# Patient Record
Sex: FEMALE | Race: WHITE | Employment: OTHER | URBAN - METROPOLITAN AREA
[De-identification: names, ages, dates, MRNs, and addresses within clinical notes are randomized per-mention and may not be internally consistent; named-entity substitution may affect disease eponyms.]

---

## 2020-01-01 ENCOUNTER — CARE COORDINATION (OUTPATIENT)
Dept: SLEEP MEDICINE | Facility: CLINIC | Age: 85
End: 2020-01-01

## 2020-01-01 ENCOUNTER — TELEPHONE (OUTPATIENT)
Dept: INTERNAL MEDICINE | Facility: CLINIC | Age: 85
End: 2020-01-01

## 2020-01-01 ENCOUNTER — MEDICAL CORRESPONDENCE (OUTPATIENT)
Dept: HEALTH INFORMATION MANAGEMENT | Facility: CLINIC | Age: 85
End: 2020-01-01

## 2020-01-01 ENCOUNTER — NURSE TRIAGE (OUTPATIENT)
Dept: NURSING | Facility: CLINIC | Age: 85
End: 2020-01-01

## 2020-01-01 ENCOUNTER — APPOINTMENT (OUTPATIENT)
Dept: OCCUPATIONAL THERAPY | Facility: CLINIC | Age: 85
DRG: 291 | End: 2020-01-01
Payer: MEDICARE

## 2020-01-01 ENCOUNTER — ANCILLARY PROCEDURE (OUTPATIENT)
Dept: GENERAL RADIOLOGY | Facility: CLINIC | Age: 85
End: 2020-01-01
Attending: NURSE PRACTITIONER
Payer: MEDICARE

## 2020-01-01 ENCOUNTER — APPOINTMENT (OUTPATIENT)
Dept: CARDIOLOGY | Facility: CLINIC | Age: 85
DRG: 291 | End: 2020-01-01
Attending: INTERNAL MEDICINE
Payer: MEDICARE

## 2020-01-01 ENCOUNTER — PATIENT OUTREACH (OUTPATIENT)
Dept: CARE COORDINATION | Facility: CLINIC | Age: 85
End: 2020-01-01

## 2020-01-01 ENCOUNTER — VIRTUAL VISIT (OUTPATIENT)
Dept: FAMILY MEDICINE | Facility: CLINIC | Age: 85
End: 2020-01-01
Payer: MEDICARE

## 2020-01-01 ENCOUNTER — TELEPHONE (OUTPATIENT)
Dept: SLEEP MEDICINE | Facility: CLINIC | Age: 85
End: 2020-01-01

## 2020-01-01 ENCOUNTER — DOCUMENTATION ONLY (OUTPATIENT)
Dept: OTHER | Facility: CLINIC | Age: 85
End: 2020-01-01

## 2020-01-01 ENCOUNTER — HOSPITAL ENCOUNTER (OUTPATIENT)
Dept: RESPIRATORY THERAPY | Facility: CLINIC | Age: 85
Discharge: HOME | End: 2020-06-10
Attending: INTERNAL MEDICINE | Admitting: INTERNAL MEDICINE
Payer: MEDICARE

## 2020-01-01 ENCOUNTER — OFFICE VISIT (OUTPATIENT)
Dept: FAMILY MEDICINE | Facility: CLINIC | Age: 85
End: 2020-01-01
Payer: MEDICARE

## 2020-01-01 ENCOUNTER — HOSPITAL ENCOUNTER (EMERGENCY)
Facility: CLINIC | Age: 85
Discharge: HOME | End: 2020-07-20
Attending: EMERGENCY MEDICINE | Admitting: EMERGENCY MEDICINE
Payer: MEDICARE

## 2020-01-01 ENCOUNTER — HOSPITAL ENCOUNTER (INPATIENT)
Facility: CLINIC | Age: 85
LOS: 1 days | Discharge: HOME HEALTH | DRG: 291 | End: 2020-05-23
Attending: NURSE PRACTITIONER | Admitting: INTERNAL MEDICINE
Payer: MEDICARE

## 2020-01-01 ENCOUNTER — APPOINTMENT (OUTPATIENT)
Dept: PHYSICAL THERAPY | Facility: CLINIC | Age: 85
DRG: 291 | End: 2020-01-01
Payer: MEDICARE

## 2020-01-01 ENCOUNTER — DOCUMENTATION ONLY (OUTPATIENT)
Dept: MEDSURG UNIT | Facility: CLINIC | Age: 85
End: 2020-01-01

## 2020-01-01 ENCOUNTER — HOSPITAL ENCOUNTER (OUTPATIENT)
Dept: CT IMAGING | Facility: CLINIC | Age: 85
Discharge: HOME | End: 2020-07-09
Attending: INTERNAL MEDICINE | Admitting: INTERNAL MEDICINE
Payer: MEDICARE

## 2020-01-01 ENCOUNTER — APPOINTMENT (OUTPATIENT)
Dept: CT IMAGING | Facility: CLINIC | Age: 85
End: 2020-01-01
Attending: EMERGENCY MEDICINE
Payer: MEDICARE

## 2020-01-01 ENCOUNTER — VIRTUAL VISIT (OUTPATIENT)
Dept: CARDIOLOGY | Facility: CLINIC | Age: 85
End: 2020-01-01
Attending: INTERNAL MEDICINE
Payer: MEDICARE

## 2020-01-01 ENCOUNTER — APPOINTMENT (OUTPATIENT)
Dept: CT IMAGING | Facility: CLINIC | Age: 85
DRG: 291 | End: 2020-01-01
Attending: NURSE PRACTITIONER
Payer: MEDICARE

## 2020-01-01 ENCOUNTER — APPOINTMENT (OUTPATIENT)
Dept: ULTRASOUND IMAGING | Facility: CLINIC | Age: 85
DRG: 291 | End: 2020-01-01
Attending: NURSE PRACTITIONER
Payer: MEDICARE

## 2020-01-01 ENCOUNTER — HOSPITAL ENCOUNTER (OUTPATIENT)
Dept: RESPIRATORY THERAPY | Facility: CLINIC | Age: 85
End: 2020-07-09
Attending: INTERNAL MEDICINE
Payer: MEDICARE

## 2020-01-01 VITALS
SYSTOLIC BLOOD PRESSURE: 130 MMHG | WEIGHT: 189.38 LBS | BODY MASS INDEX: 31.55 KG/M2 | HEIGHT: 65 IN | HEART RATE: 51 BPM | OXYGEN SATURATION: 95 % | TEMPERATURE: 98.3 F | DIASTOLIC BLOOD PRESSURE: 60 MMHG | RESPIRATION RATE: 18 BRPM

## 2020-01-01 VITALS
OXYGEN SATURATION: 91 % | TEMPERATURE: 97.3 F | SYSTOLIC BLOOD PRESSURE: 126 MMHG | RESPIRATION RATE: 16 BRPM | DIASTOLIC BLOOD PRESSURE: 70 MMHG | WEIGHT: 203 LBS | HEART RATE: 65 BPM

## 2020-01-01 VITALS
DIASTOLIC BLOOD PRESSURE: 72 MMHG | HEART RATE: 86 BPM | SYSTOLIC BLOOD PRESSURE: 124 MMHG | RESPIRATION RATE: 20 BRPM | OXYGEN SATURATION: 97 % | BODY MASS INDEX: 33.12 KG/M2 | WEIGHT: 199 LBS | TEMPERATURE: 98.5 F

## 2020-01-01 VITALS
SYSTOLIC BLOOD PRESSURE: 132 MMHG | BODY MASS INDEX: 33.28 KG/M2 | TEMPERATURE: 97.6 F | DIASTOLIC BLOOD PRESSURE: 80 MMHG | RESPIRATION RATE: 18 BRPM | WEIGHT: 200 LBS | HEART RATE: 66 BPM | OXYGEN SATURATION: 94 %

## 2020-01-01 VITALS — WEIGHT: 198 LBS | BODY MASS INDEX: 32.95 KG/M2

## 2020-01-01 VITALS
BODY MASS INDEX: 35.03 KG/M2 | OXYGEN SATURATION: 98 % | WEIGHT: 210.5 LBS | TEMPERATURE: 97.8 F | DIASTOLIC BLOOD PRESSURE: 62 MMHG | SYSTOLIC BLOOD PRESSURE: 118 MMHG | HEART RATE: 54 BPM

## 2020-01-01 VITALS
TEMPERATURE: 97.4 F | SYSTOLIC BLOOD PRESSURE: 176 MMHG | RESPIRATION RATE: 18 BRPM | HEART RATE: 55 BPM | OXYGEN SATURATION: 100 % | DIASTOLIC BLOOD PRESSURE: 84 MMHG

## 2020-01-01 DIAGNOSIS — J96.12 CHRONIC RESPIRATORY FAILURE WITH HYPERCAPNIA (H): Chronic | ICD-10-CM

## 2020-01-01 DIAGNOSIS — K21.9 GASTROESOPHAGEAL REFLUX DISEASE WITHOUT ESOPHAGITIS: Primary | ICD-10-CM

## 2020-01-01 DIAGNOSIS — S80.12XD HEMATOMA OF LEFT LOWER EXTREMITY, SUBSEQUENT ENCOUNTER: ICD-10-CM

## 2020-01-01 DIAGNOSIS — S80.12XA HEMATOMA OF LEG, LEFT, INITIAL ENCOUNTER: ICD-10-CM

## 2020-01-01 DIAGNOSIS — Z23 NEED FOR PROPHYLACTIC VACCINATION AND INOCULATION AGAINST INFLUENZA: ICD-10-CM

## 2020-01-01 DIAGNOSIS — I10 ESSENTIAL HYPERTENSION: Chronic | ICD-10-CM

## 2020-01-01 DIAGNOSIS — R00.0 WIDE-COMPLEX TACHYCARDIA: Chronic | ICD-10-CM

## 2020-01-01 DIAGNOSIS — Z79.899 ON AMIODARONE THERAPY: ICD-10-CM

## 2020-01-01 DIAGNOSIS — Z86.718 HISTORY OF DVT OF LOWER EXTREMITY: ICD-10-CM

## 2020-01-01 DIAGNOSIS — R06.02 SOB (SHORTNESS OF BREATH): ICD-10-CM

## 2020-01-01 DIAGNOSIS — Z53.9 DIAGNOSIS NOT YET DEFINED: Primary | ICD-10-CM

## 2020-01-01 DIAGNOSIS — I50.31 ACUTE DIASTOLIC HEART FAILURE (H): Primary | ICD-10-CM

## 2020-01-01 DIAGNOSIS — Z87.19 HISTORY OF UPPER GASTROINTESTINAL BLEEDING: ICD-10-CM

## 2020-01-01 DIAGNOSIS — S80.12XD HEMATOMA OF LEFT LOWER EXTREMITY, SUBSEQUENT ENCOUNTER: Primary | ICD-10-CM

## 2020-01-01 DIAGNOSIS — R06.02 SOB (SHORTNESS OF BREATH): Primary | ICD-10-CM

## 2020-01-01 DIAGNOSIS — M79.89 LEG SWELLING: ICD-10-CM

## 2020-01-01 DIAGNOSIS — M79.89 SWELLING OF LOWER EXTREMITY: ICD-10-CM

## 2020-01-01 DIAGNOSIS — M54.10 BACK PAIN WITH RIGHT-SIDED RADICULOPATHY: Primary | ICD-10-CM

## 2020-01-01 DIAGNOSIS — I25.10 CORONARY ARTERY DISEASE INVOLVING NATIVE CORONARY ARTERY OF NATIVE HEART WITHOUT ANGINA PECTORIS: ICD-10-CM

## 2020-01-01 DIAGNOSIS — I65.01 STENOSIS OF RIGHT VERTEBRAL ARTERY: ICD-10-CM

## 2020-01-01 DIAGNOSIS — I10 ESSENTIAL HYPERTENSION: Primary | Chronic | ICD-10-CM

## 2020-01-01 DIAGNOSIS — R94.2 ABNORMAL PFT: ICD-10-CM

## 2020-01-01 DIAGNOSIS — S80.02XA CONTUSION OF LEFT KNEE, INITIAL ENCOUNTER: ICD-10-CM

## 2020-01-01 DIAGNOSIS — R00.0 WIDE-COMPLEX TACHYCARDIA: Primary | ICD-10-CM

## 2020-01-01 DIAGNOSIS — Z79.01 CHRONIC ANTICOAGULATION: ICD-10-CM

## 2020-01-01 DIAGNOSIS — I25.10 CORONARY ARTERY DISEASE INVOLVING NATIVE CORONARY ARTERY OF NATIVE HEART WITHOUT ANGINA PECTORIS: Primary | Chronic | ICD-10-CM

## 2020-01-01 DIAGNOSIS — Z87.39 HISTORY OF DISCITIS: ICD-10-CM

## 2020-01-01 DIAGNOSIS — S83.92XA SPRAIN OF LEFT KNEE, INITIAL ENCOUNTER: ICD-10-CM

## 2020-01-01 DIAGNOSIS — R00.0 WIDE-COMPLEX TACHYCARDIA: ICD-10-CM

## 2020-01-01 DIAGNOSIS — M15.0 PRIMARY OSTEOARTHRITIS INVOLVING MULTIPLE JOINTS: Primary | ICD-10-CM

## 2020-01-01 DIAGNOSIS — Z20.822 ENCOUNTER FOR LABORATORY TESTING FOR COVID-19 VIRUS: ICD-10-CM

## 2020-01-01 DIAGNOSIS — I82.432 ACUTE DEEP VEIN THROMBOSIS (DVT) OF POPLITEAL VEIN OF LEFT LOWER EXTREMITY (H): ICD-10-CM

## 2020-01-01 DIAGNOSIS — Z95.828 PRESENCE OF IVC FILTER: ICD-10-CM

## 2020-01-01 DIAGNOSIS — I50.30 HEART FAILURE WITH PRESERVED EJECTION FRACTION, NYHA CLASS II (H): Primary | ICD-10-CM

## 2020-01-01 DIAGNOSIS — I50.31 ACUTE DIASTOLIC HEART FAILURE (H): ICD-10-CM

## 2020-01-01 DIAGNOSIS — R63.5 WEIGHT GAIN: ICD-10-CM

## 2020-01-01 DIAGNOSIS — I50.30 HEART FAILURE WITH PRESERVED EJECTION FRACTION, NYHA CLASS II (H): ICD-10-CM

## 2020-01-01 DIAGNOSIS — R09.02 HYPOXIA: ICD-10-CM

## 2020-01-01 DIAGNOSIS — J98.6 ELEVATED HEMIDIAPHRAGM: ICD-10-CM

## 2020-01-01 DIAGNOSIS — L30.4 INTERTRIGO: Primary | ICD-10-CM

## 2020-01-01 DIAGNOSIS — I25.10 CORONARY ARTERY DISEASE INVOLVING NATIVE CORONARY ARTERY OF NATIVE HEART WITHOUT ANGINA PECTORIS: Chronic | ICD-10-CM

## 2020-01-01 DIAGNOSIS — R09.02 HYPOXEMIA: Primary | ICD-10-CM

## 2020-01-01 DIAGNOSIS — E78.5 HYPERLIPIDEMIA LDL GOAL <70: ICD-10-CM

## 2020-01-01 DIAGNOSIS — Z79.899 ON AMIODARONE THERAPY: Primary | ICD-10-CM

## 2020-01-01 DIAGNOSIS — G62.9 NEUROPATHY: ICD-10-CM

## 2020-01-01 DIAGNOSIS — G56.02 CARPAL TUNNEL SYNDROME OF LEFT WRIST: ICD-10-CM

## 2020-01-01 LAB
ALBUMIN SERPL-MCNC: 3.2 G/DL (ref 3.4–5)
ALBUMIN SERPL-MCNC: 3.6 G/DL (ref 3.4–5)
ALBUMIN SERPL-MCNC: 3.8 G/DL (ref 3.4–5)
ALBUMIN UR-MCNC: NEGATIVE MG/DL
ALP SERPL-CCNC: 71 U/L (ref 40–150)
ALP SERPL-CCNC: 74 U/L (ref 40–150)
ALP SERPL-CCNC: 83 U/L (ref 40–150)
ALT SERPL W P-5'-P-CCNC: 17 U/L (ref 0–50)
ALT SERPL W P-5'-P-CCNC: 17 U/L (ref 0–50)
ALT SERPL W P-5'-P-CCNC: 19 U/L (ref 0–50)
ALT SERPL W P-5'-P-CCNC: 20 U/L (ref 0–50)
ANION GAP SERPL CALCULATED.3IONS-SCNC: 1 MMOL/L (ref 3–14)
ANION GAP SERPL CALCULATED.3IONS-SCNC: 1 MMOL/L (ref 3–14)
ANION GAP SERPL CALCULATED.3IONS-SCNC: 3 MMOL/L (ref 3–14)
ANION GAP SERPL CALCULATED.3IONS-SCNC: 3 MMOL/L (ref 3–14)
ANION GAP SERPL CALCULATED.3IONS-SCNC: 4 MMOL/L (ref 3–14)
ANION GAP SERPL CALCULATED.3IONS-SCNC: 4 MMOL/L (ref 3–14)
ANION GAP SERPL CALCULATED.3IONS-SCNC: 5 MMOL/L (ref 3–14)
APPEARANCE UR: CLEAR
APTT PPP: 32 SEC (ref 22–37)
AST SERPL W P-5'-P-CCNC: 18 U/L (ref 0–45)
AST SERPL W P-5'-P-CCNC: 19 U/L (ref 0–45)
AST SERPL W P-5'-P-CCNC: 23 U/L (ref 0–45)
BACTERIA SPEC CULT: NO GROWTH
BACTERIA SPEC CULT: NORMAL
BASE EXCESS BLDV CALC-SCNC: 11.2 MMOL/L
BASE EXCESS BLDV CALC-SCNC: 13 MMOL/L
BASE EXCESS BLDV CALC-SCNC: 6 MMOL/L
BASOPHILS # BLD AUTO: 0 10E9/L (ref 0–0.2)
BASOPHILS # BLD AUTO: 0 10E9/L (ref 0–0.2)
BASOPHILS # BLD AUTO: 0.1 10E9/L (ref 0–0.2)
BASOPHILS NFR BLD AUTO: 0.5 %
BASOPHILS NFR BLD AUTO: 0.6 %
BASOPHILS NFR BLD AUTO: 0.9 %
BILIRUB SERPL-MCNC: 0.2 MG/DL (ref 0.2–1.3)
BILIRUB SERPL-MCNC: 0.4 MG/DL (ref 0.2–1.3)
BILIRUB SERPL-MCNC: 0.6 MG/DL (ref 0.2–1.3)
BILIRUB UR QL STRIP: NEGATIVE
BUN SERPL-MCNC: 15 MG/DL (ref 7–30)
BUN SERPL-MCNC: 21 MG/DL (ref 7–30)
BUN SERPL-MCNC: 22 MG/DL (ref 7–30)
BUN SERPL-MCNC: 23 MG/DL (ref 7–30)
BUN SERPL-MCNC: 25 MG/DL (ref 7–30)
BUN SERPL-MCNC: 26 MG/DL (ref 7–30)
BUN SERPL-MCNC: 27 MG/DL (ref 7–30)
CALCIUM SERPL-MCNC: 8.3 MG/DL (ref 8.5–10.1)
CALCIUM SERPL-MCNC: 8.4 MG/DL (ref 8.5–10.1)
CALCIUM SERPL-MCNC: 8.5 MG/DL (ref 8.5–10.1)
CALCIUM SERPL-MCNC: 8.6 MG/DL (ref 8.5–10.1)
CALCIUM SERPL-MCNC: 8.7 MG/DL (ref 8.5–10.1)
CALCIUM SERPL-MCNC: 9 MG/DL (ref 8.5–10.1)
CALCIUM SERPL-MCNC: 9 MG/DL (ref 8.5–10.1)
CHLORIDE SERPL-SCNC: 100 MMOL/L (ref 94–109)
CHLORIDE SERPL-SCNC: 101 MMOL/L (ref 94–109)
CHLORIDE SERPL-SCNC: 101 MMOL/L (ref 94–109)
CHLORIDE SERPL-SCNC: 102 MMOL/L (ref 94–109)
CHLORIDE SERPL-SCNC: 102 MMOL/L (ref 94–109)
CHLORIDE SERPL-SCNC: 103 MMOL/L (ref 94–109)
CHLORIDE SERPL-SCNC: 108 MMOL/L (ref 94–109)
CO2 SERPL-SCNC: 32 MMOL/L (ref 20–32)
CO2 SERPL-SCNC: 34 MMOL/L (ref 20–32)
CO2 SERPL-SCNC: 35 MMOL/L (ref 20–32)
CO2 SERPL-SCNC: 37 MMOL/L (ref 20–32)
CO2 SERPL-SCNC: 38 MMOL/L (ref 20–32)
CO2 SERPL-SCNC: 39 MMOL/L (ref 20–32)
CO2 SERPL-SCNC: 39 MMOL/L (ref 20–32)
COLOR UR AUTO: YELLOW
CREAT BLD-MCNC: 1.1 MG/DL (ref 0.52–1.04)
CREAT SERPL-MCNC: 0.82 MG/DL (ref 0.55–1.02)
CREAT SERPL-MCNC: 0.88 MG/DL (ref 0.52–1.04)
CREAT SERPL-MCNC: 0.91 MG/DL (ref 0.52–1.04)
CREAT SERPL-MCNC: 0.94 MG/DL (ref 0.52–1.04)
CREAT SERPL-MCNC: 1.04 MG/DL (ref 0.52–1.04)
CREAT SERPL-MCNC: 1.16 MG/DL (ref 0.52–1.04)
CREAT SERPL-MCNC: 1.29 MG/DL (ref 0.52–1.04)
CREAT SERPL-MCNC: 1.32 MG/DL (ref 0.52–1.04)
DIFFERENTIAL METHOD BLD: ABNORMAL
DLCOCOR-%PRED-PRE: 73 %
DLCOCOR-PRE: 14.18 ML/MIN/MMHG
DLCOUNC-%PRED-PRE: 70 %
DLCOUNC-PRE: 13.58 ML/MIN/MMHG
DLCOUNC-PRED: 19.25 ML/MIN/MMHG
EJECTION FRACTION: 60 %
EJECTION FRACTION: 65 %
EOSINOPHIL # BLD AUTO: 0.1 10E9/L (ref 0–0.7)
EOSINOPHIL # BLD AUTO: 0.1 10E9/L (ref 0–0.7)
EOSINOPHIL # BLD AUTO: 0.2 10E9/L (ref 0–0.7)
EOSINOPHIL NFR BLD AUTO: 0.6 %
EOSINOPHIL NFR BLD AUTO: 1 %
EOSINOPHIL NFR BLD AUTO: 2.6 %
ERV-%PRED-PRE: 103 %
ERV-PRE: 0.26 L
ERV-PRED: 0.26 L
ERYTHROCYTE [DISTWIDTH] IN BLOOD BY AUTOMATED COUNT: 13.5 % (ref 10–15)
ERYTHROCYTE [DISTWIDTH] IN BLOOD BY AUTOMATED COUNT: 13.9 % (ref 10–15)
ERYTHROCYTE [DISTWIDTH] IN BLOOD BY AUTOMATED COUNT: 13.9 % (ref 10–15)
ERYTHROCYTE [DISTWIDTH] IN BLOOD BY AUTOMATED COUNT: 14 % (ref 10–15)
ERYTHROCYTE [DISTWIDTH] IN BLOOD BY AUTOMATED COUNT: 14.2 % (ref 10–15)
EXPTIME-PRE: 7.73 SEC
FEF2575-%PRED-POST: 74 %
FEF2575-%PRED-PRE: 75 %
FEF2575-POST: 1.12 L/SEC
FEF2575-PRE: 1.13 L/SEC
FEF2575-PRED: 1.51 L/SEC
FEFMAX-%PRED-PRE: 64 %
FEFMAX-PRE: 3 L/SEC
FEFMAX-PRED: 4.65 L/SEC
FEV1-%PRED-PRE: 58 %
FEV1-PRE: 1.12 L
FEV1FEV6-PRE: 80 %
FEV1FEV6-PRED: 77 %
FEV1FVC-PRE: 81 %
FEV1FVC-PRED: 77 %
FEV1SVC-PRE: 101 %
FEV1SVC-PRED: 66 %
FIFMAX-PRE: 1.98 L/SEC
FRCPLETH-%PRED-PRE: 75 %
FRCPLETH-PRE: 2.11 L
FRCPLETH-PRED: 2.78 L
FVC-%PRED-PRE: 54 %
FVC-PRE: 1.38 L
FVC-PRED: 2.55 L
GAW-%PRED-PRE: 34 %
GAW-PRE: 0.36 L/S/CMH2O
GAW-PRED: 1.03 L/S/CMH2O
GFR SERPL CREATININE-BSD FRML MDRD: 37 ML/MIN/{1.73_M2}
GFR SERPL CREATININE-BSD FRML MDRD: 38 ML/MIN/{1.73_M2}
GFR SERPL CREATININE-BSD FRML MDRD: 43 ML/MIN/{1.73_M2}
GFR SERPL CREATININE-BSD FRML MDRD: 47 ML/MIN/{1.73_M2}
GFR SERPL CREATININE-BSD FRML MDRD: 49 ML/MIN/{1.73_M2}
GFR SERPL CREATININE-BSD FRML MDRD: 56 ML/MIN/{1.73_M2}
GFR SERPL CREATININE-BSD FRML MDRD: 58 ML/MIN/{1.73_M2}
GFR SERPL CREATININE-BSD FRML MDRD: 60 ML/MIN/{1.73_M2}
GFR SERPL CREATININE-BSD FRML MDRD: >60 ML/MIN/1.73M2
GLUCOSE SERPL-MCNC: 104 MG/DL (ref 70–180)
GLUCOSE SERPL-MCNC: 77 MG/DL (ref 70–99)
GLUCOSE SERPL-MCNC: 78 MG/DL (ref 70–99)
GLUCOSE SERPL-MCNC: 79 MG/DL (ref 70–99)
GLUCOSE SERPL-MCNC: 80 MG/DL (ref 70–100)
GLUCOSE SERPL-MCNC: 80 MG/DL (ref 70–99)
GLUCOSE SERPL-MCNC: 83 MG/DL (ref 70–99)
GLUCOSE SERPL-MCNC: 88 MG/DL (ref 70–99)
GLUCOSE SERPL-MCNC: 95 MG/DL (ref 70–99)
GLUCOSE UR STRIP-MCNC: NEGATIVE MG/DL
HCO3 BLDV-SCNC: 34 MMOL/L (ref 21–28)
HCO3 BLDV-SCNC: 41 MMOL/L (ref 21–28)
HCO3 BLDV-SCNC: 42 MMOL/L (ref 21–28)
HCT VFR BLD AUTO: 32.9 % (ref 35–47)
HCT VFR BLD AUTO: 33.4 % (ref 35–47)
HCT VFR BLD AUTO: 37.5 % (ref 35–47)
HCT VFR BLD AUTO: 39.1 % (ref 35–47)
HCT VFR BLD AUTO: 40.6 % (ref 35–47)
HGB BLD-MCNC: 10.8 G/DL (ref 11.7–15.7)
HGB BLD-MCNC: 11.6 G/DL (ref 11.7–15.7)
HGB BLD-MCNC: 12.1 G/DL (ref 11.7–15.7)
HGB BLD-MCNC: 9.7 G/DL (ref 11.7–15.7)
HGB BLD-MCNC: 9.8 G/DL (ref 11.7–15.7)
HGB UR QL STRIP: NEGATIVE
IC-%PRED-PRE: 30 %
IC-PRE: 0.8 L
IC-PRED: 2.66 L
IMM GRANULOCYTES # BLD: 0 10E9/L (ref 0–0.4)
IMM GRANULOCYTES # BLD: 0 10E9/L (ref 0–0.4)
IMM GRANULOCYTES NFR BLD: 0.3 %
IMM GRANULOCYTES NFR BLD: 0.3 %
INR PPP: 1.37 (ref 0.86–1.14)
INR PPP: 2 (ref 0.9–1.1)
KETONES UR STRIP-MCNC: NEGATIVE MG/DL
LACTATE BLD-SCNC: 0.7 MMOL/L (ref 0.7–2)
LEUKOCYTE ESTERASE UR QL STRIP: NEGATIVE
LYMPHOCYTES # BLD AUTO: 0.9 10E9/L (ref 0.8–5.3)
LYMPHOCYTES # BLD AUTO: 1.1 10E9/L (ref 0.8–5.3)
LYMPHOCYTES # BLD AUTO: 1.7 10E9/L (ref 0.8–5.3)
LYMPHOCYTES NFR BLD AUTO: 12.2 %
LYMPHOCYTES NFR BLD AUTO: 17.1 %
LYMPHOCYTES NFR BLD AUTO: 25.1 %
Lab: NORMAL
MCH RBC QN AUTO: 28.8 PG (ref 26.5–33)
MCH RBC QN AUTO: 30.8 PG (ref 26.5–33)
MCH RBC QN AUTO: 30.8 PG (ref 26.5–33)
MCH RBC QN AUTO: 30.9 PG (ref 26.5–33)
MCH RBC QN AUTO: 30.9 PG (ref 26.5–33)
MCHC RBC AUTO-ENTMCNC: 28.8 G/DL (ref 31.5–36.5)
MCHC RBC AUTO-ENTMCNC: 29 G/DL (ref 31.5–36.5)
MCHC RBC AUTO-ENTMCNC: 29.7 G/DL (ref 31.5–36.5)
MCHC RBC AUTO-ENTMCNC: 29.8 G/DL (ref 31.5–36.5)
MCHC RBC AUTO-ENTMCNC: 29.8 G/DL (ref 31.5–36.5)
MCV RBC AUTO: 100 FL (ref 78–100)
MCV RBC AUTO: 104 FL (ref 78–100)
MCV RBC AUTO: 106 FL (ref 78–100)
MEP-PRE: 47 CMH2O
MIP-PRE: -32 CMH2O
MONOCYTES # BLD AUTO: 0.7 10E9/L (ref 0–1.3)
MONOCYTES # BLD AUTO: 0.7 10E9/L (ref 0–1.3)
MONOCYTES # BLD AUTO: 0.8 10E9/L (ref 0–1.3)
MONOCYTES NFR BLD AUTO: 11.4 %
MONOCYTES NFR BLD AUTO: 11.7 %
MONOCYTES NFR BLD AUTO: 9.1 %
NEUTROPHILS # BLD AUTO: 4.1 10E9/L (ref 1.6–8.3)
NEUTROPHILS # BLD AUTO: 4.4 10E9/L (ref 1.6–8.3)
NEUTROPHILS # BLD AUTO: 6 10E9/L (ref 1.6–8.3)
NEUTROPHILS NFR BLD AUTO: 59.4 %
NEUTROPHILS NFR BLD AUTO: 69.9 %
NEUTROPHILS NFR BLD AUTO: 77.3 %
NITRATE UR QL: NEGATIVE
NRBC # BLD AUTO: 0 10*3/UL
NRBC # BLD AUTO: 0 10*3/UL
NRBC BLD AUTO-RTO: 0 /100
NRBC BLD AUTO-RTO: 0 /100
NT-PROBNP SERPL-MCNC: 883 PG/ML (ref 0–1800)
NT-PROBNP SERPL-MCNC: 966 PG/ML (ref 0–450)
O2/TOTAL GAS SETTING VFR VENT: 24 %
O2/TOTAL GAS SETTING VFR VENT: ABNORMAL %
O2/TOTAL GAS SETTING VFR VENT: ABNORMAL %
PCO2 BLDV: 69 MM HG (ref 40–50)
PCO2 BLDV: 81 MM HG (ref 40–50)
PCO2 BLDV: 87 MM HG (ref 40–50)
PH BLDV: 7.28 PH (ref 7.32–7.43)
PH BLDV: 7.31 PH (ref 7.32–7.43)
PH BLDV: 7.33 PH (ref 7.32–7.43)
PH UR STRIP: 5 PH (ref 5–7)
PLATELET # BLD AUTO: 183 10E9/L (ref 150–450)
PLATELET # BLD AUTO: 206 10E9/L (ref 150–450)
PLATELET # BLD AUTO: 211 10E9/L (ref 150–450)
PLATELET # BLD AUTO: 227 10E9/L (ref 150–450)
PLATELET # BLD AUTO: 255 10E9/L (ref 150–450)
PO2 BLDV: 29 MM HG (ref 25–47)
PO2 BLDV: 35 MM HG (ref 25–47)
PO2 BLDV: 40 MM HG (ref 25–47)
POTASSIUM SERPL-SCNC: 3.4 MMOL/L (ref 3.4–5.3)
POTASSIUM SERPL-SCNC: 3.7 MMOL/L (ref 3.4–5.3)
POTASSIUM SERPL-SCNC: 3.8 MMOL/L (ref 3.5–5.1)
POTASSIUM SERPL-SCNC: 3.9 MMOL/L (ref 3.4–5.3)
POTASSIUM SERPL-SCNC: 4 MMOL/L (ref 3.4–5.3)
POTASSIUM SERPL-SCNC: 4.1 MMOL/L (ref 3.4–5.3)
POTASSIUM SERPL-SCNC: 4.4 MMOL/L (ref 3.4–5.3)
POTASSIUM SERPL-SCNC: 4.6 MMOL/L (ref 3.4–5.3)
PROT SERPL-MCNC: 6.8 G/DL (ref 6.8–8.8)
PROT SERPL-MCNC: 7.3 G/DL (ref 6.8–8.8)
PROT SERPL-MCNC: 7.4 G/DL (ref 6.8–8.8)
RBC # BLD AUTO: 3.14 10E12/L (ref 3.8–5.2)
RBC # BLD AUTO: 3.18 10E12/L (ref 3.8–5.2)
RBC # BLD AUTO: 3.75 10E12/L (ref 3.8–5.2)
RBC # BLD AUTO: 3.77 10E12/L (ref 3.8–5.2)
RBC # BLD AUTO: 3.92 10E12/L (ref 3.8–5.2)
RVPLETH-%PRED-PRE: 77 %
RVPLETH-PRE: 1.8 L
RVPLETH-PRED: 2.32 L
SARS-COV-2 PCR COMMENT: NORMAL
SARS-COV-2 RNA SPEC QL NAA+PROBE: NEGATIVE
SARS-COV-2 RNA SPEC QL NAA+PROBE: NORMAL
SGAW-%PRED-PRE: 165 %
SGAW-PRE: 0.17 1/CMH2O*S
SGAW-PRED: 0.1 1/CMH2O*S
SODIUM SERPL-SCNC: 139 MMOL/L (ref 133–144)
SODIUM SERPL-SCNC: 140 MMOL/L (ref 133–144)
SODIUM SERPL-SCNC: 141 MMOL/L (ref 133–144)
SODIUM SERPL-SCNC: 142 MMOL/L (ref 133–144)
SODIUM SERPL-SCNC: 142 MMOL/L (ref 133–144)
SODIUM SERPL-SCNC: 144 MMOL/L (ref 133–144)
SODIUM SERPL-SCNC: 144 MMOL/L (ref 133–144)
SOURCE: NORMAL
SP GR UR STRIP: 1.02 (ref 1–1.03)
SPECIMEN SOURCE: NORMAL
SRAW-%PRED-PRE: 125 %
SRAW-PRE: 5.97 CMH2O*S
SRAW-PRED: 4.76 CMH2O*S
TLCPLETH-%PRED-PRE: 56 %
TLCPLETH-PRE: 2.91 L
TLCPLETH-PRED: 5.11 L
TROPONIN I SERPL-MCNC: <0.015 UG/L (ref 0–0.04)
TSH SERPL DL<=0.005 MIU/L-ACNC: 2.33 MU/L (ref 0.4–4)
UROBILINOGEN UR STRIP-MCNC: 0 MG/DL (ref 0–2)
VA-%PRED-PRE: 56 %
VA-PRE: 2.71 L
VC-%PRED-PRE: 38 %
VC-PRE: 1.11 L
VC-PRED: 2.91 L
WBC # BLD AUTO: 5.7 10E9/L (ref 4–11)
WBC # BLD AUTO: 6.3 10E9/L (ref 4–11)
WBC # BLD AUTO: 6.9 10E9/L (ref 4–11)
WBC # BLD AUTO: 7.7 10E9/L (ref 4–11)
WBC # BLD AUTO: 7.8 10E9/L (ref 4–11)

## 2020-01-01 PROCEDURE — 93325 DOPPLER ECHO COLOR FLOW MAPG: CPT | Mod: 26 | Performed by: INTERNAL MEDICINE

## 2020-01-01 PROCEDURE — 25000128 H RX IP 250 OP 636: Performed by: INTERNAL MEDICINE

## 2020-01-01 PROCEDURE — 82565 ASSAY OF CREATININE: CPT

## 2020-01-01 PROCEDURE — 36415 COLL VENOUS BLD VENIPUNCTURE: CPT | Performed by: NURSE PRACTITIONER

## 2020-01-01 PROCEDURE — 99495 TRANSJ CARE MGMT MOD F2F 14D: CPT | Performed by: INTERNAL MEDICINE

## 2020-01-01 PROCEDURE — 99285 EMERGENCY DEPT VISIT HI MDM: CPT | Mod: 25 | Performed by: EMERGENCY MEDICINE

## 2020-01-01 PROCEDURE — 71046 X-RAY EXAM CHEST 2 VIEWS: CPT | Performed by: RADIOLOGY

## 2020-01-01 PROCEDURE — 71260 CT THORAX DX C+: CPT

## 2020-01-01 PROCEDURE — 25000128 H RX IP 250 OP 636: Performed by: NURSE PRACTITIONER

## 2020-01-01 PROCEDURE — 85027 COMPLETE CBC AUTOMATED: CPT | Performed by: INTERNAL MEDICINE

## 2020-01-01 PROCEDURE — 97161 PT EVAL LOW COMPLEX 20 MIN: CPT | Mod: GP | Performed by: PHYSICAL THERAPIST

## 2020-01-01 PROCEDURE — 99204 OFFICE O/P NEW MOD 45 MIN: CPT | Mod: 95 | Performed by: INTERNAL MEDICINE

## 2020-01-01 PROCEDURE — 93321 DOPPLER ECHO F-UP/LMTD STD: CPT

## 2020-01-01 PROCEDURE — 99284 EMERGENCY DEPT VISIT MOD MDM: CPT | Mod: Z6 | Performed by: EMERGENCY MEDICINE

## 2020-01-01 PROCEDURE — 40000809 ZZH STATISTIC NO DOCUMENTATION TO SUPPORT CHARGE

## 2020-01-01 PROCEDURE — 87040 BLOOD CULTURE FOR BACTERIA: CPT | Performed by: NURSE PRACTITIONER

## 2020-01-01 PROCEDURE — 94060 EVALUATION OF WHEEZING: CPT

## 2020-01-01 PROCEDURE — 99204 OFFICE O/P NEW MOD 45 MIN: CPT | Performed by: INTERNAL MEDICINE

## 2020-01-01 PROCEDURE — 85025 COMPLETE CBC W/AUTO DIFF WBC: CPT | Performed by: NURSE PRACTITIONER

## 2020-01-01 PROCEDURE — 71275 CT ANGIOGRAPHY CHEST: CPT

## 2020-01-01 PROCEDURE — 99285 EMERGENCY DEPT VISIT HI MDM: CPT | Mod: 25 | Performed by: NURSE PRACTITIONER

## 2020-01-01 PROCEDURE — 83880 ASSAY OF NATRIURETIC PEPTIDE: CPT | Performed by: NURSE PRACTITIONER

## 2020-01-01 PROCEDURE — 80053 COMPREHEN METABOLIC PANEL: CPT | Performed by: NURSE PRACTITIONER

## 2020-01-01 PROCEDURE — 99225 ZZC SUBSEQUENT OBSERVATION CARE,LEVEL II: CPT | Performed by: INTERNAL MEDICINE

## 2020-01-01 PROCEDURE — 94762 N-INVAS EAR/PLS OXIMTRY CONT: CPT

## 2020-01-01 PROCEDURE — 80048 BASIC METABOLIC PNL TOTAL CA: CPT | Performed by: INTERNAL MEDICINE

## 2020-01-01 PROCEDURE — 85025 COMPLETE CBC W/AUTO DIFF WBC: CPT | Performed by: EMERGENCY MEDICINE

## 2020-01-01 PROCEDURE — G0180 MD CERTIFICATION HHA PATIENT: HCPCS | Performed by: FAMILY MEDICINE

## 2020-01-01 PROCEDURE — 25000125 ZZHC RX 250: Performed by: INTERNAL MEDICINE

## 2020-01-01 PROCEDURE — 73700 CT LOWER EXTREMITY W/O DYE: CPT | Mod: LT

## 2020-01-01 PROCEDURE — 93970 EXTREMITY STUDY: CPT

## 2020-01-01 PROCEDURE — 94726 PLETHYSMOGRAPHY LUNG VOLUMES: CPT | Mod: 26 | Performed by: INTERNAL MEDICINE

## 2020-01-01 PROCEDURE — 81003 URINALYSIS AUTO W/O SCOPE: CPT | Performed by: NURSE PRACTITIONER

## 2020-01-01 PROCEDURE — 93321 DOPPLER ECHO F-UP/LMTD STD: CPT | Mod: 26 | Performed by: INTERNAL MEDICINE

## 2020-01-01 PROCEDURE — 36415 COLL VENOUS BLD VENIPUNCTURE: CPT | Performed by: INTERNAL MEDICINE

## 2020-01-01 PROCEDURE — 93010 ELECTROCARDIOGRAM REPORT: CPT | Mod: Z6 | Performed by: NURSE PRACTITIONER

## 2020-01-01 PROCEDURE — 94729 DIFFUSING CAPACITY: CPT

## 2020-01-01 PROCEDURE — 94726 PLETHYSMOGRAPHY LUNG VOLUMES: CPT

## 2020-01-01 PROCEDURE — 80053 COMPREHEN METABOLIC PANEL: CPT | Performed by: EMERGENCY MEDICINE

## 2020-01-01 PROCEDURE — 84443 ASSAY THYROID STIM HORMONE: CPT | Performed by: INTERNAL MEDICINE

## 2020-01-01 PROCEDURE — 85610 PROTHROMBIN TIME: CPT | Performed by: EMERGENCY MEDICINE

## 2020-01-01 PROCEDURE — 82803 BLOOD GASES ANY COMBINATION: CPT | Performed by: NURSE PRACTITIONER

## 2020-01-01 PROCEDURE — 40000274 ZZH STATISTIC RCP CONSULT EA 30 MIN

## 2020-01-01 PROCEDURE — 25000132 ZZH RX MED GY IP 250 OP 250 PS 637: Mod: GY | Performed by: INTERNAL MEDICINE

## 2020-01-01 PROCEDURE — 99239 HOSP IP/OBS DSCHRG MGMT >30: CPT | Performed by: INTERNAL MEDICINE

## 2020-01-01 PROCEDURE — 82803 BLOOD GASES ANY COMBINATION: CPT | Performed by: INTERNAL MEDICINE

## 2020-01-01 PROCEDURE — 94799 UNLISTED PULMONARY SVC/PX: CPT

## 2020-01-01 PROCEDURE — 25000125 ZZHC RX 250: Performed by: NURSE PRACTITIONER

## 2020-01-01 PROCEDURE — 99214 OFFICE O/P EST MOD 30 MIN: CPT | Performed by: INTERNAL MEDICINE

## 2020-01-01 PROCEDURE — 99214 OFFICE O/P EST MOD 30 MIN: CPT | Mod: 25 | Performed by: NURSE PRACTITIONER

## 2020-01-01 PROCEDURE — 83605 ASSAY OF LACTIC ACID: CPT | Performed by: NURSE PRACTITIONER

## 2020-01-01 PROCEDURE — 99233 SBSQ HOSP IP/OBS HIGH 50: CPT | Performed by: INTERNAL MEDICINE

## 2020-01-01 PROCEDURE — 96376 TX/PRO/DX INJ SAME DRUG ADON: CPT

## 2020-01-01 PROCEDURE — 93308 TTE F-UP OR LMTD: CPT | Mod: 26 | Performed by: INTERNAL MEDICINE

## 2020-01-01 PROCEDURE — G0378 HOSPITAL OBSERVATION PER HR: HCPCS

## 2020-01-01 PROCEDURE — 40000894 ZZH STATISTIC OT IP EVAL DEFER

## 2020-01-01 PROCEDURE — 94060 EVALUATION OF WHEEZING: CPT | Mod: 26 | Performed by: INTERNAL MEDICINE

## 2020-01-01 PROCEDURE — 90662 IIV NO PRSV INCREASED AG IM: CPT | Performed by: NURSE PRACTITIONER

## 2020-01-01 PROCEDURE — 99213 OFFICE O/P EST LOW 20 MIN: CPT | Mod: 95 | Performed by: INTERNAL MEDICINE

## 2020-01-01 PROCEDURE — 99219 ZZC INITIAL OBSERVATION CARE,LEVL II: CPT | Performed by: INTERNAL MEDICINE

## 2020-01-01 PROCEDURE — 12000000 ZZH R&B MED SURG/OB

## 2020-01-01 PROCEDURE — 99207 ZZC CDG-CODE CATEGORY CHANGED: CPT | Performed by: INTERNAL MEDICINE

## 2020-01-01 PROCEDURE — 40000275 ZZH STATISTIC RCP TIME EA 10 MIN

## 2020-01-01 PROCEDURE — G0008 ADMIN INFLUENZA VIRUS VAC: HCPCS | Performed by: NURSE PRACTITIONER

## 2020-01-01 PROCEDURE — 84484 ASSAY OF TROPONIN QUANT: CPT | Performed by: NURSE PRACTITIONER

## 2020-01-01 PROCEDURE — 84460 ALANINE AMINO (ALT) (SGPT): CPT | Performed by: INTERNAL MEDICINE

## 2020-01-01 PROCEDURE — 93005 ELECTROCARDIOGRAM TRACING: CPT | Performed by: NURSE PRACTITIONER

## 2020-01-01 PROCEDURE — 70450 CT HEAD/BRAIN W/O DYE: CPT

## 2020-01-01 PROCEDURE — 97165 OT EVAL LOW COMPLEX 30 MIN: CPT | Mod: GO

## 2020-01-01 PROCEDURE — 94729 DIFFUSING CAPACITY: CPT | Mod: 26 | Performed by: INTERNAL MEDICINE

## 2020-01-01 PROCEDURE — 99214 OFFICE O/P EST MOD 30 MIN: CPT | Mod: 95 | Performed by: INTERNAL MEDICINE

## 2020-01-01 PROCEDURE — 96374 THER/PROPH/DIAG INJ IV PUSH: CPT | Mod: 59 | Performed by: NURSE PRACTITIONER

## 2020-01-01 PROCEDURE — G0179 MD RECERTIFICATION HHA PT: HCPCS | Performed by: INTERNAL MEDICINE

## 2020-01-01 PROCEDURE — 87086 URINE CULTURE/COLONY COUNT: CPT | Performed by: NURSE PRACTITIONER

## 2020-01-01 PROCEDURE — 85730 THROMBOPLASTIN TIME PARTIAL: CPT | Performed by: EMERGENCY MEDICINE

## 2020-01-01 RX ORDER — LOPERAMIDE HYDROCHLORIDE 2 MG/1
2 TABLET ORAL EVERY 4 HOURS PRN
COMMUNITY
Start: 2020-01-01 | End: 2021-01-01

## 2020-01-01 RX ORDER — IOPAMIDOL 755 MG/ML
77 INJECTION, SOLUTION INTRAVASCULAR ONCE
Status: COMPLETED | OUTPATIENT
Start: 2020-01-01 | End: 2020-01-01

## 2020-01-01 RX ORDER — METOPROLOL SUCCINATE 50 MG/1
50 TABLET, EXTENDED RELEASE ORAL DAILY
Status: DISCONTINUED | OUTPATIENT
Start: 2020-01-01 | End: 2020-01-01 | Stop reason: HOSPADM

## 2020-01-01 RX ORDER — OXYCODONE AND ACETAMINOPHEN 7.5; 325 MG/1; MG/1
1 TABLET ORAL AT BEDTIME
Refills: 0 | COMMUNITY
Start: 2020-01-01 | End: 2020-01-01

## 2020-01-01 RX ORDER — ATORVASTATIN CALCIUM 20 MG/1
40 TABLET, FILM COATED ORAL DAILY
Status: DISCONTINUED | OUTPATIENT
Start: 2020-01-01 | End: 2020-01-01 | Stop reason: HOSPADM

## 2020-01-01 RX ORDER — OXYCODONE AND ACETAMINOPHEN 5; 325 MG/1; MG/1
1 TABLET ORAL DAILY PRN
Qty: 7 TABLET | Refills: 0 | Status: SHIPPED | OUTPATIENT
Start: 2020-01-01 | End: 2020-01-01

## 2020-01-01 RX ORDER — FUROSEMIDE 40 MG
40 TABLET ORAL DAILY
Qty: 30 TABLET | Refills: 1 | Status: SHIPPED | OUTPATIENT
Start: 2020-01-01 | End: 2020-01-01

## 2020-01-01 RX ORDER — LOSARTAN POTASSIUM 25 MG/1
25 TABLET ORAL DAILY
COMMUNITY
Start: 2020-01-01 | End: 2021-01-01

## 2020-01-01 RX ORDER — LEVOTHYROXINE SODIUM 75 UG/1
75 TABLET ORAL DAILY
COMMUNITY
Start: 2020-01-01

## 2020-01-01 RX ORDER — FUROSEMIDE 40 MG
40 TABLET ORAL DAILY PRN
Qty: 30 TABLET | Refills: 1 | Status: SHIPPED | OUTPATIENT
Start: 2020-01-01 | End: 2020-01-01

## 2020-01-01 RX ORDER — METOPROLOL SUCCINATE 25 MG/1
25 TABLET, EXTENDED RELEASE ORAL DAILY
Qty: 90 TABLET | Refills: 3
Start: 2020-01-01 | End: 2020-01-01

## 2020-01-01 RX ORDER — AMIODARONE HYDROCHLORIDE 200 MG/1
200 TABLET ORAL DAILY
Qty: 90 TABLET | Refills: 0 | Status: SHIPPED | OUTPATIENT
Start: 2020-01-01 | End: 2021-01-01

## 2020-01-01 RX ORDER — NAPHAZOLINE/ZINC SULF/GLYCERIN 0.012-0.25
1-2 DROPS OPHTHALMIC (EYE) 4 TIMES DAILY PRN
COMMUNITY
Start: 2020-01-01 | End: 2021-01-01

## 2020-01-01 RX ORDER — METOPROLOL SUCCINATE 50 MG/1
50 TABLET, EXTENDED RELEASE ORAL DAILY
COMMUNITY
Start: 2020-01-01 | End: 2020-01-01

## 2020-01-01 RX ORDER — AMIODARONE HYDROCHLORIDE 200 MG/1
200 TABLET ORAL DAILY
Qty: 90 TABLET | Refills: 0 | Status: SHIPPED | OUTPATIENT
Start: 2020-01-01 | End: 2020-01-01

## 2020-01-01 RX ORDER — GABAPENTIN 600 MG/1
600 TABLET ORAL AT BEDTIME
COMMUNITY
Start: 2020-01-01 | End: 2020-01-01

## 2020-01-01 RX ORDER — OMEPRAZOLE 40 MG/1
40 CAPSULE, DELAYED RELEASE ORAL DAILY
Qty: 30 CAPSULE | Refills: 0 | Status: SHIPPED | OUTPATIENT
Start: 2020-01-01 | End: 2020-01-01

## 2020-01-01 RX ORDER — NYSTATIN 100000 U/G
CREAM TOPICAL 2 TIMES DAILY
Status: DISCONTINUED | OUTPATIENT
Start: 2020-01-01 | End: 2020-01-01 | Stop reason: HOSPADM

## 2020-01-01 RX ORDER — OXYCODONE AND ACETAMINOPHEN 5; 325 MG/1; MG/1
1 TABLET ORAL DAILY PRN
Qty: 20 TABLET | Refills: 0 | Status: SHIPPED | OUTPATIENT
Start: 2020-01-01 | End: 2020-01-01

## 2020-01-01 RX ORDER — GABAPENTIN 600 MG/1
600 TABLET ORAL 2 TIMES DAILY
Qty: 180 TABLET | Refills: 3 | Status: SHIPPED | OUTPATIENT
Start: 2020-01-01

## 2020-01-01 RX ORDER — CEPHALEXIN 500 MG/1
500 CAPSULE ORAL 4 TIMES DAILY
Qty: 40 CAPSULE | Refills: 0 | Status: SHIPPED | OUTPATIENT
Start: 2020-01-01 | End: 2020-01-01

## 2020-01-01 RX ORDER — POLYETHYLENE GLYCOL 3350 17 G/17G
1 POWDER, FOR SOLUTION ORAL DAILY PRN
COMMUNITY
Start: 2020-01-01 | End: 2021-01-01

## 2020-01-01 RX ORDER — BENZOCAINE/MENTHOL 6 MG-10 MG
LOZENGE MUCOUS MEMBRANE 4 TIMES DAILY PRN
Status: ON HOLD | COMMUNITY
Start: 2020-01-01 | End: 2020-01-01

## 2020-01-01 RX ORDER — FOLIC ACID 1 MG/1
1 TABLET ORAL DAILY
COMMUNITY
Start: 2020-01-01

## 2020-01-01 RX ORDER — LEVOTHYROXINE SODIUM 75 UG/1
75 TABLET ORAL DAILY
Status: DISCONTINUED | OUTPATIENT
Start: 2020-01-01 | End: 2020-01-01 | Stop reason: HOSPADM

## 2020-01-01 RX ORDER — NYSTATIN 100000 U/G
CREAM TOPICAL 2 TIMES DAILY
Qty: 30 G | Refills: 11 | Status: SHIPPED | OUTPATIENT
Start: 2020-01-01

## 2020-01-01 RX ORDER — FUROSEMIDE 10 MG/ML
40 INJECTION INTRAMUSCULAR; INTRAVENOUS ONCE
Status: COMPLETED | OUTPATIENT
Start: 2020-01-01 | End: 2020-01-01

## 2020-01-01 RX ORDER — GABAPENTIN 600 MG/1
600 TABLET ORAL AT BEDTIME
Status: DISCONTINUED | OUTPATIENT
Start: 2020-01-01 | End: 2020-01-01 | Stop reason: HOSPADM

## 2020-01-01 RX ORDER — FUROSEMIDE 20 MG
TABLET ORAL
Qty: 30 TABLET | Refills: 11 | Status: SHIPPED | OUTPATIENT
Start: 2020-01-01 | End: 2020-01-01

## 2020-01-01 RX ORDER — ALBUTEROL SULFATE 0.83 MG/ML
2.5 SOLUTION RESPIRATORY (INHALATION) ONCE
Status: COMPLETED | OUTPATIENT
Start: 2020-01-01 | End: 2020-01-01

## 2020-01-01 RX ORDER — OXYCODONE AND ACETAMINOPHEN 5; 325 MG/1; MG/1
1 TABLET ORAL 2 TIMES DAILY PRN
Qty: 14 TABLET | Refills: 0 | Status: SHIPPED | OUTPATIENT
Start: 2020-01-01 | End: 2021-01-01

## 2020-01-01 RX ORDER — SIMETHICONE 125 MG
125-250 TABLET,CHEWABLE ORAL PRN
COMMUNITY
Start: 2020-01-01 | End: 2021-01-01

## 2020-01-01 RX ORDER — LOSARTAN POTASSIUM 25 MG/1
25 TABLET ORAL DAILY
Status: DISCONTINUED | OUTPATIENT
Start: 2020-01-01 | End: 2020-01-01 | Stop reason: HOSPADM

## 2020-01-01 RX ORDER — ZINC OXIDE
OINTMENT (GRAM) TOPICAL PRN
COMMUNITY
Start: 2020-01-01 | End: 2021-01-01

## 2020-01-01 RX ORDER — BENZOCAINE/MENTHOL 6 MG-10 MG
LOZENGE MUCOUS MEMBRANE 3 TIMES DAILY PRN
COMMUNITY
Start: 2020-01-01 | End: 2021-01-01

## 2020-01-01 RX ORDER — ACETAMINOPHEN 500 MG
1000 TABLET ORAL
COMMUNITY
Start: 2020-01-01 | End: 2020-01-01

## 2020-01-01 RX ORDER — FUROSEMIDE 20 MG
TABLET ORAL
Qty: 360 TABLET | Refills: 11 | Status: ON HOLD | OUTPATIENT
Start: 2020-01-01 | End: 2021-01-01

## 2020-01-01 RX ORDER — AMIODARONE HYDROCHLORIDE 200 MG/1
200 TABLET ORAL DAILY
Qty: 30 TABLET | Refills: 0 | Status: SHIPPED | OUTPATIENT
Start: 2020-01-01 | End: 2020-01-01

## 2020-01-01 RX ORDER — AMIODARONE HYDROCHLORIDE 400 MG/1
400 TABLET ORAL DAILY
Qty: 21 TABLET | Refills: 0 | Status: ON HOLD | COMMUNITY
Start: 2020-01-01 | End: 2020-01-01

## 2020-01-01 RX ORDER — METOPROLOL SUCCINATE 25 MG/1
25 TABLET, EXTENDED RELEASE ORAL DAILY
Qty: 90 TABLET | Refills: 3 | Status: SHIPPED | OUTPATIENT
Start: 2020-01-01 | End: 2020-01-01

## 2020-01-01 RX ORDER — NYSTATIN 100000 U/G
CREAM TOPICAL 2 TIMES DAILY
COMMUNITY
Start: 2020-01-01 | End: 2020-01-01

## 2020-01-01 RX ORDER — ACETAMINOPHEN 500 MG
1000 TABLET ORAL 3 TIMES DAILY
Status: SHIPPED | DISCHARGE
Start: 2020-01-01

## 2020-01-01 RX ORDER — OMEPRAZOLE 40 MG/1
40 CAPSULE, DELAYED RELEASE ORAL DAILY
COMMUNITY
End: 2020-01-01

## 2020-01-01 RX ORDER — FUROSEMIDE 10 MG/ML
20 INJECTION INTRAMUSCULAR; INTRAVENOUS ONCE
Status: COMPLETED | OUTPATIENT
Start: 2020-01-01 | End: 2020-01-01

## 2020-01-01 RX ORDER — ACETAMINOPHEN 500 MG
1000 TABLET ORAL
Status: DISCONTINUED | OUTPATIENT
Start: 2020-01-01 | End: 2020-01-01 | Stop reason: HOSPADM

## 2020-01-01 RX ORDER — TRIAMCINOLONE ACETONIDE 1 MG/G
CREAM TOPICAL 2 TIMES DAILY PRN
COMMUNITY
Start: 2020-01-01 | End: 2021-01-01

## 2020-01-01 RX ORDER — AMIODARONE HYDROCHLORIDE 200 MG/1
200 TABLET ORAL DAILY
Status: DISCONTINUED | OUTPATIENT
Start: 2020-01-01 | End: 2020-01-01 | Stop reason: HOSPADM

## 2020-01-01 RX ORDER — OMEPRAZOLE 40 MG/1
CAPSULE, DELAYED RELEASE ORAL
Qty: 30 CAPSULE | Refills: 0 | Status: ON HOLD | OUTPATIENT
Start: 2020-01-01 | End: 2021-01-01

## 2020-01-01 RX ORDER — ATORVASTATIN CALCIUM 40 MG/1
40 TABLET, FILM COATED ORAL DAILY
Qty: 90 TABLET | Refills: 3 | Status: SHIPPED | OUTPATIENT
Start: 2020-01-01 | End: 2021-01-01

## 2020-01-01 RX ORDER — FUROSEMIDE 20 MG
20 TABLET ORAL DAILY
Qty: 3 TABLET | Refills: 0 | Status: ON HOLD | OUTPATIENT
Start: 2020-01-01 | End: 2020-01-01

## 2020-01-01 RX ORDER — SULFAMETHOXAZOLE/TRIMETHOPRIM 800-160 MG
1 TABLET ORAL 2 TIMES DAILY
Qty: 10 TABLET | Refills: 0 | COMMUNITY
Start: 2020-01-01 | End: 2021-01-01

## 2020-01-01 RX ORDER — FUROSEMIDE 20 MG
20 TABLET ORAL DAILY PRN
Qty: 30 TABLET | Refills: 0 | Status: SHIPPED | OUTPATIENT
Start: 2020-01-01 | End: 2020-01-01

## 2020-01-01 RX ORDER — IOPAMIDOL 755 MG/ML
80 INJECTION, SOLUTION INTRAVASCULAR ONCE
Status: COMPLETED | OUTPATIENT
Start: 2020-01-01 | End: 2020-01-01

## 2020-01-01 RX ORDER — ZINC ACETATE ANHYDROUS AND ZINC GLUCONATE 2; 1 [HP_X]/1; [HP_X]/1
1 TABLET ORAL
COMMUNITY
Start: 2020-01-01 | End: 2021-01-01

## 2020-01-01 RX ADMIN — MICONAZOLE NITRATE: 20 POWDER TOPICAL at 08:37

## 2020-01-01 RX ADMIN — LOSARTAN POTASSIUM 25 MG: 25 TABLET, FILM COATED ORAL at 08:45

## 2020-01-01 RX ADMIN — AMIODARONE HYDROCHLORIDE 200 MG: 200 TABLET ORAL at 08:37

## 2020-01-01 RX ADMIN — LOSARTAN POTASSIUM 25 MG: 25 TABLET, FILM COATED ORAL at 20:26

## 2020-01-01 RX ADMIN — FUROSEMIDE 40 MG: 10 INJECTION, SOLUTION INTRAVENOUS at 14:30

## 2020-01-01 RX ADMIN — FUROSEMIDE 20 MG: 10 INJECTION, SOLUTION INTRAMUSCULAR; INTRAVENOUS at 20:26

## 2020-01-01 RX ADMIN — AMIODARONE HYDROCHLORIDE 200 MG: 200 TABLET ORAL at 20:27

## 2020-01-01 RX ADMIN — SODIUM CHLORIDE 100 ML: 9 INJECTION, SOLUTION INTRAVENOUS at 10:13

## 2020-01-01 RX ADMIN — ALBUTEROL SULFATE 2.5 MG: 2.5 SOLUTION RESPIRATORY (INHALATION) at 12:15

## 2020-01-01 RX ADMIN — AMIODARONE HYDROCHLORIDE 200 MG: 200 TABLET ORAL at 09:18

## 2020-01-01 RX ADMIN — ATORVASTATIN CALCIUM 40 MG: 20 TABLET, FILM COATED ORAL at 20:27

## 2020-01-01 RX ADMIN — METOPROLOL SUCCINATE 50 MG: 50 TABLET, EXTENDED RELEASE ORAL at 20:27

## 2020-01-01 RX ADMIN — SODIUM CHLORIDE 80 ML: 9 INJECTION, SOLUTION INTRAVENOUS at 13:06

## 2020-01-01 RX ADMIN — ATORVASTATIN CALCIUM 40 MG: 20 TABLET, FILM COATED ORAL at 08:45

## 2020-01-01 RX ADMIN — MICONAZOLE NITRATE: 20 POWDER TOPICAL at 09:18

## 2020-01-01 RX ADMIN — MICONAZOLE NITRATE: 20 POWDER TOPICAL at 10:18

## 2020-01-01 RX ADMIN — IOPAMIDOL 80 ML: 755 INJECTION, SOLUTION INTRAVENOUS at 13:06

## 2020-01-01 RX ADMIN — ACETAMINOPHEN 1000 MG: 500 TABLET, FILM COATED ORAL at 08:44

## 2020-01-01 RX ADMIN — IOPAMIDOL 77 ML: 755 INJECTION, SOLUTION INTRAVENOUS at 10:12

## 2020-01-01 RX ADMIN — GABAPENTIN 600 MG: 600 TABLET, FILM COATED ORAL at 21:22

## 2020-01-01 RX ADMIN — METOPROLOL SUCCINATE 50 MG: 50 TABLET, EXTENDED RELEASE ORAL at 08:37

## 2020-01-01 RX ADMIN — ACETAMINOPHEN 1000 MG: 500 TABLET, FILM COATED ORAL at 20:27

## 2020-01-01 RX ADMIN — LEVOTHYROXINE SODIUM 75 MCG: 75 TABLET ORAL at 08:37

## 2020-01-01 RX ADMIN — ACETAMINOPHEN 1000 MG: 500 TABLET, FILM COATED ORAL at 12:22

## 2020-01-01 RX ADMIN — METOPROLOL SUCCINATE 50 MG: 50 TABLET, EXTENDED RELEASE ORAL at 08:44

## 2020-01-01 RX ADMIN — MICONAZOLE NITRATE: 20 POWDER TOPICAL at 21:23

## 2020-01-01 RX ADMIN — METOPROLOL SUCCINATE 50 MG: 50 TABLET, EXTENDED RELEASE ORAL at 09:18

## 2020-01-01 RX ADMIN — LOSARTAN POTASSIUM 25 MG: 25 TABLET, FILM COATED ORAL at 08:37

## 2020-01-01 RX ADMIN — RIVAROXABAN 20 MG: 10 TABLET, FILM COATED ORAL at 16:55

## 2020-01-01 RX ADMIN — ACETAMINOPHEN 1000 MG: 500 TABLET, FILM COATED ORAL at 09:18

## 2020-01-01 RX ADMIN — ACETAMINOPHEN 1000 MG: 500 TABLET, FILM COATED ORAL at 08:37

## 2020-01-01 RX ADMIN — MICONAZOLE NITRATE: 20 POWDER TOPICAL at 21:46

## 2020-01-01 RX ADMIN — LOSARTAN POTASSIUM 25 MG: 25 TABLET, FILM COATED ORAL at 09:18

## 2020-01-01 RX ADMIN — LEVOTHYROXINE SODIUM 75 MCG: 75 TABLET ORAL at 09:18

## 2020-01-01 RX ADMIN — ACETAMINOPHEN 1000 MG: 500 TABLET, FILM COATED ORAL at 11:59

## 2020-01-01 RX ADMIN — RIVAROXABAN 20 MG: 10 TABLET, FILM COATED ORAL at 18:23

## 2020-01-01 RX ADMIN — ATORVASTATIN CALCIUM 40 MG: 20 TABLET, FILM COATED ORAL at 09:18

## 2020-01-01 RX ADMIN — ACETAMINOPHEN 1000 MG: 500 TABLET, FILM COATED ORAL at 16:55

## 2020-01-01 RX ADMIN — LEVOTHYROXINE SODIUM 75 MCG: 75 TABLET ORAL at 08:44

## 2020-01-01 RX ADMIN — ACETAMINOPHEN 1000 MG: 500 TABLET, FILM COATED ORAL at 18:23

## 2020-01-01 RX ADMIN — RIVAROXABAN 20 MG: 10 TABLET, FILM COATED ORAL at 20:26

## 2020-01-01 RX ADMIN — ACETAMINOPHEN 1000 MG: 500 TABLET, FILM COATED ORAL at 12:02

## 2020-01-01 RX ADMIN — FUROSEMIDE 40 MG: 10 INJECTION, SOLUTION INTRAMUSCULAR; INTRAVENOUS at 12:11

## 2020-01-01 RX ADMIN — GABAPENTIN 600 MG: 600 TABLET, FILM COATED ORAL at 21:46

## 2020-01-01 RX ADMIN — GABAPENTIN 600 MG: 600 TABLET, FILM COATED ORAL at 21:39

## 2020-01-01 RX ADMIN — AMIODARONE HYDROCHLORIDE 200 MG: 200 TABLET ORAL at 08:44

## 2020-01-01 RX ADMIN — ATORVASTATIN CALCIUM 40 MG: 20 TABLET, FILM COATED ORAL at 08:37

## 2020-01-01 SDOH — HEALTH STABILITY: MENTAL HEALTH: HOW OFTEN DO YOU HAVE A DRINK CONTAINING ALCOHOL?: NEVER

## 2020-01-01 ASSESSMENT — ACTIVITIES OF DAILY LIVING (ADL)
ADLS_ACUITY_SCORE: 20

## 2020-01-01 ASSESSMENT — MIFFLIN-ST. JEOR
SCORE: 1342.88
SCORE: 1310.88
SCORE: 1314.88

## 2020-04-22 ENCOUNTER — TRANSFERRED RECORDS (OUTPATIENT)
Dept: HEALTH INFORMATION MANAGEMENT | Facility: CLINIC | Age: 85
End: 2020-04-22

## 2020-05-01 PROBLEM — R00.0 WIDE-COMPLEX TACHYCARDIA: Status: ACTIVE | Noted: 2020-01-01

## 2020-05-01 PROBLEM — I65.01 STENOSIS OF RIGHT VERTEBRAL ARTERY: Status: ACTIVE | Noted: 2020-01-01

## 2020-05-01 PROBLEM — I25.10 CORONARY ARTERY DISEASE INVOLVING NATIVE CORONARY ARTERY OF NATIVE HEART WITHOUT ANGINA PECTORIS: Status: ACTIVE | Noted: 2020-01-01

## 2020-05-01 PROBLEM — Z79.891 LONG TERM (CURRENT) USE OF OPIATE ANALGESIC: Status: RESOLVED | Noted: 2019-09-30 | Resolved: 2020-01-01

## 2020-05-01 PROBLEM — E66.01 MORBID OBESITY WITH BMI OF 40.0-44.9, ADULT (H): Status: ACTIVE | Noted: 2018-09-14

## 2020-05-01 PROBLEM — Z95.828 PRESENCE OF IVC FILTER: Status: ACTIVE | Noted: 2020-01-01

## 2020-05-01 PROBLEM — Z87.39 HISTORY OF DISCITIS: Status: ACTIVE | Noted: 2020-01-01

## 2020-05-01 PROBLEM — M54.17 LUMBOSACRAL RADICULOPATHY AT L5: Status: ACTIVE | Noted: 2017-03-15

## 2020-05-01 PROBLEM — C44.310 BASAL CELL CARCINOMA OF FACE: Status: ACTIVE | Noted: 2017-07-19

## 2020-05-01 PROBLEM — G45.9 TIA (TRANSIENT ISCHEMIC ATTACK): Status: ACTIVE | Noted: 2019-12-26

## 2020-05-01 PROBLEM — Z87.19 HISTORY OF UPPER GASTROINTESTINAL BLEEDING: Status: ACTIVE | Noted: 2020-01-01

## 2020-05-01 NOTE — PROGRESS NOTES
Subjective     Caitlin Sales is a 84 year old female who presents to clinic today for the following health issues:    HPI     Establish care  Pt has signed a PHI today authorizing daugthers : Chante Soliman, Gale Reardon, and Jade Mata to discuss all PHI.  Pt's dtr, Chante is on the care outside the clinic on the phone [phone] in case the provider wants to talk to her as well during the office visit today.    Pt is living in the [facility] - at [address] [place] [id number]  Pt brought a lot forms        Hospital Follow-up Visit:    Hospital/Nursing Home/IP Rehab Facility: [facility]   Date of Admission: 4/22/20  Date of Discharge: 4/28/20  Reason(s) for Admission: Wide-complex tachycardia (HRC);   Cardiomyopathy, unspecified type (HRC)      Was your hospitalization related to COVID-19? No   Problems taking medications regularly:  None  Medication changes since discharge: None  Problems adhering to non-medication therapy:  None    Summary of hospitalization:  CareEverywhere information obtained and reviewed  Diagnostic Tests/Treatments reviewed.  Follow up needed: cards  Other Healthcare Providers Involved in Patient s Care:         None  Update since discharge: improved. Post Discharge Medication Reconciliation: discharge medications reconciled and changed, per note/orders (see AVS).  Plan of care communicated with patient and family          Was living in [facility] and moved to [facility] in [place]. This was 3-4 weeks ago.  Previous PCP with [facility].  Visit was done with daughter Chante on the phone       Final Diagnoses  #Paroxysmal Wide Complex Tachycardia vs atrial fibrillation  #Hx of TIA with possible acute TIA vs syncope  #possible syncope  #Sepsis possibly due to SSTI  #Acute LLE DVT  #Raynaud phenomenon?   #Chronic back pain s/p fusion c/b RLE radiculopathy and diskitis  #Alzheimers Dementia  #CKD  #HTN  #Hypothyroidism      Wide-complex tachycardia:  Needed multiple shocks.  Started on amiodarone.  Converted to sinus.  --On amiodarone 400 mg daily x25 days, then decrease to 200 mg daily  --Needs cardiology follow-up  --Needs LFTs and TSH in 3 months    History of Stroke/TIA: Seen on CT angio.  Has moderate stenosis of the right vertebral artery    DVT: Occlusive left mid to distal femoral vein involving the popliteal vein and nonocclusive clot in the left common femoral and proximal femoral  --On Xarelto 15 mg twice daily x21 days, then 20 mg daily indefinitely  --In the setting of sepsis due to cellulitis  --History of left leg DVT following back surgery in 2013.  --Has IVC filter in place; patient does not report that there were plans to remove     CAD?: Perfusion defect in the inferolateral wall and septal area seen on stress test.  Patient reports no history of acute MI.  --not on statin?  --not on aspirin - risk likely outweighs the benefit due to being on xarelto.  --she does not recall being on a statin    Sepsis possibly due to SSTI. She was noted to have what looks like a cellulitis. She was transitioned to keflex to complete the course  --Has since completed antibiotic    Raynaud phenomenon?  Intermittent hands are cyanotic appearing and cool, then resolves. Per patient this has happened before. Diff dx includes raynaud phenomenon (more likely since wax and wane) vs less likely shower emboli, though if vasculopathic could have PVD. No signs of ongoing critical circulation. If worsens consider UE vascular imaging, vascular surgery consultation.    Chronic back pain s/p fusion c/b RLE radiculopathy and diskitis  --On gabapentin.  Previously on percocet 7.5 mg HS but this was stopped because making her too drowsy.     Alzheimer's Dementia: Recently moved to [facility].  Family and patient deny any formal diagnosis of dementia.  No formal neuropsych testing per patient and daughter Chante  --after visit, PCP notes were reviewed.  In the  "months leading up to [facility] placement, safety was a major concern.  --She had been unintentionally overdosing on her gabapentin and her oxycodone and Tylenol.  She was hiding these medications and would get angry when her family would try to supervise the administration of these medications.  Family was needing to stay with patient 24 hours a day in the several months leading up to her move to the assisted living facility.  Her primary care doctor did indeed give her the diagnosis of Alzheimer's.    Leg swelling: intermittent, but worse since hospital stay.    Code: Full per chart review and discussion with patient.  She reports \"it is selfish, but if there is even a small chance that I would recover, I would want to do everything medically indicated to save my life\"  , has 6 children, all local.  Has many grandchildren and great grandchildren.  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  Hobbies, dance and music.  She plays the piano. Holiness is important, Judaism.    Lifelong non-smoker.  Previous 1 cocktail/night, none in many months.  Has living will.  Jade is financial, New is primary health care agent.  However, per office visit note 2/20/20 with PCP it appears Jade is the healthcare power of         --patient denies chest pain, palpitations, shortness of breath.  Feels the cellulitis of LLE is improved.  Is noting swelling of left hand and bilateral lower extremities    Current Outpatient Medications   Medication Sig Dispense Refill     acetaminophen (TYLENOL) 500 MG tablet Take 2 tablets (1,000 mg) by mouth 3 times daily (with meals)       amiodarone (PACERONE) 400 MG tablet Take 1 tablet (400 mg) by mouth daily 21 tablet 0     folic acid (FOLVITE) 1 MG tablet Take 1 tablet (1 mg) by mouth daily       gabapentin (NEURONTIN) 600 MG tablet Take 1 tablet (600 mg) by mouth At Bedtime       Homeopathic Products (ZICAM COLD REMEDY) TBDP Take 1 lozenge by mouth every 3 hours as needed At onset of cold symptoms   "     hydrocortisone (CORTAID) 1 % external cream Apply topically 3 times daily as needed       hydrocortisone (CORTAID) 1 % external cream Apply topically 4 times daily as needed Apply to itchy skin       Hyprom-Naphaz-Polysorb-Zn Sulf (CLEAR EYES COMPLETE) SOLN Apply 1-2 drops to eye 4 times daily as needed       levothyroxine (SYNTHROID/LEVOTHROID) 75 MCG tablet Take 1 tablet (75 mcg) by mouth daily       loperamide (IMODIUM A-D) 2 MG tablet Take 1 tablet (2 mg) by mouth every 4 hours as needed for diarrhea       losartan (COZAAR) 25 MG tablet Take 1 tablet (25 mg) by mouth daily       metoprolol succinate ER (TOPROL-XL) 50 MG 24 hr tablet Take 1 tablet (50 mg) by mouth daily       nystatin (MYCOSTATIN) 447131 UNIT/GM external cream Apply topically 2 times daily       polyethylene glycol (MIRALAX) 17 GM/SCOOP powder Take 17 g (1 capful) by mouth daily as needed for constipation       rivaroxaban ANTICOAGULANT (XARELTO ANTICOAGULANT) 15 MG TABS tablet Take 1 tablet (15 mg) by mouth 2 times daily (with meals) 32 tablet 0     simethicone (MYLICON) 125 MG chewable tablet Take 1-2 tablets (125-250 mg) by mouth as needed for intestinal gas       sulfamethoxazole-trimethoprim (BACTRIM DS) 800-160 MG tablet Take 1 tablet by mouth 2 times daily Take as needed for UTI sx 10 tablet 0     triamcinolone (KENALOG) 0.1 % external cream Apply topically 2 times daily Apply to hand rahs       zinc oxide (DESITIN) 40 % external ointment Apply topically as needed for dry skin or irritation Apply daily as needed to red chafed skin rash       Angel Carb-Mag Hydrox-Simeth (ROLAIDS ADVANCED PO) Take 2-3 tablets by mouth daily as needed       Methylcobalamin 5000 MCG SUBL Place 1 lozenge under the tongue daily       Multiple Vitamins-Minerals (ZINC) LOZG Take 1 each by mouth every 3 hours as needed           Reviewed and updated as needed this visit by Provider  Tobacco  Meds  Problems  Med Hx  Surg Hx  Fam Hx  Soc Hx      "    COLONOSCOPY 2000   Chronic sigmoid diverticular disease with mild signs of diverticulitis     APPENDECTOMY 2   As a teenager     REMOVAL GALLBLADDER 1967        REMOVAL OF OVARY/TUBE(S) 1967   Left ovary/tube removed S/P tubal pregnancy     DXA AXIAL SKELETON W VERTEBRAL FX ASSESS 2004   Normal, per doctor note     DOPPLER ECHO HEART,COMPLETE 2002        BIOPSY OF UTERUS LINING 2001   Endometrial Biopsy     COLONOSCOPY,REMV LESN,SNARE 2002   With biopsy. Precancerous polyp noted. F/U recommended in 2 years.      COLONOSCOPY,BIOPSY 2007   (needs f/u 2014)Indications: Hx of polyps. Findings: Polyp in the mid transverse colon, severe diverticulosis in the sigmoid, hemorrhoids, otherwise normal colonoscopy.      INJ,NEUROLYTIC,EPID/LUMB/CAUDAL 2010   DI L4-5 and L5-S1 injections. Mcdonough.      BACK SURGERY 2010   X4 at [facility]      VENA CAVA FILTER PLACEMENT    permenant     TOTAL HIP REPLACEMENT 2013 Right            Family History    Medical History Relation Name Comments   GI Disease Other   Family history of colon polyps     Relation Name Status Comments   Brother   Alive     Daughter   Alive     Daughter   Alive     Daughter   Alive     Daughter   Alive     Father    (Age 79)  of AAA, also had HTN, Prostate cancer, age 70s, Colon Polyps   Maternal Grandfather        Maternal Grandmother        Mother    (Age 81) AODM, multiple myeloma, bone, liver and ? lip from \"chew\" age 70s   Other         Paternal Grandfather        Paternal Grandmother        Son   Alive     Son   Alive          Review of Systems   ROS COMP: Constitutional, HEENT, cardiovascular, pulmonary, GI, , musculoskeletal, neuro, skin, endocrine and psych systems are negative, except as otherwise noted.      Objective    /70   Pulse 65   Temp 97.3  F (36.3  C) (Tympanic)   Resp 16   Wt 92.1 kg (203 lb)   SpO2 91% " "  Breastfeeding No   There is no height or weight on file to calculate BMI.  Physical Exam   GENERAL APPEARANCE: alert, no distress and Overweight, wheeled walker   EYES: Eyes grossly normal to inspection, PERRL and conjunctivae and sclerae normal  HENT: MMM  NECK: no adenopathy, no asymmetry, masses, or scars and thyroid normal to palpation  RESP: lungs clear to auscultation - no rales, rhonchi or wheezes  CV: regular rates and rhythm, normal S1 S2, no S3 or S4 and no murmur, click or rub  LYMPHATICS: no cervical adenopathy  3+ pitting edema to bilateral lower legs, 1+ pitting edema to left hand  ABDOMEN: soft, nontender, without hepatosplenomegaly or masses, bowel sounds normal and obese  SKIN: Very thin fragile skin.  Diffuse ecchymosis bilateral arms and legs.  Venous stasis changes bilateral lower legs.  Area of demarcation of the left lower leg is noted with minimal erythema within demarcated area  PSYCH: mentation appears normal and affect normal/bright  MENTAL STATUS EXAM:  Appearance/Behavior: No apparent distress and Neatly groomed  Speech: Normal  Mood/Affect: normal affect  Insight: Poor  Memory: impaired              Assessment & Plan     1. Wide-complex tachycardia (H) - Continue on amiodarone.  Once completes the 400 mg daily will need 200 mg daily.  Needs referral to EP cardiology and she prefers to keep local here at [place]  - CARDIOLOGY EVAL ADULT REFERRAL; Future  - amiodarone (PACERONE) 200 MG tablet; Take 1 tablet (200 mg) by mouth daily  Dispense: 90 tablet; Refill: 0    2. Presence of IVC filter history of DVT in 2010.  The IVC filter has been in place since then.  There is a note from her previous primary care physician that this is a \"permanent\" filter, although further details are unclear    3. Hyperlipidemia LDL goal <70 -Given the abnormal stress test and the evidence of stroke on imaging, it is prudent to start a statin and patient and daughter are agreeable  - atorvastatin (LIPITOR) " 40 MG tablet; Take 1 tablet (40 mg) by mouth daily  Dispense: 90 tablet; Refill: 3    4. Leg swelling - Short course of diuretics.  Longer course increases the risk of bladder infections of which she has had recurrent.  Order for compression stockings  - Miscellaneous Order for DME - ONLY FOR DME  - furosemide (LASIX) 20 MG tablet; Take 1 tablet (20 mg) by mouth daily for 3 days  Dispense: 3 tablet; Refill: 0    5. Acute deep vein thrombosis (DVT) of popliteal vein of left lower extremity (H) second occurrence of DVT.  Needs to remain on anticoagulation indefinitely.-Change to once daily Once the twice daily dosing of Xarelto is complete,  - rivaroxaban ANTICOAGULANT (XARELTO ANTICOAGULANT) 20 MG TABS tablet; Take 1 tablet (20 mg) by mouth daily (with dinner)  Dispense: 90 tablet; Refill: 3    6. Coronary artery disease involving native coronary artery of native heart without angina pectoris -start statin as above    7. History of DVT of lower extremity  .  History of GI bleed on aspirin.  Will need to monitor for bleeding while on the Xarelto.  Did not start aspirin today    8. Stenosis of right vertebral artery -anticoagulation and statin as above    9. History of discitis -complication of lumbar spine surgery in 2010.  Required prolonged antibiotics    10. History of upper gastrointestinal bleeding as above         Patient Instructions   1. Referral to cardiology at [place].  Please cancel [facility] follow-up.   2. follow-up with Dr. Zacarias in 1 month.  We will review your medication list and see about stopping any.  3. Bring copy of living will  4. For the swelling, start Lasix 20 mg once daily x 3 days  5. Order for compression stockings  6. Start atorvastatin 40 mg once daily at bedtime  7. Once the amiodarone 400 mg daily is complete, start 200 mg once daily  8. Once rivaroxaban twice daily is complete, start 20 mg once daily at bedtime.      Return in about 1 month (around 6/1/2020) for Routine  Follow-Up/Med Check.    Abbie Zacarias, [facility]

## 2020-05-01 NOTE — PATIENT INSTRUCTIONS
1. Referral to cardiology at [place].  Please cancel [facility] follow-up.   2. follow-up with Dr. Zacarias in 1 month.  We will review your medication list and see about stopping any.  3. Bring copy of living will  4. For the swelling, start Lasix 20 mg once daily x 3 days  5. Order for compression stockings  6. Start atorvastatin 40 mg once daily at bedtime  7. Once the amiodarone 400 mg daily is complete, start 200 mg once daily  8. Once rivaroxaban twice daily is complete, start 20 mg once daily at bedtime.

## 2020-05-08 NOTE — TELEPHONE ENCOUNTER
Reason for Call:  Other prescription    Detailed comments: Caitlin has intermittently complains of acid reflux.  The last 2 nights she has stated that she has had bile come up.  Ayana from [facility] in [place] is asking if patient can get something for this?  She used to take something but it has been recalled.      Also questions about rivaroxaban dosing.  They need either 1 more 15mg tab or 1 more 20mg tab.  Based on the current order, they are short 1 day before they can refill to new dose of 20mg.        Phone Number Patient can be reached at: Ayana - [phone]    Best Time: Any    Can we leave a detailed message on this number? YES    Call taken on 5/8/2020 at 12:10 PM by Evette Guzman

## 2020-05-12 NOTE — TELEPHONE ENCOUNTER
Physician Order, OT Eval, OT Care Plan, Physician Orders.  Signed, faxed to [phone] and sent to [facility].    Vicki Tavarez on 5/12/2020 at 9:32 AM

## 2020-05-13 NOTE — TELEPHONE ENCOUNTER
Dr. Rell SMITH    When I called to tell Ayana BLACKMON about the DME for stocking she states they are actually 2 pills short on the xarelto.  I refilled another tablet for them.  If this is not ok please let us know. Lauren MARCUS RN

## 2020-05-13 NOTE — TELEPHONE ENCOUNTER
Dr. Zacarias,    I called Ayana at [facility].  She needs one more thing.  A DME for compression stocking to [facility].  She never picked up the ones at [facility].  I have pended for your approval. Lauren MARCUS RN

## 2020-05-13 NOTE — TELEPHONE ENCOUNTER
Reason for Call:  Other     Detailed comments: The pt is missing an dose of the medication of xarelto, and she is wondering if she should have this refilled from today or it was missed another day please advise her.      Phone Number Patient can be reached at: Other phone number:  [phone]    Best Time: any    Can we leave a detailed message on this number? YES    Call taken on 5/13/2020 at 2:06 PM by Lauren Fowler

## 2020-05-13 NOTE — TELEPHONE ENCOUNTER
DETAILED ROUTING HISTORY OF                                             Telephone Encounter for Caitlin Sales                       Created by: Evette Guzman                      on Fri May 8, 2020 12:10 PM        Routed by: Vicki Tavarez                       on Wed May 13, 2020 11:29 AM       Routed to:                       Cassandra: [facility]     on Wed May 13, 2020 11:29 AM                    [facility] Provider Careteam Pool      Opened by: Lauren Wilder, RN *Pool member*   on Wed May 13, 2020 11:32 AM       Pended by: Lauren Wilder, RN *Pool member*   on Wed May 13, 2020 11:32 AM                                       End of Report

## 2020-05-13 NOTE — TELEPHONE ENCOUNTER
Dr. Zacarias,    Looks like this call never got routed to us.  [facility].  Sounds like she was on zantac and needs something else now.  Also they are 1 pill short of the rivaroxaban. Don't understand does she take it with the 20 mg tabs? Lauren MARCUS RN

## 2020-05-13 NOTE — TELEPHONE ENCOUNTER
Ayana RN at [facility] would like to talk to RN about this, she has not heard from us or Dr. Zacarias. Vicki Tavarez on 5/13/2020 at 11:29 AM

## 2020-05-13 NOTE — TELEPHONE ENCOUNTER
Pharmacist called back to confirm order of Xarelto, ONE, pill only.    Yes, this is correct.    Fabienne Agudelo RN

## 2020-05-20 PROBLEM — R09.02 HYPOXIA: Status: ACTIVE | Noted: 2020-01-01

## 2020-05-20 NOTE — ED NOTES
Spoke with Ayana BLACKMON at [facility] [phone] with admission plan. She stated she would inform her daughter

## 2020-05-20 NOTE — LETTER
Transition Communication Hand-off for Care Transitions to Next Level of Care Provider    Name: Caitlin Sales  : 1935  MRN #: [id number]  Primary Care Provider: Abbie Zacarias  Primary Care MD Name: Dr. Zacarias  Primary Clinic: [address] [facility] [address]  Primary Care Clinic Name: [facility]  Reason for Hospitalization:  Hypoxia [R09.02]  Swelling of lower extremity [M79.89]  Admit Date/Time: 2020  8:40 AM  Discharge Date:   Payor Source: Payor: MEDICARE / Plan: MEDICARE / Product Type: Medicare /     Readmission Assessment Measure (ASHLEY) Risk Score/category: average           Reason for Communication Hand-off Referral: Fragility    Discharge Plan: back to [facility]/ home care       Concern for non-adherence with plan of care:   Y/N no  Discharge Needs Assessment:  Needs      Most Recent Value   Equipment Currently Used at Home  walker, rolling   Assisted Living  -- [[facility], [place]]          Follow-up specialty is recommended: Yes    Follow-up plan:    Future Appointments   Date Time Provider Department Center   2020 12:40 PM Abbie Zacarias DO [facility]       Any outstanding tests or procedures:        Referrals     Future Labs/Procedures    Home Care Referral     Comments:    Your provider has referred you to: [facility] [address] [place] Phone: [phone] Fax: [phone]    Extended Emergency Contact Information  Primary Emergency Contact: Sonia Reardon  Mobile Phone: [phone]  Relation: Daughter  Secondary Emergency Contact: Jade Mata  Mobile Phone: [phone]  Relation: Daughter    Patient Anticipated Discharge Date:    RN, PT, HHA to begin 24 - 48 hours after discharge.  PLEASE EVALUATE AND TREAT (Evaluation timeline is 24 - 48 hrs. Please call if there is need for a variance to this timeline).    REASON FOR REFERRAL: resumption   ADDITIONAL SERVICES NEEDED:     OTHER PERTINENT INFORMATION: Patient was last seen by provider  on 5/23 for hypoxia.    No current outpatient medications on file.    Patient Active Problem List:     History of DVT of lower extremity     Neuropathy     S/P hip replacement     Wide-complex tachycardia (H)     Presence of IVC filter     Counseling on health care directive     Back pain with right-sided radiculopathy     CKD (chronic kidney disease) stage 2, GFR 60-89 ml/min     Diverticulosis of colon     Esophageal reflux     Late onset Alzheimer's disease without behavioral disturbance (H)     Lumbosacral radiculopathy at L5     Osteoarthrosis, hip     Osteoporosis     Other iron deficiency anemia     Hypothyroidism     Personal history of colonic polyps     Primary osteoarthritis involving multiple joints     Hyperlipidemia     S/P lumbar spinal fusion     Shoulder pain     Supraventricular tachycardia (H)     History of TIA (transient ischemic attack) and stroke     Vitreous degeneration     Essential hypertension     Coronary artery disease involving native coronary artery of native heart without angina pectoris     Stenosis of right vertebral artery     History of discitis     History of upper gastrointestinal bleeding     Hypoxemia     Class 1 obesity due to excess calories with serious comorbidity in adult     Chronic respiratory failure with hypercapnia (H)     Elevated hemidiaphragm      Documentation of Face to Face and Certification for Home Health Services    I certify that patient, Caitlin Sales is under my care and that I, or a Nurse Practitioner or Physician's Assistant working with me, had a face-to-face encounter that meets the physician face-to-face encounter requirements with this patient on: 5/23/2020.    This encounter with the patient was in whole, or in part, for the following medical condition, which is the primary reason for Home Health Care: hypoxia.    I certify that, based on my findings, the following services are medically necessary Home Health Services: Nursing, Occupational  Therapy and Physical Therapy    My clinical findings support the need for the above services because: Nurse is needed: resumption., Occupational Therapy Services are needed to assess and treat cognitive ability and address ADL safety due to impairment in resumption. and Physical Therapy Services are needed to assess and treat the following functional impairments: resumption.    Further, I certify that my clinical findings support that this patient is homebound (i.e. absences from home require considerable and taxing effort and are for medical reasons or [employer] services or infrequently or of short duration when for other reasons) because: Requires assistance of another person or specialized equipment to access medical services because patient: Requires supervision of another for safe transfer..    Based on the above findings, I certify that this patient is confined to the home and needs intermittent skilled nursing care, physical therapy and/or speech therapy.  The patient is under my care, and I have initiated the establishment of the plan of care.  This patient will be followed by a physician who will periodically review the plan of care.    Physician/Provider to provide follow up care: Abbie Zacarias    [facility] certified Physician at time of discharge: Dr. Iyer    Please be aware that coverage of these services is subject to the terms and limitations of your health insurance plan.  Call member services at your health plan with any benefit or coverage questions.          Supplies     Future Labs/Procedures    Home Oxygen Order for DME - ONLY FOR DME     Process Instructions:    By signing this order, the Authorizing Provider is attesting that they have completed a face-to-face evaluation on the patient to determine their need for this equipment in the last 60 days. A new face-to-face evaluation is required each time  A new prescription for on eo f the specified items is ordered.   If an  additional provider completed the evaluation, please indicate their name below.     **As of 2018, an order requisition and face sheet will print for all DME orders. Please give printed order and face sheet to patient if not obtaining product from [facility] DME closet.           Key Recommendations:  Discharging back to her correction w/ resumed home care, now qualified for home oxygen    Tania Kohler RN    AVS/Discharge Summary is the source of truth; this is a helpful guide for improved communication of patient story

## 2020-05-20 NOTE — ED PROVIDER NOTES
History     Chief Complaint   Patient presents with     Shortness of Breath     saturation of 80% this AM at [facility] center, per staff SOA, patient states was not     Cough     only after eating     HPI  Caitlin Sales is a 84 year old female with past medical history of wide-complex tachycardia approximately 4 weeks ago, DVT in left lower extremity, possible history of Alzheimer's, hypertension who presents to the emergency department from assisted living with concerns of hypoxia.  Reports that the nursing staff came into her room this a.m. and noted her oxygen saturation was 80%.  Patient was reporting that she is feeling fine and denies chest pain, shortness of air.  Patient reports that she has noticed over the past 2 to 3 days that she has developed a cough/chest discomfort after eating and then decided that she was eating too much and decreased her volume of food and those symptoms resolved.  Patient reports pain with pressure and palpation in the lower extremities but otherwise denies any other pain.  In discussion with daughter Gale, they have noticed increased swelling in the bilateral lower extremities.  Patient denies history of heart failure  Patient reports chronic urinary urgency and no change in this and denies hematuria, dysuria.    04/27/2020  Component  Value  Ref Range & Units  Status    Ejection Fraction  60   %  Final         Allergies:  Allergies   Allergen Reactions     Ace Inhibitors Cough     Aspirin      Other reaction(s): GI Bleeding     Citalopram Unknown     Penicillins      Other reaction(s): *Unknown - Childhood Rxn     Tramadol Other (See Comments)     Lethargy     Furosemide Rash     Lisinopril-Hydrochlorothiazide Cough     Torsemide Rash       Problem List:    Patient Active Problem List    Diagnosis Date Noted     Hypoxia 05/20/2020     Priority: Medium     Presence of IVC filter 05/01/2020     Priority: Medium     Discussed considering removal in 6-12 months, post-COVID.    "    Coronary artery disease involving native coronary artery of native heart without angina pectoris 05/01/2020     Priority: Medium     Presumed CAD.  Stress test 4/2020 Perfusion defect in the inferolateral wall and septal area seen on stress test.  Patient reports no history of acute MI.       Stenosis of right vertebral artery 05/01/2020     Priority: Medium     With TIA 4/2020.  Started statin       History of discitis 05/01/2020     Priority: Medium     diskitis and had, I believe, roughly 12 weeks of IV antibiotics and 3 subsequent back fusion surgeries after initial surgery. Eventually, she was infection free but unfortunately she had a fair bit of scar tissue from that that entrapped nerve roots on the right side causing a significant marked right low back pain and right leg radiculopathy         History of upper gastrointestinal bleeding 05/01/2020     Priority: Medium     On aspirin       Wide-complex tachycardia (H) 04/28/2020     Priority: Medium     Late onset Alzheimer's disease without behavioral disturbance (H) 02/20/2020     Priority: Medium     Worsening as of 2/2020       TIA (transient ischemic attack) 12/26/2019     Priority: Medium     Supraventricular tachycardia (H) 01/25/2019     Priority: Medium     HOLTER suggested long runs Vtach.  Cardiac EP eval [facility] dul feels this is SVT with aberancy, beta blocker dose increased.  Stress testing 4/2019 \"most likely\" normal, EF 80%       Morbid obesity with BMI of 40.0-44.9, adult (H) 09/14/2018     Priority: Medium     Basal cell carcinoma of face 07/19/2017     Priority: Medium     Lumbosacral radiculopathy at L5 03/15/2017     Priority: Medium     S/P lumbar spinal fusion 03/15/2017     Priority: Medium     L3-S1       Other specified hypothyroidism 02/15/2016     Priority: Medium     CKD (chronic kidney disease) stage 2, GFR 60-89 ml/min 12/18/2015     Priority: Medium     Other iron deficiency anemia 12/18/2015     Priority: Medium     400 " venofer 12/2019 (noted to be b12, folate AND b12 def at same time)       Pure hypercholesterolemia 12/18/2015     Priority: Medium     Back pain with right-sided radiculopathy 11/20/2015     Priority: Medium     INJECTION Dr Huddleston 3/15/17 modestly helpful.  Fusion Angeline Banks 6/2010-- multiple complications, profound scar tissue entrapping right sided nerves-- chronic pain       Shoulder pain 02/16/2015     Priority: Medium     Severe OA per xr 2015.  Not surg candidate.  Left shoulder inj 2/16/15.  Right shoulder inj 2/16/15, 9/6/2016, 12/7/18, 1/3/2020       History of DVT of lower extremity 09/25/2013     Priority: Medium     ultrasound 4/2020 - Occlusive left mid to distal femoral vein involving the popliteal vein and nonocclusive clot in the left common femoral and proximal femoral  Needs anticoagulation indefinintely.  History of DVT 2013       Neuropathy 09/25/2013     Priority: Medium     S/P hip replacement 09/25/2013     Priority: Medium     Osteoarthrosis, hip 05/02/2012     Priority: Medium     Right hip. Significant OA changes on xrays.  Failed conservative rx, 9/24/13 with Right CHILO/misterling       Routine general medical examination at a health care facility 08/07/2006     Priority: Medium     Pap/mammo via [facility]  IMO Update 10/11       Personal history of colonic polyps 02/06/2006     Priority: Medium     8/05=1 tubulovillous polyp, recheck 9/07=1polyp=benign/5 yr fu  IMO Update 10/11       Diverticulosis of colon 08/05/2005     Priority: Medium     Per colonoscopy  IMO Update 10/11       Esophageal reflux 08/05/2004     Priority: Medium     Osteoporosis 08/05/2004     Priority: Medium     Dexa 10/06 normal, recheck 2 yrs.   Dexa 2/08 good. 1/2011 mild worsening with frax score 10%major/0.8%other. REPEAT 2012^^^^^^^^^^^^^^^^^^^^^       Primary osteoarthritis involving multiple joints 08/05/2004     Priority: Medium     Complex/multiple back surgeries Angeline Banks summer 2010--ultimately  with fusion.  Right knee-hinkle, rooster comb injections-x5 fall 2008.  Right shoulder cortisone 1/08, 10/08, 1/3/20.  Left hip injection 5/2011. Right hip injected mdavis 1/20/11, 3/15/11, also 9/2011 by [facility] (all trochanter bursa), -1/18/12 (r hip again).  Left knee 6/25/14, 9/17/14, 4/24/15.       Essential hypertension 08/05/2004     Priority: Medium     Echo 02/2016-- EF 55-60%, valves ok, right heart ok       Vitreous degeneration 03/14/2002     Priority: Medium     HX of bilateral vitreous detachment       Counseling on health care directive 06/15/2001     Priority: Medium        Past Medical History:    Past Medical History:   Diagnosis Date     CKD (chronic kidney disease) stage 2, GFR 60-89 ml/min 12/18/2015     Coronary artery disease involving native coronary artery of native heart without angina pectoris 5/1/2020     Esophageal reflux 8/5/2004     Essential hypertension 8/5/2004     History of DVT of lower extremity 9/25/2013     Late onset Alzheimer's disease without behavioral disturbance (H) 2/20/2020     Morbid obesity with BMI of 40.0-44.9, adult (H) 9/14/2018     Neuropathy 9/25/2013     Presence of IVC filter 5/1/2020     TIA (transient ischemic attack) 12/26/2019     Wide-complex tachycardia (H) 4/28/2020       Past Surgical History:    Past Surgical History:   Procedure Laterality Date     APPENDECTOMY      teenager     AS LUMBAR SPINE FUSN,POST INTRBDY  2010     AS REVISE TOTAL HIP REPLACEMENT Right 2013     CHOLECYSTECTOMY  1967     ENDOMETRIAL SAMPLING (BIOPSY)  2001     SALPINGO OOPHORECTOMY,R/L/DI Left 1967     VENA CAVA FILTER  2010    Permanent        Family History:    Family History   Problem Relation Age of Onset     Cancer Mother         multiple myeloma     Abdominal Aortic Aneurysm Father      Hypertension Father      Prostate Cancer Father        Social History:  Marital Status:   [5]  Social History     Tobacco Use     Smoking status: Never Smoker      "Smokeless tobacco: Never Used   Substance Use Topics     Alcohol use: Not Currently     Frequency: Never     Drug use: Never        Medications:    No current outpatient medications on file.    Review of Systems  As mentioned above in the history present illness. All other systems were reviewed and are negative.    Physical Exam   BP: (!) 156/84  Pulse: 88  Heart Rate: 84  Temp: 98.2  F (36.8  C)  Resp: 16  Height: 165.1 cm (5' 5\")  Weight: 89.8 kg (198 lb)  SpO2: 91 %      Physical Exam  Vitals signs and nursing note reviewed.   Constitutional:       General: She is not in acute distress.     Appearance: She is well-developed. She is obese. She is not ill-appearing, toxic-appearing or diaphoretic.   HENT:      Head: Normocephalic and atraumatic.      Right Ear: External ear normal.      Left Ear: External ear normal.      Mouth/Throat:      Mouth: Mucous membranes are moist.   Eyes:      General:         Right eye: No discharge.         Left eye: No discharge.      Extraocular Movements: Extraocular movements intact.      Conjunctiva/sclera: Conjunctivae normal.      Pupils: Pupils are equal, round, and reactive to light.   Neck:      Musculoskeletal: Normal range of motion.   Cardiovascular:      Rate and Rhythm: Normal rate and regular rhythm.      Heart sounds: Normal heart sounds. No murmur. No friction rub.   Pulmonary:      Effort: Pulmonary effort is normal. No tachypnea, accessory muscle usage or respiratory distress.      Breath sounds: Normal breath sounds. No stridor. No decreased breath sounds, wheezing or rales.   Chest:      Chest wall: No tenderness or crepitus.   Abdominal:      General: Bowel sounds are normal.      Palpations: Abdomen is soft. There is no mass.      Tenderness: There is no abdominal tenderness. There is no guarding or rebound.   Musculoskeletal:      Right lower leg: Edema (2 pedal to mid shin) present.      Left lower leg: She exhibits tenderness (left lower leg with erythema, " dry scaly skin). Edema (+1 pedal) present.   Skin:     General: Skin is warm and dry.      Capillary Refill: Capillary refill takes less than 2 seconds.      Coloration: Skin is not pale.      Findings: No erythema or rash.   Neurological:      General: No focal deficit present.      Mental Status: She is alert.   Psychiatric:         Mood and Affect: Mood normal.         ED Course        Procedures         EKG Interpretation:      EKG Number: 1  Interpreted by Abbie Chambers, WALT ZAMORA, Ritchie June  Symptoms at time of EKG: None   Rhythm: sinus  Rate: 84  Conduction: concern for  Left bundle branch block   ST Segments/ T Waves: No acute ischemic changes  Comparison to prior: No old EKG available    Clinical Impression: no acute changes and non-specific EKG        Results for orders placed or performed during the hospital encounter of 05/20/20 (from the past 24 hour(s))   CBC with platelets differential   Result Value Ref Range    WBC 6.9 4.0 - 11.0 10e9/L    RBC Count 3.77 (L) 3.8 - 5.2 10e12/L    Hemoglobin 11.6 (L) 11.7 - 15.7 g/dL    Hematocrit 39.1 35.0 - 47.0 %     (H) 78 - 100 fl    MCH 30.8 26.5 - 33.0 pg    MCHC 29.7 (L) 31.5 - 36.5 g/dL    RDW 13.9 10.0 - 15.0 %    Platelet Count 255 150 - 450 10e9/L    Diff Method Automated Method     % Neutrophils 59.4 %    % Lymphocytes 25.1 %    % Monocytes 11.7 %    % Eosinophils 2.6 %    % Basophils 0.9 %    % Immature Granulocytes 0.3 %    Nucleated RBCs 0 0 /100    Absolute Neutrophil 4.1 1.6 - 8.3 10e9/L    Absolute Lymphocytes 1.7 0.8 - 5.3 10e9/L    Absolute Monocytes 0.8 0.0 - 1.3 10e9/L    Absolute Eosinophils 0.2 0.0 - 0.7 10e9/L    Absolute Basophils 0.1 0.0 - 0.2 10e9/L    Abs Immature Granulocytes 0.0 0 - 0.4 10e9/L    Absolute Nucleated RBC 0.0    Comprehensive metabolic panel   Result Value Ref Range    Sodium 144 133 - 144 mmol/L    Potassium 4.0 3.4 - 5.3 mmol/L    Chloride 108 94 - 109 mmol/L    Carbon Dioxide 32 20 - 32 mmol/L    Anion Gap 4  3 - 14 mmol/L    Glucose 83 70 - 99 mg/dL    Urea Nitrogen 23 7 - 30 mg/dL    Creatinine 0.91 0.52 - 1.04 mg/dL    GFR Estimate 58 (L) >60 mL/min/[1.73_m2]    GFR Estimate If Black 67 >60 mL/min/[1.73_m2]    Calcium 8.4 (L) 8.5 - 10.1 mg/dL    Bilirubin Total 0.4 0.2 - 1.3 mg/dL    Albumin 3.2 (L) 3.4 - 5.0 g/dL    Protein Total 6.8 6.8 - 8.8 g/dL    Alkaline Phosphatase 71 40 - 150 U/L    ALT 17 0 - 50 U/L    AST 19 0 - 45 U/L   Troponin I   Result Value Ref Range    Troponin I ES <0.015 0.000 - 0.045 ug/L   Blood gas venous   Result Value Ref Range    Ph Venous 7.31 (L) 7.32 - 7.43 pH    PCO2 Venous 69 (H) 40 - 50 mm Hg    PO2 Venous 29 25 - 47 mm Hg    Bicarbonate Venous 34 (H) 21 - 28 mmol/L    Base Excess Venous 6.0 mmol/L    FIO2 21%    Symptomatic COVID-19 Virus (Coronavirus) by PCR    Specimen: Nasopharyngeal   Result Value Ref Range    COVID-19 Virus PCR to [facility] - Source Nasopharyngeal     COVID-19 Virus PCR to [facility] - Result       Test received-See reflex to IDDL test SARS CoV2 (COVID-19) Virus RT-PCR   Lactic acid whole blood   Result Value Ref Range    Lactic Acid 0.7 0.7 - 2.0 mmol/L   Nt probnp inpatient (BNP)   Result Value Ref Range    N-Terminal Pro BNP Inpatient 883 0 - 1,800 pg/mL   SARS-CoV-2 COVID-19 Virus (Coronavirus) RT-PCR Nasopharyngeal    Specimen: Nasopharyngeal   Result Value Ref Range    SARS-CoV-2 Virus Specimen Source Nasopharyngeal     SARS-CoV-2 PCR Result NEGATIVE     SARS-CoV-2 PCR Comment       The Simplexa COVID-19 direct PCR assay by [employer] on the [employer] instrument has been   given Emergency Use Authorization (EUA) for the in vitro qualitative detection of RNA from   the SARS-CoV2 virus in nasopharyngeal swabs in viral transport medium from patients with   signs and symptoms of infection who are suspected of COVID-19. Performance is unknown in   asymptomatic patients.     Blood culture (one site)    Specimen: Blood    Left Arm   Result Value Ref Range    Specimen  Description Blood Left Arm     Culture Micro PENDING    CT Chest Pulmonary Embolism w Contrast    Narrative    CT CHEST PULMONARY EMBOLISM WITH CONTRAST  5/20/2020 10:24 AM     HISTORY: Hypoxia, known DVT.    COMPARISON: None.    TECHNIQUE: Volumetric helical acquisition of CT images of the chest  from the lung apices to the kidneys were acquired after the  administration of 77 mL Isovue-370 IV contrast. Radiation dose for  this scan was reduced using automated exposure control, adjustment of  the mA and/or kV according to patient size, or iterative  reconstruction technique.    FINDINGS: There is no pulmonary embolism. Somewhat centralized  groundglass changes noted which are nonspecific, pattern suggestive of  edema. Moderate hiatal hernia. Markedly elevated right hemidiaphragm  with presumed associated compressive atelectasis versus less likely  infiltrate. The heart appears enlarged, enlarged main pulmonary artery  which is nonspecific but may indicate pulmonary hypertension. Thoracic  aorta is atherosclerotic without evidence of dissection or aneurysm.  There is no pleural or pericardial effusion. There is no pneumothorax.  Adrenal glands are normal. Remainder of the visualized upper abdomen  is unremarkable.      Impression    IMPRESSION:   1. No pulmonary embolism demonstrated.  2. No thoracic aortic dissection or aneurysm.   3. Markedly elevated right hemidiaphragm and associated compressive  atelectasis versus less likely infiltrate.   4. Some central groundglass change noted, this may indicate edema or  perhaps more peripheral air trapping. This is felt less likely  infectious but cannot be excluded.  5. Moderate hiatal hernia.    VALERIO MEIER MD   UA reflex to Microscopic   Result Value Ref Range    Color Urine Yellow     Appearance Urine Clear     Glucose Urine Negative NEG^Negative mg/dL    Bilirubin Urine Negative NEG^Negative    Ketones Urine Negative NEG^Negative mg/dL    Specific Gravity Urine  1.017 1.003 - 1.035    Blood Urine Negative NEG^Negative    pH Urine 5.0 5.0 - 7.0 pH    Protein Albumin Urine Negative NEG^Negative mg/dL    Urobilinogen mg/dL 0.0 0.0 - 2.0 mg/dL    Nitrite Urine Negative NEG^Negative    Leukocyte Esterase Urine Negative NEG^Negative    Source Midstream Urine    Urine Culture    Specimen: Midstream Urine   Result Value Ref Range    Specimen Description Midstream Urine     Special Requests Specimen received in preservative     Culture Micro PENDING    US Lower Extremity Venous Duplex Bilateral    Narrative    US LOWER EXTREMITY VENOUS DUPLEX BILATERAL 5/20/2020 11:24 AM    HISTORY: Bilateral leg swelling.    TECHNIQUE: Color flow and doppler spectral waveform analysis of deep  venous structures is performed.  Imaged deep venous structures of each  lower extremity include the common femoral vein, femoral vein,  popliteal vein, and visualized posterior deep calf veins.    COMPARISON: None.    FINDINGS: No DVT is seen in the right lower extremity. The distal left  posterior tibial vein, just above the left ankle is difficult to  visualize due to its small size. It is also difficult to compress.  This is probably due to its small size and the patient's body habitus.  A small amount of mild/early nonocclusive thrombus would be considered  less likely.      Impression    IMPRESSION: No DVT is seen in either lower extremity at or above the  level of the knees. There is a one equivocal area in the left lower  extremity, just above the level of the ankle, as described above..     MAY JENSEN MD       Medications   acetaminophen (TYLENOL) tablet 1,000 mg (has no administration in time range)   atorvastatin (LIPITOR) tablet 40 mg (has no administration in time range)   gabapentin (NEURONTIN) tablet 600 mg (has no administration in time range)   levothyroxine (SYNTHROID/LEVOTHROID) tablet 75 mcg (has no administration in time range)   losartan (COZAAR) tablet 25 mg (has no administration in  time range)   metoprolol succinate ER (TOPROL-XL) 24 hr tablet 50 mg (has no administration in time range)   iopamidol (ISOVUE-370) solution 77 mL (77 mLs Intravenous Given 5/20/20 1012)   sodium chloride 0.9 % bag 500mL for CT scan flush use (100 mLs Intravenous Given 5/20/20 1013)   furosemide (LASIX) injection 40 mg (40 mg Intravenous Given 5/20/20 1211)       Assessments & Plan (with Medical Decision Making)  Caitlin Sales is a 84 year old female with past medical history of wide-complex tachycardia approximately 4 weeks ago, DVT in left lower extremity, possible history of Alzheimer's, hypertension who presents to the emergency department from assisted living with concerns of hypoxia.  On initial examination patient is noted to have a temp of 97, heart rate 78, respiratory rate of 20, and initial oxygen saturation of 90% on room air and then within 1 hour did decline down into 89% and thus 2 L of oxygen per nasal cannula was applied with subsequent oxygenation 95% and greater.  EKG reveals possible left bundle branch block but sinus rhythm and troponin is normal and no signs of acute ischemia.  CT for PE protocol completed due to known DVT 4 weeks ago and reveals no PE and no peripheral groundglass appearance but central groundglass appearance possible concerning for edema or fluid in the chest.  Bilateral lower extremity ultrasounds completed and DVT that was previously noted in the left lower leg is absent presently and no DVT in the right leg.  Renal and liver function appears stable.  Lactic acid obtained and is normal.  Unknown etiology for the hypoxia although there is bilateral lower extremity swelling possibly new onset heart failure.  Phone call placed to hospitalist Dr. Kishan Eldridge who agrees to observation admission.  COVID 19 testing completed and pending.  Consulted with Dr. New Shaw and evaluation and care of this patient.     I have reviewed the nursing notes.    I have reviewed the  findings, diagnosis, plan and need for follow up with the patient.    Current Discharge Medication List          Final diagnoses:   Hypoxia   Swelling of lower extremity       5/20/2020   [facility] EMERGENCY DEPARTMENT     Abbie Chambers APRN CNP  05/20/20 6002

## 2020-05-20 NOTE — ED NOTES
Bed: ED18  Expected date:   Expected time:   Means of arrival: Ambulance  Comments:  Pt coming from [facility]

## 2020-05-20 NOTE — PLAN OF CARE
[place] [facility] [place]    Patient admitted to room 2405 at approximately 1400 via cart from emergency room. Patient was accompanied by transport tech.     Verbal SBAR report received from Iris TAVAREZ RN prior to patient arrival.     Patient transferred to bed via air josie. Patient alert and oriented X 3; disoriented to date. Pain is controlled without any medications.  . Admission vital signs: Blood pressure (!) 186/87, pulse 78, temperature 97  F (36.1  C), temperature source Oral, resp. rate 20, height 1.651 m (5' 5\"), weight 88.7 kg (195 lb 8.8 oz), SpO2 97 %, not currently breastfeeding. Patient was oriented to plan of care, call light, bed controls, tv, telephone, bathroom, and visiting hours.     Risk Assessment    The following safety risks were identified during admission: fall. Yellow risk band applied: YES.     Skin Initial Assessment    This writer admitted this patient and completed a full skin assessment and Kashif score in the Adult PCS flowsheet. Appropriate interventions initiated as needed.     Secondary skin check completed by Not completed by 2nd RN due to Patient Under Investigation for Covid.    Kashif Risk Assessment  Sensory Perception: 3-->slightly limited  Moisture: 3-->occasionally moist  Activity: 3-->walks occasionally  Mobility: 3-->slightly limited  Nutrition: 4-->excellent  Friction and Shear: 2-->potential problem  Kashif Score: 18  Bed Support Surface: Atmos Air mattress  Reassessed using Bed Algorithm: No  Kashif Intervention(s) Implemented: antiembolic/splint/device removed for skin assessment, HOB elevated 30 degrees or less, incontinence care, patient /family education on pressure injury prevention, patient education on pressure relief reinforced, repositioned/turned q2hr  Informed Refusal Kashif Interventions: No    Education    Patient has a [place] to Observation order: Yes  Observation education completed and documented: Yes In Emergency Dept.      Alma Bell RN      "

## 2020-05-20 NOTE — ED TRIAGE NOTES
"Here today due to low saturation on vitals check this AM, was in hospital recently for Afib and blood clot to left lower extremity, states has a filter for clots, states has episodes of SOA recently but not currently, has a cough only after eats \"due to reflux\"  "

## 2020-05-20 NOTE — PROGRESS NOTES
DATE:  5/20/2020   TIME OF RECEIPT FROM LAB:  1750  LAB TEST:  COVID 19  LAB VALUE:  NEGATIVE  RESULTS GIVEN WITH READ-BACK TO (PROVIDER):  Dr Nagel    TIME LAB VALUE REPORTED TO PROVIDER:  7584

## 2020-05-20 NOTE — H&P
[facility]    History and Physical - Hospitalist Service       Date of Admission:  5/20/2020    Assessment & Plan   Caitlin Sales is a 84 year old female presents on 5/20/2020 due to home health care found patient's room air SpO2 80% and patient was referred to the ED for evaluation.     Hypoxemia without respiratory failure  Suspect acute diastolic heart failure     Patient found to have SpO2 80% on RA by [facility] and referred to the ED. No dyspnea, chest pain. Possible mild pulmonary edema on CT chest. H/o systolic heart failure but LVEF recovered and was 65% on echo 4/28/2020. Bilateral lower extremity edema right > left on exam with pitting above the knees. lower extremity US negative. CT angio PE study negative. Furosemide 40 mg IV in the ED with excellent urine output. Currently off supplemental O2 while awake, sitting up in bed. Patient's body habitus may also play a role in some hypoventilation at night and atelectasis may also play a role.     Given significant bilateral lower extremity edema that is worse than baseline, will give additional furosemide this evening.    - furosemide 20 mg IV x one more dose   - telemetry monitoring to rule out recurrent tachyarrhythmia   - recheck BMP in the morning   - follow off O2 and resume if needed    COVID PUI - ruled out    Due to hypoxemia and some central groundglass change on CT chest and COVID-19 pandemic, COVID-19 considered and NP swab for SARS-CoV-2 PCR sent. No acute symptoms or labs to suggest COVID-19 and patient likely has acute on chronic heart failure as explanation for hypoxemia. Low suspicion PUI. SARS-CoV-2 PCR is negative. No indication for repeat testing. Precautions removed.     H/o wide-complex tachycardia    Recent admission to [facility], found to be in wide complex tachycardia which ultimately required multiple shocks and converted on amiodarone. Just finished the amiodarone loading dose of 400 mg daily yesterday and  is to start 200 mg daily today   - amiodarone 200 mg daily   - telemetry monitoring overnight    Coronary Artery Disease    Stress test 4/2020 showed severe irreversible perfusion defect of the inferolateral wall and distal septal distribution, no discrete areas of ischemia. Asymptomatic.    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 Acute left lower extremity DVT seen one month ago and now on indefinite anticoagulation with rivaroxaban.   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    - continue home rivaroxaban, statin    Lower extremity edema    Prescribed 3 days of furosemide for lower extremity edema 5/1. Echo 4/2020 showed EF 65%, normal LV size and systolic function, mild RV dilation, normal to mildly reduced RV systolic function, bi-atrial enlargement, mild MR and TR. Weight is down from clinic visit but still significant bilateral lower extremity edema right > left.    - furosemide IV once more as above     Chronic Back Pain with radiculopathy    Chronic.   - continue home gabapentin    Alzheimer's dementia    No formal neuropysch testing. Family and patient denied formal diagnosis on recent visit to PCP though prior PCP had documented alzheimer's dementia with concerns about patient's ability to manage medications. Now in assisted living facility. Normal mentation today.      Diet: Regular Diet Adult    DVT Prophylaxis: on DOAC  Lindsey Catheter: not present  Code Status: Full    Disposition Plan   Expected discharge: Tomorrow, recommended to prior living arrangement once remains off oxygen and mobility is at baseline.  Entered: Cornell Nagel MD 05/20/2020, 6:49 PM       Cornell Nagel MD  [facility]  [place]  ______________________________________________________________________    Primary Care Physician   Abbie Zacarias [phone]    History is obtained from the patient who is a good historian and review of old records via the EMR.    History of Present Illness   Caitlin Sales is a 84 year old female who was found to have oxygen saturations of 80% on room air by home health care today and was referred to the ED for further evaluation.    She had a recent admission to [facility], 4/22/20-4/28/20 due to palpitations and shortness of breath. She was found to have a wide complexed tachycardia which required multiple shocks and ultimately converted on amiodarone. During her hospital course she multiple other acute problems addressed. She had an episode with was thought to be a TIA and was started on rivaroxaban. She developed an acute DVT that was also managed on rivaroxaban. She developed sepsis which was thought to be due to cellulitis and was treated with antibiotics.  She was seen in follow-up by her new primary care physician, Dr. Zacarias, on May 1 and was given 3 days of furosemide for bilateral lower extremity edema.    Patient felt in her normal health this morning and is only here because of the finding of hypoxemia by home health care.  She has felt some shortness of breath after eating a large meal over the last few weeks but otherwise she had no shortness of breath or increased dyspnea on exertion.  She does still have lower extremity edema that is more than her baseline.  This is been a problem since her last admission.  She sleeps with a wedge due to reflux and has had no nighttime shortness of breath.  She had a little bit of cough after she eats or drinks but otherwise no cough.  No myalgias, fevers, chills, loss of taste or smell.  No ill contacts.  No chest pain or palpitations.    Patient received 40 mg IV furosemide in the ED and has had excellent urine output.  She does not feel any  different because she did not feel poorly or short of breath prior to the Lasix.    Review of Systems    The 10 point Review of Systems is negative other than noted in the HPI or here.  She has noticed some urinary urgency at home but no dysuria. She has a lot of pain and stiffness in the right shoulder and elbow that are chronic.     Past Medical History    I have reviewed this patient's medical history and updated it with pertinent information if needed.   Past Medical History:   Diagnosis Date     CKD (chronic kidney disease) stage 2, GFR 60-89 ml/min 12/18/2015     Coronary artery disease involving native coronary artery of native heart without angina pectoris 5/1/2020    Presumed CAD.  Stress test 4/2020 Perfusion defect in the inferolateral wall and septal area seen on stress test.  Patient reports no history of acute MI.     Esophageal reflux 8/5/2004     Essential hypertension 8/5/2004    Echo 02/2016-- EF 55-60%, valves ok, right heart ok     History of DVT of lower extremity 9/25/2013    ultrasound 4/2020 - Occlusive left mid to distal femoral vein involving the popliteal vein and nonocclusive clot in the left common femoral and proximal femoral Needs anticoagulation indefinintely.  History of DVT 2013     Late onset Alzheimer's disease without behavioral disturbance (H) 2/20/2020    Worsening as of 2/2020     Morbid obesity with BMI of 40.0-44.9, adult (H) 9/14/2018     Neuropathy 9/25/2013     Presence of IVC filter 5/1/2020    Discussed considering removal in 6-12 months, post-COVID.     TIA (transient ischemic attack) 12/26/2019     Wide-complex tachycardia (H) 4/28/2020       Past Surgical History   I have reviewed this patient's surgical history and updated it with pertinent information if needed.  Past Surgical History:   Procedure Laterality Date     APPENDECTOMY      teenager     AS LUMBAR SPINE FUSN,POST INTRBDY  2010     AS REVISE TOTAL HIP REPLACEMENT Right 2013     CHOLECYSTECTOMY  1967      ENDOMETRIAL SAMPLING (BIOPSY)  2001     SALPINGO OOPHORECTOMY,R/L/DI Left 1967     VENA CAVA FILTER  2010    Permanent        Social History   I have reviewed this patient's social history and updated it with pertinent information if needed.  Social History     Tobacco Use     Smoking status: Never Smoker     Smokeless tobacco: Never Used   Substance Use Topics     Alcohol use: Not Currently     Frequency: Never     Drug use: Never       Family History   I have reviewed this patient's family history and updated it with pertinent information if needed.   Family History   Problem Relation Age of Onset     Cancer Mother         multiple myeloma     Abdominal Aortic Aneurysm Father      Hypertension Father      Prostate Cancer Father        Prior to Admission Medications   Prior to Admission Medications   Prescriptions Last Dose Informant Patient Reported? Taking?   Angel Carb-Mag Hydrox-Simeth (ROLAIDS ADVANCED PO) Unknown at Unknown time  Yes No   Sig: Take 2-3 tablets by mouth daily as needed   Homeopathic Products (ZICAM COLD REMEDY) TBDP Unknown at Unknown time  Yes No   Sig: Take 1 lozenge by mouth every 3 hours as needed At onset of cold symptoms   Hyprom-Naphaz-Polysorb-Zn Sulf (CLEAR EYES COMPLETE) SOLN Unknown at Unknown time  Yes No   Sig: Apply 1-2 drops to eye 4 times daily as needed   Methylcobalamin 5000 MCG SUBL Unknown at Unknown time  Yes No   Sig: Place 1 lozenge under the tongue daily   Multiple Vitamins-Minerals (ZINC) LOZG Unknown at Unknown time  Yes No   Sig: Take 1 each by mouth every 3 hours as needed   acetaminophen (TYLENOL) 500 MG tablet 5/19/2020 at Unknown time  Yes Yes   Sig: Take 2 tablets (1,000 mg) by mouth 3 times daily (with meals)   amiodarone (PACERONE) 200 MG tablet   No No   Sig: Take 1 tablet (200 mg) by mouth daily   amiodarone (PACERONE) 400 MG tablet 5/19/2020 at Unknown time  Yes Yes   Sig: Take 1 tablet (400 mg) by mouth daily   atorvastatin (LIPITOR) 40 MG tablet 5/19/2020  at Unknown time  No Yes   Sig: Take 1 tablet (40 mg) by mouth daily   folic acid (FOLVITE) 1 MG tablet 5/19/2020 at Unknown time  Yes Yes   Sig: Take 1 tablet (1 mg) by mouth daily   gabapentin (NEURONTIN) 600 MG tablet 5/19/2020 at Unknown time  Yes Yes   Sig: Take 1 tablet (600 mg) by mouth At Bedtime   hydrocortisone (CORTAID) 1 % external cream Unknown at Unknown time  Yes No   Sig: Apply topically 3 times daily as needed   hydrocortisone (CORTAID) 1 % external cream   Yes No   Sig: Apply topically 4 times daily as needed Apply to itchy skin   levothyroxine (SYNTHROID/LEVOTHROID) 75 MCG tablet 5/20/2020 at Unknown time  Yes Yes   Sig: Take 1 tablet (75 mcg) by mouth daily   loperamide (IMODIUM A-D) 2 MG tablet Unknown at Unknown time  Yes No   Sig: Take 1 tablet (2 mg) by mouth every 4 hours as needed for diarrhea   losartan (COZAAR) 25 MG tablet 5/19/2020 at Unknown time  Yes Yes   Sig: Take 1 tablet (25 mg) by mouth daily   metoprolol succinate ER (TOPROL-XL) 50 MG 24 hr tablet 5/19/2020 at Unknown time  Yes Yes   Sig: Take 1 tablet (50 mg) by mouth daily   nystatin (MYCOSTATIN) 322537 UNIT/GM external cream 5/19/2020 at Unknown time  Yes Yes   Sig: Apply topically 2 times daily   order for DME 5/19/2020 at Unknown time  No Yes   Sig: Equipment being ordered: knee high compression stockings 20-30 mm compression   polyethylene glycol (MIRALAX) 17 GM/SCOOP powder Unknown at Unknown time  Yes No   Sig: Take 17 g (1 capful) by mouth daily as needed for constipation   rivaroxaban ANTICOAGULANT (XARELTO ANTICOAGULANT) 20 MG TABS tablet 5/19/2020 at Unknown time  No Yes   Sig: Take 1 tablet (20 mg) by mouth daily (with dinner)   simethicone (MYLICON) 125 MG chewable tablet Unknown at Unknown time  Yes No   Sig: Take 1-2 tablets (125-250 mg) by mouth as needed for intestinal gas   sulfamethoxazole-trimethoprim (BACTRIM DS) 800-160 MG tablet Unknown at Unknown time  Yes No   Sig: Take 1 tablet by mouth 2 times daily  "Take as needed for UTI sx   triamcinolone (KENALOG) 0.1 % external cream Unknown at Unknown time  Yes No   Sig: Apply topically 2 times daily Apply to hand rahs   zinc oxide (DESITIN) 40 % external ointment Unknown at Unknown time  Yes No   Sig: Apply topically as needed for dry skin or irritation Apply daily as needed to red chafed skin rash      Facility-Administered Medications: None     Allergies   Allergies   Allergen Reactions     Ace Inhibitors Cough     Aspirin      Other reaction(s): GI Bleeding     Citalopram Unknown     Penicillins      Other reaction(s): *Unknown - Childhood Rxn     Tramadol Other (See Comments)     Lethargy     Furosemide Rash     Lisinopril-Hydrochlorothiazide Cough     Torsemide Rash       Physical Exam   BP (!) 186/87 (BP Location: Right arm)   Pulse 78   Temp 97  F (36.1  C) (Oral)   Resp 20   Ht 1.651 m (5' 5\")   Wt 88.7 kg (195 lb 8.8 oz)   SpO2 97%   BMI 32.54 kg/m       Weight: 195 lbs 8.77 oz Body mass index is 32.54 kg/m .     Constitutional: Alert, oriented x 3, cooperative, no apparent distress, appears nontoxic, eating dinner, no observed cough.   Eyes: Eyes are clear, pupils are reactive.  HEENT: Oropharynx is clear and moist. No evidence of cranial trauma.  Lymph/Hematologic: No epitrochlear, axillary, anterior or posterior cervical, or supraclavicular lymphadenopathy is appreciated.  Cardiovascular: Regular rate and rhythm, normal S1 and S2, and no murmur noted. JVP is normal. Good peripheral pulses in wrists bilaterally.  There is 3+ bilateral lower extremity edema above the knees with right greater than left.  There is some chronic stasis changes particularly in the left lower extremity.  Respiratory: Clear to auscultation bilaterally.   GI: Soft, non-tender, normal bowel sounds, no hepatomegaly.  Genitourinary: Deferred  Musculoskeletal: Poor range of motion of the right shoulder as it appears likely frozen, decreased range of motion the right elbow which is " also stiff.  She has some stiffness in passive range of motion of the left upper extremity but this does not seem painful.  Skin: Warm and dry.  No acute stasis dermatitis apparent in the lower extremities  Neurologic: Neck supple. Cranial nerves are grossly intact.  is symmetric.  Dorsiflexion is strong and symmetric    Data   Data reviewed today:   Recent Labs   Lab 05/20/20  0932   WBC 6.9   HGB 11.6*   *         POTASSIUM 4.0   CHLORIDE 108   CO2 32   BUN 23   CR 0.91   ANIONGAP 4   FROY 8.4*   GLC 83   ALBUMIN 3.2*   PROTTOTAL 6.8   BILITOTAL 0.4   ALKPHOS 71   ALT 17   AST 19   TROPI <0.015       Recent Results (from the past 24 hour(s))   CT Chest Pulmonary Embolism w Contrast    Narrative    CT CHEST PULMONARY EMBOLISM WITH CONTRAST  5/20/2020 10:24 AM     HISTORY: Hypoxia, known DVT.    COMPARISON: None.    TECHNIQUE: Volumetric helical acquisition of CT images of the chest  from the lung apices to the kidneys were acquired after the  administration of 77 mL Isovue-370 IV contrast. Radiation dose for  this scan was reduced using automated exposure control, adjustment of  the mA and/or kV according to patient size, or iterative  reconstruction technique.    FINDINGS: There is no pulmonary embolism. Somewhat centralized  groundglass changes noted which are nonspecific, pattern suggestive of  edema. Moderate hiatal hernia. Markedly elevated right hemidiaphragm  with presumed associated compressive atelectasis versus less likely  infiltrate. The heart appears enlarged, enlarged main pulmonary artery  which is nonspecific but may indicate pulmonary hypertension. Thoracic  aorta is atherosclerotic without evidence of dissection or aneurysm.  There is no pleural or pericardial effusion. There is no pneumothorax.  Adrenal glands are normal. Remainder of the visualized upper abdomen  is unremarkable.      Impression    IMPRESSION:   1. No pulmonary embolism demonstrated.  2. No thoracic aortic  dissection or aneurysm.   3. Markedly elevated right hemidiaphragm and associated compressive  atelectasis versus less likely infiltrate.   4. Some central groundglass change noted, this may indicate edema or  perhaps more peripheral air trapping. This is felt less likely  infectious but cannot be excluded.  5. Moderate hiatal hernia.    VALERIO MEIER MD   US Lower Extremity Venous Duplex Bilateral    Narrative    US LOWER EXTREMITY VENOUS DUPLEX BILATERAL 5/20/2020 11:24 AM    HISTORY: Bilateral leg swelling.    TECHNIQUE: Color flow and doppler spectral waveform analysis of deep  venous structures is performed.  Imaged deep venous structures of each  lower extremity include the common femoral vein, femoral vein,  popliteal vein, and visualized posterior deep calf veins.    COMPARISON: None.    FINDINGS: No DVT is seen in the right lower extremity. The distal left  posterior tibial vein, just above the left ankle is difficult to  visualize due to its small size. It is also difficult to compress.  This is probably due to its small size and the patient's body habitus.  A small amount of mild/early nonocclusive thrombus would be considered  less likely.      Impression    IMPRESSION: No DVT is seen in either lower extremity at or above the  level of the knees. There is a one equivocal area in the left lower  extremity, just above the level of the ankle, as described above..     MAY JENSEN MD       I personally reviewed the chest CT image(s) showing Mild central groundglass opacities, no effusions, elevated right hemidiaphragm.    Cornell Nagel MD  [facility]

## 2020-05-21 PROBLEM — E66.811 CLASS 1 OBESITY DUE TO EXCESS CALORIES WITH SERIOUS COMORBIDITY IN ADULT: Chronic | Status: ACTIVE | Noted: 2020-01-01

## 2020-05-21 PROBLEM — J96.22 ACUTE AND CHRONIC RESPIRATORY FAILURE WITH HYPERCAPNIA (H): Status: ACTIVE | Noted: 2020-01-01

## 2020-05-21 PROBLEM — J96.12 CHRONIC RESPIRATORY FAILURE WITH HYPERCAPNIA (H): Status: ACTIVE | Noted: 2020-01-01

## 2020-05-21 PROBLEM — E66.09 CLASS 1 OBESITY DUE TO EXCESS CALORIES WITH SERIOUS COMORBIDITY IN ADULT: Chronic | Status: ACTIVE | Noted: 2020-01-01

## 2020-05-21 NOTE — CONSULTS
Called the pt and informed him of Dr. Chad Gilbert instructions below.  He would like the aldosterone order mailed to his house in [place] at the address:    [address], [address]    Order sent with instructions to have results faxed to Care Transition Initial Assessment - RN    Admission Date and Time:   Observation Status 5/20/20 at 2:34 PM.    Spoke with: Patient.    DATA   Principal Problem:    Hypoxia  Active Problems:    History of DVT of lower extremity    Wide-complex tachycardia (H)    Presence of IVC filter    CKD (chronic kidney disease) stage 2, GFR 60-89 ml/min    Esophageal reflux    Late onset Alzheimer's disease without behavioral disturbance (H)    Lumbosacral radiculopathy at L5    TIA (transient ischemic attack)    Essential hypertension    Coronary artery disease involving native coronary artery of native heart without angina pectoris    Chronic respiratory failure with hypercapnia (H)    Class 1 obesity due to excess calories with serious comorbidity in adult       Primary Care Clinic Name: [facility]  Primary Care MD Name: Dr. Zacarias  Contact information and PCP information verified: Yes    ASSESSMENT  Cognitive Status: awake, alert and oriented.  Lives With: alone      Description of Support System: Supportive, Involved   Who is your support system?: Children, Facility resident(s)/Staff   Support Assessment: Adequate family and caregiver support   Insurance Concerns: No Insurance issues identified      This writer spoke with the patient, introduced myself and role.     The patient lives at [facility] (Phone: [phone] or [phone] Fax: [phone]).  Kelley BLACKMON, [facility] [phone] reports the patient receives assistance with meals, dressing, grooming, bathroom supervision and medications.  She is current with [facility] Phone: [phone] Fax: [phone], RN, PT and OT.  The patient's daughter, Jade lives near by and is supportive.    Transportation will be provided by daughterJade upon discharge.    Care Transitions to notify Kelley BLACKMON, [facility] [phone] regarding the discharge date and time.    PLAN    [facility] to [facility] with resumption of [facility].  If the patient changes status to \"Inpatient\" a home care order will need to be placed to resume home care.      Lissette Brink RN, Care Coordinator [phone]        "

## 2020-05-21 NOTE — TELEPHONE ENCOUNTER
Angela from [facility] is calling to let Dr. Zacarias know that Caitlin was taken by ambulance to [facility] and then admitted with suspect of Covid 19.  Vicki Lei  Clinic Station

## 2020-05-21 NOTE — PLAN OF CARE
Patient oxygen saturation on room air mid 80's . Patient currently on 1 L via n/c with saturation 92-93. Purewick in use. 600 ml urine output for day shift. Patient received Lasix 40 mg twice. Daughter aware that patient will not discharge today. Patient lungs sounds clear with no distress. Heart echo done.

## 2020-05-21 NOTE — PLAN OF CARE
AOx4, forgetful. Purewick in place, working well. IV lasix as ordered, large amount of fluid out w/in last 16 hours. On 2L O2 thru night, as sats dipped to 86-88% while sleeping. Denies pain, IV SL x 2. Rested comfortably throughout shift.

## 2020-05-21 NOTE — PROGRESS NOTES
[facility] Medicine Progress Note  Date of Service: 05/21/2020     Assessment & Plan   Caitlin Sales is a 84 year old female presents on 5/20/2020 due to home health care found patient's room air SpO2 80% and patient was referred to the ED for evaluation.     Hypoxemia without acute respiratory failure  Suspect acute diastolic heart failure     Patient found to have SpO2 80% on RA by [facility] and referred to the ED. No dyspnea, chest pain. Possible mild pulmonary edema on CT chest. H/o systolic heart failure but LVEF recovered and was 65% on echo 4/28/2020. Bilateral lower extremity edema right > left on exam with pitting above the knees. lower extremity US negative. CT angio PE study negative. Furosemide 40 mg IV in the ED with excellent urine output. Currently off supplemental O2 while awake, sitting up in bed. Patient's body habitus may also play a role in some hypoventilation at night and atelectasis may also play a role.     Good urine output and net diuresis 3700 mL overnight. Still some desaturations to 85-86% on RA today. Tolerated initial diuresis well.    - furosemide 40 mg IV once today   - recheck VBG and BMP in the morning   - continue telemetry while here   - recheck echo, eval right sided pressures   - wean O2 as able, may need to discharge on O2 supplementation if not resolving tomorrow    Hypercarbia    On admission, VpH 7.31/VpCO2 69. Suggests some chronic CO2 retention with possibly some acute as well. No COPD history. May have obesity related hypoventilation. Has an elevated right hemidiaphragm which appears chronic as it was noted on report of CXR 11/21/2019 and felt stable compared with CXR 5/7/2017.    - recheck VBG tomorrow    COVID PUI - ruled out    Due to hypoxemia and some central groundglass change on CT chest and COVID-19 pandemic, COVID-19 considered and NP swab for SARS-CoV-2 PCR sent. No acute symptoms or labs to suggest COVID-19 and patient  likely has acute on chronic heart failure as explanation for hypoxemia. Low suspicion PUI. SARS-CoV-2 PCR is negative. No indication for repeat testing. Precautions removed.     H/o wide-complex tachycardia    Recent admission to [facility], found to be in wide complex tachycardia which ultimately required multiple shocks and converted on amiodarone. Just finished the amiodarone loading dose of 400 mg daily yesterday and is to start 200 mg daily on day of admission.    No arrhythmia on tele thus far.    - amiodarone 200 mg daily   - telemetry monitoring     Coronary Artery Disease    Stress test 4/2020 showed severe irreversible perfusion defect of the inferolateral wall and distal septal distribution, no discrete areas of ischemia. Asymptomatic.    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 Acute left lower extremity DVT seen one month ago and now on indefinite anticoagulation with rivaroxaban.   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    - continue home rivaroxaban, statin    Lower extremity edema    Prescribed 3 days of furosemide for lower extremity edema 5/1. Echo 4/2020 showed EF 65%, normal LV size and systolic function, mild RV dilation, normal to mildly reduced RV systolic function, bi-atrial enlargement, mild MR and TR. Weight is down from clinic visit but still significant bilateral lower extremity edema right > left.     Modest improvent   - furosemide IV once more as above     Chronic Back Pain with radiculopathy    Chronic.   - continue home gabapentin    Alzheimer's dementia    No formal neuropysch testing. Family and patient denied formal diagnosis on recent visit to PCP though prior PCP had documented alzheimer's dementia with  "concerns about patient's ability to manage medications. Now in assisted living facility. Normal mentation today.      Diet: Regular Diet Adult    DVT Prophylaxis: on DOAC  Lindsey Catheter: not present  Code Status: Full    Discussion: Still needing O2 but no distress. May be chronic. One more night. Continue observation    Disposition: Anticipate discharge tomorrow     Attestation:  Total time: 30 minutes    Cornell Nagel MD  [facility] Medicine        Interval History   Feels \"same\" with no shortness of breath, no chest pain. No dizziness, no abdominal pain, no nausea.     Physical Exam   Temp:  [97.4  F (36.3  C)-98.8  F (37.1  C)] 98.2  F (36.8  C)  Pulse:  [59-70] 59  Heart Rate:  [58-83] 58  Resp:  [18-20] 20  BP: (116-155)/(44-68) 127/61  SpO2:  [85 %-96 %] 93 %    Weights:   Vitals:    05/20/20 0852 05/20/20 1419   Weight: 89.8 kg (198 lb) 88.7 kg (195 lb 8.8 oz)    Body mass index is 32.54 kg/m .    Constitutional: alert and oriented, NAD, nontoxic  CV: Regular, JVP normal, 3+ edema right lower extremity above the knee, 2+ left lower extremity below the knee  Respiratory: CTA bilaterally  GI: Soft, non-tender, bowel sounds normal  Skin: Warm and dry    Data   Recent Labs   Lab 05/21/20  0530 05/20/20  0932   WBC  --  6.9   HGB  --  11.6*   MCV  --  104*   PLT  --  255    144   POTASSIUM 4.4 4.0   CHLORIDE 102 108   CO2 34* 32   BUN 21 23   CR 1.04 0.91   ANIONGAP 3 4   FROY 8.5 8.4*   GLC 77 83   ALBUMIN  --  3.2*   PROTTOTAL  --  6.8   BILITOTAL  --  0.4   ALKPHOS  --  71   ALT  --  17   AST  --  19   TROPI  --  <0.015       Recent Labs   Lab 05/21/20  0530 05/20/20  0932   GLC 77 83        Unresulted Labs Ordered in the Past 30 Days of this Admission     Date and Time Order Name Status Description    5/20/2020 0924 Blood culture (one site) Preliminary            Imaging:   No new    I reviewed all new labs and imaging results over the last 24 hours. I personally reviewed no images or EKG's " today.    Medications       acetaminophen  1,000 mg Oral TID w/meals     amiodarone  200 mg Oral Daily     atorvastatin  40 mg Oral Daily     gabapentin  600 mg Oral At Bedtime     levothyroxine  75 mcg Oral Daily     losartan  25 mg Oral Daily     metoprolol succinate ER  50 mg Oral Daily     miconazole   Topical BID     nystatin   Topical BID     rivaroxaban ANTICOAGULANT  20 mg Oral Daily with supper     sodium chloride (PF)  3 mL Intracatheter Q8H   Reviewed meds    Cornell Nagel MD  [facility] Medicine

## 2020-05-22 PROBLEM — Z87.19 HISTORY OF UPPER GASTROINTESTINAL BLEEDING: Chronic | Status: ACTIVE | Noted: 2020-01-01

## 2020-05-22 PROBLEM — Z87.39 HISTORY OF DISCITIS: Chronic | Status: ACTIVE | Noted: 2020-01-01

## 2020-05-22 PROBLEM — Z95.828 PRESENCE OF IVC FILTER: Chronic | Status: ACTIVE | Noted: 2020-01-01

## 2020-05-22 PROBLEM — R79.81 LOW O2 SATURATION: Status: ACTIVE | Noted: 2020-01-01

## 2020-05-22 PROBLEM — C44.310 BASAL CELL CARCINOMA OF FACE: Status: RESOLVED | Noted: 2017-07-19 | Resolved: 2020-01-01

## 2020-05-22 PROBLEM — I65.01 STENOSIS OF RIGHT VERTEBRAL ARTERY: Chronic | Status: ACTIVE | Noted: 2020-01-01

## 2020-05-22 PROBLEM — J98.6 ELEVATED HEMIDIAPHRAGM: Status: ACTIVE | Noted: 2020-01-01

## 2020-05-22 NOTE — PLAN OF CARE
OT SUMMARY D/C:  Patient plan for D/C:   AL  Current Status:   I/S all ADL related mobility/transfer in room with RW    Barriers to return to prior living situation:   none    Recommendation for D/C:  See above    Rationale for D/C recommendations:  See above    Vesta Card, OTR

## 2020-05-22 NOTE — PLAN OF CARE
A&O, forgetful. Remains on 1L O2 via NC, sats 96%. Purewick in place, working effectively. IV SL, denies pain, resting comfortably.

## 2020-05-22 NOTE — PROGRESS NOTES
[facility]    Medicine Progress Note - Hospitalist Service       Date of Admission:  5/20/2020  Assessment & Plan   Caitlin Sales is a 84 year old female presents on 5/20/2020 due to home health care found patient's room air SpO2 80% and patient was referred to the ED for evaluation.     Hypoxemia without acute respiratory failure- multifactorial   Suspect acute diastolic heart failure   Consider amiodarone toxicity    Patient found to have SpO2 80% on RA by [facility] and referred to the ED. No dyspnea, chest pain. Possible mild pulmonary edema on CT chest. H/o systolic heart failure but LVEF recovered and was 65% on echo 4/28/2020. Bilateral lower extremity edema right > left on exam with pitting above the knees. Lower extremity US negative. CT angio PE study negative. Furosemide 40 mg IV in the ED with excellent urine output. Had Good urine output and net diuresis 3700, but still desaturations to 85-86% on RA. Repeated furosemide 40 mg IV 5/21/2020. Repeat echo unchanged. PAP 32 +RA  - Appears to be improving but still desats on room air to 88, some of this may simply be hypercarbia. Check ABG for A-a gradient  - Reluctant to attempt further diuresis with increasing creatinine and Bicarb   - Daily weights not being done, get weight   - Wean O2 if able, suspect may need to discharge on O2 supplementation     Chronic respiratory failure with Hypercarbia    On admission, VpH 7.31/VpCO2 69. Suggests some chronic CO2 retention with possibly some acute as well. No COPD history. May have obesity related hypoventilation. Has an elevated right hemidiaphragm which was first noted on report of CXR 11/21/2019 but felt stable compared with CXR 5/7/2017.    - Recheck VBG and ABG tomorrow, YES BOTH   - Overnight oximetry    COVID PUI - ruled out    Due to hypoxemia and some central groundglass change on CT chest and COVID-19 pandemic, COVID-19 considered and NP swab for SARS-CoV-2 PCR sent. No acute  symptoms or labs to suggest COVID-19 and patient likely has acute on chronic heart failure as explanation for hypoxemia. Low suspicion PUI. SARS-CoV-2 PCR is negative. No indication for repeat testing. Precautions removed.     H/o wide-complex tachycardia    Recent admission to [facility], found to be in wide complex tachycardia which ultimately required multiple shocks and converted on amiodarone. Just finished the amiodarone loading dose of 400 mg daily yesterday and is to start 200 mg daily on day of admission.    No arrhythmia on tele thus far.    - amiodarone 200 mg daily   - telemetry monitoring     Coronary Artery Disease    Stress test 4/2020 showed severe irreversible perfusion defect of the inferolateral wall and distal septal distribution, no discrete areas of ischemia. Asymptomatic.    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 Acute left lower extremity DVT seen one month ago and now on indefinite anticoagulation with rivaroxaban.   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    - continue home rivaroxaban, statin    Lower extremity edema    Prescribed 3 days of furosemide for lower extremity edema 5/1/20. Echo 4/2020 showed EF 65%, normal LV size and systolic function, mild RV dilation, normal to mildly reduced RV systolic function, bi-atrial enlargement, mild MR and TR. Weight is down from clinic visit but still significant bilateral lower extremity edema right > left.     Modest improvement withlasix    Chronic Back Pain with radiculopathy   - continue home gabapentin    Alzheimer's dementia    No formal neuropysch testing. Family and patient denied formal diagnosis on recent visit to PCP though prior PCP had documented alzheimer's  dementia with concerns about patient's ability to manage medications. Now in assisted living facility. Normal mentation today.        Diet: Regular Diet Adult    DVT Prophylaxis: DOAC  Lindsey Catheter: not present  Code Status: Full Code           Disposition Plan   Expected discharge: Tomorrow, recommended to prior living arrangement once testing finished.  Entered: Lawrence Iyer MD 05/22/2020, 3:17 PM       The patient's care was discussed with the Bedside Nurse and Patient.    Lawrence Iyer MD  Hospitalist Service  [facility]    ______________________________________________________________________    Interval History     No other significant past medical history or family history. Complaints, feels fine      Intake/Output Summary (Last 24 hours) at 5/22/2020 1523  Last data filed at 5/22/2020 0545  Gross per 24 hour   Intake 240 ml   Output 1200 ml   Net -960 ml        Vitals:    05/20/20 0852 05/20/20 1419   Weight: 89.8 kg (198 lb) 88.7 kg (195 lb 8.8 oz)     Wt Readings from Last 4 Encounters:   05/20/20 88.7 kg (195 lb 8.8 oz)   05/01/20 92.1 kg (203 lb)         Data reviewed today: I reviewed all medications, new labs and imaging results over the last 24 hours. I personally reviewed the chest CT image(s) showing elevated right hemidiaphragm, probable atelectasis.    Physical Exam   Vital Signs: Temp: 98  F (36.7  C) Temp src: Oral BP: (!) 148/70 Pulse: 55 Heart Rate: 63 Resp: 18 SpO2: 96 % O2 Device: Nasal cannula Oxygen Delivery: 1 LPM  Weight: 195 lbs 8.77 oz  Constitutional: awake, alert, cooperative, no apparent distress, and appears stated age  Respiratory: No increased work of breathing, poor air exchange/lung volumes, bibasilar rales   Cardiovascular: regular rate and rhythm, No JVD at 40 degrees  GI: soft, non-distended and non-tender  Musculoskeletal: 1+ edema right lower extremity. Stasis dermatitis left shin    Data     CMP  Recent Labs   Lab 05/22/20  0675  05/21/20  0530 05/20/20  0932    139 144   POTASSIUM 3.4 4.4 4.0   CHLORIDE 101 102 108   CO2 39* 34* 32   ANIONGAP 4 3 4   GLC 80 77 83   BUN 26 21 23   CR 1.32* 1.04 0.91   GFRESTIMATED 37* 49* 58*   GFRESTBLACK 43* 57* 67   FROY 8.3* 8.5 8.4*   PROTTOTAL  --   --  6.8   ALBUMIN  --   --  3.2*   BILITOTAL  --   --  0.4   ALKPHOS  --   --  71   AST  --   --  19   ALT  --   --  17     CBC  Recent Labs   Lab 05/20/20  0932   WBC 6.9   RBC 3.77*   HGB 11.6*   HCT 39.1   *   MCH 30.8   MCHC 29.7*   RDW 13.9          Venous Blood Gas  Recent Labs   Lab 05/22/20  0621 05/20/20  0932   PHV 7.33 7.31*   PCO2V 81* 69*   PO2V 35 29   HCO3V 42* 34*   ANTONINA 13.0 6.0   O2PER 24% 21%

## 2020-05-22 NOTE — PROGRESS NOTES
DATE:  5/22/2020   TIME OF RECEIPT FROM LAB:  0650  LAB TEST:  PCO2  LAB VALUE:  81  RESULTS GIVEN WITH READ-BACK TO (PROVIDER):  Keiry Anaya - On call  PA  TIME LAB VALUE REPORTED TO PROVIDER:   0654

## 2020-05-22 NOTE — PROGRESS NOTES
05/22/20 1300   Quick Adds   Type of Visit Initial PT Evaluation   Living Environment   Lives With facility resident   Living Arrangements assisted living   Home Accessibility no concerns   Transportation Anticipated family or friend will provide   Living Environment Comment [facility] helps with dressing, toileting, bathing and meals.  They also help get her legs up into bed at night.    Self-Care   Usual Activity Tolerance good   Current Activity Tolerance moderate   Regular Exercise No   Equipment Currently Used at Home walker, rolling   Functional Level Prior   Ambulation 1-->assistive equipment   Transferring 1-->assistive equipment   Toileting 1-->assistive equipment   Bathing 2-->assistive person   Communication 0-->understands/communicates without difficulty   Swallowing 0-->swallows foods/liquids without difficulty   Cognition 0 - no cognition issues reported   Fall history within last six months no   Which of the above functional risks had a recent onset or change? none   Prior Functional Level Comment Uses a walker at home.   General Information   Onset of Illness/Injury or Date of Surgery - Date 05/20/20   Referring Physician Lawrence Iyer   Patient/Family Goals Statement to go home   Pertinent History of Current Problem (include personal factors and/or comorbidities that impact the POC) Caitlin Sales is a 84 year old female presents on 5/20/2020 due to home health care found patient's room air SpO2 80% and patient was referred to the ED for evaluation.    Precautions/Limitations no known precautions/limitations   General Info Comments up with assist   Cognitive Status Examination   Orientation orientation to person, place and time   Level of Consciousness alert   Follows Commands and Answers Questions 100% of the time   Personal Safety and Judgment intact   Memory intact   Integumentary/Edema   Integumentary/Edema Comments bruising to left hand   Range of Motion (ROM)   ROM Comment AROM WNL B LE   "  Strength   Strength Comments strength: HIp flexion R 4-/5, L 4/5, knee flexion 4/5 B, knee extension 4/5 B    Bed Mobility   Bed Mobility Comments sit > supine Reza at legs   Transfer Skills   Transfer Comments sit <> stand Mod Indp    Gait   Gait Comments ambualted 150 feet with FWW and SBA with 2 L oxygen    Balance   Balance Comments able to stand without UE support   Sensory Examination   Sensory Perception Comments in tact to light touch   General Therapy Interventions   Intervention Comments eval only   Clinical Impression   Criteria for Skilled Therapeutic Intervention evaluation only   PT Diagnosis assessment for safe return to previous level of function   Influenced by the following impairments weakness, SOB   Functional limitations due to impairments none   Clinical Presentation Stable/Uncomplicated   Clinical Presentation Rationale clinical decision making and chart review   Clinical Decision Making (Complexity) Low complexity   Therapy Frequency Other (see comments)  (eval only)   Predicted Duration of Therapy Intervention (days/wks) eval only   Anticipated Discharge Disposition Home;Other (see comments)  (assistive living facilty )   Risk & Benefits of therapy have been explained Yes   Patient, Family & other staff in agreement with plan of care Yes   [facility] [employer] TM \"6 Clicks\"   2016, Trustees of [facility], under license to [employer].  All rights reserved.   6 Clicks Short Forms Basic Mobility Inpatient Short Form   [facility]  \"6 Clicks\" V.2 Basic Mobility Inpatient Short Form   1. Turning from your back to your side while in a flat bed without using bedrails? 4 - None   2. Moving from lying on your back to sitting on the side of a flat bed without using bedrails? 4 - None   3. Moving to and from a bed to a chair (including a wheelchair)? 4 - None   4. Standing up from a chair using your arms (e.g., wheelchair, or bedside chair)? 4 - None   5. To walk in hospital " room? 4 - None   6. Climbing 3-5 steps with a railing? 3 - A Little   Basic Mobility Raw Score (Score out of 24.Lower scores equate to lower levels of function) 23   Total Evaluation Time   Total Evaluation Time (Minutes) 15       Fior Saleh  PT, DPT       5/22/2020   [address] [address]  [email or web address]  Voicemail: [phone]

## 2020-05-22 NOTE — PLAN OF CARE
EVALUATION ONLY   Discharge Planner PT   Patient plan for discharge: Back to [facility]  Current status: sit <> stand Mod Indp. Ambulated 150 feet with FWW and SBA with 2L oxygen, sit > supine Reza at legs (patient has help with this at AI at baseline)   Barriers to return to prior living situation: none  Recommendations for discharge: back to [facility]  Rationale for recommendations: at baseline function       Entered by: Fior Saleh 05/22/2020 1:29 PM

## 2020-05-22 NOTE — PROGRESS NOTES
Addendum:  IBIS was informed that PT/OT saw pt & recommend return to [facility] when medically stable.    IBIS called & informed Kelley BLACKMON at [facility] [phone].      Care Transitions MUST notify Kelley BLACKMON, [facility] [place] [address] [phone] on day of discharge with a time of return.  Isabel requests that discharge orders are faxed PRIOR to pt's return.  EN Purcell on 5/22/2020 at 1:58 PM    CARE TRANSITIONS PROGRESS NOTE:    Reason for Follow up: Discharge planning.  Per IDT team, pt not medically stable for discharge for 1-2 days.  Pt remains OBS status.    Per EMR review, the patient lives at [facility] in [place] (Phone: [phone] or [phone] Fax: [phone]), and is current with [facility] Phone: [phone] Fax: [phone], RN, PT and OT services.      IBIS placed call to Kelley BLACKMON, [facility] [phone] who informed this writer that the pt can return on the weekends, HOWEVER, also stated that the pt must be independent with all transfers & ambulation in order to return, as their [facility] does not assist with these 2 functions.  The patient does receive assistance with meals, dressing, grooming, bathroom supervision and medications.      IBIS requested PT/OT to see pt today to assist with disposition planning.  Awaiting rec    The patient's daughter, Jade lives near by and is supportive; Jade will transport upon discharge.          Anticipated discharge needs: TBD based on PT/OT assessments; hopeful to return to [facility] if able to transfer & ambulate independently.    Next steps: CTS to follow.    Discharge Planner   Discharge Plans in progress: Return to [facility] vs TCU  Barriers to discharge plan: Medical stability  Follow up plan: CTS to follow       Entered by: Nancy Colunga 05/22/2020 10:27 AM       EN Russell  [facility] [phone]   [facility]  [phone]

## 2020-05-23 NOTE — PROGRESS NOTES
Name: Caitlin Sales    MRN#: [id number]    Reason for Hospitalization: Hypoxia [R09.02]  Swelling of lower extremity [M79.89]    Discharge Date: 5/23/2020    Patient / Family response to discharge plan: Confirmed patient is ready for discharge today.  She will discharge with new home oxygen.  Patient will return to [facility] (Phone: [phone] or [phone] Fax: [phone]) with resumption of [facility] Phone: [phone] Fax: [phone]. Home care resumption order placed for RN, PT and OT.       Spoke with Kelley BLACKMON, [facility] [phone], notified of discharge plan.     Spoke with patient via phone to confirm plans. Patient's daughter, Jade will provide transportation upon discharge.       Other Providers (Care Coordinator, County Services, PCA services etc): No    CTS Hand Off Completed: Yes: completed    PAS #: NA    ASHLEY Score: average    Future Appointments:   Future Appointments   Date Time Provider Department Center   5/29/2020 12:40 PM Abbie Zacarias, DO [facility]       Discharge Disposition: assisted living    Discharge Planner   Discharge Plans in progress: completed: return to care home w/ resumed home care  Barriers to discharge plan: none  Follow up plan: care home/pcp/home care       Entered by: Tania Kohler 05/23/2020 12:31 PM         DONNIE ChenN RN  Inpatient RN care coordinator  [facility] [phone]  [facility] [phone]

## 2020-05-23 NOTE — DISCHARGE SUMMARY
[facility]  Hospitalist Discharge Summary      Date of Admission:  5/20/2020  Date of Discharge:  5/23/2020  4:15 PM  Discharging Provider: Lawrence Iyer MD      Discharge Diagnoses   Principal Problem:    Hypoxemia (5/20/2020)  Active Problems:    Chronic respiratory failure with hypercapnia (H) (5/21/2020)    Elevated hemidiaphragm (5/22/2020)    Class 1 obesity due to excess calories with serious comorbidity in adult (5/21/2020)    Late onset Alzheimer's disease without behavioral disturbance (H) (2/20/2020)    History of TIA (transient ischemic attack) and stroke (12/26/2019)    Coronary artery disease involving native coronary artery of native heart without angina pectoris (5/1/2020)    History of DVT of lower extremity (9/25/2013)    Wide-complex tachycardia (H) (4/28/2020)    Presence of IVC filter (5/1/2020)    Essential hypertension (8/5/2004)      Follow-ups Needed After Discharge   Follow-up Appointments     Follow-up and recommended labs and tests       Follow up with primary care provider, Abbie Zacarias, within 7 days   for hospital follow- up and regarding new diagnosis.    Pulmonary Function Test when able.  Cardiology ASAP.  Sleep Medicine, Pulmonary medicine            Recommend expedited PFTS when Lab opens as may be VERY helpful    Unresulted Labs Ordered in the Past 30 Days of this Admission     Date and Time Order Name Status Description    5/20/2020 0924 Blood culture (one site) Preliminary       These results will be followed up by Abbie Zacarias     Discharge Disposition   Admited to home care   Discharged to assisted living  Condition at discharge: Fair      Hospital Course   Caitlin Sales is a 84 year old female presents on 5/20/2020 due to home health care found patient's room air SpO2 80% and patient was referred to the ED for evaluation.     Hypoxemia without acute respiratory failure- multifactorial   Some acute diastolic heart failure may  be suspected  Chronic respiratory failure with hypercarbia contributing  Consider amiodarone toxicity    Patient found to have SpO2 80% on RA by [facility] and referred to the ED. No dyspnea, chest pain. Possible mild pulmonary edema on CT chest. H/o systolic heart failure but LVEF recovered and was 65% on echo 4/28/2020. Bilateral lower extremity edema right > left on exam with pitting above the knees. Lower extremity US negative. CT angio PE study negative. Rx Furosemide 40 mg IV in the ED with excellent urine output. Had good urine output and net diuresis 3700, but still desaturations to 85-86% on RA. Repeated furosemide 40 mg IV 5/21/2020. Repeat echo unchanged. PAP 32 +RA. .   Appears to be somewhat improved but still desats on room air to 88%, some of this may simply be related to the hypercarbia. Unable to obtain an ABG for A-a gradient. Reluctant to attempt further diuresis with increasing creatinine and Bicarb   - Will discharge on O2 2 LPM  - See cardiology to consider alternative antiarrythmic ASAP  - See Pulmonology, PFTS, Sleep Medidicine when able    Chronic respiratory failure with Hypercarbia    On admission, VpH 7.31/VpCO2 69. Suggests chronic CO2 retention. No COPD history. May have obesity related hypoventilation. Has an elevated right hemidiaphragm which was first noted on report of CXR 11/21/2019 but felt stable compared with CXR 5/7/2017.   - Overnight oximetry was most consistent with sleep related hypoxemia, mitigated by use of O2 and without classic cyclical desaturations suggestive of obstructive sleep apnea   - Consider Chest fluoroscopy to evaluate right hemidiaphragm function  - May benefit from home bilevel or respiratory assist device, but lacking definitive diagnosis of etiology at this point    COVID PUI - ruled out    Due to hypoxemia and some central groundglass change on CT chest and COVID-19 pandemic, COVID-19 considered and NP swab for SARS-CoV-2 PCR sent. No acute symptoms or  labs to suggest COVID-19 and patient likely has acute on chronic heart failure as explanation for hypoxemia. Low suspicion PUI. SARS-CoV-2 PCR is negative. No indication for repeat testing. Precautions removed.     H/o wide-complex tachycardia    Recent admission to [facility], found to be in wide complex tachycardia which ultimately required multiple shocks and converted on amiodarone. Just finished the amiodarone loading dose of 400 mg daily yesterday and is to start 200 mg daily on day of admission.    No arrhythmia on tele thus far.    - On amiodarone 200 mg daily   - telemetry monitoring while here    Coronary Artery Disease    Stress test 4/2020 showed severe irreversible perfusion defect of the inferolateral wall and distal septal distribution, no discrete areas of ischemia. Asymptomatic.    - continued home atorvastatin, BB    History of DVT, IVC filter in place, permanent    Initial DVT following back surgery in 2013, IVC filter in place permanently. Acute left lower extremity DVT seen one month ago and now on indefinite anticoagulation with rivaroxaban.   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    - continued home rivaroxaban, statin    Lower extremity edema    Prescribed 3 days of furosemide for lower extremity edema 5/1/20. Echo 4/2020 showed EF 65%, normal LV size and systolic function, mild RV dilation, normal to mildly reduced RV systolic function, bi-atrial enlargement, mild MR and TR. Weight is down from clinic visit but still significant bilateral lower extremity edema right > left.     Modest improvement with lasix    Chronic Back Pain with radiculopathy   - continued home gabapentin    Alzheimer's dementia    No formal neuropysch testing. Family and patient denied formal diagnosis on recent visit to PCP though prior PCP had documented  alzheimer's dementia with concerns about patient's ability to manage medications. Now in assisted living facility. Normal mentation today.       Consultations This Hospital Stay   CARE TRANSITION RN/SW IP CONSULT  PHYSICAL THERAPY ADULT IP CONSULT  OCCUPATIONAL THERAPY ADULT IP CONSULT    Code Status   Full Code    Time Spent on this Encounter   I, Lawrence Iyer MD, personally saw the patient today and spent greater than 30 minutes discharging this patient.       Lawrence Iyer MD  [facility]  ______________________________________________________________________    Physical Exam   Vital Signs: Temp: 98.3  F (36.8  C) Temp src: Oral BP: 130/60 Pulse: 51 Heart Rate: 65 Resp: 18 SpO2: 95 % O2 Device: Nasal cannula Oxygen Delivery: 2 LPM  Weight: 189 lbs 6 oz  Constitutional: awake, alert, cooperative, no apparent distress, and appears stated age       Primary Care Physician   Abbie Zacarias    Discharge Orders      Home Care Referral      CARDIOLOGY EVAL ADULT REFERRAL      PULMONARY MEDICINE REFERRAL      SLEEP EVALUATION & MANAGEMENT REFERRAL - [facility]  [phone] (Age 15 and up)      Reason for your hospital stay    Low oxygen levels- Multifactorial: Mild congestive heart failure, Chronic respiratory failure with hypercarbia (likely related to right hemidiaphragm malfunction and/or possibly obesity hypoventilation syndrome). Consider early amiodarone toxicity     Follow-up and recommended labs and tests     Follow up with primary care provider, Abbie Zacarias, within 7 days for hospital follow- up and regarding new diagnosis.    Pulmonary Function Test when able.  Cardiology ASAP.  Sleep Medicine, Pulmonary medicine     Activity    Your activity upon discharge: activity as tolerated     Monitor and record    weight every other day     When to contact your care team    Daily weights. Call clinic for increase or decrease of greater than 5  lbs in less than a week.  Low Salt diet (<2000mg)  is important.     Full Code    Per admission     General PFT Lab (Please always keep checked)     Pulmonary Function Test    .     Home Oxygen Order for DME - ONLY FOR DME     Diet    Follow this diet upon discharge: Orders Placed This Encounter      Regular Diet Adult       Significant Results and Procedures     Heart Failure Labs  [facility]    Inpatient:   Lab Results   Component Value Date    NTBNPI 883 05/20/2020     CMP  Recent Labs   Lab 05/23/20  0539 05/22/20  0621 05/21/20  0530 05/20/20  0932    144 139 144   POTASSIUM 3.9 3.4 4.4 4.0   CHLORIDE 101 101 102 108   CO2 38* 39* 34* 32   ANIONGAP 1* 4 3 4   GLC 79 80 77 83   BUN 22 26 21 23   CR 0.94 1.32* 1.04 0.91   GFRESTIMATED 56* 37* 49* 58*   GFRESTBLACK 65 43* 57* 67   FROY 8.6 8.3* 8.5 8.4*   PROTTOTAL  --   --   --  6.8   ALBUMIN  --   --   --  3.2*   BILITOTAL  --   --   --  0.4   ALKPHOS  --   --   --  71   AST  --   --   --  19   ALT  --   --   --  17     CBC  Recent Labs   Lab 05/23/20  0539 05/20/20  0932   WBC 5.7 6.9   RBC 3.92 3.77*   HGB 12.1 11.6*   HCT 40.6 39.1   * 104*   MCH 30.9 30.8   MCHC 29.8* 29.7*   RDW 13.5 13.9    255     Venous Blood Gas  Recent Labs   Lab 05/23/20  0539 05/22/20  0621 05/20/20  0932   PHV 7.28* 7.33 7.31*   PCO2V 87* 81* 69*   PO2V 40 35 29   HCO3V 41* 42* 34*   ANTONINA 11.2 13.0 6.0   O2PER 24 24% 21%     Results for orders placed or performed during the hospital encounter of 05/20/20   CT Chest Pulmonary Embolism w Contrast    Narrative    CT CHEST PULMONARY EMBOLISM WITH CONTRAST  5/20/2020 10:24 AM     HISTORY: Hypoxia, known DVT.    COMPARISON: None.    TECHNIQUE: Volumetric helical acquisition of CT images of the chest  from the lung apices to the kidneys were acquired after the  administration of 77 mL Isovue-370 IV contrast. Radiation dose for  this scan was reduced using automated exposure control, adjustment  of  the mA and/or kV according to patient size, or iterative  reconstruction technique.    FINDINGS: There is no pulmonary embolism. Somewhat centralized  groundglass changes noted which are nonspecific, pattern suggestive of  edema. Moderate hiatal hernia. Markedly elevated right hemidiaphragm  with presumed associated compressive atelectasis versus less likely  infiltrate. The heart appears enlarged, enlarged main pulmonary artery  which is nonspecific but may indicate pulmonary hypertension. Thoracic  aorta is atherosclerotic without evidence of dissection or aneurysm.  There is no pleural or pericardial effusion. There is no pneumothorax.  Adrenal glands are normal. Remainder of the visualized upper abdomen  is unremarkable.      Impression    IMPRESSION:   1. No pulmonary embolism demonstrated.  2. No thoracic aortic dissection or aneurysm.   3. Markedly elevated right hemidiaphragm and associated compressive  atelectasis versus less likely infiltrate.   4. Some central groundglass change noted, this may indicate edema or  perhaps more peripheral air trapping. This is felt less likely  infectious but cannot be excluded.  5. Moderate hiatal hernia.    VALERIO MEIER MD       US Lower Extremity Venous Duplex Bilateral    Narrative    US LOWER EXTREMITY VENOUS DUPLEX BILATERAL 5/20/2020 11:24 AM    HISTORY: Bilateral leg swelling.    TECHNIQUE: Color flow and doppler spectral waveform analysis of deep  venous structures is performed.  Imaged deep venous structures of each  lower extremity include the common femoral vein, femoral vein,  popliteal vein, and visualized posterior deep calf veins.    COMPARISON: None.    FINDINGS: No DVT is seen in the right lower extremity. The distal left  posterior tibial vein, just above the left ankle is difficult to  visualize due to its small size. It is also difficult to compress.  This is probably due to its small size and the patient's body habitus.  A small amount of  mild/early nonocclusive thrombus would be considered  less likely.      Impression    IMPRESSION: No DVT is seen in either lower extremity at or above the  level of the knees. There is a one equivocal area in the left lower  extremity, just above the level of the ankle, as described above..     MAY JENSEN MD       Echo Limited    Narrative     Study Date: 05/21/2020 02:16 PM     Procedure  Limited Portable Echo Adult.  _____________________________________________________________________________     Interpretation Summary     The left ventricle is normal in size. There is mild concentric left  ventricular hypertrophy. Left ventricular systolic function is normal. The  visual ejection fraction is estimated at 60-65%. No regional wall motion  abnormalities noted.  The right ventricle is mildly dilated. The right ventricular systolic function  is normal.  Mild to moderate bi-atrial enlargement.  Trace to mild mitral and tricuspid regurgitation.  No pericardial effusion.  In comparison to the previous [facility] study dated 04/28/2020, the findings appear  similar.  _____________________________________________________________________________  Left Ventricle  The left ventricle is normal in size. There is mild concentric left  ventricular hypertrophy. Left ventricular systolic function is normal. The  visual ejection fraction is estimated at 60-65%. No regional wall motion  abnormalities noted.     Right Ventricle  The right ventricle is mildly dilated. The right ventricular systolic function  is normal.     Atria  There is mild-moderate biatrial enlargement.     Mitral Valve  There is trace to mild mitral regurgitation.      Tricuspid Valve  There is mild (1+) tricuspid regurgitation. The right ventricular systolic  pressure is approximated at 35.2 mmHg plus the right atrial pressure. IVC  diameter <2.1 cm collapsing >50% with sniff suggests a normal RA pressure of 3  mmHg.     Aortic Valve  There is moderate trileaflet  aortic sclerosis. There is trace aortic  regurgitation. Doppler evaluation for aortic stenosis was not performed.     Pulmonic Valve  The pulmonic valve is not well visualized.     Vessels  The inferior vena cava is normal.     Pericardium  There is no pericardial effusion.     Rhythm  Sinus rhythm was noted.     Report approved by: Michaelle Peter 05/21/2020 03:53 PM          Discharge Medications   Discharge Medication List as of 5/23/2020  3:31 PM      START taking these medications    Details   !! order for DME O2 2 LPM continuosDisp-1 Device,R-0, Local Print       !! - Potential duplicate medications found. Please discuss with provider.      CONTINUE these medications which have NOT CHANGED    Details   acetaminophen (TYLENOL) 500 MG tablet Take 2 tablets (1,000 mg) by mouth 3 times daily (with meals), Historical      atorvastatin (LIPITOR) 40 MG tablet Take 1 tablet (40 mg) by mouth daily, Disp-90 tablet,R-3, E-Prescribe      folic acid (FOLVITE) 1 MG tablet Take 1 tablet (1 mg) by mouth daily, Historical      gabapentin (NEURONTIN) 600 MG tablet Take 1 tablet (600 mg) by mouth At Bedtime, Historical      levothyroxine (SYNTHROID/LEVOTHROID) 75 MCG tablet Take 1 tablet (75 mcg) by mouth daily, Historical      losartan (COZAAR) 25 MG tablet Take 1 tablet (25 mg) by mouth daily, Historical      metoprolol succinate ER (TOPROL-XL) 50 MG 24 hr tablet Take 1 tablet (50 mg) by mouth daily, Historical      nystatin (MYCOSTATIN) 023573 UNIT/GM external cream Apply topically 2 times dailyHistorical      !! order for DME Equipment being ordered: knee high compression stockings 20-30 mm compressionDisp-1 Units,R-1, Local Print      rivaroxaban ANTICOAGULANT (XARELTO ANTICOAGULANT) 20 MG TABS tablet Take 1 tablet (20 mg) by mouth daily (with dinner), Disp-90 tablet,R-3, E-Prescribe      amiodarone (PACERONE) 200 MG tablet Take 1 tablet (200 mg) by mouth daily, Disp-90 tablet,R-0, E-Prescribe      Angel Carb-Mag  Hydrox-Simeth (ROLAIDS ADVANCED PO) Take 2-3 tablets by mouth daily as needed, Historical      Homeopathic Products (ZICAM COLD REMEDY) TBDP Take 1 lozenge by mouth every 3 hours as needed At onset of cold symptoms, Historical      hydrocortisone (CORTAID) 1 % external cream Apply topically 3 times daily as neededHistorical      Hyprom-Naphaz-Polysorb-Zn Sulf (CLEAR EYES COMPLETE) SOLN Apply 1-2 drops to eye 4 times daily as needed, Historical      loperamide (IMODIUM A-D) 2 MG tablet Take 1 tablet (2 mg) by mouth every 4 hours as needed for diarrhea, Historical      Methylcobalamin 5000 MCG SUBL Place 1 lozenge under the tongue daily, Historical      Multiple Vitamins-Minerals (ZINC) LOZG Take 1 each by mouth every 3 hours as needed, Historical      polyethylene glycol (MIRALAX) 17 GM/SCOOP powder Take 17 g (1 capful) by mouth daily as needed for constipation, Historical      simethicone (MYLICON) 125 MG chewable tablet Take 1-2 tablets (125-250 mg) by mouth as needed for intestinal gas, Historical      sulfamethoxazole-trimethoprim (BACTRIM DS) 800-160 MG tablet Take 1 tablet by mouth 2 times daily Take as needed for UTI sx, Disp-10 tablet,R-0, Historical      triamcinolone (KENALOG) 0.1 % external cream Apply topically 2 times daily Apply to hand rahsHistorical      zinc oxide (DESITIN) 40 % external ointment Apply topically as needed for dry skin or irritation Apply daily as needed to red chafed skin rashHistorical       !! - Potential duplicate medications found. Please discuss with provider.        Allergies   Allergies   Allergen Reactions     Ace Inhibitors Cough     Aspirin      Other reaction(s): GI Bleeding     Citalopram Unknown     Penicillins      Other reaction(s): *Unknown - Childhood Rxn     Tramadol Other (See Comments)     Lethargy     Furosemide Rash     Lisinopril-Hydrochlorothiazide Cough     Torsemide Rash

## 2020-05-23 NOTE — PROVIDER NOTIFICATION
DATE:  5/23/2020   TIME OF RECEIPT FROM LAB:  0545  LAB TEST:  PCO2  LAB VALUE:  87  RESULTS GIVEN WITH READ-BACK TO (PROVIDER):  Lawrence Iyer MD  TIME LAB VALUE REPORTED TO PROVIDER:   0602

## 2020-05-23 NOTE — PLAN OF CARE
Patient alert, but forgetful. Vital signs stable. Afebrile. Up with stand by assist with a walker.     Exercise oximetry test completed by RT. Patient on 2L of oxygen via nasal cannula. Patient denied any shortness of breath or difficulty breathing. Patient has been up in the chair and ambulated in hallway today.      Tolerating regular diet and denied any nausea. Voiding without issues. Patient denied any lightheadedness/dizziness, or numbness/tingling.     Tele monitor on.      Plan to discharge back to [facility]; patient's daughter will be picking her up.

## 2020-05-23 NOTE — PROGRESS NOTES
Resting SpO2 on RA=84%  Resting SpO2 on 2 lpm=94%  Ambulating SpO2 on 2 lpm=95% with 25 beat increase in HR.

## 2020-05-23 NOTE — DISCHARGE INSTRUCTIONS
Oxygen Provider:  Arranged through [facility] [employer], contact number [phone]. If you have any questions or concerns please call the oxygen company directly.

## 2020-05-23 NOTE — PROGRESS NOTES
Overnight oximetry done for 1 hour on RA then o2 was added at 1liter. Results in hard chart will need scanning into Epic.  Unable to obtain ABG.

## 2020-05-23 NOTE — PROGRESS NOTES
2:15pm- Spoke with nurse Nany informing her that we are still in need for MD face to face note before we can deliver oxygen. She stated they will get that in.   2:50pm- Received noted called Nany she was not available spoke with Hetal informing her that we received notes and our  is on way to deliver oxygen.

## 2020-05-23 NOTE — PLAN OF CARE
Patient alert, but forgetful. Vital signs stable. Afebrile. Up with stand by assist with a walker.    Patient continues to be on 1L of oxygen via nasal cannula. Attempted to wean patient to room air, but she unable to maintain O2 sat above 90%. Plan to have a overnight oximetry study tonight. Patient denies any shortness of breath or difficulty breathing. Trace edema noted in lower extremities. Patient's weight is down 3.2 kg since admission.      Patient had a pure wick catheter on overnight. Pure wick was removed today. Patient likes the pure wick catheter because she doesn't like having to call for help. This writer talked to the patient about the importance to get up and move around and to not hesitate to all for help. Patient understanding, but still would like to use the pure wick overnight.      Tele monitor on.

## 2020-05-23 NOTE — PROGRESS NOTES
I certify that this patient, Caitlin Sales has been under my care (or a nurse practitioner or physican's assistant working with me). This is the face-to-face encounter for oxygen medical necessity.      Caitlin Sales is now in a chronic stable state and continues to require supplemental oxygen. Patient has continued oxygen desaturation due to Chronic Respiratory Failure with Hypoxia J93.11.    See Discharge note for multifactorial etiology    Alternative treatment(s) tried or considered and deemed clinically infective for treatment of Chronic Respiratory Failure with Hypoxia J93.11 include diuresis.  If portability is ordered, is the patient mobile within the home? yes    **Patients who qualify for home O2 coverage under the CMS guidelines require ABG tests or O2 sat readings obtained closest to, but no earlier than 2 days prior to the discharge, as evidence of the need for home oxygen therapy. Testing must be performed while patient is in the chronic stable state. See notes for O2 sats.**

## 2020-05-23 NOTE — PROGRESS NOTES
This writer was called by the ICU and was notified of a heart rate of 48. Writer went to see the patient and pulse ox read a heart rate of 50.     The patient stated she had just felt like her chest was heavy and her heart was racing before writer entered the room, but those symptoms have since resolved.    Will continue to monitor.

## 2020-05-23 NOTE — PLAN OF CARE
Per discussion with Pt PureWick external catheter placed at 2315 for comfort; Pt agrees for PureWick from 2300 to 0600 for comfort and to promote sleep. Lindsay care completed prior to placement; Miconazole powder applied to groin and lindsay area. Good urine output. Pt had good  O2 saturation while awake, but desatted to 89% once asleep. O2 adjusted by RT to 1 LPM. Up with SBA. Otherwise slept peacefully throughout the night.  Anthony Goss RN on 5/23/2020 at 6:58 AM

## 2020-05-23 NOTE — PROGRESS NOTES
Received oxygen referral for patient 5/23 @ 10:45am from Nany. Spoke with Nany informing her we have RX and qualfying sats but will need MD face to face notes indicating oxygen needs ( .[id number]). She stated the MD has been paged to do this but may be tied up in ICU so unsure when will be completed.   11:03am- Called and spoke with patient offered choice and discussed equipment, informed her that once we have all documents needed to appropriately bill her insurance we will deliver POC to hospital bedside. She asked that I reached out to either Rosario or Ayana for update on oxygen, she stated [facility] living [facility] and I should be able to find the number. Informed her hat I would try to locate that number and reach out to them.  11:52am- Spoke with Rosario at [facility] discussed equipment and that we would deliver to hospital  Bedside once we have documents needed for patient to go home with. Provided her with our number if she has any questions or concerns in regards to patients oxygen needs or equipment.

## 2020-05-23 NOTE — PROGRESS NOTES
[facility] DISCHARGE NOTE    Patient discharged to assisted living at 4:15 PM via wheel chair. Accompanied by other:self and staff. Discharge instructions reviewed with patient, opportunity offered to ask questions. Prescriptions sent to patients preferred pharmacy. All belongings sent with patient.    Patient's daughter, Jade, was called and reviewed discharge instructions over the phone.     Komal Berrios RN

## 2020-05-26 NOTE — PROGRESS NOTES
Clinic Care Coordination Contact  [facility]/Voicemail    Referral Source: IP Handoff    Clinical Data: Care Coordinator Outreach    Outreach attempted x 2.  Left message on patient's voicemail with call back information and requested return call.    Patient has PCP follow up scheduled for 5/29.      Plan: Care Coordinator will send care coordination introduction letter with care coordinator contact information and explanation of care coordination services. Care Coordinator will do no further outreaches at this time.    VANESSA Epps, RN   [facility]  - Clinic Care Coordinator  Phone: [phone]

## 2020-05-26 NOTE — PROGRESS NOTES
Clinic Care Coordination Contact  [facility]/Voicemail    Referral Source: IP Handoff    Clinical Data: Care Coordinator Outreach    Outreach attempted x 1 to patient's listed phone number.  Unable to leave message as voicemail box was full.  Per chart review, patient resides at [facility] in [place], [place]. She recently moved to this facility and established primary care with Dr. Zacarias on 5/1/20. She has adult children involved and supportive.  Patient was open to [facility] and home care order was placed to resume RN, PT, OT after hospital discharge.  Patient discharged with home oxygen which is new DME.    Plan: Care Coordinator will try to reach patient again in 1-2 business days.    VANESSA Epps, RN   [facility]  - Clinic Care Coordinator  Phone: [phone]

## 2020-05-26 NOTE — LETTER
[facility]  [address] [place] [address]      May 26, 2020      Caitlin Sales  [address]  [place] [address]      Dear Caitlin,    I am a clinic care coordinator who works with Abbie Zacarias DO at [facility]. I am reaching out to you to introduce Clinic Care Coordination and to see if there was anything I could assist you with.  Below is a description of clinic care coordination and how I can further assist you.      The clinic care coordinator team is made up of a registered nurse,  and community health worker who understand the health care system. The goal of clinic care coordination is to help you manage your health and improve access to the health care system in the most efficient manner. The team can assist you in meeting your health care goals by providing education, strengthening the communication among your providers and supporting you with any resource needs.    Please feel free to contact me at [phone] with any questions or concerns. We are focused on providing you with the highest-quality healthcare experience possible and that all starts with you.     Sincerely,     Chelo Wakefield, DONNIEN, RN   [facility]  - Clinic Care Coordinator

## 2020-05-28 NOTE — TELEPHONE ENCOUNTER
Reason for Call:  Orders for [facility]    Detailed comments: Angela BLACKMON from [facility] is calling for Verbal Orders for PT and OT eval and treat, SN 1 time a week for 1 week, 3 times for 2 weeks, 2 times a week for 3 weeks, 1 times a week for 2 weeks all for Disease management, pain and teaching.  Vitals from today are 98.4 Temp, Pulse 58, Resp 16 /80 and lungs are clear. Patient has had a 5 lb weight gain since Sunday. She also has bilateral edema 1-2 pitting on calves ankles and tops of feet.  Please call with orders.    Phone Number Patient can be reached at: Other phone number:  [phone]    Best Time: any    Can we leave a detailed message on this number? YES   Vicki Lei  Clinic Station        Call taken on 5/28/2020 at 11:41 AM by Vicki Pettit

## 2020-05-28 NOTE — TELEPHONE ENCOUNTER
Recommend labs at tomorrow's visit, orders placed.  Recommend furosemide 40 mg once a day, order placed

## 2020-05-28 NOTE — TELEPHONE ENCOUNTER
Routed to provider.  LUIS.    I spoke with Kelley.  Pt has been getting daily weight since 5/24/20.  Pt has bilateral edema too of 1-2.    Kelley updates that pt had full home care assessment today and vitals are all stable.    Denies breathing problems; denies shortness of breath or palpitations.    Pt has face to face visit tomorrow with Dr Zacarias.  If otherwise stable, will address at office visit.    Fabienne Agudelo RN

## 2020-05-28 NOTE — TELEPHONE ENCOUNTER
Reason for Call:  Other FYI    Detailed comments: Kelley from [facility] care facility is calling to report patients weight it has gone up 5 pounds in 5 days 195.5- 200.6 her call back number is [phone]. Patient has an appointment tomorrow.    Phone Number Patient can be reached at: Home number on file [phone] (home)    Best Time: any    Can we leave a detailed message on this number? YES    Call taken on 5/28/2020 at 10:15 AM by Hetal Fair

## 2020-05-28 NOTE — TELEPHONE ENCOUNTER
Left non-detailed message for Kelley ENGLE RN, [facility], to return a call to the clinic NEYMAR.     FILI Agudelo RN

## 2020-05-28 NOTE — TELEPHONE ENCOUNTER
"Noted \"allergy\".  Since she took this while inpatient without a reaction it is safe to continue  "

## 2020-05-28 NOTE — TELEPHONE ENCOUNTER
arabella RN called stating that patient was to start taking oral furosemide, however it is listed as drug allergy. Patient did have a hospital side which she received iv furosemide. Also earlier this month she had a boost of oral furosemide. Both time she had no noted reactions to medication.      Should she continue to take current drug?    Arabella was reviewing her med list and noticed that she has this allergy which caused hives or dermatis.       [employer] eladio.     [place] [employer]  [facility]

## 2020-05-29 NOTE — TELEPHONE ENCOUNTER
Paradigm and workability letter form faxed to: [phone] and sent to scanning.    Lauren Fowler  [employer]

## 2020-05-29 NOTE — PROGRESS NOTES
"Subjective     Caitlin Sales is a 84 year old female who presents to clinic today for the following health issues:    HPI     Pt report oxygen is 2L     Daughter Frances is on the phone    Hospital Follow-up Visit:    Hospital/Nursing Home/IP Rehab Facility: [facility]  Date of Admission: 5/20/20  Date of Discharge: 5/23/20  Reason(s) for Admission: Hypoxemia      Was your hospitalization related to COVID-19? No   Problems taking medications regularly:  None  Medication changes since discharge: None  Problems adhering to non-medication therapy:  None    Summary of hospitalization:  [facility] discharge summary reviewed  Diagnostic Tests/Treatments reviewed.  Follow up needed: see below  Other Healthcare Providers Involved in Patient s Care:         Homecare  Update since discharge: improved. Post Discharge Medication Reconciliation: discharge medications reconciled, continue medications without change.  Plan of care communicated with patient and family          --she reports no shortness of breath.  Feeling good  --ongoing swelling, R>L  --started lasix this AM, that I rx yesterday with report of 5 lbs wt gain in 1 week  --at hospital discharge \"Appears to be somewhat improved but still desats on room air to 88%, some of this may simply be related to the hypercarbia\"  --was not discharge on diuretics because of e-lyte problems  --wt on 5/25 195, yesterday 200.6      Current Outpatient Medications   Medication Sig Dispense Refill     acetaminophen (TYLENOL) 500 MG tablet Take 2 tablets (1,000 mg) by mouth 3 times daily (with meals)       amiodarone (PACERONE) 200 MG tablet Take 1 tablet (200 mg) by mouth daily 90 tablet 0     atorvastatin (LIPITOR) 40 MG tablet Take 1 tablet (40 mg) by mouth daily 90 tablet 3     Angel Carb-Mag Hydrox-Simeth (ROLAIDS ADVANCED PO) Take 2-3 tablets by mouth daily as needed       folic acid (FOLVITE) 1 MG tablet Take 1 tablet (1 mg) by mouth daily       furosemide " (LASIX) 40 MG tablet Take 1 tablet (40 mg) by mouth daily 30 tablet 1     gabapentin (NEURONTIN) 600 MG tablet Take 1 tablet (600 mg) by mouth At Bedtime       Homeopathic Products (ZICAM COLD REMEDY) TBDP Take 1 lozenge by mouth every 3 hours as needed At onset of cold symptoms       hydrocortisone (CORTAID) 1 % external cream Apply topically 3 times daily as needed       Hyprom-Naphaz-Polysorb-Zn Sulf (CLEAR EYES COMPLETE) SOLN Apply 1-2 drops to eye 4 times daily as needed       levothyroxine (SYNTHROID/LEVOTHROID) 75 MCG tablet Take 1 tablet (75 mcg) by mouth daily       loperamide (IMODIUM A-D) 2 MG tablet Take 1 tablet (2 mg) by mouth every 4 hours as needed for diarrhea       losartan (COZAAR) 25 MG tablet Take 1 tablet (25 mg) by mouth daily       Methylcobalamin 5000 MCG SUBL Place 1 lozenge under the tongue daily       metoprolol succinate ER (TOPROL-XL) 50 MG 24 hr tablet Take 1 tablet (50 mg) by mouth daily       Multiple Vitamins-Minerals (ZINC) LOZG Take 1 each by mouth every 3 hours as needed       nystatin (MYCOSTATIN) 120968 UNIT/GM external cream Apply topically 2 times daily       order for DME O2 2 LPM continuos 1 Device 0     order for DME Equipment being ordered: knee high compression stockings 20-30 mm compression 1 Units 1     polyethylene glycol (MIRALAX) 17 GM/SCOOP powder Take 17 g (1 capful) by mouth daily as needed for constipation       rivaroxaban ANTICOAGULANT (XARELTO ANTICOAGULANT) 20 MG TABS tablet Take 1 tablet (20 mg) by mouth daily (with dinner) 90 tablet 3     simethicone (MYLICON) 125 MG chewable tablet Take 1-2 tablets (125-250 mg) by mouth as needed for intestinal gas       sulfamethoxazole-trimethoprim (BACTRIM DS) 800-160 MG tablet Take 1 tablet by mouth 2 times daily Take as needed for UTI sx 10 tablet 0     triamcinolone (KENALOG) 0.1 % external cream Apply topically 2 times daily Apply to hand rahs       zinc oxide (DESITIN) 40 % external ointment Apply topically as  needed for dry skin or irritation Apply daily as needed to red chafed skin rash           Reviewed and updated as needed this visit by Provider         Review of Systems   Constitutional, HEENT, cardiovascular, pulmonary, GI, , musculoskeletal, neuro, skin, endocrine and psych systems are negative, except as otherwise noted.      Objective    /80   Pulse 66   Temp 97.6  F (36.4  C) (Tympanic)   Resp 18   Wt 90.7 kg (200 lb)   SpO2 94%   Breastfeeding No   BMI 33.28 kg/m    Body mass index is 33.28 kg/m .  Physical Exam   GENERAL APPEARANCE: alert, no distress, over weight and in wheelchair, wearing oxygen  RESP: lungs clear to auscultation - no rales, rhonchi or wheezes  CV: regular rates and rhythm, normal S1 S2, no S3 or S4 and no murmur, click or rub  LYMPHATICS: 3+ pitting edema  right leg, 2+ left leg            Assessment & Plan     1. Acute diastolic heart failure (H) she has had a 5 pound weight gain since hospital discharge.  On initial presentation to the clinic, the [facility] recorded her oxygen as 79%.  She was monitored for 1 full minute with a finger pulse ox.  Her pulse was 113 at that time.  Provider immediately entered the room and patient did not appear in distress.  The pulse ox was removed and her finger was warmed.  The pulse ox was checked on provider's finger and it was showing normal readings.  The pulse ox was returned to the patient's finger and oxygen was in the mid to upper 90s, with pulse in the 60s.  This appears to be an aberrant reading.  However, given the weight gain will proceed cautiously.  Diuretics were not continued on hospital discharge because of rising bicarbonate.  Check a chest x-ray and BMP today.  Will have patient see me on 6/4, with labs prior to visit to recheck kidney function.  Continue furosemide.  Continue RN daily weights and oxygen checks  - CARDIOLOGY EVAL ADULT REFERRAL; Future  - **Basic metabolic panel FUTURE anytime; Future  - Basic metabolic  panel  - XR Chest 2 Views    2. Wide-complex tachycardia (H)- There is concerned that amiodarone is causing pulmonary toxicity.  She needs to see cardiology in the next 1 to 2 weeks to determine a alternate treatment plan.  She should have PFTs, unsure the feasibility of this given the COVID crisis.  - CARDIOLOGY EVAL ADULT REFERRAL; Future    3. Chronic respiratory failure with hypercapnia (H) -concern for sleep apnea and daytime hypoxemia due to pickwickian type physiology.  Get sleep study.  It might be challenging for patient to arrange transportation.  - General PFT Lab (Please always keep checked); Future  - SLEEP EVALUATION & MANAGEMENT REFERRAL - ADULT -[facility] [place]  [phone] (Age 15 and up); Future  - Pulmonary Function Test; Future    Left VM with cardiac RN about needing more urgent consultation with EP    Patient Instructions   1. Urgent referral to Cardiology (1-2 weeks)  2. Urgent referral for breathing tests (2 weeks hopefully) and Sleep clinic (1-2 months)  3. See Dr. Zacarias next Thursday 6/4, labs 1 hour prior to visit.  4. Labs and xray today      Return in about 1 week (around 6/5/2020) for Routine Follow-Up/Med Check, Lab Work.     Greater than 40 minutes was spent face-to-face with the patient by the provider.  Greater than 50% was spent in counseling or coordinating care for this patient.      Abbie Zacarias, DO  [facility]

## 2020-06-02 NOTE — LETTER
6/2/2020    Abbie Zacarias, DO  [address] [address]    RE: Caitlin Carballoey       Dear Colleague,    I had the pleasure of seeing Caitlin Sales in the [facility].    Caitlin Sales is a 84 year old female who is being evaluated via a billable telephone visit.      Mrs. Sales is a  84v year old woman who recently moved here from [place], [place] with known history of multiple episodes of wide-complex tachycardia, the longest of which lasting over 150 beats felt to be aberrantly conducted SVT by her cardiologist. Metoprolol was increased to 50 mg daily. Recently she presented with chest heaviness and noted to be a wide complex tachycardia unresponsive to Adenosine twice required cardioversion. The rate was about 170 bpm. She was discharged to home on amiodarone from [facility]. While there LVEF was initially reduced at 40% but later on normalized. Nuc stress test shows predominantly irreversible perfusion defect in the inferolateral wall. There is a severe and predominantly irreversible patchy perfusion defect in the distal septal distribution. No discrete areas of significant ischemia. Visually estimated LV EF 60%. She was hospitalized at [facility] for hypoxemia due to CHF with pEF. She felt  Much better after diuresed off several lbs.    Currently, she is residing at an assisted living recovering and likely to stay there long term. She has O2 nc on and sat at 95%. No chest pain nor pressure nor palpitations. Her meds were reviewed personally by me as read by her RN. She is scheduled for lab work this Thursday. Recent ECG reviewed showing nsr with narrow QRS originally then LBBB.     Past Medical History:   Diagnosis Date     Benign neoplasm of colon 09/07/2007     Dermatomycosis, unspecified 09/07/2004   Under breast and stomach     Disorder of bone and cartilage, unspecified 8/5/2004   DEXA scan 09/28/2004-recommend calcium 1500, vitamin D 800,      Diverticulosis of colon (without mention of hemorrhage) 08/05/2005   Per colonoscopy     Enthesopathy of hip region 03/25/2004   Mild right trochanteric bursitis     Esophageal reflux 3/25/2010     Generalized osteoarthrosis, unspecified site 3/25/2010     Long term (current) use of opiate analgesic 09/14/2018   Treatment Agreement     Low Back Pain Radiating to Both Legs 10/14/2009   Left>right leg pain.     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 Acute left lower extremity DVT seen one month ago and now on indefinite anticoagulation with rivaroxaban.  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      A/P      Sustained WCT likely SVT with LBBB aberrancy given resting surface ECG already has it as well in the background of preserved LVEF with recent CHF decompensation appears to resolving. Her chest pain appears to be correlated with her tachycardia. Not sure what to make of nuclear stress test result but for now will continue medical therapy including beta-blocker for her CAD. Given this history amio is safest at this point in time for her likely SVT with LBBB aberrancy - I am assuming as Epic do not have actual ECG of WCT. She appears to be euvolumic on current Lasix and is scheduled to have BMP level this week. I will add TSH  and ALT level and have come back in 3 months to see SERAFIN or me.       Thank you for allowing me to participate in the care of your patient.    Sincerely,     Piotr Nair MD     [facility]

## 2020-06-02 NOTE — PROGRESS NOTES
"Caitlin Sales is a 84 year old female who is being evaluated via a billable telephone visit.      The patient has been notified of following:     \"This telephone visit will be conducted via a call between you and your physician/provider. We have found that certain health care needs can be provided without the need for a physical exam.  This service lets us provide the care you need with a short phone conversation.  If a prescription is necessary we can send it directly to your pharmacy.  If lab work is needed we can place an order for that and you can then stop by our lab to have the test done at a later time.    Telephone visits are billed at different rates depending on your insurance coverage. During this emergency period, for some insurers they may be billed the same as an in-person visit.  Please reach out to your insurance provider with any questions.    If during the course of the call the physician/provider feels a telephone visit is not appropriate, you will not be charged for this service.\"    Patient has given verbal consent for Telephone visit?  Yes    What phone number would you like to be contacted at? [phone]    How would you like to obtain your AVS? Mail a copy    Video via [employer] duration: 20 minutes  General:  no apparent distress, normal body habitus, sitting upright wearing O2 cannula  ENT/Mouth:  membranes moist, no nasal discharge.  Normal head shape, no apparent injury or laceration.  Eyes:  no scleral icterus, normal conjunctivae.  No observed jaundice.  Neck:  no apparent neck swelling.   Chest/Lungs:  No breathing difficulty while speaking.  No audible wheezing.  No cough during conversation.  Cardiovascular:  No obviously elevated jugular venous pressure.  No apparent edema bilaterally in LE.   Abdomen:  no obvious abdominal distention.   Extremities:  no apparent cyanosis.  Skin:  no xanthelasma.  No facial lacerations.  Neurologic:  Normal arm motion bilateral, no tremors.  "   Psychiatric:  Alert and oriented x3, calm demeanor    The rest of the comprehensive physical examination is deferred due to public health emergency video visit restrictions.      Mrs. Sales is a  84v year old woman who recently moved here from [place], [place] with known history of multiple episodes of wide-complex tachycardia, the longest of which lasting over 150 beats felt to be aberrantly conducted SVT by her cardiologist. Metoprolol was increased to 50 mg daily. Recently she presented with chest heaviness and noted to be a wide complex tachycardia unresponsive to Adenosine twice required cardioversion. The rate was about 170 bpm. She was discharged to home on amiodarone from [facility]. While there LVEF was initially reduced at 40% but later on normalized. Nuc stress test shows predominantly irreversible perfusion defect in the inferolateral wall. There is a severe and predominantly irreversible patchy perfusion defect in the distal septal distribution. No discrete areas of significant ischemia. Visually estimated LV EF 60%. She was hospitalized at [facility] for hypoxemia due to CHF with pEF. She felt  Much better after diuresed off several lbs.    Currently, she is residing at an assisted living recovering and likely to stay there long term. She has O2 nc on and sat at 95%. No chest pain nor pressure nor palpitations. Her meds were reviewed personally by me as read by her RN. She is scheduled for lab work this Thursday. Recent ECG reviewed showing nsr with narrow QRS originally then LBBB.     Past Medical History:   Diagnosis Date     Benign neoplasm of colon 09/07/2007     Dermatomycosis, unspecified 09/07/2004   Under breast and stomach     Disorder of bone and cartilage, unspecified 8/5/2004   DEXA scan 09/28/2004-recommend calcium 1500, vitamin D 800,     Diverticulosis of colon (without mention of hemorrhage) 08/05/2005   Per colonoscopy     Enthesopathy of hip region 03/25/2004   Mild right  trochanteric bursitis     Esophageal reflux 3/25/2010     Generalized osteoarthrosis, unspecified site 3/25/2010     Long term (current) use of opiate analgesic 09/14/2018   Treatment Agreement     Low Back Pain Radiating to Both Legs 10/14/2009   Left>right leg pain.     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 Acute left lower extremity DVT seen one month ago and now on indefinite anticoagulation with rivaroxaban.  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      A/P      Sustained WCT likely SVT with LBBB aberrancy given resting surface ECG already has it as well in the background of preserved LVEF with recent CHF decompensation appears to resolving. Her chest pain appears to be correlated with her tachycardia. Not sure what to make of nuclear stress test result but for now will continue medical therapy including beta-blocker for her CAD. Given this history amio is safest at this point in time for her likely SVT with LBBB aberrancy - I am assuming as Epic do not have actual ECG of WCT. She appears to be euvolumic on current Lasix and is scheduled to have BMP level this week. I will add TSH and ALT level and have come back in 3 months to see SERAFIN or me.     Piotr Nair MD

## 2020-06-04 NOTE — PROGRESS NOTES
Subjective     Caitlin Sales is a 84 year old female who presents to clinic today for the following health issues:    DIPESH     Frances is the dtr -   Gale is on the phone during the visit    Heart Failure Follow-up    Are you experiencing any shortness of breath? No    Are you experiencing any swelling in your legs or feet?  Worse than usual - Both Legs and Feet    Are you using more pillows than usual? No    Do you cough at night?  No    Do you check your weight daily?  Yes     Have you had a weight change recently?  No    Are you having any of the following side effects from your medications? (Select all that apply)  The patient does not report symptoms of dizziness, fatigue, cough, swelling, or slow heart beat.    Since your last visit, how many times have you gone to the cardiologist, urgent care, emergency room, or hospital because of your heart failure?   None     Last week we resumed diuretics due to wt gain.  Wt has not dropped    No shortness of breath, chest pain     Wearing tubigrip    The lasix is causing urinary frequency and this is bothersome.  Not disrupting her sleep    Wide complex Tachycardia:   --virtual visit with Cards on 6/2.  Recommended to continue amiodarone      Left hand numbness: started after prolonged hand resting on chin.  Affected 3-5th fingers.  Having weakness in .    Current Outpatient Medications   Medication Sig Dispense Refill     acetaminophen (TYLENOL) 500 MG tablet Take 2 tablets (1,000 mg) by mouth 3 times daily (with meals)       amiodarone (PACERONE) 200 MG tablet Take 1 tablet (200 mg) by mouth daily 90 tablet 0     atorvastatin (LIPITOR) 40 MG tablet Take 1 tablet (40 mg) by mouth daily 90 tablet 3     Angel Carb-Mag Hydrox-Simeth (ROLAIDS ADVANCED PO) Take 2-3 tablets by mouth daily as needed       folic acid (FOLVITE) 1 MG tablet Take 1 tablet (1 mg) by mouth daily       furosemide (LASIX) 40 MG tablet Take 1 tablet (40 mg) by mouth daily 30 tablet 1      gabapentin (NEURONTIN) 600 MG tablet Take 1 tablet (600 mg) by mouth At Bedtime       hydrocortisone (CORTAID) 1 % external cream Apply topically 3 times daily as needed       Hyprom-Naphaz-Polysorb-Zn Sulf (CLEAR EYES COMPLETE) SOLN Apply 1-2 drops to eye 4 times daily as needed       levothyroxine (SYNTHROID/LEVOTHROID) 75 MCG tablet Take 1 tablet (75 mcg) by mouth daily       loperamide (IMODIUM A-D) 2 MG tablet Take 1 tablet (2 mg) by mouth every 4 hours as needed for diarrhea       losartan (COZAAR) 25 MG tablet Take 1 tablet (25 mg) by mouth daily       metoprolol succinate ER (TOPROL-XL) 50 MG 24 hr tablet Take 1 tablet (50 mg) by mouth daily       nystatin (MYCOSTATIN) 228349 UNIT/GM external cream Apply topically 2 times daily       omeprazole (PRILOSEC) 40 MG DR capsule Take 40 mg by mouth daily       order for DME O2 2 LPM continuos 1 Device 0     order for DME Equipment being ordered: knee high compression stockings 20-30 mm compression 1 Units 1     polyethylene glycol (MIRALAX) 17 GM/SCOOP powder Take 17 g (1 capful) by mouth daily as needed for constipation       rivaroxaban ANTICOAGULANT (XARELTO ANTICOAGULANT) 20 MG TABS tablet Take 1 tablet (20 mg) by mouth daily (with dinner) 90 tablet 3     simethicone (MYLICON) 125 MG chewable tablet Take 1-2 tablets (125-250 mg) by mouth as needed for intestinal gas       triamcinolone (KENALOG) 0.1 % external cream Apply topically 2 times daily Apply to hand rahs       vitamin B-12 (CYANOCOBALAMIN) 1000 MCG tablet Take 1,000 mcg by mouth daily       zinc oxide (DESITIN) 40 % external ointment Apply topically as needed for dry skin or irritation Apply daily as needed to red chafed skin rash       Homeopathic Products (ZICAM COLD REMEDY) TBDP Take 1 lozenge by mouth every 3 hours as needed At onset of cold symptoms       Methylcobalamin 5000 MCG SUBL Place 1 lozenge under the tongue daily       Multiple Vitamins-Minerals (ZINC) LOZG Take 1 each by mouth every 3  hours as needed       sulfamethoxazole-trimethoprim (BACTRIM DS) 800-160 MG tablet Take 1 tablet by mouth 2 times daily Take as needed for UTI sx 10 tablet 0         Reviewed and updated as needed this visit by Provider         Review of Systems   Constitutional, HEENT, cardiovascular, pulmonary, GI, , musculoskeletal, neuro, skin, endocrine and psych systems are negative, except as otherwise noted.      Objective    /72 (BP Location: Right arm, Patient Position: Chair, Cuff Size: Adult Large)   Pulse 86   Temp 98.5  F (36.9  C) (Tympanic)   Resp 20   Wt 90.3 kg (199 lb)   SpO2 97%   BMI 33.12 kg/m    Body mass index is 33.12 kg/m .  Physical Exam   GENERAL APPEARANCE: alert, no distress, fatigued and elderly, frail, wearing oxygen, has wheeled walker  RESP: crackles bilateral bases  CV: regular rates and rhythm, normal S1 S2, no S3 or S4 and no murmur, click or rub  LYMPHATICS: 2+ pitting edema, tapers to knee, R>L    Diagnostic Test Results:  Labs reviewed in Epic  Results for orders placed or performed in visit on 06/04/20 (from the past 24 hour(s))   TSH with free T4 reflex   Result Value Ref Range    TSH 2.33 0.40 - 4.00 mU/L   ALT   Result Value Ref Range    ALT 20 0 - 50 U/L   Basic metabolic panel   Result Value Ref Range    Sodium 142 133 - 144 mmol/L    Potassium 3.7 3.4 - 5.3 mmol/L    Chloride 100 94 - 109 mmol/L    Carbon Dioxide 37 (H) 20 - 32 mmol/L    Anion Gap 5 3 - 14 mmol/L    Glucose 95 70 - 99 mg/dL    Urea Nitrogen 27 7 - 30 mg/dL    Creatinine 1.16 (H) 0.52 - 1.04 mg/dL    GFR Estimate 43 (L) >60 mL/min/[1.73_m2]    GFR Estimate If Black 50 (L) >60 mL/min/[1.73_m2]    Calcium 9.0 8.5 - 10.1 mg/dL           Assessment & Plan     1. Heart failure with preserved ejection fraction, NYHA class II (H) -the diuretic did not change her weight or her leg swelling, but caused significant polyuria and reduced kidney function.  Reviewed heart failure action plan with patient and daughter.   Provided a copy.  Take the Lasix 40 mg daily x3 days only if weight is greater than 2 pounds in 24 hours or 5 pounds in 1 week    2. Carpal tunnel syndrome of left wrist -probable based on her description of symptoms.  Brace given    3. Wide-complex tachycardia (H) -follow with cardiology, remains on amiodarone    4. Essential hypertension  - furosemide (LASIX) 40 MG tablet; Take 1 tablet (40 mg) by mouth daily as needed (if weight gain)  Dispense: 30 tablet; Refill: 1           Patient Instructions   1. Hand numbness sounds like carpal tunnel.  Wear a brace at bedtime  2. Kidney function is reduced because of the diuretic.  Take the furosemide 40 mg daily x 3 days, if weight gain of > 2 lbs in 24 hours or 5 lbs in 1 week.                  Thank you for choosing [facility].  You may be receiving an email and/or telephone survey request from [facility] Customer Experience regarding your visit today.  Please take a few minutes to respond to the survey to let us know how we are doing.      If you have questions or concerns, please contact us via [employer] or you can contact your care team at [phone].    Our Clinic hours are:  Monday 6:40 am  to 7:00 pm  Tuesday -Friday 6:40 am to 5:00 pm    The [place] outpatient lab hours are:  Monday - Friday 6:10 am to 4:45 pm  Saturdays 7:00 am to 11:00 am  Appointments are required, call [phone]    If you have clinical questions after hours or would like to schedule an appointment,  call the clinic at [phone].    Patient Education     Left- or Right- Side Congestive Heart Failure (CHF)    The heart is a large muscle that acts as a pump to circulate blood throughout the body. Blood carries oxygen to all of the organs, including the brain, muscles, and skin. After your body takes the oxygen out of the blood, the blood returns to the heart. The right side of the heart collects the blood from the body and pumps it to the lungs. In the lungs, it gets fresh oxygen and  gives up carbon dioxide. The oxygen-rich blood from the lungs then returns to the left side of the heart, where it is pumped back out to the rest of your body, starting the process all over.  Congestive heart failure (CHF) occurs when the heart muscle does not function normally, leading to fluid retention or reduces blood flow. This can be caused by heart muscle weakness or stiffness, or a heart valve problem. Heart failure can affect the right side of the heart or the left side. But heart failure may affect not only the right side of the heart or only the left side. Although it may have started on one side, it can and often eventually does affect both sides.  Right-side heart failure  When the right side of the heart is failing, it can t handle the blood it is getting from the rest of the body. This blood returns to the heart through veins. When too much pressure builds up in the veins, fluid leaks out into the tissues. Gravity then causes that fluid to move to those parts of the body that are the lowest. So one of the first symptoms of right-side CHF can include swelling in the feet and ankles. If the condition gets worse, the swelling can even go up past the knees. Sometimes it gets so severe, the liver and intestines can get congested as well.  Left-side heart failure  When the left side of the heart is failing, it can t handle the blood it gets from the lungs. Pressure then builds up in the veins of the lungs, causing fluid to leak into the lung tissues. This may cause CHF and pulmonary edema. This causes you to feel short of breath, weak, or dizzy. These symptoms are often worse with exertion, such as when climbing stairs or walking up hills. Lying with your head flat is uncomfortable and can make your breathing worse. This may make sleeping difficult. You may need to use extra pillows to elevate your upper body to sleep well. The same is true when just resting during the daytime. You may also feel weak or  tired and have less energy during exertion.  There are many causes of heart failure including:    Coronary artery disease    Past heart attack (also known as acute myocardial infarction, or AMI)    High blood pressure    Damaged heart valve    Diabetes    Obesity    Cigarette smoking    Alcohol abuse  Heart failure is usually a chronic condition. The purpose of medical treatment is to improve the pumping action of the heart and to remove excess water from the body. A number of medicines can help reach this goal, improve symptoms, and prevent the heart from becoming weaker. Sometimes, heart failure can become so severe that a device is placed in the heart to help with pumping. Another major goal is to better treat the causes of heart failure, such as diabetes and high blood pressure, by making changes in your lifestyle and maximizing medical control when needed.  Home care  Follow these guidelines when caring for yourself at home:    Check your weight every day. This is very important because a sudden increase in weight gain could mean worsening heart failure. Keep these things in mind:  ? Use the same scale every day.  ? Weigh yourself at the same time every day.  ? Make sure the scale is on a hard floor surface, not on a rug or carpet.  ? Keep a record of your weight every day so your healthcare provider can see it. If you are not given a log sheet for this, keep a separate journal for this purpose.     Cut back on the amount of salt (sodium) you eat. Follow your healthcare provider's recommendation on how much salt or sodium you should have each day.  ? Limit high-salt foods. These include olives, pickles, smoked meats, salted potato chips, and most prepared foods.  ? Don't add salt to your food at the table. Use only small amounts of salt when cooking.  ? Read the labels carefully on food packages to learn how much salt or sodium is in each serving in the package. Remember, a can or package of food may contain  more than 1 serving. So if you eat all the food in the package, you may be getting more salt than you think.    Follow your healthcare provider's recommendations about how much fluid you should have. Be aware that some foods, such as soup, pudding, and juicy fruits like oranges or melons, contain liquid. You'll need to count the liquid in those foods as part of your daily fluid intake. Your provider can help you with this.    Stop smoking.    Cut back on how much alcohol you drink.    Lose weight if you are overweight. The excess weight adds a lot of stress on the workload of the heart.    Stay active. Talk with your provider about an exercise program that is safe for your heart.    Keep your feet elevated to reduce swelling. Ask your provider about support hose as a preventive treatment for daytime leg swelling.  Besides taking your medicine as instructed, an important part of treatment is lifestyle changes. These include diet, physical activity, stopping smoking, and weight control.  Improve your diet by including more fresh foods, cutting back on how much sugar and saturated fat you eat, and eating fewer processed foods and less salt.  Follow-up care  Follow up with your healthcare provider, or as advised.  Make sure to keep any appointments that were made for you. These can help better control your congestive heart failure. You will need to follow up with your provider on a routine basis to make sure your heart failure is well managed.  If an X-ray, electrocardiogram (ECG), or other tests were done, you will be told of any new findings that may affect your care.  Call 911  Call 911 if you:    Become severely short of breath    Feel lightheaded, or feel like you might pass out or faint    Have chest pain or discomfort that is different than usual, the medicines your doctor told you to use for this don't help, or the pain lasts longer than 10 to 15 minutes    You suddenly develop a rapid heart rate  When to seek  medical advice  The following may be signs that your heart failure is getting worse. Call your healthcare provider right away if any of these happen:    Sudden weight gain. This means 3 or more pounds in one day, or 5 or more pounds in 1 week    Trouble breathing not related to being active    New or increased swelling of your legs or ankles    Swelling or pain in your abdomen    Breathing trouble at night. This means waking up short of breath or needing more pillows to breathe.    Frequent coughing that doesn t go away    Feeling much more tired than usual  Date Last Reviewed: 5/1/2018 2000-2019 [employer]. [address] [place] [address], [place] [address]. All rights reserved. This information is not intended as a substitute for professional medical care. Always follow your healthcare professional's instructions.               Return in about 3 months (around 9/4/2020) for Routine Follow-Up/Med Check.    Abbie Zacarias, DO  [facility]

## 2020-06-04 NOTE — NURSING NOTE
PCP gave CHRIS verbal order for gave oxygen to her pt.  Pt needed 2L and used the in clinic oxygen tank.  Mireya Gonzalez CMA ([facility])   (aka: Laura Gonzalez)

## 2020-06-04 NOTE — LETTER
Lynne 10, 2020      Caitlin Sales  [address]  [place] [address]        Dear ,    We are writing to inform you of your test results.    Kidney function is mildly low, but similar to previous values.    Resulted Orders   TSH with free T4 reflex   Result Value Ref Range    TSH 2.33 0.40 - 4.00 mU/L   ALT   Result Value Ref Range    ALT 20 0 - 50 U/L   Basic metabolic panel   Result Value Ref Range    Sodium 142 133 - 144 mmol/L    Potassium 3.7 3.4 - 5.3 mmol/L    Chloride 100 94 - 109 mmol/L    Carbon Dioxide 37 (H) 20 - 32 mmol/L    Anion Gap 5 3 - 14 mmol/L    Glucose 95 70 - 99 mg/dL    Urea Nitrogen 27 7 - 30 mg/dL    Creatinine 1.16 (H) 0.52 - 1.04 mg/dL    GFR Estimate 43 (L) >60 mL/min/[1.73_m2]      Comment:      Non  GFR Calc  Starting 12/18/2018, serum creatinine based estimated GFR (eGFR) will be   calculated using the Chronic Kidney Disease Epidemiology Collaboration   (CKD-EPI) equation.      GFR Estimate If Black 50 (L) >60 mL/min/[1.73_m2]      Comment:       GFR Calc  Starting 12/18/2018, serum creatinine based estimated GFR (eGFR) will be   calculated using the Chronic Kidney Disease Epidemiology Collaboration   (CKD-EPI) equation.      Calcium 9.0 8.5 - 10.1 mg/dL       If you have any questions or concerns, please call the clinic at the number listed above.       Sincerely,      Abbie Zacarias, DO

## 2020-06-04 NOTE — LETTER
My Heart Failure Action Plan   Name: Caitlin Sales    YOB: 1935   Date: 6/4/2020    My doctor: Abbie Zacarias     [facility]     [address] [address] [phone]  My Diagnosis: Preserved (HFp)- EF:Over 50%   My Exercise Goal: 30 minutes daily  .     My Weight Plan:   Wt Readings from Last 2 Encounters:   06/04/20 90.3 kg (199 lb)   06/02/20 89.8 kg (198 lb)     Weigh yourself daily using the same scale. If you gain more than 2 pounds in 24 hours or 5 pounds in a week take lasix 40 mg daily x 3 days    My Diet Goal: 2,000 mg of sodium    Emergency Room Visits:    Our goal is to improve your quality of life and help you avoid a visit to the emergency room or hospital.  If we work together, we can achieve this goal. But, if you feel you need to call 911 or go to the emergency room, please do so.  If you go to the emergency room, please bring your list of medicines and your daily weight chart with you.       GREEN ZONE     Doing well today    Weight gained is no more than 2 pounds a day or 5 pounds a week.    No swelling in feet, ankles, legs or stomach.    No more swelling than usual.    No more trouble breathing than usual.    No change in my sleep.    No other problems. Actions:    I am doing fine.  I will take my medicine, follow my diet, see my doctor, exercise, and watch for symptoms.           YELLOW ZONE         Having a bad day or flare up    Weight gain of more than 2 pounds in one day or 5 pounds in one week.    New swelling in ankle, leg, knee or thigh.    Bloating in belly, pants feel tighter.    Swelling in hands or face.    Coughing or trouble breathing while walking or talking.    Harder to breathe last night.    Have trouble sleeping, wake up short of breath.    Much more tired than usual.    Not eating.    Pain in my chest or bad leg cramps.    Feel weak or dizzy. Actions:    I need to take action and call my doctor or nurse  today.                 RED ZONE         Need medical care now    Weight gain of 5 pounds overnight.    Chest pain or pressure that does not go away.    Feel less alert.    Wheezing or have trouble breathing when at rest.    Cannot sleep lying down.    Cannot take my water pill.    Pass out or faint. Actions:    I need to call my doctor or nurse now!    Call 911 if I have chest pain or cannot breathe.

## 2020-06-04 NOTE — PATIENT INSTRUCTIONS
1. Hand numbness sounds like carpal tunnel.  Wear a brace at bedtime  2. Kidney function is reduced because of the diuretic.  Take the furosemide 40 mg daily x 3 days, if weight gain of > 2 lbs in 24 hours or 5 lbs in 1 week.                  Thank you for choosing [facility] (formerly [facility]).  You may be receiving an email and/or telephone survey request from [facility] [employer] regarding your visit today.  Please take a few minutes to respond to the survey to let us know how we are doing.      If you have questions or concerns, please contact us via [email or web address] or you can contact your care team at [phone].    Our Clinic hours are:  Monday 6:40 am  to 7:00 pm  Tuesday -Friday 6:40 am to 5:00 pm    The [place] outpatient lab hours are:  Monday - Friday 6:10 am to 4:45 pm  Saturdays 7:00 am to 11:00 am  Appointments are required, call [phone]    If you have clinical questions after hours or would like to schedule an appointment,  call the clinic at [phone].    Patient Education     Left- or Right- Side Congestive Heart Failure (CHF)    The heart is a large muscle that acts as a pump to circulate blood throughout the body. Blood carries oxygen to all of the organs, including the brain, muscles, and skin. After your body takes the oxygen out of the blood, the blood returns to the heart. The right side of the heart collects the blood from the body and pumps it to the lungs. In the lungs, it gets fresh oxygen and gives up carbon dioxide. The oxygen-rich blood from the lungs then returns to the left side of the heart, where it is pumped back out to the rest of your body, starting the process all over.  Congestive heart failure (CHF) occurs when the heart muscle does not function normally, leading to fluid retention or reduces blood flow. This can be caused by heart muscle weakness or stiffness, or a heart valve problem. Heart failure can affect the right side of the heart or the left side. But heart failure may  affect not only the right side of the heart or only the left side. Although it may have started on one side, it can and often eventually does affect both sides.  Right-side heart failure  When the right side of the heart is failing, it can t handle the blood it is getting from the rest of the body. This blood returns to the heart through veins. When too much pressure builds up in the veins, fluid leaks out into the tissues. Gravity then causes that fluid to move to those parts of the body that are the lowest. So one of the first symptoms of right-side CHF can include swelling in the feet and ankles. If the condition gets worse, the swelling can even go up past the knees. Sometimes it gets so severe, the liver and intestines can get congested as well.  Left-side heart failure  When the left side of the heart is failing, it can t handle the blood it gets from the lungs. Pressure then builds up in the veins of the lungs, causing fluid to leak into the lung tissues. This may cause CHF and pulmonary edema. This causes you to feel short of breath, weak, or dizzy. These symptoms are often worse with exertion, such as when climbing stairs or walking up hills. Lying with your head flat is uncomfortable and can make your breathing worse. This may make sleeping difficult. You may need to use extra pillows to elevate your upper body to sleep well. The same is true when just resting during the daytime. You may also feel weak or tired and have less energy during exertion.  There are many causes of heart failure including:    Coronary artery disease    Past heart attack (also known as acute myocardial infarction, or AMI)    High blood pressure    Damaged heart valve    Diabetes    Obesity    Cigarette smoking    Alcohol abuse  Heart failure is usually a chronic condition. The purpose of medical treatment is to improve the pumping action of the heart and to remove excess water from the body. A number of medicines can help reach  this goal, improve symptoms, and prevent the heart from becoming weaker. Sometimes, heart failure can become so severe that a device is placed in the heart to help with pumping. Another major goal is to better treat the causes of heart failure, such as diabetes and high blood pressure, by making changes in your lifestyle and maximizing medical control when needed.  Home care  Follow these guidelines when caring for yourself at home:    Check your weight every day. This is very important because a sudden increase in weight gain could mean worsening heart failure. Keep these things in mind:  ? Use the same scale every day.  ? Weigh yourself at the same time every day.  ? Make sure the scale is on a hard floor surface, not on a rug or carpet.  ? Keep a record of your weight every day so your healthcare provider can see it. If you are not given a log sheet for this, keep a separate journal for this purpose.     Cut back on the amount of salt (sodium) you eat. Follow your healthcare provider's recommendation on how much salt or sodium you should have each day.  ? Limit high-salt foods. These include olives, pickles, smoked meats, salted potato chips, and most prepared foods.  ? Don't add salt to your food at the table. Use only small amounts of salt when cooking.  ? Read the labels carefully on food packages to learn how much salt or sodium is in each serving in the package. Remember, a can or package of food may contain more than 1 serving. So if you eat all the food in the package, you may be getting more salt than you think.    Follow your healthcare provider's recommendations about how much fluid you should have. Be aware that some foods, such as soup, pudding, and juicy fruits like oranges or melons, contain liquid. You'll need to count the liquid in those foods as part of your daily fluid intake. Your provider can help you with this.    Stop smoking.    Cut back on how much alcohol you drink.    Lose weight if you  are overweight. The excess weight adds a lot of stress on the workload of the heart.    Stay active. Talk with your provider about an exercise program that is safe for your heart.    Keep your feet elevated to reduce swelling. Ask your provider about support hose as a preventive treatment for daytime leg swelling.  Besides taking your medicine as instructed, an important part of treatment is lifestyle changes. These include diet, physical activity, stopping smoking, and weight control.  Improve your diet by including more fresh foods, cutting back on how much sugar and saturated fat you eat, and eating fewer processed foods and less salt.  Follow-up care  Follow up with your healthcare provider, or as advised.  Make sure to keep any appointments that were made for you. These can help better control your congestive heart failure. You will need to follow up with your provider on a routine basis to make sure your heart failure is well managed.  If an X-ray, electrocardiogram (ECG), or other tests were done, you will be told of any new findings that may affect your care.  Call 911  Call 911 if you:    Become severely short of breath    Feel lightheaded, or feel like you might pass out or faint    Have chest pain or discomfort that is different than usual, the medicines your doctor told you to use for this don't help, or the pain lasts longer than 10 to 15 minutes    You suddenly develop a rapid heart rate  When to seek medical advice  The following may be signs that your heart failure is getting worse. Call your healthcare provider right away if any of these happen:    Sudden weight gain. This means 3 or more pounds in one day, or 5 or more pounds in 1 week    Trouble breathing not related to being active    New or increased swelling of your legs or ankles    Swelling or pain in your abdomen    Breathing trouble at night. This means waking up short of breath or needing more pillows to breathe.    Frequent coughing that  doesn t go away    Feeling much more tired than usual  Date Last Reviewed: 5/1/2018 2000-2019 [employer] [address], [place] [address]. All rights reserved. This information is not intended as a substitute for professional medical care. Always follow your healthcare professional's instructions.

## 2020-06-08 NOTE — TELEPHONE ENCOUNTER
Supplemental Orders    Signed, faxed to [phone] and sent to [employer]. Vicki Tavarez on 6/8/2020 at 3:54 PM

## 2020-06-11 NOTE — TELEPHONE ENCOUNTER
Left voice mail to schedule a VIDEO VISIT/CONSULT. Order is in patient's chart. Please schedule.    Angela Tamayo

## 2020-06-22 NOTE — RESULT ENCOUNTER NOTE
Reviewed PFT.  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  Recommend next step of breathing tests (looking at specific parameters not done on the first round) and updated CT of the chest.  Orders placed.  Recommend virtual visit w/me 1 week after PFT done to review further.  If any respiratory symptoms, see me sooner.  Patient lives at skilled nursing facility.  Please notify facility of need for next round of testing

## 2020-06-24 NOTE — TELEPHONE ENCOUNTER
Reason for Call:  Home Health Care    Angela with [facility] called regarding (reason for call): Please call Angela with verbal orders - OK to leave message on VM     Orders are needed for this patient.     [facility] a week a 4 weeks for resp, skin and pain assess, med set-up and teaching.      Pt Provider: Rell    Phone Number Homecare Nurse can be reached at: [phone]    Can we leave a detailed message on this number? Yes    Phone number patient can be reached at: Home number on file [phone] (home)    Best Time: any    Call taken on 2020 at 12:51 PM by Sarah More

## 2020-06-24 NOTE — TELEPHONE ENCOUNTER
OK for orders? [facility] a week a 4 weeks for resp, skin and pain assess, med set-up and teaching.        DONNIE RuedaN, RN

## 2020-06-29 NOTE — TELEPHONE ENCOUNTER
Physician Order for change visit frequency of SN     Signed, faxed to [phone] and sent to [employer]. Vicki Tavarez on 6/29/2020 at 3:33 PM

## 2020-07-07 NOTE — TELEPHONE ENCOUNTER
No note from a med error yesterday, and no idea what med they are referring to.     Left message for Ayana BLACKMON at [place] to call us back,   Shraddha Sharif RNC

## 2020-07-07 NOTE — TELEPHONE ENCOUNTER
Reason for Call:  Medication Error:    Do you use a [facility]?  Name of the pharmacy and phone number for the current request:  [facility] [phone]    Name of the medication requested: Levothyroxine    Other request: How should they continue medication after double dose yesterday?    Can we leave a detailed message on this number? YES    Phone number patient can be reached at: Other phone number:  [phone] Ayana Montgomery Time: Any    Call taken on 7/7/2020 at 10:18 AM by Vicki Tavarez

## 2020-07-07 NOTE — TELEPHONE ENCOUNTER
"\"so the staff today went to give her levothyroxine 75 mcg and realized she got an extra dose sometime, and we are thinking it was yesterday. \"   \"we think it got mixed in with her 1 pm pills again yesterday. \"    \"not sure if Dr Zacarias wants us to hold a dose now tomorrow, or to continue with daily dosing? \"  \"we know this blister pack started July 1, so sometime in the past week she got two levothryoxine in the same day. \"    Dr Zacarias?          OK to leave detailed message on Ayana's voicemail with Dr Zacarias's recommendation.     Shraddha Sharif RNC    "

## 2020-07-07 NOTE — PROGRESS NOTES
Faxed signed CMN for oxygen to [facility] [phone].  Sent to Dr. Iyer who is unavailable, Dr. Chriss Rhodes signed.

## 2020-07-10 NOTE — TELEPHONE ENCOUNTER
Ayana with the [place] at "[employer] notified. She will follow up with Dr. Zacarias regarding recent testing as well.      DONNIE RuedaN, RN

## 2020-07-10 NOTE — TELEPHONE ENCOUNTER
Covering for PCP (out of clinic today):  I would recommend keeping the amiodarone at 200mg for now, but decreasing her metoprolol to 25mg daily to see if that helps.  It looks like she has an appointment with EP cardiology on 7/29 at [facility], so they can certainly follow-up on this as well.    García Cerrato MD

## 2020-07-10 NOTE — TELEPHONE ENCOUNTER
Reason for Call:  Low HR    Detailed comments: Ayana from the [facility] in  is calling and stating that this patiens HR was 40 today and staff just told her that her HR all week has been in the 40's to low 50's. Please advise.    Phone Number Patient can be reached at: Other phone number:  [phone]    Best Time: any    Can we leave a detailed message on this number? YES   Vicki Lei  [facility] [place]       Call taken on 7/10/2020 at 1:14 PM by Vicki Pettit

## 2020-07-10 NOTE — TELEPHONE ENCOUNTER
S-(situation): Bradycardia last week.     B-(background): 6/4/20 Office visit for heart Failure.     A-(assessment): contacted NEYMAR Piña with the [id number]. Staff reported Bradycardia to RN at [facility] today.   VS at around 11 am are BP: 125/66, P: Apical 40, SpO2: 96 T: 97.3, A&O; awake and alert, walking around, acting normally.    Walking and talking, did get a new mattress yesterday and reported not sleeping well and had some heartburn last night.   Last 6 days pulse readings from oxymeter:  P: 53  P: 72  P: 47  P: 45  P: 49    Patient has been getting Amiodarone 200 mg daily at 0830.   Wondering if Amiodarone should be adjusted.   VS might be at variable time during the day and it is not documented by the med techs.     Weight is up 3 pounds today, at 204, giving Lasix 40 mg prn for weight gain, today is the first day.     R-(recommendations): Provider please review and advise. Thank you.

## 2020-07-13 NOTE — TELEPHONE ENCOUNTER
"Routing refill request to provider for review/approval because:  Medication is reported/historical    Requested Prescriptions   Pending Prescriptions Disp Refills     omeprazole (PRILOSEC) 40 MG DR capsule [Pharmacy Med Name: omeprazole 40 mg capsule,delayed release] 30 capsule 2     Sig: Take 1 capsule (40 mg) by mouth daily       PPI Protocol Failed - 7/12/2020  8:00 AM        Failed - No diagnosis of osteoporosis on record        Passed - Not on Clopidogrel (unless Pantoprazole ordered)        Passed - Recent (12 mo) or future (30 days) visit within the authorizing provider's specialty     Patient has had an office visit with the authorizing provider or a provider within the authorizing providers department within the previous 12 mos or has a future within next 30 days. See \"Patient Info\" tab in inbasket, or \"Choose Columns\" in Meds & Orders section of the refill encounter.              Passed - Medication is active on med list        Passed - Patient is age 18 or older        Passed - No active pregnacy on record        Passed - No positive pregnancy test in past 12 months           amiodarone (PACERONE) 200 MG tablet [Pharmacy Med Name: amiodarone 200 mg tablet] 90 tablet 0     Sig: Take 1 tablet (200 mg) by mouth daily       There is no refill protocol information for this order                "

## 2020-07-13 NOTE — TELEPHONE ENCOUNTER
Covering for PCP (out of clinic today):  30 days refilled.  Patient has appointment with PCP on 7/23 and should review these meds at that time.    Thanks,  García Cerrato MD

## 2020-07-15 NOTE — TELEPHONE ENCOUNTER
Her recent wt Sat 199, Sun 197, Mon 196, Tues 196.5 . Her recent pulses are 50,58,48,54,48 and bp is 120/60-70 today bp was 136/65. She fell at 5 am. She got up to toilet, fell, she couldn't get up so they had to call police to transfer . She denies pain and has walked, bearing wt. Offers no complains. Says its a little tender. Staff noted knee was swollen a couple hours after the fall. and they are elevating and icing. Any changes recommended?  Marianna Collins RN

## 2020-07-15 NOTE — TELEPHONE ENCOUNTER
Recommend holding the metoprolol entirely this week and follow-up with PCP as scheduled.  Come in earlier for eval if any symptoms from the fall are worsening.    Thanks,  García Cerrato MD

## 2020-07-15 NOTE — TELEPHONE ENCOUNTER
Medications Order    Signed, faxed to [phone] and sent to [employer]. Vicki Tavarez on 7/15/2020 at 8:59 AM

## 2020-07-15 NOTE — TELEPHONE ENCOUNTER
Reason for call:  Patient reporting a symptom    Symptom or request:     Duration (how long have symptoms been present): 1 week    Have you been treated for this before? No    Additional comments: Medication changes have not helped. Patient fell, denied injury, but she does have swelling and bruising in left knee. Her range of motion is in tact and she is able to ambulate.    Phone Number patient can be reached at:  Other phone number:  Ayana [phone]    Best Time:  Any    Can we leave a detailed message on this number:  YES    Call taken on 7/15/2020 at 2:30 PM by Vicki Tavarez

## 2020-07-20 NOTE — TELEPHONE ENCOUNTER
Reason for Call:  Other call back    Detailed comments: Ayana from [place] [facility] is calling stating that the fall last week patient is able to ambulate. But the leg is swollen and bruising is bad. Patient is on a blood thinner.     Phone Number Patient can be reached at: [place] [phone]    Best Time: any    Can we leave a detailed message on this number? YES    Call taken on 7/20/2020 at 12:15 PM by Hetal Fair

## 2020-07-20 NOTE — ED AVS SNAPSHOT
[facility] Emergency Department  [address] [address]  Phone:  [phone]  Fax:  [phone]                                    Caitlin Sales   MRN: [id number]    Department:  [facility] Emergency Department   Date of Visit:  7/20/2020           After Visit Summary Signature Page    I have received my discharge instructions, and my questions have been answered. I have discussed any challenges I see with this plan with the nurse or doctor.    ..........................................................................................................................................  Patient/Patient Representative Signature      ..........................................................................................................................................  Patient Representative Print Name and Relationship to Patient    ..................................................               ................................................  Date                                   Time    ..........................................................................................................................................  Reviewed by Signature/Title    ...................................................              ..............................................  Date                                               Time          22EPIC Rev 08/18

## 2020-07-20 NOTE — TELEPHONE ENCOUNTER
Leg is worse and the patient has a history of DVT.  The RN also notes the leg is externally rotated, patient says it has always been this way.   appt made. Lauren MARCUS RN

## 2020-07-20 NOTE — ED PROVIDER NOTES
"  History   Chief complaint: left knee injury with swelling and severe bruising, concerned about DVT.    HPI   Hx per patient, her daughter, assisted living facility staff notes and EMR.  Caitlin Sales is a 84 year old female who presents with her daughter for evaluation of left knee injury approximately 4.5 days ago after she lost her balance walking to the bathroom in her assisted living apartment.  She normally ambulates with a walker.  She has been able to bear weight but has developed swelling and ecchymosis which concerned assisted living facility staff and prompted them to recommend she come to the ED for evaluation due to concern for possible \"stress fracture or clot\" she has a history of DVT/PE, is anticoagulated on Xarelto, and has an IVC filter in place.  Localized mild pain about the knee, but able to ambulate and bear weight on the knee and leg. Nothing taken or required for pain relief. No other leg pain and no chest pain, cough or shortness of breath.  She denies any other injury from the fall and denies head injury.  No headache, neck or back pain or other injury.  They have no other acute complaints or concerns.    Allergies:  Allergies   Allergen Reactions     Ace Inhibitors Cough     Aspirin      Other reaction(s): GI Bleeding     Citalopram Unknown     Penicillins      Other reaction(s): *Unknown - Childhood Rxn     Tramadol Other (See Comments)     Lethargy     Furosemide Rash     Lisinopril-Hydrochlorothiazide Cough     Torsemide Rash       Problem List:    Patient Active Problem List    Diagnosis Date Noted     Heart failure with preserved ejection fraction, NYHA class II (H) 06/08/2020     Priority: Medium     Elevated hemidiaphragm 05/22/2020     Priority: Medium     Markedly elevated right hemidiaphragm, 1st noted 12/25/2019       Chronic respiratory failure with hypercapnia (H) 05/21/2020     Priority: Medium     Hypoxemia 05/20/2020     Priority: Medium     Presence of IVC filter " 05/01/2020     Priority: Medium     PLACED 2013  Discussed considering removal in 6-12 months, post-COVID.       Coronary artery disease involving native coronary artery of native heart without angina pectoris 05/01/2020     Priority: Medium     Presumed CAD.  Stress test 4/2020 Perfusion defect in the inferolateral wall and septal area seen on stress test.  Patient reports no history of acute MI.       Stenosis of right vertebral artery 05/01/2020     Priority: Medium     Noted on CTA for TIA 4/2020.  Started statin       History of discitis 05/01/2020     Priority: Medium     She , had a spinal fusion done by Dr. Zapata in [place] about 2010 that went very poorly. She ended up having diskitis, infection and needed roughly 12 weeks of IV antibiotics. She had three subsequent surgeries to try to clean the issues up and, unfortunately, had significant scar tissue in her lumbar spine and chronic pain due to this       History of upper gastrointestinal bleeding 05/01/2020     Priority: Medium     On aspirin       Wide-complex tachycardia (H) 04/28/2020     Priority: Medium     [facility] presented 4/2020 with palpitations associated with shortness of breath. Heart rate was noted to be in 165 beats per minute. She was given adenosine 6 mg followed by 12 mg. Subsequently she was given 150 joules shock. Subsequently patient was given 200 joules of shock. Patient was also given 150 mg bolus of amiodarone followed by drip. She converted to sinus rhythm after amiodarone bolus. Transferred to [facility]. EP was consulted and they felt it was unlikely to be VT         Late onset Alzheimer's disease without behavioral disturbance (H) 02/20/2020     Priority: Medium     Worsening as of 2/2020       History of TIA (transient ischemic attack) and stroke 12/26/2019     Priority: Medium     History of TIAs- scant details available  During hospitalization 4/202:  Stroke code called on 4/23. Head CT negative.  "Not candidate for tpa or embolectomy. Concern for embolic effect with possible a fib vs flutter and non AC cardioversion at OSH. Bubble study negative. Likely had TIA.  Neurology was involved and she was started on xarelto as above         Supraventricular tachycardia (H) 01/25/2019     Priority: Medium     HOLTER suggested long runs Vtach.  Cardiac EP eval [facility] dul feels this is SVT with aberancy, beta blocker dose increased.  Stress testing 4/2019 \"most likely\" normal, EF 80%       Lumbosacral radiculopathy at L5 03/15/2017     Priority: Medium     S/P lumbar spinal fusion 03/15/2017     Priority: Medium     L3-S1       Hypothyroidism 02/15/2016     Priority: Medium     CKD (chronic kidney disease) stage 2, GFR 60-89 ml/min 12/18/2015     Priority: Medium     Other iron deficiency anemia 12/18/2015     Priority: Medium     400 venofer 12/2019 (noted to be b12, folate AND b12 def at same time)       Hyperlipidemia 12/18/2015     Priority: Medium     Back pain with right-sided radiculopathy 11/20/2015     Priority: Medium     INJECTION Dr Huddleston 3/15/17 modestly helpful.  Fusion [place] 6/2010-- multiple complications, profound scar tissue entrapping right sided nerves-- chronic pain       Shoulder pain 02/16/2015     Priority: Medium     Severe OA per xr 2015.  Not surg candidate.  Left shoulder inj 2/16/15.  Right shoulder inj 2/16/15, 9/6/2016, 12/7/18, 1/3/2020       History of DVT of lower extremity 09/25/2013     Priority: Medium     Initial DVT following back surgery in 2013, IVC filter in place permanently.   Acute left lower extremity DVT 4/2020o and now on indefinite anticoagulation with rivaroxaban. ultrasound 4/2020 - Occlusive left mid to distal femoral vein involving the popliteal vein and nonocclusive clot in the left common femoral and proximal femoral  Needs anticoagulation indefinintely.        Neuropathy 09/25/2013     Priority: Medium     S/P hip replacement 09/25/2013     Priority: Medium "     Osteoarthrosis, hip 05/02/2012     Priority: Medium     Right hip. Significant OA changes on xrays.  Failed conservative rx, 9/24/13 with Right CHILO/misterling       Personal history of colonic polyps 02/06/2006     Priority: Medium     8/05=1 tubulovillous polyp, recheck 9/07=1polyp       Diverticulosis of colon 08/05/2005     Priority: Medium     Per colonoscopy       Esophageal reflux 08/05/2004     Priority: Medium     Osteoporosis 08/05/2004     Priority: Medium     Dexa 10/06 normal, recheck 2 yrs.   Dexa 2/08 good. 1/2011 mild worsening with frax score 10%major/0.8%other. REPEAT 2012^^^^^^^^^^^^^^^^^^^^^       Primary osteoarthritis involving multiple joints 08/05/2004     Priority: Medium     Complex/multiple back surgeries [place] summer 2010--ultimately with fusion.  Right knee-hinkle, rooster comb injections-x5 fall 2008.  Right shoulder cortisone 1/08, 10/08, 1/3/20.  Left hip injection 5/2011. Right hip injected [facility]avis 1/20/11, 3/15/11, also 9/2011 by [facility] pain clinic (all trochanter bursa), -1/18/12 (r hip again).  Left knee 6/25/14, 9/17/14, 4/24/15.       Essential hypertension 08/05/2004     Priority: Medium     Echo 02/2016-- EF 55-60%, valves ok, right heart ok       Vitreous degeneration 03/14/2002     Priority: Medium     HX of bilateral vitreous detachment       Counseling on health care directive 06/15/2001     Priority: Medium     Class 1 obesity due to excess calories with serious comorbidity in adult 05/21/2020     Priority: Low        Past Medical History:    Past Medical History:   Diagnosis Date     Basal cell carcinoma of face 7/19/2017     History of DVT of lower extremity 09/25/2013     Neuropathy 9/25/2013     TIA (transient ischemic attack) 12/26/2019     Wide-complex tachycardia (H) 04/28/2020       Past Surgical History:    Past Surgical History:   Procedure Laterality Date     APPENDECTOMY      teenager     AS LUMBAR SPINE FUSN,POST INTRBDY  2010     AS REVISE TOTAL  HIP REPLACEMENT Right 2013     CHOLECYSTECTOMY  1967     ENDOMETRIAL SAMPLING (BIOPSY)  2001     SALPINGO OOPHORECTOMY,R/L/DI Left 1967     VENA CAVA FILTER  2010    Permanent        Family History:    Family History   Problem Relation Age of Onset     Cancer Mother         multiple myeloma     Abdominal Aortic Aneurysm Father      Hypertension Father      Prostate Cancer Father        Social History:  Marital Status:   [5]  Social History     Tobacco Use     Smoking status: Never Smoker     Smokeless tobacco: Never Used   Substance Use Topics     Alcohol use: Not Currently     Frequency: Never     Drug use: Never        Medications:    cephALEXin (KEFLEX) 500 MG capsule  acetaminophen (TYLENOL) 500 MG tablet  amiodarone (PACERONE) 200 MG tablet  atorvastatin (LIPITOR) 40 MG tablet  Angel Carb-Mag Hydrox-Simeth (ROLAIDS ADVANCED PO)  folic acid (FOLVITE) 1 MG tablet  furosemide (LASIX) 40 MG tablet  gabapentin (NEURONTIN) 600 MG tablet  Homeopathic Products (ZICAM COLD REMEDY) TBDP  hydrocortisone (CORTAID) 1 % external cream  Hyprom-Naphaz-Polysorb-Zn Sulf (CLEAR EYES COMPLETE) SOLN  levothyroxine (SYNTHROID/LEVOTHROID) 75 MCG tablet  loperamide (IMODIUM A-D) 2 MG tablet  losartan (COZAAR) 25 MG tablet  Methylcobalamin 5000 MCG SUBL  metoprolol succinate ER (TOPROL-XL) 25 MG 24 hr tablet  Multiple Vitamins-Minerals (ZINC) LOZG  nystatin (MYCOSTATIN) 000165 UNIT/GM external cream  omeprazole (PRILOSEC) 40 MG DR capsule  order for DME  order for DME  order for DME  polyethylene glycol (MIRALAX) 17 GM/SCOOP powder  rivaroxaban ANTICOAGULANT (XARELTO ANTICOAGULANT) 20 MG TABS tablet  simethicone (MYLICON) 125 MG chewable tablet  sulfamethoxazole-trimethoprim (BACTRIM DS) 800-160 MG tablet  triamcinolone (KENALOG) 0.1 % external cream  vitamin B-12 (CYANOCOBALAMIN) 1000 MCG tablet  zinc oxide (DESITIN) 40 % external ointment        Review of Systems  As mentioned above in the history present illness.  All other  systems were reviewed and are negative.    Physical Exam   BP: (!) 150/71  Pulse: 53  Heart Rate: 58  Temp: 97.4  F (36.3  C)  Resp: 18  SpO2: 93 %      Physical Exam  Vitals signs and nursing note reviewed.   Constitutional:       General: She is not in acute distress.     Appearance: Normal appearance. She is well-developed. She is not ill-appearing or diaphoretic.   HENT:      Head: Normocephalic and atraumatic.      Right Ear: External ear normal.      Left Ear: External ear normal.      Nose: Nose normal.   Eyes:      General: No scleral icterus.     Extraocular Movements: Extraocular movements intact.      Conjunctiva/sclera: Conjunctivae normal.   Neck:      Musculoskeletal: Normal range of motion and neck supple.      Trachea: No tracheal deviation.   Cardiovascular:      Rate and Rhythm: Normal rate and regular rhythm.      Pulses: Normal pulses.      Heart sounds: Normal heart sounds. No murmur. No friction rub. No gallop.    Pulmonary:      Effort: Pulmonary effort is normal. No respiratory distress.      Breath sounds: Normal breath sounds. No wheezing, rhonchi or rales.   Abdominal:      Palpations: Abdomen is soft.      Tenderness: There is no abdominal tenderness.   Musculoskeletal: Normal range of motion.         General: Swelling (left knee) and tenderness ( mild non-localized tenderness in the soft tissues about the knee) present.      Right lower leg: No edema.      Left lower leg: No edema.   Skin:     General: Skin is warm and dry.      Capillary Refill: Capillary refill takes less than 2 seconds.      Coloration: Skin is not pale.      Findings: Bruising ( left leg as photographed) and erythema ( faint skin erythema c/w stasis dermatitis, at baseline per pt and daughter) present. No rash.   Neurological:      General: No focal deficit present.      Mental Status: She is alert and oriented to person, place, and time.      Sensory: No sensory deficit.      Motor: No weakness.      Coordination:  Coordination normal.   Psychiatric:         Mood and Affect: Mood normal.         Behavior: Behavior normal.                 ED Course        Procedures               Results for orders placed or performed during the hospital encounter of 07/20/20 (from the past 24 hour(s))   CBC with platelets differential   Result Value Ref Range    WBC 7.7 4.0 - 11.0 10e9/L    RBC Count 3.18 (L) 3.8 - 5.2 10e12/L    Hemoglobin 9.8 (L) 11.7 - 15.7 g/dL    Hematocrit 32.9 (L) 35.0 - 47.0 %     (H) 78 - 100 fl    MCH 30.8 26.5 - 33.0 pg    MCHC 29.8 (L) 31.5 - 36.5 g/dL    RDW 13.9 10.0 - 15.0 %    Platelet Count 206 150 - 450 10e9/L    Diff Method Automated Method     % Neutrophils 77.3 %    % Lymphocytes 12.2 %    % Monocytes 9.1 %    % Eosinophils 0.6 %    % Basophils 0.5 %    % Immature Granulocytes 0.3 %    Nucleated RBCs 0 0 /100    Absolute Neutrophil 6.0 1.6 - 8.3 10e9/L    Absolute Lymphocytes 0.9 0.8 - 5.3 10e9/L    Absolute Monocytes 0.7 0.0 - 1.3 10e9/L    Absolute Eosinophils 0.1 0.0 - 0.7 10e9/L    Absolute Basophils 0.0 0.0 - 0.2 10e9/L    Abs Immature Granulocytes 0.0 0 - 0.4 10e9/L    Absolute Nucleated RBC 0.0    Partial thromboplastin time   Result Value Ref Range    PTT 32 22 - 37 sec   Comprehensive metabolic panel   Result Value Ref Range    Sodium 141 133 - 144 mmol/L    Potassium 4.6 3.4 - 5.3 mmol/L    Chloride 103 94 - 109 mmol/L    Carbon Dioxide 35 (H) 20 - 32 mmol/L    Anion Gap 3 3 - 14 mmol/L    Glucose 88 70 - 99 mg/dL    Urea Nitrogen 15 7 - 30 mg/dL    Creatinine 0.88 0.52 - 1.04 mg/dL    GFR Estimate 60 (L) >60 mL/min/[1.73_m2]    GFR Estimate If Black 69 >60 mL/min/[1.73_m2]    Calcium 9.0 8.5 - 10.1 mg/dL    Bilirubin Total 0.6 0.2 - 1.3 mg/dL    Albumin 3.6 3.4 - 5.0 g/dL    Protein Total 7.3 6.8 - 8.8 g/dL    Alkaline Phosphatase 83 40 - 150 U/L    ALT 17 0 - 50 U/L    AST 23 0 - 45 U/L   INR   Result Value Ref Range    INR 1.37 (H) 0.86 - 1.14   CT Head w/o Contrast    Narrative     EXAM: CT HEAD W/O CONTRAST  LOCATION: [facility]  DATE/TIME: 7/20/2020 5:52 PM    INDICATION: Fall, pain, swelling, on anticoagulation. Evaluate for intracranial hemorrhage and fracture.  COMPARISON: None.  TECHNIQUE: Routine without IV contrast. Multiplanar reformats. Dose reduction techniques were used.    FINDINGS:  INTRACRANIAL CONTENTS: No intracranial hemorrhage, extraaxial collection, or mass effect. Geographic encephalomalacia consistent with prior right MCA infarction. No CT evidence of acute infarct. Mild presumed chronic small vessel ischemic changes. Mild   to moderate generalized volume loss. No hydrocephalus.     VISUALIZED ORBITS/SINUSES/MASTOIDS: No intraorbital abnormality. No paranasal sinus mucosal disease. No middle ear or mastoid effusion.    BONES/SOFT TISSUES: No acute abnormality.      Impression    IMPRESSION:  1.  No acute intracranial process.  2.  Old right MCA territory infarction.  3.  Mild senescent changes and cerebral small vessel disease.   CT Knee Left w/o Contrast    Narrative    EXAM: CT KNEE LEFT W/O CONTRAST  LOCATION: [facility]  DATE/TIME: 7/20/2020 5:52 PM    INDICATION: Left-sided knee pain after a fall.  COMPARISON: None.  TECHNIQUE: Noncontrast. Axial, sagittal and coronal thin-section reconstruction. Dose reduction techniques were used.   CONTRAST: None.    FINDINGS:   No fracture. Moderate-advanced left knee tricompartmental degenerative arthrosis, with medial and patellofemoral compartment narrowing. Bone demineralization. Small knee joint effusion. Mild muscle atrophy. Ill-defined soft tissue thickening (slight   increased density) in the anterolateral knee subcutaneous tissues. This area measures roughly 20 x 74 x 95 mm (AP, LR, SI).      Impression    IMPRESSION:  1.  No fracture or joint malalignment.  2.  Soft tissue contusion-hematoma in the anterolateral knee subcutaneous tissues.  3.  Moderate-advanced left knee tricompartmental  degenerative arthrosis.  4.  Small left knee joint effusion.           Previous Records Reviewed:    Hemoglobin   Date Value Ref Range Status   07/20/2020 9.8 (L) 11.7 - 15.7 g/dL Final   05/23/2020 12.1 11.7 - 15.7 g/dL Final   05/20/2020 11.6 (L) 11.7 - 15.7 g/dL Final       Medications - No data to display    Assessments & Plan (with Medical Decision Making)   84-year-old female on Xarelto for prior DVT/PE with fall due to tripping or losing her balance when she got up to use the restroom early in the morning approximately 4.5 days ago, who presents emergency department at the recommendation of assisted living facility staff due to swelling and bruising about the knee and lower leg. Assisted living facility staff were worried about a stress fracture or  DVT. She is able to bear weight and ambulate on the leg and has no CMS dysfunction or symptoms of compartment syndrome, and no other signs or symptoms of DVT or PE. CT of the knee negative for fx, positive for hematoma. Chronic stasis dermatitis and erythema of the left anterior lower leg, which she and her daughter do not feel is changed from baseline.  I asked that they monitor this carefully and initiate therapy with Keflex if this appears to be worsening in any way, a wait-and-see prescription, and they expressed understanding of this. Due to her advanced age and anticoagulation therapy I performed a CT scan of the head to evaluate for intracranial hemorrhage.  This study was negative for any acute disease or abnormality.  I recommended she follow-up in primary care clinic to discuss her anticoagulation therapy and consider a switch to Coumadin given her advanced age and fall risk. She and her daughter were provided instructions for supportive care and will return as needed for worsened condition or worsening symptoms, or new problems or concerns.    I have reviewed the nursing notes.    I have reviewed the findings, diagnosis, plan and need for follow up with  the patient.    Current Discharge Medication List      START taking these medications    Details   cephALEXin (KEFLEX) 500 MG capsule Take 1 capsule (500 mg) by mouth 4 times daily  Qty: 40 capsule, Refills: 0             Final diagnoses:   Contusion of left knee, initial encounter   Sprain of left knee, initial encounter   Hematoma of leg, left, initial encounter   Chronic anticoagulation - Xarelto       7/20/2020   [facility] EMERGENCY DEPARTMENT     Quang Clay MD  07/23/20 2030

## 2020-07-20 NOTE — TELEPHONE ENCOUNTER
Provider covering for Ayana Bradford from [facility] calls and states the patient needs a DME for 4 wheel walker sent to [facility].  DME pended for your approval. Lauren MARCUS RN

## 2020-07-20 NOTE — ED TRIAGE NOTES
Wednesday at 0500 pt got up to use the bathroom, was getting off the couch, walker tipped over and pt fell, injuring L leg. Pt is on coumadin, large amount of bruising and swelling present on L leg. Pt has hx of blood clots, is able to walk, says swelling has gone down.

## 2020-07-21 NOTE — TELEPHONE ENCOUNTER
Pt's AL RN Ayana calling back to report pt still has an IV in L  region. Was in ER earlier today for a fall. Ayana wondering if any reason IV left in and if it can be taken out.     Reviewed discharge instructions with Ayana and Writer and Ayana agree there is no apparent reason for IV still being in place. Advised Ayana pt more at risk of infection leaving in so Writer would advise removing.    Ayana agrees, states she is familiar with and comfortable with IVs and plans to remove.       Additional Information    [1] Patient is improved AND [2] caller has simple question that triager can answer    Protocols used: POST-HOSPITALIZATION FOLLOW-UP CALL-A-

## 2020-07-21 NOTE — TELEPHONE ENCOUNTER
Faxed to [facility]. Unable to reach patient, so contacted her nurse, Ayana. Vicki Tavarez on 7/21/2020 at 10:16 AM

## 2020-07-21 NOTE — PROGRESS NOTES
Clinic Care Coordination Contact  [facility]/Voicemail    Referral Source: ED Follow-Up  Patient presented to ED on 7/20/20 for evaluation of left knee injury. She is on chronic anticoagulation.  Per ED notes, patient was diagnosed with sprain of left knee. She discharged back to her long-term and has virtual follow up with PCP in 2 days.    Clinical Data: Care Coordinator Outreach    Outreach attempted x 1.  Left message on patient's voicemail with call back information and requested return call if she has any questions or concerns related to ED visit or follow up recommendations.    Patient resides at [facility] in [place], [place]. She has home oxygen supplied through [facility]. Was recently discharged from [facility] which had been initiated for skilled home care episode after hospitalization in May.    Plan: Care Coordinator sent care coordination introduction letter on 5/26/20 via mail. Care Coordinator will do no further outreaches at this time.    VANESSA Epps, RN   [facility]  - Clinic Care Coordinator  Phone: [phone]

## 2020-07-22 NOTE — TELEPHONE ENCOUNTER
[facility] Physical Therapy Orders    Signed, faxed [phone] and sent to [place].Vicki Tavarez on 7/22/2020 at 10:20 AM

## 2020-07-22 NOTE — TELEPHONE ENCOUNTER
PT Orders    Signed, faxed to [phone] and sent to [place]. Vicki Tavarez on 7/22/2020 at 10:19 AM

## 2020-07-23 NOTE — TELEPHONE ENCOUNTER
Reason for Call:  Request for results:    Name of test or procedure: Pulmonary function and Chest CT    Date of test of procedure: Chest CT and pulmonary function test    Location of the test or procedure: [place]    OK to leave the result message on voice mail or with a family member? YES    Phone number Patient can be reached at:  Other phone number:  Tom Piña [facility], [phone]    Additional comments: Had virtual visit today and forgot to ask results    Call taken on 7/23/2020 at 1:20 PM by Vicki Tavarez

## 2020-07-23 NOTE — TELEPHONE ENCOUNTER
Reason for Call:  Medication or medication refill:    Do you use a [facility]?  Name of the pharmacy and phone number for the current request:  [facility] [place] [phone]    Name of the medication requested: Percocet    Other request:Percocet- need to know if for chronic or acute pain, if acute pain restricted to 7 days.NEED REPLY BY 4 PM.    Can we leave a detailed message on this number? YES    Phone number patient can be reached at:Den Juarez, [phone]    Best Time: Any    Call taken on 7/23/2020 at 1:24 PM by Vicki Tavarez

## 2020-07-23 NOTE — TELEPHONE ENCOUNTER
Percocet RX sent today needs to specify if chronic use?  If not then only #7 can be dispensed for acute purposes and then another #13 or #20 RX will need to be sent.    Routing to provider.  Faith CRISTOBAL RN

## 2020-07-23 NOTE — PROGRESS NOTES
"Caitlin Sales is a 85 year old female who is being evaluated via a billable video visit.      The patient has been notified of following:     \"This video visit will be conducted via a call between you and your physician/provider. We have found that certain health care needs can be provided without the need for an in-person physical exam.  This service lets us provide the care you need with a video conversation.  If a prescription is necessary we can send it directly to your pharmacy.  If lab work is needed we can place an order for that and you can then stop by our lab to have the test done at a later time.    Video visits are billed at different rates depending on your insurance coverage.  Please reach out to your insurance provider with any questions.    If during the course of the call the physician/provider feels a video visit is not appropriate, you will not be charged for this service.\"    Patient has given verbal consent for Video visit? Yes  How would you like to obtain your AVS? Mail a copy  If you are dropped from the video visit, the video invite should be resent to: Text to cell phone: [phone]  Will anyone else be joining your video visit? Yes: RN. How would they like to receive their invitation? Text to cell phone: [phone]        She has [employer] [place]      Subjective     Caitlin Sales is a 85 year old female who presents today via video visit for the following health issues:    HPI      Admission on 07/20/2020, Discharged on 07/20/2020   Component Date Value Ref Range Status     WBC 07/20/2020 7.7  4.0 - 11.0 10e9/L Final     RBC Count 07/20/2020 3.18* 3.8 - 5.2 10e12/L Final     Hemoglobin 07/20/2020 9.8* 11.7 - 15.7 g/dL Final     Hematocrit 07/20/2020 32.9* 35.0 - 47.0 % Final     MCV 07/20/2020 104* 78 - 100 fl Final     MCH 07/20/2020 30.8  26.5 - 33.0 pg Final     MCHC 07/20/2020 29.8* 31.5 - 36.5 g/dL Final     RDW 07/20/2020 13.9  10.0 - 15.0 % Final     Platelet Count " 07/20/2020 206  150 - 450 10e9/L Final     Diff Method 07/20/2020 Automated Method   Final     % Neutrophils 07/20/2020 77.3  % Final     % Lymphocytes 07/20/2020 12.2  % Final     % Monocytes 07/20/2020 9.1  % Final     % Eosinophils 07/20/2020 0.6  % Final     % Basophils 07/20/2020 0.5  % Final     % Immature Granulocytes 07/20/2020 0.3  % Final     Nucleated RBCs 07/20/2020 0  0 /100 Final     Absolute Neutrophil 07/20/2020 6.0  1.6 - 8.3 10e9/L Final     Absolute Lymphocytes 07/20/2020 0.9  0.8 - 5.3 10e9/L Final     Absolute Monocytes 07/20/2020 0.7  0.0 - 1.3 10e9/L Final     Absolute Eosinophils 07/20/2020 0.1  0.0 - 0.7 10e9/L Final     Absolute Basophils 07/20/2020 0.0  0.0 - 0.2 10e9/L Final     Abs Immature Granulocytes 07/20/2020 0.0  0 - 0.4 10e9/L Final     Absolute Nucleated RBC 07/20/2020 0.0   Final     PTT 07/20/2020 32  22 - 37 sec Final     Sodium 07/20/2020 141  133 - 144 mmol/L Final     Potassium 07/20/2020 4.6  3.4 - 5.3 mmol/L Final    Specimen slightly hemolyzed, potassium may be falsely elevated     Chloride 07/20/2020 103  94 - 109 mmol/L Final     Carbon Dioxide 07/20/2020 35* 20 - 32 mmol/L Final     Anion Gap 07/20/2020 3  3 - 14 mmol/L Final     Glucose 07/20/2020 88  70 - 99 mg/dL Final     Urea Nitrogen 07/20/2020 15  7 - 30 mg/dL Final     Creatinine 07/20/2020 0.88  0.52 - 1.04 mg/dL Final     GFR Estimate 07/20/2020 60* >60 mL/min/[1.73_m2] Final    Comment: Non  GFR Calc  Starting 12/18/2018, serum creatinine based estimated GFR (eGFR) will be   calculated using the Chronic Kidney Disease Epidemiology Collaboration   (CKD-EPI) equation.       GFR Estimate If Black 07/20/2020 69  >60 mL/min/[1.73_m2] Final    Comment:  GFR Calc  Starting 12/18/2018, serum creatinine based estimated GFR (eGFR) will be   calculated using the Chronic Kidney Disease Epidemiology Collaboration   (CKD-EPI) equation.       Calcium 07/20/2020 9.0  8.5 - 10.1 mg/dL Final      Bilirubin Total 07/20/2020 0.6  0.2 - 1.3 mg/dL Final     Albumin 07/20/2020 3.6  3.4 - 5.0 g/dL Final     Protein Total 07/20/2020 7.3  6.8 - 8.8 g/dL Final     Alkaline Phosphatase 07/20/2020 83  40 - 150 U/L Final     ALT 07/20/2020 17  0 - 50 U/L Final     AST 07/20/2020 23  0 - 45 U/L Final    Comment: Specimen is hemolyzed which can falsely elevate AST. Analysis of a   non-hemolyzed specimen may result in a lower value.       INR 07/20/2020 1.37* 0.86 - 1.14 Final           ED/ Followup:    Facility:  [facility]  Date of visit: 7/20/20  Reason for visit: Contusion of left knee + Sprain of left knee + Hematoma of left leg + Chronic anticoagulation  Current Status: Pt is doing okay, but still having leg pain      Joint Pain    Onset: Follow up ED    Description:   Location: Left knee  Character: Sharp and Dull ache    Intensity: severe 9/10    Progression of Symptoms: worse, intermittent    Accompanying Signs & Symptoms:  Other symptoms: radiation of pain to lateral outside of the knee    History:   Previous similar pain: YES      Precipitating factors:   Trauma or overuse: YES    Alleviating factors:Improved by: nothing    Therapies Tried and outcome: na    Er doctor took picture and there is significant hematoma of the leg    Was given keflex in case the redness of the leg got worse    Pain lateral knee joint line.    Bruising is significantly worse    DVT 4/23 at regions. Has IVC filter in place    Tylenol scheduled is helpful 1000 mg TID.  Percocet HS previous, stopped for unclear reason    Low HR: hr was running low.  Dr.Bera perez metoprolol 50-25, then held entirely.  Pulse 40s-50s 7/10.  Pulse in 50-60s.  Blood pressure 120-150s.  moslty 130s        Current Outpatient Medications   Medication Sig Dispense Refill     acetaminophen (TYLENOL) 500 MG tablet Take 2 tablets (1,000 mg) by mouth 3 times daily (with meals)       amiodarone (PACERONE) 200 MG tablet Take 1 tablet (200 mg) by mouth  daily 30 tablet 0     atorvastatin (LIPITOR) 40 MG tablet Take 1 tablet (40 mg) by mouth daily 90 tablet 3     Angel Carb-Mag Hydrox-Simeth (ROLAIDS ADVANCED PO) Take 2-3 tablets by mouth daily as needed       folic acid (FOLVITE) 1 MG tablet Take 1 tablet (1 mg) by mouth daily       gabapentin (NEURONTIN) 600 MG tablet Take 1 tablet (600 mg) by mouth At Bedtime       hydrocortisone (CORTAID) 1 % external cream Apply topically 3 times daily as needed       Hyprom-Naphaz-Polysorb-Zn Sulf (CLEAR EYES COMPLETE) SOLN Apply 1-2 drops to eye 4 times daily as needed       levothyroxine (SYNTHROID/LEVOTHROID) 75 MCG tablet Take 1 tablet (75 mcg) by mouth daily       Methylcobalamin 5000 MCG SUBL Place 1 lozenge under the tongue daily       nystatin (MYCOSTATIN) 777446 UNIT/GM external cream Apply topically 2 times daily       omeprazole (PRILOSEC) 40 MG DR capsule Take 1 capsule (40 mg) by mouth daily 30 capsule 0     order for DME Equipment being ordered: walker with 4 wheels 1 Device 0     order for DME O2 2 LPM continuos 1 Device 0     order for DME Equipment being ordered: knee high compression stockings 20-30 mm compression 1 Units 1     rivaroxaban ANTICOAGULANT (XARELTO ANTICOAGULANT) 20 MG TABS tablet Take 1 tablet (20 mg) by mouth daily (with dinner) 90 tablet 3     simethicone (MYLICON) 125 MG chewable tablet Take 1-2 tablets (125-250 mg) by mouth as needed for intestinal gas       vitamin B-12 (CYANOCOBALAMIN) 1000 MCG tablet Take 1,000 mcg by mouth daily       cephALEXin (KEFLEX) 500 MG capsule Take 1 capsule (500 mg) by mouth 4 times daily (Patient not taking: Reported on 7/23/2020) 40 capsule 0     furosemide (LASIX) 40 MG tablet Take 1 tablet (40 mg) by mouth daily as needed (if weight gain) (Patient not taking: Reported on 7/23/2020) 30 tablet 1     Homeopathic Products (ZICAM COLD REMEDY) TBDP Take 1 lozenge by mouth every 3 hours as needed At onset of cold symptoms       loperamide (IMODIUM A-D) 2 MG tablet  Take 1 tablet (2 mg) by mouth every 4 hours as needed for diarrhea       losartan (COZAAR) 25 MG tablet Take 1 tablet (25 mg) by mouth daily       metoprolol succinate ER (TOPROL-XL) 25 MG 24 hr tablet Take 1 tablet (25 mg) by mouth daily (Patient not taking: Reported on 7/23/2020) 90 tablet 3     Multiple Vitamins-Minerals (ZINC) LOZG Take 1 each by mouth every 3 hours as needed       polyethylene glycol (MIRALAX) 17 GM/SCOOP powder Take 17 g (1 capful) by mouth daily as needed for constipation       sulfamethoxazole-trimethoprim (BACTRIM DS) 800-160 MG tablet Take 1 tablet by mouth 2 times daily Take as needed for UTI sx 10 tablet 0     triamcinolone (KENALOG) 0.1 % external cream Apply topically 2 times daily Apply to hand rahs       zinc oxide (DESITIN) 40 % external ointment Apply topically as needed for dry skin or irritation Apply daily as needed to red chafed skin rash         Reviewed and updated as needed this visit by Provider         Review of Systems   Constitutional, HEENT, cardiovascular, pulmonary, GI, , musculoskeletal, neuro, skin, endocrine and psych systems are negative, except as otherwise noted.         Video Start Time: 10:24 AM    Reviewed and updated as needed this visit by Provider         Review of Systems         Objective             Physical Exam     GENERAL: alert, no distress, elderly, fatigued and sitting in recliner, wearing oxygen  EYES: Eyes grossly normal to inspection.  No discharge or erythema, or obvious scleral/conjunctival abnormalities.  RESP: No audible wheeze, cough, or visible cyanosis.  No visible retractions or increased work of breathing.    SKIN: significant hematoma involving left lower leg and distal femur.  Erythema of lower leg.  Compared to picture taken in ER a few days ago, there is mildly increased swelling of the lateral knee.  RN present with patient reports there is not pain with palpation along entire lower leg  PSYCH: concentration poor and affect  flat                Diagnoses and all orders for this visit:  Hematoma of left lower extremity, subsequent encounter  -     acetaminophen (TYLENOL) 500 MG tablet; Take 2 tablets (1,000 mg) by mouth 3 times daily  -     oxyCODONE-acetaminophen (PERCOCET) 5-325 MG tablet; Take 1 tablet by mouth daily as needed for pain  -     **CBC with platelets FUTURE anytime; Future      Because of increase in pain and swelling, will stop the xarelto for a few days.  Even thought the DVT was in April, she has IVC filter and risk of pulmonary embolism is low.  Recommend rechecking Hgb since it was done from baseline.  Pain is not severe, but AI requests additional PRN on the few days where pain is more severe.    1. Hold Xarelto and follow-up with Dr. Zacarias on 7/28 or 7/29  2. Get blood drawn before 3 pm on Friday  3. Change Tylenol to 3 x day  4. Add percocet once daily as needed.  5. Continue to stay off Metoprolol due to low HR  6. OK to cancel appointment with [facility] and follow-up with Dr. Murillo in September       Video-Visit Details    Type of service:  Video Visit    Video End Time:10:58    Originating Location (pt. Location): Assisted Living    Distant Location (provider location):  [facility]     Platform used for Video Visit: SoheilaWell    No follow-ups on file.       Abbie Zacarias DO

## 2020-07-23 NOTE — TELEPHONE ENCOUNTER
Reason for Call:  Home Health Care    Angela with [facility] called regarding (reason for ecall): Please call with verbal orders - OK to leave message    Orders are needed for this patient.     Skilled Nursinx a week x 1 week for recert and then 1x a week x 3 weeks      Pt Provider: Rell    Phone Number Homecare Nurse can be reached at: [phone]    Can we leave a detailed message on this number? YES    Phone number patient can be reached at: Home number on file [phone] (home)    Best Time: any    Call taken on 2020 at 11:35 AM by Sarah More

## 2020-07-23 NOTE — PATIENT INSTRUCTIONS
1. Hold Xarelto and follow-up with Dr. Zacarias on 7/28 or 7/29  2. Get blood drawn before 3 pm on Friday  3. Change Tylenol to 3 x day  4. Add percocet once daily as needed.  5. Continue to stay off Metoprolol due to low HR  6. OK to cancel appointment with [facility] and follow-up with Dr. Murillo in September

## 2020-07-24 NOTE — TELEPHONE ENCOUNTER
Pulmonary function test is still in preliminary status. CT Chest results were mailed to home last week.     Patient had virtual visit and forgot to ask about her results. Nurse called yesterday. Anything to note on the prelim results for PFT? Or hold off until finalized?     DONNIE RuedaN, RN

## 2020-07-28 NOTE — TELEPHONE ENCOUNTER
Jem FREIRE calls back and will have one of the techs at 3 look at the results.  Looks like only part of the test was done.  They will call us back after 3. Lauren MARCUS RN

## 2020-07-28 NOTE — RESULT ENCOUNTER NOTE
Team, please call the pulmonary function lab to have them re-interpret the test based on the follow-up test that were done on 7/9/20

## 2020-07-29 NOTE — PROGRESS NOTES
"Caitlin Sales is a 85 year old female who is being evaluated via a billable video visit.      The patient has been notified of following:     \"This video visit will be conducted via a call between you and your physician/provider. We have found that certain health care needs can be provided without the need for an in-person physical exam.  This service lets us provide the care you need with a video conversation.  If a prescription is necessary we can send it directly to your pharmacy.  If lab work is needed we can place an order for that and you can then stop by our lab to have the test done at a later time.    Video visits are billed at different rates depending on your insurance coverage.  Please reach out to your insurance provider with any questions.    If during the course of the call the physician/provider feels a video visit is not appropriate, you will not be charged for this service.\"    Patient has given verbal consent for Video visit? Yes  How would you like to obtain your AVS? Mail a copy  If you are dropped from the video visit, the video invite should be resent to: Text to cell phone: [phone]  Will anyone else be joining your video visit? Yes: RN. How would they like to receive their invitation? Text to cell phone: [phone]    Subjective     Caitlin Sales is a 85 year old female who presents today via video visit for the following health issues:    HPI       Lab Results:     Leg Hematoma: used percocet the last 3 nights.  Notes improving bruising and swelling.  AI staff note the last 3 nights increased pain, hence the percocet. Difficulty with walking still    PFT results: she wants information about this test relayed to NEYMAR Piña at [facility] [place]    Blood pressure/pulse: 120-140/70-80. Pulse 50-60s.    Wt stable 200. Fall was right after taking furosemide 40 mg x 3 days, RN suspects this played a role.  Wt went from 202 to 196      Video Start Time: 2:51 " PM        Current Outpatient Medications   Medication Sig Dispense Refill     acetaminophen (TYLENOL) 500 MG tablet Take 2 tablets (1,000 mg) by mouth 3 times daily       amiodarone (PACERONE) 200 MG tablet Take 1 tablet (200 mg) by mouth daily 30 tablet 0     atorvastatin (LIPITOR) 40 MG tablet Take 1 tablet (40 mg) by mouth daily 90 tablet 3     Angel Carb-Mag Hydrox-Simeth (ROLAIDS ADVANCED PO) Take 2-3 tablets by mouth daily as needed       folic acid (FOLVITE) 1 MG tablet Take 1 tablet (1 mg) by mouth daily       gabapentin (NEURONTIN) 600 MG tablet Take 1 tablet (600 mg) by mouth At Bedtime       Homeopathic Products (ZICAM COLD REMEDY) TBDP Take 1 lozenge by mouth every 3 hours as needed At onset of cold symptoms       hydrocortisone (CORTAID) 1 % external cream Apply topically 3 times daily as needed       Hyprom-Naphaz-Polysorb-Zn Sulf (CLEAR EYES COMPLETE) SOLN Apply 1-2 drops to eye 4 times daily as needed       levothyroxine (SYNTHROID/LEVOTHROID) 75 MCG tablet Take 1 tablet (75 mcg) by mouth daily       loperamide (IMODIUM A-D) 2 MG tablet Take 1 tablet (2 mg) by mouth every 4 hours as needed for diarrhea       losartan (COZAAR) 25 MG tablet Take 1 tablet (25 mg) by mouth daily       Methylcobalamin 5000 MCG SUBL Place 1 lozenge under the tongue daily       Multiple Vitamins-Minerals (ZINC) LOZG Take 1 each by mouth every 3 hours as needed       nystatin (MYCOSTATIN) 141528 UNIT/GM external cream Apply topically 2 times daily       omeprazole (PRILOSEC) 40 MG DR capsule Take 1 capsule (40 mg) by mouth daily 30 capsule 0     order for DME Equipment being ordered: walker with 4 wheels 1 Device 0     order for DME O2 2 LPM continuos 1 Device 0     order for DME Equipment being ordered: knee high compression stockings 20-30 mm compression 1 Units 1     oxyCODONE-acetaminophen (PERCOCET) 5-325 MG tablet Take 1 tablet by mouth daily as needed for pain 20 tablet 0     polyethylene glycol (MIRALAX) 17 GM/SCOOP  powder Take 17 g (1 capful) by mouth daily as needed for constipation       rivaroxaban ANTICOAGULANT (XARELTO ANTICOAGULANT) 20 MG TABS tablet Take 1 tablet (20 mg) by mouth daily (with dinner) 90 tablet 3     simethicone (MYLICON) 125 MG chewable tablet Take 1-2 tablets (125-250 mg) by mouth as needed for intestinal gas       sulfamethoxazole-trimethoprim (BACTRIM DS) 800-160 MG tablet Take 1 tablet by mouth 2 times daily Take as needed for UTI sx 10 tablet 0     triamcinolone (KENALOG) 0.1 % external cream Apply topically 2 times daily Apply to hand rahs       vitamin B-12 (CYANOCOBALAMIN) 1000 MCG tablet Take 1,000 mcg by mouth daily       zinc oxide (DESITIN) 40 % external ointment Apply topically as needed for dry skin or irritation Apply daily as needed to red chafed skin rash       cephALEXin (KEFLEX) 500 MG capsule Take 1 capsule (500 mg) by mouth 4 times daily (Patient not taking: Reported on 7/23/2020) 40 capsule 0     furosemide (LASIX) 40 MG tablet Take 1 tablet (40 mg) by mouth daily as needed (if weight gain) (Patient not taking: Reported on 7/23/2020) 30 tablet 1       Reviewed and updated as needed this visit by Provider         Review of Systems   Constitutional, HEENT, cardiovascular, pulmonary, gi and gu systems are negative, except as otherwise noted.      Objective             Physical Exam     GENERAL: alert, no distress, frail and elderly  EYES: Eyes grossly normal to inspection.  No discharge or erythema, or obvious scleral/conjunctival abnormalities.  RESP: No audible wheeze, cough, or visible cyanosis.  No visible retractions or increased work of breathing.    SKIN: Improving ecchymosis of the right leg to just above the knee.  Improving swelling of the right leg  PSYCH: Flat affect, poor memory            Assessment & Plan     1. Hematoma of left lower extremity, subsequent encounter - refill faxed.  Can only get #7 at a time.  May need for another few weeks.  Using Tylenol.  Resume  "xarelto  - oxyCODONE-acetaminophen (PERCOCET) 5-325 MG tablet; Take 1 tablet by mouth daily as needed for pain  Dispense: 7 tablet; Refill: 0    2. Essential hypertension - decreased lasix from 40 daily x 4 days PRN for weight gain to 20 mg daily x 1 day PRN for weight gain.  - furosemide (LASIX) 20 MG tablet; Take 1 tablet (20 mg) by mouth daily as needed (if weight gain)  Dispense: 30 tablet; Refill: 0    3. Chronic respiratory failure with hypercapnia (H) - may be due to elevated hemidiaphragm.  Specialized PFT still pending,c all out to PFT lab for interpretation    4. Heart failure with preserved ejection fraction, NYHA class II (H) - ok to decrease lasix as above.       BMI:   Estimated body mass index is 33.12 kg/m  as calculated from the following:    Height as of 5/20/20: 1.651 m (5' 5\").    Weight as of 6/4/20: 90.3 kg (199 lb).   Weight management plan: Discussed healthy diet and exercise guidelines        Patient Instructions   1. Ok to resume Xarelto  2. Hemoglobin is stable  3. Refill the Percocet  4. 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  This may contribute to some of the respiratory symptoms and your need for oxygen.  I am still awaiting final interpretation of the pulmonary function test, I will notify you when they are read.  5. Ok for furosemide 20 mg daily as needed if weight gain > 2 lbs in 24 hours or 5 lbs in 1 week.      No follow-ups on file.    Abbie Zacarias DO  [facility]      Video-Visit Details    Type of service:  Video Visit    Video End Time:3:20    Originating Location (pt. Location): Assisted Living    Distant Location (provider location):  [facility]     Platform used for Video Visit: Bulmaro    No follow-ups on file.       Abbie Zacarias DO        "

## 2020-07-29 NOTE — NURSING NOTE
Hard copy placed in the PCP desk because cannot be faxed nor verbal order for the pharmacy. It has to be e-prescribed. ATN PCP when e-prescribed destroy the hard copy and documented it on the medication.  Mireya Gonzalez CMA ([facility])   (aka: Laura Gonzalez)

## 2020-07-29 NOTE — PATIENT INSTRUCTIONS
1. Ok to resume Xarelto  2. Hemoglobin is stable  3. Refill the Percocet  4. 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  This may contribute to some of the respiratory symptoms and your need for oxygen.  I am still awaiting final interpretation of the pulmonary function test, I will notify you when they are read.  5. Ok for furosemide 20 mg daily as needed if weight gain > 2 lbs in 24 hours or 5 lbs in 1 week.

## 2020-07-29 NOTE — TELEPHONE ENCOUNTER
Dr. Zacarias,  Pulmonary function  Henry Greer called this morning and said that the MEP and MIP did not get ordered correctly on PFT but no need to change it, tech says that he asked Dr. Iyer to read the the results on 7-7-2020, this is to update you.    NEYMAR Fabian

## 2020-07-29 NOTE — LETTER
[facility]  [address] [place] [address]  [phone]      August 11, 2020    Caitlin Sales                                                                                                                     [address]  [place] [address]                Dear Caitlin,    Please call and let the patient know that the second pulmonary test indicate mild neuromuscular weakness (diaphragm and chest wall muscle weakness) which can occur with age.  There is no evidence of amiodarone toxicity which is what I was primarily looking for.  No further follow-up is needed at this time      Sincerely,         Abbie Zacarias

## 2020-07-30 NOTE — TELEPHONE ENCOUNTER
Talked with PFT lab again.  They are aware and in contact with reading doctor.  He will follow-up next week if there is no result

## 2020-07-30 NOTE — TELEPHONE ENCOUNTER
Dr. Zacarias,    We are not getting any where with this request for PFT.  Jem Lezama said it looks like there is a MEP and MIP done.  The RT never got a hold of us either like Jem said would happen.  How would you like to proceed.?     Value  Flag  Ref Range  Units  Status  Collected  Lab    MEP-Pre  47    cmH2O   07/09/2020 11:02 AM  224    MIP-Pre  -32            Lauren MARCUS RN

## 2020-07-31 NOTE — TELEPHONE ENCOUNTER
Can she increase the gabapentin? She has a history of neuropathy. She was seen in the er and has had 2 follow ups on this issue.  She describes the pain as burning, stinging sometimes stabbing. The swelling is going down. The nurse will need to be called back. Marianna Collins RN

## 2020-07-31 NOTE — TELEPHONE ENCOUNTER
The RN at the [facility] was advised of the medication change and message from md. Marianna Collins RN

## 2020-07-31 NOTE — TELEPHONE ENCOUNTER
Reason for Call:  Lower left leg pain    Detailed comments: Kelley Hatch from the [facility] is calling regarding Lucilles left lower leg pain. It is increasing from the knee down. They are wondering if they can increase the gabapenten. Please advise.    Phone Number Patient can be reached at: Other phone number:  [phone]    Best Time: any    Can we leave a detailed message on this number? YES   Vicki Lei  Clinic Station        Call taken on 7/31/2020 at 1:31 PM by Vicki Pettit

## 2020-08-03 NOTE — TELEPHONE ENCOUNTER
Order-Change to Treatment-Verbal Orders to change Furosemide to 40 mg daily, okay to restart Rivaroxaban 20 mg daily every evening.   Home Health Certification and Plan SN 7-27-20  1x wk for 3 wks      Signed, faxed and sent to Scanning. Vicki Tavarez on 8/3/2020 at 8:22 AM

## 2020-08-04 NOTE — TELEPHONE ENCOUNTER
Left another message for the PFT tech that we still haven't heard anything on this patient and there is nothing yet in her chart. Lauren MARCUS RN

## 2020-08-07 NOTE — TELEPHONE ENCOUNTER
RN left another msg for Bill *[phone].    Called our pulmonary rehab *[phone] (RN was told yesterday they do not take care of PFTs) to see if they have another contact #? - got VM.    Called [facility] ([phone]) to see if they could get a provider to review/interpret for Dr. Zacarias?    Spoke with Ena (answered phone) she spoke with Dr. Perlman's CMA and CMA asked Dr. Perlman if he would review? Yes.    RN spoke with Dr. David Perlman.  The PFT 7-9-20 only has the MIP and MEP, not necessarily whole picture.  Were those 2 measurements all that was needed?  He states those readings are reduced which could indicate neuromuscular weakness however, what is the baseline of an 85 yr old patient?  CT there was no evidence of amiodarone toxicity - concern from PFT 6-10-20.  There may be some scoliosis contributing?  Dr. Perlman would like to know what is the question surrounding the PFTs 7-9-20?    Dr. Perlman also stated ok to contact him via epic if you have further questions.    Routing to provider.  Faith CRISTOBAL RN

## 2020-08-07 NOTE — TELEPHONE ENCOUNTER
Henry returned call.  He states the results were send to Dr. Iyer (sleep) to interpret last week.    Routing to provider as LUIS.  Faith CRISTOBAL RN

## 2020-08-11 NOTE — TELEPHONE ENCOUNTER
Patient informed of message below from provider.   Patient request provider message be mailed to her, placed in outgoing mail.

## 2020-08-11 NOTE — TELEPHONE ENCOUNTER
Please call and let the patient know that the second pulmonary test indicate mild neuromuscular weakness (diaphragm and chest wall muscle weakness) which can occur with age.  There is no evidence of amiodarone toxicity which is what I was primarily looking for.  No further follow-up is needed at this time

## 2020-08-12 NOTE — TELEPHONE ENCOUNTER
Reason for Call:  Weight  gain    Detailed comments: Ayana from [facility] [place] [facility] [facility] is calling and is concerned about Caitlin's Weight gain. She is up 4-5 lbs in the past 10 days.  Wondering if her prn Lasix, the dose should be upped. Please advise.    Phone Number Patient can be reached at: Other phone number:  [phone]    Best Time: any    Can we leave a detailed message on this number? YES   Vicki Lei  [facility] [place]       Call taken on 8/12/2020 at 12:24 PM by Vicki Pettit

## 2020-08-12 NOTE — TELEPHONE ENCOUNTER
Updated Medication Orders    Signed, faxed to [phone] and sent to [facility]. Vicki Tavarez on 8/12/2020 at 2:01 PM

## 2020-08-14 NOTE — TELEPHONE ENCOUNTER
"S-(situation): medication request    B-(background): LOV 07/29/20 virtual visit with Dr. Rell SEGUNDO-(assessment): Spoke with Ayana with [facility]. She is asking for increase of Lasix dose due to recent weight gain. States that patient complained of \"a little more shortness of breath today\". Previously had a prescription for 40 mg for 3 days PRN.  Has been taking 20 mg -203# normal baseline range for weights. However in the past week or so, weights around 204-207.5. 206.5 today      204.5 8/5  203.5 8/6/20  204 8/7/20   207 8/8/20  207.5 8/9/20  205 8/10/20  204.5 8/11/20  206.5 8/12/20    BP and HR:  Today 158/71  50  150/70 60 8/13/20  147/78 47 8/12/20  153/71 64 8/11/20  147/68 65 8/10/20      R-(recommendations): Routing to provider.      DONNIE RuedaN, RN          "

## 2020-08-14 NOTE — TELEPHONE ENCOUNTER
Attempted to contact Ayana. The line is busy after 3 attempts. Called [facility] and left a message with office RN Michell. She states patient manages her own medications but will get a message to Ayana.    Left message for patient to return call to clinic.     Edyta Peterson, BSN, RN

## 2020-08-14 NOTE — TELEPHONE ENCOUNTER
I have attempted to contact patient and called 3 different number.  Two are busy and one just rings and rings. Lauren MARCUS RN

## 2020-08-14 NOTE — TELEPHONE ENCOUNTER
NEYMAR Piña at [facility] notified of Dr. Cerrato's message. States that patient does not set up own medications. She will update their MAR and call back on Monday to update. Will keep this encounter open but will post-pone.      VANESSA Rueda, RN

## 2020-08-14 NOTE — TELEPHONE ENCOUNTER
Covering for PCP (out of clinic today): Okay to increase Lasix to 40mg daily for 3 days and she should follow-up with us on Monday to let us know how things are going.    Thanks,  García Cerrato MD

## 2020-08-17 NOTE — TELEPHONE ENCOUNTER
Attempted to contact the [place].  The [phone] is a busy signal.  I called [phone] the phone rings and rings and the client tells this RN that Ayana is not in today. Lauren MARCUS RN

## 2020-08-18 NOTE — TELEPHONE ENCOUNTER
Ayana called back with pt update.    Pt had been getting furosemide, 20 mg, prn.  However, this dose was being administered consistently each day from 8/5/20 to 8/12/20 but it did not seem to make much of a difference.    Pt was told to take 40 mg for 3 days last Friday, Saturday, and Sunday.  Pt's weights were 206.5, 204.5, and 203 on Friday, Saturday and Sunday.    Pt refused Sudjulianna's furosemide 3rd dose because her weight was down to 203 and she thought she did not need it anymore.  Weight was 204 yesterday so pt took prn dose of 20 mg of furosemide.  Today, weight is 207.    Pt has no complaints except a little increased shortness of breath over the past week.      Blood pressures are 130-150/60-70.  Today's blood pressure is 146/88, pulse of 55.    Lower extremity edema is none on left leg and trace on right leg today.    Routed to provider for further instructions.    Fabienne Agudelo RN

## 2020-08-18 NOTE — TELEPHONE ENCOUNTER
Vita leigh for Ayana, home care nurse.  2 Attempts.  Need update for pt.  Fabienne Agudelo, RN, RN

## 2020-08-18 NOTE — TELEPHONE ENCOUNTER
Ayana from the [place] assistance. Stating last week she was increased the lasix and on Sunday she was 203 and today she is back up to 207.   Her call back number is [phone]      Hetal Fair on 8/18/2020 at 3:30 PM

## 2020-08-19 NOTE — TELEPHONE ENCOUNTER
Attempted to call Ayana at the [place] and her mailbox is full and can not accept any more messages then hangs up. Lauren MARCUS RN

## 2020-08-19 NOTE — TELEPHONE ENCOUNTER
Lets try lasix 20 mg daily alternate with 40 mg daily.  Perhaps this will keep weight stable and prevent too much fluid buildup

## 2020-08-19 NOTE — TELEPHONE ENCOUNTER
Contacted Ayana at the [place].  With the new order for Lasix 20 mg daily alternate with 40 mg daily. Lauren MARCUS RN

## 2020-08-28 NOTE — TELEPHONE ENCOUNTER
Reason for Call:  FYI    Detailed comments: Angela BLACKMON with [facility] is calling that they have discharged Caitlin from [facility].  Has gone to Assisted Living.      Call taken on 8/28/2020 at 2:39 PM by Lauren Fowler

## 2020-09-02 NOTE — TELEPHONE ENCOUNTER
PT Discharge Summary    Signed, faxed to [phone] and sent to [facility]. .Vicki Tavarez on 9/2/2020 at 2:04 PM

## 2020-09-17 NOTE — TELEPHONE ENCOUNTER
Reason for Call:  SOA and weight gain    Detailed comments: Isabel Hatch is calling and stating that John weight has been gradually going up. She is now at 207lbs and now has intermittent SOA. Wondering if the Lasix dose should be upped. Please call.    Phone Number Patient can be reached at: Other phone number:  [phone]    Best Time: any    Can we leave a detailed message on this number? YES Vicki Lei  [facility] [place]       Call taken on 9/17/2020 at 3:12 PM by Vicki ePttit

## 2020-09-17 NOTE — TELEPHONE ENCOUNTER
S-(situation): I spoke with Kelley.  Pt is assisted living at [facility] in [place].  Pt is noted to be short of breath with exertion x 3 days.  Not short of breath at rest.   Weight is up to 207.  Baselines recently is 203 to 204.  Two months ago baselines were 200 to 202.  Pulses have been in the 40's and 50's x 1 week.  Pulse today is 49.  Blood pressure today is 133/74.  Extremity edema is trace and unchanged.    Current Lasix dose is daily alternating doses of 20 and 40 mg.    B-(background): h/o heart failure    A-(assessment): increased weight, shortness of breath with exertion, and decreased pulses.    R-(recommendations): Routed to provider for further instructions.  NEYMAR Benson, asks if to consider increased dose of lasix?    Fabienne Agudelo RN

## 2020-09-17 NOTE — TELEPHONE ENCOUNTER
Recommend to increase lasix to 40 mg daily x 1 week, then back to 40 mg daily alternate with 20 mg daily    Be seen if symptoms worsen

## 2020-09-22 NOTE — TELEPHONE ENCOUNTER
Increase Furosemide to 40mg daily    Signed, faxed to [phone] and sent to [facility]. Vicki Tavarez on 9/22/2020 at 8:08 AM

## 2020-09-30 NOTE — TELEPHONE ENCOUNTER
Patient was very sleepy this morning even had difficulty waking her. Her morning meds were closer to 10 am and typically she is up at 7-8 am.  Now she is awake and appropriate. 95% o2 sat on 2 liters. Temp 97.3. She doesn't feel ill. The nurse says she has been snaking more on fruits. Because of the 5.5 lb wt gain in one day and the elevated bp and pulse she wonders if she needs to send her in to hospital?  bp was 168/87 and normal would be 133/74 for her . Also pulse can be in 50's and today it is 90. No swelling noted. No chest pain, headache, nausea, sore throat , or loss of smell.  Marianna Collins RN

## 2020-09-30 NOTE — TELEPHONE ENCOUNTER
jaylen freitas with [facility]     Had a weight gain from yesterday of 5.5 lbs, lethargic bp 163/87 out of patient's normal range pulse 90 normally 50    [facility]

## 2020-09-30 NOTE — TELEPHONE ENCOUNTER
A face to face visit is ideal, nothing available.  Can book visit for tomorrow (I am working inpatient, so not available).  Can increase lasix to 60 mg daily until seen.    However, if symptoms worsen, then ER is appropriate.

## 2020-09-30 NOTE — TELEPHONE ENCOUNTER
Ayana advised and expressed understanding. She will call Caitlin's family to have them schedule and will give them the message below from Dr Zacarias about having her seen today or tomorrow. She will increase the Lasix today to the 60 mg and will call 911 if necessary for the ER.     Abbey Khanna RN

## 2020-10-01 NOTE — PROGRESS NOTES
Subjective     Caitlin Sales is a 85 year old female who presents to clinic today for the following health issues:    HPI         Concern - Weight Gain   Onset: over a week   Description: She gained 5.5 pounds from Tuesday to WED. Her B/P was 163/87 and her pulse was 90. He typical pulse is 45-60. Recently she has been having SOB when she only walks about ten feet.   Intensity: moderate  Progression of Symptoms:  same  Accompanying Signs & Symptoms: She has had a  productive cough for the past 1 to 2 weeks just in the morning.   Previous history of similar problem: none   Precipitating factors:        Worsened by: none   Alleviating factors:        Improved by: none   Therapies tried and outcome: She has increased her Lasix to 60mg daily.       Review of Systems   Constitutional, HEENT, cardiovascular, pulmonary, gi and gu systems are negative, except as otherwise noted.      Objective    /62 (BP Location: Right arm, Patient Position: Sitting, Cuff Size: Adult Large)   Pulse 54   Temp 97.8  F (36.6  C) (Tympanic)   Wt 95.5 kg (210 lb 8 oz)   SpO2 98%   BMI 35.03 kg/m    Body mass index is 35.03 kg/m .  Physical Exam   GENERAL: healthy, alert and no distress  EYES: Eyes grossly normal to inspection, PERRL and conjunctivae and sclerae normal  NECK: no adenopathy, no asymmetry, masses, or scars and thyroid normal to palpation  RESP: lungs clear to auscultation - no rales, rhonchi or wheezes  CV: regular rates and rhythm, normal S1 S2, no S3 or S4, grade 3/6 sytolic murmur, peripheral pulses strong and no peripheral edema  MS: no gross musculoskeletal defects noted, no edema  NEURO: Normal strength and tone, mentation intact and speech normal  PSYCH: mentation appears normal, affect normal/bright    Results for orders placed or performed in visit on 10/01/20 (from the past 24 hour(s))   BNP-N terminal pro   Result Value Ref Range    N-Terminal Pro Bnp 966 (H) 0 - 450 pg/mL   Comprehensive metabolic  panel (BMP + Alb, Alk Phos, ALT, AST, Total. Bili, TP)   Result Value Ref Range    Sodium 142 133 - 144 mmol/L    Potassium 4.1 3.4 - 5.3 mmol/L    Chloride 102 94 - 109 mmol/L    Carbon Dioxide 39 (H) 20 - 32 mmol/L    Anion Gap 1 (L) 3 - 14 mmol/L    Glucose 78 70 - 99 mg/dL    Urea Nitrogen 25 7 - 30 mg/dL    Creatinine 1.29 (H) 0.52 - 1.04 mg/dL    GFR Estimate 38 (L) >60 mL/min/[1.73_m2]    GFR Estimate If Black 44 (L) >60 mL/min/[1.73_m2]    Calcium 8.7 8.5 - 10.1 mg/dL    Bilirubin Total 0.2 0.2 - 1.3 mg/dL    Albumin 3.8 3.4 - 5.0 g/dL    Protein Total 7.4 6.8 - 8.8 g/dL    Alkaline Phosphatase 74 40 - 150 U/L    ALT 19 0 - 50 U/L    AST 18 0 - 45 U/L   CBC with platelets and differential   Result Value Ref Range    WBC 6.3 4.0 - 11.0 10e9/L    RBC Count 3.75 (L) 3.8 - 5.2 10e12/L    Hemoglobin 10.8 (L) 11.7 - 15.7 g/dL    Hematocrit 37.5 35.0 - 47.0 %     78 - 100 fl    MCH 28.8 26.5 - 33.0 pg    MCHC 28.8 (L) 31.5 - 36.5 g/dL    RDW 14.0 10.0 - 15.0 %    Platelet Count 183 150 - 450 10e9/L    % Neutrophils 69.9 %    % Lymphocytes 17.1 %    % Monocytes 11.4 %    % Eosinophils 1.0 %    % Basophils 0.6 %    Absolute Neutrophil 4.4 1.6 - 8.3 10e9/L    Absolute Lymphocytes 1.1 0.8 - 5.3 10e9/L    Absolute Monocytes 0.7 0.0 - 1.3 10e9/L    Absolute Eosinophils 0.1 0.0 - 0.7 10e9/L    Absolute Basophils 0.0 0.0 - 0.2 10e9/L    Diff Method Automated Method      CXR - Normal- no infiltrates, effusions, pneumothoraces, cardiomegaly or masses  see report from Radiologist        Assessment & Plan     SOB (shortness of breath)  -multifactorial, per chest Xray and recent CT the patient have elevation of the right hemidiaphragm with right lower  lobe atelectasis, volume loss in the medial right middle lobe. Labs from today are reassuring, O2 sats vitals normal, patient appears comfortable during exam, not in acute distress   -she is feeling better today, she took 60 mg of Lasix yesterday and her weight is down by  1.5 lbs, she still feeling shortness of breath today, but states its at her baseline   -recommended to reduce Lasix to 40 mg daily for the next 3 days and than go back to regular dosing schedule 20 mg every other day alternating with 40 mg every other day   - BNP-N terminal pro  - Comprehensive metabolic panel (BMP + Alb, Alk Phos, ALT, AST, Total. Bili, TP)  - CBC with platelets and differential  - XR Chest 2 Views; Future    Weight gain  -continue to monitor daily weights  -take Lasix 40 mg daily for the next 3 days and than reduce to regular dosing schedule as above   - BNP-N terminal pro    Need for prophylactic vaccination and inoculation against influenza    - FLUZONE HIGH DOSE 65+  [26085]  - ADMIN INFLUENZA (For MEDICARE Patients ONLY) []        See Patient Instructions    Return in about 1 week (around 10/8/2020), or if symptoms worsen or fail to improve.    WALT Villarreal [facility]

## 2020-10-19 NOTE — TELEPHONE ENCOUNTER
Requested Prescriptions   Pending Prescriptions Disp Refills     amiodarone (PACERONE) 200 MG tablet [Pharmacy Med Name: amiodarone 200 mg tablet] 31 tablet 0     Sig: Take 1 tablet (200 mg) by mouth daily No further refills until seen by cardiology--call [phone] to schedule       There is no refill protocol information for this order

## 2020-10-31 NOTE — TELEPHONE ENCOUNTER
Ayana BLACKMON from the [facility] assisted living in [facility] - [phone]  Calls in concerned about pt's increase in weight   Is on daily lasix    Most of the week -wt has been between 209-210 lbs  Yesterday up to 212.5 lbs  Today wt > 215 lbs    Ayana BLACKMON says pt is not SOB   Maybe ate too many Ritz crackers last couple of days (salt) ?    Ayana would like to talk to [facility] to see if they should increase lasix dosage     Paging  for [place] called   Will have a Dr Angela Galvan call Ayana BLACKMON back directly at [phone] to discuss (10:47 am )    Protocol and care advice reviewed  Caller states understanding of the recommended disposition  Advised to call back if further questions or concerns    Sylvester Love RN / [facility]    Reason for Disposition    [1] Caller requesting NON-URGENT health information AND [2] PCP's office is the best resource    Protocols used: INFORMATION ONLY CALL-A-

## 2020-11-09 NOTE — TELEPHONE ENCOUNTER
"Requested Prescriptions   Pending Prescriptions Disp Refills     nystatin (MYCOSTATIN) 022648 UNIT/GM external cream       Sig: Apply topically 2 times daily       Antifungal Agents Passed - 11/9/2020  2:31 PM        Passed - Recent (12 mo) or future (30 days) visit within the authorizing provider's specialty     Patient has had an office visit with the authorizing provider or a provider within the authorizing providers department within the previous 12 mos or has a future within next 30 days. See \"Patient Info\" tab in inbasket, or \"Choose Columns\" in Meds & Orders section of the refill encounter.              Passed - Not Fluconazole or Terconazole      If oral Fluconazole or Terconazole, may refill if indicated in progress notes.           Passed - Medication is active on med list             "

## 2020-11-10 NOTE — TELEPHONE ENCOUNTER
Requested Prescriptions   Pending Prescriptions Disp Refills     oxyCODONE-acetaminophen (PERCOCET) 5-325 MG tablet [Pharmacy Med Name: oxycodone-acetaminophen 5 mg-325 mg tablet] 7 tablet 0     Sig: Take 1 tablet by mouth daily as needed for pain       There is no refill protocol information for this order

## 2020-11-10 NOTE — LETTER
November 12, 2020      Caitlin Sales  [address]  [place] [address]        Dear Caitlin,       We received a refill request for your Percocet medication.  This medication has been refilled for 30 days as you are due for a virtual visit for further refills.  Please call [phone] to schedule an appointment.      Sincerely,        Abbie Zacarias, DO

## 2020-11-11 NOTE — TELEPHONE ENCOUNTER
Routing refill request to provider for review/approval because:  Medication is reported/historical    Abbey Khanna RN

## 2020-12-01 NOTE — TELEPHONE ENCOUNTER
Refill provided.  Please schedule patient for virtual visit to determine ongoing needs for pain medications

## 2020-12-02 NOTE — TELEPHONE ENCOUNTER
Hetal Fair contacted [facility] on 12/02/20 and left a message. If patient calls back please schedule appointment in 1 month. Please give message below.

## 2020-12-28 NOTE — TELEPHONE ENCOUNTER
Routing refill request to provider for review/approval because:  Drug not on the [facility] refill protocol     Leticia Maradiaga RN

## 2020-12-29 NOTE — TELEPHONE ENCOUNTER
Message left for patient to check the pharmacy.  Needs office visit for further refills. Lauren MARCUS RN

## 2020-12-30 NOTE — TELEPHONE ENCOUNTER
Dr. Zacarias,    S-(situation): pain from recent fall    B-(background): patient was walking with her walker and got the oxygen tubing entangled.  Fell on buttock near her recliner.  Today the patient is more stiff and having pain in her buttock area.  No bruising noted.  Patient wants to give it one more day before being seen.  Patient has had a slight external rotation of a leg from a back surgery.  One leg is not longer than the other.  Patient is ambulating to the bathroom and back.  Did apply ice to the area yesterday.  Patient is on tylenol TID already.  Has a percocet order for 1 a day prn but she takes this at bedtime.      A-(assessment): pain from recent fall    R-(recommendations): RN would like to have the percocet order changed to 2 tabs daily prn for now.  Please advise. Lauren MARCUS RN

## 2020-12-30 NOTE — TELEPHONE ENCOUNTER
Reason for Call:  Other call back    Detailed comments: Patient care team nurse requesting a call back for patient recent fall. Wanting to up hydroCodone prescription to twice a day PRN.     Phone Number Patient can be reached at: Other phone number:  [phone]    Best Time: anytime    Can we leave a detailed message on this number? YES    Call taken on 12/30/2020 at 1:43 PM by Barbara Rodriguez

## 2020-12-31 NOTE — TELEPHONE ENCOUNTER
Verbal ok to increase percocet to 2 tabs prn daily and the need to be seen next week left on confidential line for Ayana Ashton RN. Lauren MARCUS RN

## 2020-12-31 NOTE — TELEPHONE ENCOUNTER
[facility]  Clarified the Rx for percocet again.  Ayana will have patient's family schedule the visit for next week. Lauren MARCUS RN

## 2020-12-31 NOTE — TELEPHONE ENCOUNTER
Ayana received verbal orders on voice mail from RN as below  Per Ayana she did not want the orders for Percocet to read 2 daily, wanting orders to read 1 tab BID prn.      To provider to advise if ok to increase Percocet to 1 tab BID prn and route back to your team for follow up       Amy FIELDSN, RN, CPN

## 2021-01-01 ENCOUNTER — APPOINTMENT (OUTPATIENT)
Dept: CT IMAGING | Facility: CLINIC | Age: 86
End: 2021-01-01
Attending: EMERGENCY MEDICINE
Payer: MEDICARE

## 2021-01-01 ENCOUNTER — TELEPHONE (OUTPATIENT)
Dept: NEUROLOGY | Facility: CLINIC | Age: 86
End: 2021-01-01

## 2021-01-01 ENCOUNTER — APPOINTMENT (OUTPATIENT)
Dept: CARDIOLOGY | Facility: CLINIC | Age: 86
DRG: 291 | End: 2021-01-01
Attending: INTERNAL MEDICINE
Payer: MEDICARE

## 2021-01-01 ENCOUNTER — TELEPHONE (OUTPATIENT)
Dept: INTERNAL MEDICINE | Facility: CLINIC | Age: 86
End: 2021-01-01

## 2021-01-01 ENCOUNTER — HOSPITAL ENCOUNTER (INPATIENT)
Facility: CLINIC | Age: 86
LOS: 6 days | DRG: 291 | End: 2021-04-26
Attending: INTERNAL MEDICINE | Admitting: INTERNAL MEDICINE
Payer: MEDICARE

## 2021-01-01 ENCOUNTER — APPOINTMENT (OUTPATIENT)
Dept: GENERAL RADIOLOGY | Facility: CLINIC | Age: 86
End: 2021-01-01
Attending: FAMILY MEDICINE
Payer: MEDICARE

## 2021-01-01 ENCOUNTER — VIRTUAL VISIT (OUTPATIENT)
Dept: FAMILY MEDICINE | Facility: CLINIC | Age: 86
End: 2021-01-01
Payer: MEDICARE

## 2021-01-01 ENCOUNTER — APPOINTMENT (OUTPATIENT)
Dept: OCCUPATIONAL THERAPY | Facility: CLINIC | Age: 86
DRG: 682 | End: 2021-01-01
Payer: MEDICARE

## 2021-01-01 ENCOUNTER — APPOINTMENT (OUTPATIENT)
Dept: GENERAL RADIOLOGY | Facility: CLINIC | Age: 86
DRG: 682 | End: 2021-01-01
Attending: INTERNAL MEDICINE
Payer: MEDICARE

## 2021-01-01 ENCOUNTER — APPOINTMENT (OUTPATIENT)
Dept: GENERAL RADIOLOGY | Facility: CLINIC | Age: 86
DRG: 682 | End: 2021-01-01
Attending: FAMILY MEDICINE
Payer: MEDICARE

## 2021-01-01 ENCOUNTER — HOSPITAL ENCOUNTER (OUTPATIENT)
Dept: RESPIRATORY THERAPY | Facility: CLINIC | Age: 86
End: 2021-03-08
Attending: INTERNAL MEDICINE
Payer: MEDICARE

## 2021-01-01 ENCOUNTER — PATIENT OUTREACH (OUTPATIENT)
Dept: NURSING | Facility: CLINIC | Age: 86
End: 2021-01-01
Payer: MEDICARE

## 2021-01-01 ENCOUNTER — APPOINTMENT (OUTPATIENT)
Dept: CT IMAGING | Facility: CLINIC | Age: 86
DRG: 682 | End: 2021-01-01
Attending: FAMILY MEDICINE
Payer: MEDICARE

## 2021-01-01 ENCOUNTER — APPOINTMENT (OUTPATIENT)
Dept: PHYSICAL THERAPY | Facility: CLINIC | Age: 86
DRG: 682 | End: 2021-01-01
Payer: MEDICARE

## 2021-01-01 ENCOUNTER — HOSPITAL ENCOUNTER (EMERGENCY)
Facility: CLINIC | Age: 86
Discharge: ANOTHER HOSPITAL | End: 2021-04-20
Attending: FAMILY MEDICINE | Admitting: FAMILY MEDICINE
Payer: MEDICARE

## 2021-01-01 ENCOUNTER — APPOINTMENT (OUTPATIENT)
Dept: GENERAL RADIOLOGY | Facility: CLINIC | Age: 86
DRG: 291 | End: 2021-01-01
Attending: INTERNAL MEDICINE
Payer: MEDICARE

## 2021-01-01 ENCOUNTER — VIRTUAL VISIT (OUTPATIENT)
Dept: CARDIOLOGY | Facility: CLINIC | Age: 86
End: 2021-01-01
Payer: MEDICARE

## 2021-01-01 ENCOUNTER — DOCUMENTATION ONLY (OUTPATIENT)
Dept: CARDIOLOGY | Facility: CLINIC | Age: 86
End: 2021-01-01

## 2021-01-01 ENCOUNTER — NURSING HOME VISIT (OUTPATIENT)
Dept: GERIATRICS | Facility: CLINIC | Age: 86
End: 2021-01-01
Payer: MEDICARE

## 2021-01-01 ENCOUNTER — HOSPITAL ENCOUNTER (EMERGENCY)
Facility: CLINIC | Age: 86
Discharge: HOME | End: 2021-01-19
Attending: EMERGENCY MEDICINE | Admitting: EMERGENCY MEDICINE
Payer: MEDICARE

## 2021-01-01 ENCOUNTER — HOSPITAL LABORATORY (OUTPATIENT)
Facility: OTHER | Age: 86
End: 2021-01-01

## 2021-01-01 ENCOUNTER — MEDICAL CORRESPONDENCE (OUTPATIENT)
Dept: HEALTH INFORMATION MANAGEMENT | Facility: CLINIC | Age: 86
End: 2021-01-01

## 2021-01-01 ENCOUNTER — HEALTH MAINTENANCE LETTER (OUTPATIENT)
Age: 86
End: 2021-01-01

## 2021-01-01 ENCOUNTER — VIRTUAL VISIT (OUTPATIENT)
Dept: NEUROPSYCHOLOGY | Facility: CLINIC | Age: 86
End: 2021-01-01
Attending: INTERNAL MEDICINE
Payer: MEDICARE

## 2021-01-01 ENCOUNTER — HOSPITAL ENCOUNTER (INPATIENT)
Facility: CLINIC | Age: 86
LOS: 5 days | Discharge: NURSING, REHAB OR LONG-TERM CARE | DRG: 682 | End: 2021-04-16
Attending: STUDENT IN AN ORGANIZED HEALTH CARE EDUCATION/TRAINING PROGRAM | Admitting: FAMILY MEDICINE
Payer: MEDICARE

## 2021-01-01 ENCOUNTER — TELEPHONE (OUTPATIENT)
Dept: PEDIATRICS | Facility: CLINIC | Age: 86
End: 2021-01-01

## 2021-01-01 ENCOUNTER — OFFICE VISIT (OUTPATIENT)
Dept: FAMILY MEDICINE | Facility: CLINIC | Age: 86
End: 2021-01-01
Payer: MEDICARE

## 2021-01-01 ENCOUNTER — APPOINTMENT (OUTPATIENT)
Dept: CARDIOLOGY | Facility: CLINIC | Age: 86
DRG: 682 | End: 2021-01-01
Attending: FAMILY MEDICINE
Payer: MEDICARE

## 2021-01-01 VITALS
SYSTOLIC BLOOD PRESSURE: 132 MMHG | TEMPERATURE: 99.7 F | WEIGHT: 203.71 LBS | BODY MASS INDEX: 33.9 KG/M2 | OXYGEN SATURATION: 100 % | DIASTOLIC BLOOD PRESSURE: 66 MMHG

## 2021-01-01 VITALS
BODY MASS INDEX: 36.99 KG/M2 | DIASTOLIC BLOOD PRESSURE: 59 MMHG | TEMPERATURE: 97.8 F | WEIGHT: 222 LBS | OXYGEN SATURATION: 93 % | SYSTOLIC BLOOD PRESSURE: 154 MMHG | HEIGHT: 65 IN | RESPIRATION RATE: 18 BRPM | HEART RATE: 54 BPM

## 2021-01-01 VITALS
OXYGEN SATURATION: 95 % | DIASTOLIC BLOOD PRESSURE: 82 MMHG | HEART RATE: 59 BPM | SYSTOLIC BLOOD PRESSURE: 130 MMHG | WEIGHT: 223 LBS | HEIGHT: 65 IN | BODY MASS INDEX: 37.15 KG/M2 | TEMPERATURE: 97.4 F

## 2021-01-01 VITALS
BODY MASS INDEX: 44.17 KG/M2 | HEART RATE: 64 BPM | TEMPERATURE: 98.1 F | DIASTOLIC BLOOD PRESSURE: 53 MMHG | SYSTOLIC BLOOD PRESSURE: 117 MMHG | WEIGHT: 265.1 LBS | OXYGEN SATURATION: 96 % | RESPIRATION RATE: 18 BRPM | HEIGHT: 65 IN

## 2021-01-01 VITALS
DIASTOLIC BLOOD PRESSURE: 73 MMHG | SYSTOLIC BLOOD PRESSURE: 109 MMHG | OXYGEN SATURATION: 93 % | HEART RATE: 70 BPM | RESPIRATION RATE: 28 BRPM

## 2021-01-01 VITALS
HEART RATE: 49 BPM | OXYGEN SATURATION: 100 % | RESPIRATION RATE: 14 BRPM | WEIGHT: 213 LBS | TEMPERATURE: 97.8 F | DIASTOLIC BLOOD PRESSURE: 66 MMHG | BODY MASS INDEX: 35.45 KG/M2 | SYSTOLIC BLOOD PRESSURE: 166 MMHG

## 2021-01-01 DIAGNOSIS — I10 ESSENTIAL HYPERTENSION: Chronic | ICD-10-CM

## 2021-01-01 DIAGNOSIS — Z95.828 PRESENCE OF IVC FILTER: ICD-10-CM

## 2021-01-01 DIAGNOSIS — Z99.81 DEPENDENCE ON SUPPLEMENTAL OXYGEN: ICD-10-CM

## 2021-01-01 DIAGNOSIS — Z79.891 LONG TERM (CURRENT) USE OF OPIATE ANALGESIC: ICD-10-CM

## 2021-01-01 DIAGNOSIS — G47.01 INSOMNIA DUE TO MEDICAL CONDITION: ICD-10-CM

## 2021-01-01 DIAGNOSIS — N18.31 STAGE 3A CHRONIC KIDNEY DISEASE (H): ICD-10-CM

## 2021-01-01 DIAGNOSIS — D50.8 OTHER IRON DEFICIENCY ANEMIA: ICD-10-CM

## 2021-01-01 DIAGNOSIS — M54.17 LUMBOSACRAL RADICULOPATHY AT L5: ICD-10-CM

## 2021-01-01 DIAGNOSIS — M62.81 GENERALIZED MUSCLE WEAKNESS: ICD-10-CM

## 2021-01-01 DIAGNOSIS — J96.12 CHRONIC RESPIRATORY FAILURE WITH HYPERCAPNIA (H): Chronic | ICD-10-CM

## 2021-01-01 DIAGNOSIS — Z79.899 ON AMIODARONE THERAPY: ICD-10-CM

## 2021-01-01 DIAGNOSIS — I25.10 CORONARY ARTERY DISEASE INVOLVING NATIVE CORONARY ARTERY OF NATIVE HEART WITHOUT ANGINA PECTORIS: ICD-10-CM

## 2021-01-01 DIAGNOSIS — G62.9 NEUROPATHY: ICD-10-CM

## 2021-01-01 DIAGNOSIS — J84.9 ILD (INTERSTITIAL LUNG DISEASE) (H): ICD-10-CM

## 2021-01-01 DIAGNOSIS — R21 RASH AND NONSPECIFIC SKIN ERUPTION: ICD-10-CM

## 2021-01-01 DIAGNOSIS — I50.31 ACUTE DIASTOLIC HEART FAILURE (H): ICD-10-CM

## 2021-01-01 DIAGNOSIS — J96.00 ACUTE RESPIRATORY FAILURE, UNSPECIFIED WHETHER WITH HYPOXIA OR HYPERCAPNIA (H): ICD-10-CM

## 2021-01-01 DIAGNOSIS — R00.1 BRADYCARDIA: ICD-10-CM

## 2021-01-01 DIAGNOSIS — M15.9 OSTEOARTHRITIS OF MULTIPLE JOINTS, UNSPECIFIED OSTEOARTHRITIS TYPE: ICD-10-CM

## 2021-01-01 DIAGNOSIS — E66.01 MORBID OBESITY (H): ICD-10-CM

## 2021-01-01 DIAGNOSIS — N17.9 ACUTE KIDNEY INJURY (H): ICD-10-CM

## 2021-01-01 DIAGNOSIS — Z86.718 HISTORY OF DVT (DEEP VEIN THROMBOSIS): ICD-10-CM

## 2021-01-01 DIAGNOSIS — G62.9 NEUROPATHY: Primary | ICD-10-CM

## 2021-01-01 DIAGNOSIS — R09.89 ABNORMAL CHEST SOUNDS: ICD-10-CM

## 2021-01-01 DIAGNOSIS — F03.91 DEMENTIA WITH BEHAVIORAL DISTURBANCE, UNSPECIFIED DEMENTIA TYPE: Primary | ICD-10-CM

## 2021-01-01 DIAGNOSIS — Z79.899 ON AMIODARONE THERAPY: Primary | ICD-10-CM

## 2021-01-01 DIAGNOSIS — R09.89 LABILE BLOOD PRESSURE: ICD-10-CM

## 2021-01-01 DIAGNOSIS — M25.511 CHRONIC RIGHT SHOULDER PAIN: ICD-10-CM

## 2021-01-01 DIAGNOSIS — I50.30 HEART FAILURE WITH PRESERVED EJECTION FRACTION, NYHA CLASS II (H): ICD-10-CM

## 2021-01-01 DIAGNOSIS — R41.0 CONFUSION: ICD-10-CM

## 2021-01-01 DIAGNOSIS — M17.12 ARTHRITIS OF LEFT KNEE: ICD-10-CM

## 2021-01-01 DIAGNOSIS — I95.9 HYPOTENSION, UNSPECIFIED HYPOTENSION TYPE: ICD-10-CM

## 2021-01-01 DIAGNOSIS — I50.31 ACUTE DIASTOLIC HEART FAILURE (H): Primary | ICD-10-CM

## 2021-01-01 DIAGNOSIS — F02.80 LATE ONSET ALZHEIMER'S DISEASE WITHOUT BEHAVIORAL DISTURBANCE (H): Chronic | ICD-10-CM

## 2021-01-01 DIAGNOSIS — N17.9 ACUTE RENAL FAILURE, UNSPECIFIED ACUTE RENAL FAILURE TYPE (H): ICD-10-CM

## 2021-01-01 DIAGNOSIS — G30.1 LATE ONSET ALZHEIMER'S DISEASE WITHOUT BEHAVIORAL DISTURBANCE (H): Chronic | ICD-10-CM

## 2021-01-01 DIAGNOSIS — R32 URINARY INCONTINENCE, UNSPECIFIED TYPE: ICD-10-CM

## 2021-01-01 DIAGNOSIS — R00.0 WIDE-COMPLEX TACHYCARDIA: Chronic | ICD-10-CM

## 2021-01-01 DIAGNOSIS — G89.29 CHRONIC RIGHT SHOULDER PAIN: ICD-10-CM

## 2021-01-01 DIAGNOSIS — Z87.19 HISTORY OF UPPER GASTROINTESTINAL BLEEDING: ICD-10-CM

## 2021-01-01 DIAGNOSIS — R00.0 WIDE-COMPLEX TACHYCARDIA: ICD-10-CM

## 2021-01-01 DIAGNOSIS — Z11.52 ENCOUNTER FOR SCREENING LABORATORY TESTING FOR SEVERE ACUTE RESPIRATORY SYNDROME CORONAVIRUS 2 (SARS-COV-2): ICD-10-CM

## 2021-01-01 DIAGNOSIS — F39 MILD MOOD DISORDER (H): ICD-10-CM

## 2021-01-01 DIAGNOSIS — R25.1 TREMOR: ICD-10-CM

## 2021-01-01 DIAGNOSIS — E78.5 HYPERLIPIDEMIA LDL GOAL <70: ICD-10-CM

## 2021-01-01 DIAGNOSIS — R41.89 COGNITIVE IMPAIRMENT: ICD-10-CM

## 2021-01-01 DIAGNOSIS — J96.12 CHRONIC RESPIRATORY FAILURE WITH HYPERCAPNIA (H): Primary | ICD-10-CM

## 2021-01-01 DIAGNOSIS — J96.12 CHRONIC RESPIRATORY FAILURE WITH HYPERCAPNIA (H): Primary | Chronic | ICD-10-CM

## 2021-01-01 DIAGNOSIS — Z98.1 S/P LUMBAR SPINAL FUSION: ICD-10-CM

## 2021-01-01 DIAGNOSIS — Z79.01 CURRENT USE OF LONG TERM ANTICOAGULATION: ICD-10-CM

## 2021-01-01 LAB
ALBUMIN SERPL-MCNC: 2.2 G/DL (ref 3.4–5)
ALBUMIN SERPL-MCNC: 2.2 G/DL (ref 3.4–5)
ALBUMIN SERPL-MCNC: 2.3 G/DL (ref 3.4–5)
ALBUMIN SERPL-MCNC: 2.4 G/DL (ref 3.4–5)
ALBUMIN SERPL-MCNC: 2.5 G/DL (ref 3.4–5)
ALBUMIN SERPL-MCNC: 2.5 G/DL (ref 3.4–5)
ALBUMIN SERPL-MCNC: 2.8 G/DL (ref 3.4–5)
ALBUMIN SERPL-MCNC: 3.4 G/DL (ref 3.4–5)
ALBUMIN SERPL-MCNC: 3.5 G/DL (ref 3.4–5)
ALBUMIN UR-MCNC: 30 MG/DL
ALBUMIN UR-MCNC: 30 MG/DL
ALBUMIN UR-MCNC: NEGATIVE MG/DL
ALBUMIN UR-MCNC: NEGATIVE MG/DL
ALP SERPL-CCNC: 101 U/L (ref 40–150)
ALP SERPL-CCNC: 106 U/L (ref 40–150)
ALP SERPL-CCNC: 110 U/L (ref 40–150)
ALP SERPL-CCNC: 112 U/L (ref 40–150)
ALP SERPL-CCNC: 112 U/L (ref 40–150)
ALP SERPL-CCNC: 65 U/L (ref 40–150)
ALP SERPL-CCNC: 74 U/L (ref 40–150)
ALP SERPL-CCNC: 82 U/L (ref 40–150)
ALP SERPL-CCNC: 98 U/L (ref 40–150)
ALT SERPL W P-5'-P-CCNC: 101 U/L (ref 0–50)
ALT SERPL W P-5'-P-CCNC: 125 U/L (ref 0–50)
ALT SERPL W P-5'-P-CCNC: 15 U/L (ref 0–50)
ALT SERPL W P-5'-P-CCNC: 15 U/L (ref 0–50)
ALT SERPL W P-5'-P-CCNC: 34 U/L (ref 0–50)
ALT SERPL W P-5'-P-CCNC: 37 U/L (ref 0–50)
ALT SERPL W P-5'-P-CCNC: 46 U/L (ref 0–50)
ALT SERPL W P-5'-P-CCNC: 56 U/L (ref 0–50)
ALT SERPL W P-5'-P-CCNC: 95 U/L (ref 0–50)
AMIODARONE SERPL-MCNC: 587 NG/ML (ref 1000–2500)
ANION GAP SERPL CALCULATED.3IONS-SCNC: 1 MMOL/L (ref 3–14)
ANION GAP SERPL CALCULATED.3IONS-SCNC: 1 MMOL/L (ref 3–14)
ANION GAP SERPL CALCULATED.3IONS-SCNC: 2 MMOL/L (ref 3–14)
ANION GAP SERPL CALCULATED.3IONS-SCNC: 2 MMOL/L (ref 3–14)
ANION GAP SERPL CALCULATED.3IONS-SCNC: 3 MMOL/L (ref 3–14)
ANION GAP SERPL CALCULATED.3IONS-SCNC: 3 MMOL/L (ref 3–14)
ANION GAP SERPL CALCULATED.3IONS-SCNC: 4 MMOL/L (ref 3–14)
ANION GAP SERPL CALCULATED.3IONS-SCNC: 4 MMOL/L (ref 3–14)
ANION GAP SERPL CALCULATED.3IONS-SCNC: 9 MMOL/L (ref 3–14)
ANION GAP SERPL CALCULATED.3IONS-SCNC: <1 MMOL/L (ref 3–14)
APAP SERPL-MCNC: 8 MG/L (ref 10–20)
APPEARANCE UR: CLEAR
APTT PPP: 100 SEC (ref 22–37)
APTT PPP: 30 SEC (ref 22–37)
APTT PPP: 47 SEC (ref 22–37)
APTT PPP: 47 SEC (ref 22–37)
APTT PPP: 51 SEC (ref 22–37)
APTT PPP: 56 SEC (ref 22–37)
APTT PPP: 56 SEC (ref 22–37)
APTT PPP: 58 SEC (ref 22–37)
APTT PPP: 58 SEC (ref 22–37)
APTT PPP: 59 SEC (ref 22–37)
APTT PPP: 67 SEC (ref 22–37)
APTT PPP: 69 SEC (ref 22–37)
APTT PPP: 74 SEC (ref 22–37)
APTT PPP: 98 SEC (ref 22–37)
AST SERPL W P-5'-P-CCNC: 10 U/L (ref 0–45)
AST SERPL W P-5'-P-CCNC: 102 U/L (ref 0–45)
AST SERPL W P-5'-P-CCNC: 105 U/L (ref 0–45)
AST SERPL W P-5'-P-CCNC: 13 U/L (ref 0–45)
AST SERPL W P-5'-P-CCNC: 34 U/L (ref 0–45)
AST SERPL W P-5'-P-CCNC: 39 U/L (ref 0–45)
AST SERPL W P-5'-P-CCNC: 40 U/L (ref 0–45)
AST SERPL W P-5'-P-CCNC: 47 U/L (ref 0–45)
AST SERPL W P-5'-P-CCNC: 88 U/L (ref 0–45)
BACTERIA #/AREA URNS HPF: ABNORMAL /HPF
BACTERIA #/AREA URNS HPF: ABNORMAL /HPF
BACTERIA SPEC CULT: ABNORMAL
BACTERIA SPEC CULT: NORMAL
BACTERIA SPEC CULT: NORMAL
BASE EXCESS BLDA CALC-SCNC: 11.4 MMOL/L
BASE EXCESS BLDA CALC-SCNC: 6.5 MMOL/L
BASE EXCESS BLDA CALC-SCNC: 9.9 MMOL/L
BASE EXCESS BLDV CALC-SCNC: 0 MMOL/L
BASE EXCESS BLDV CALC-SCNC: 10.5 MMOL/L
BASE EXCESS BLDV CALC-SCNC: 11.8 MMOL/L
BASE EXCESS BLDV CALC-SCNC: 12.6 MMOL/L
BASE EXCESS BLDV CALC-SCNC: 12.8 MMOL/L
BASE EXCESS BLDV CALC-SCNC: 12.9 MMOL/L
BASE EXCESS BLDV CALC-SCNC: 13 MMOL/L
BASE EXCESS BLDV CALC-SCNC: 13.7 MMOL/L
BASE EXCESS BLDV CALC-SCNC: 13.9 MMOL/L
BASE EXCESS BLDV CALC-SCNC: 15.2 MMOL/L
BASE EXCESS BLDV CALC-SCNC: 17.1 MMOL/L
BASE EXCESS BLDV CALC-SCNC: 18.2 MMOL/L
BASE EXCESS BLDV CALC-SCNC: 19.6 MMOL/L
BASE EXCESS BLDV CALC-SCNC: 19.9 MMOL/L
BASE EXCESS BLDV CALC-SCNC: 4.6 MMOL/L
BASE EXCESS BLDV CALC-SCNC: 7.1 MMOL/L
BASE EXCESS BLDV CALC-SCNC: 9.3 MMOL/L
BASE EXCESS BLDV CALC-SCNC: 9.8 MMOL/L
BASOPHILS # BLD AUTO: 0 10E9/L (ref 0–0.2)
BASOPHILS # BLD AUTO: 0.1 10E9/L (ref 0–0.2)
BASOPHILS # BLD AUTO: 0.1 10E9/L (ref 0–0.2)
BASOPHILS NFR BLD AUTO: 0 %
BASOPHILS NFR BLD AUTO: 0.2 %
BASOPHILS NFR BLD AUTO: 0.3 %
BASOPHILS NFR BLD AUTO: 0.3 %
BASOPHILS NFR BLD AUTO: 0.4 %
BASOPHILS NFR BLD AUTO: 0.5 %
BILIRUB SERPL-MCNC: 0.2 MG/DL (ref 0.2–1.3)
BILIRUB SERPL-MCNC: 0.2 MG/DL (ref 0.2–1.3)
BILIRUB SERPL-MCNC: 0.3 MG/DL (ref 0.2–1.3)
BILIRUB SERPL-MCNC: 0.5 MG/DL (ref 0.2–1.3)
BILIRUB SERPL-MCNC: 0.7 MG/DL (ref 0.2–1.3)
BILIRUB SERPL-MCNC: 0.8 MG/DL (ref 0.2–1.3)
BILIRUB SERPL-MCNC: 0.9 MG/DL (ref 0.2–1.3)
BILIRUB UR QL STRIP: NEGATIVE
BUN SERPL-MCNC: 22 MG/DL (ref 7–30)
BUN SERPL-MCNC: 29 MG/DL (ref 7–30)
BUN SERPL-MCNC: 30 MG/DL (ref 7–30)
BUN SERPL-MCNC: 31 MG/DL (ref 7–30)
BUN SERPL-MCNC: 33 MG/DL (ref 7–30)
BUN SERPL-MCNC: 34 MG/DL (ref 7–30)
BUN SERPL-MCNC: 35 MG/DL (ref 7–30)
BUN SERPL-MCNC: 37 MG/DL (ref 7–30)
BUN SERPL-MCNC: 37 MG/DL (ref 7–30)
BUN SERPL-MCNC: 38 MG/DL (ref 7–30)
BUN SERPL-MCNC: 39 MG/DL (ref 7–30)
BUN SERPL-MCNC: 39 MG/DL (ref 7–30)
BUN SERPL-MCNC: 40 MG/DL (ref 7–30)
BUN SERPL-MCNC: 43 MG/DL (ref 7–30)
BUN SERPL-MCNC: 44 MG/DL (ref 7–30)
BUN SERPL-MCNC: 45 MG/DL (ref 7–30)
CA-I BLD-MCNC: 4.2 MG/DL (ref 4.4–5.2)
CALCIUM SERPL-MCNC: 7.1 MG/DL (ref 8.5–10.1)
CALCIUM SERPL-MCNC: 7.6 MG/DL (ref 8.5–10.1)
CALCIUM SERPL-MCNC: 7.7 MG/DL (ref 8.5–10.1)
CALCIUM SERPL-MCNC: 7.8 MG/DL (ref 8.5–10.1)
CALCIUM SERPL-MCNC: 7.8 MG/DL (ref 8.5–10.1)
CALCIUM SERPL-MCNC: 7.9 MG/DL (ref 8.5–10.1)
CALCIUM SERPL-MCNC: 8 MG/DL (ref 8.5–10.1)
CALCIUM SERPL-MCNC: 8 MG/DL (ref 8.5–10.1)
CALCIUM SERPL-MCNC: 8.1 MG/DL (ref 8.5–10.1)
CALCIUM SERPL-MCNC: 8.1 MG/DL (ref 8.5–10.1)
CALCIUM SERPL-MCNC: 8.2 MG/DL (ref 8.5–10.1)
CALCIUM SERPL-MCNC: 8.2 MG/DL (ref 8.5–10.1)
CALCIUM SERPL-MCNC: 8.3 MG/DL (ref 8.5–10.1)
CALCIUM SERPL-MCNC: 8.4 MG/DL (ref 8.5–10.1)
CALCIUM SERPL-MCNC: 8.4 MG/DL (ref 8.5–10.1)
CALCIUM SERPL-MCNC: 8.5 MG/DL (ref 8.5–10.1)
CHLORIDE SERPL-SCNC: 100 MMOL/L (ref 94–109)
CHLORIDE SERPL-SCNC: 100 MMOL/L (ref 94–109)
CHLORIDE SERPL-SCNC: 101 MMOL/L (ref 94–109)
CHLORIDE SERPL-SCNC: 101 MMOL/L (ref 94–109)
CHLORIDE SERPL-SCNC: 102 MMOL/L (ref 94–109)
CHLORIDE SERPL-SCNC: 102 MMOL/L (ref 94–109)
CHLORIDE SERPL-SCNC: 104 MMOL/L (ref 94–109)
CHLORIDE SERPL-SCNC: 105 MMOL/L (ref 94–109)
CHLORIDE SERPL-SCNC: 105 MMOL/L (ref 94–109)
CHLORIDE SERPL-SCNC: 106 MMOL/L (ref 94–109)
CHLORIDE SERPL-SCNC: 108 MMOL/L (ref 94–109)
CHLORIDE SERPL-SCNC: 108 MMOL/L (ref 94–109)
CHLORIDE SERPL-SCNC: 113 MMOL/L (ref 94–109)
CHLORIDE SERPL-SCNC: 113 MMOL/L (ref 94–109)
CHLORIDE SERPL-SCNC: 114 MMOL/L (ref 94–109)
CHLORIDE SERPL-SCNC: 99 MMOL/L (ref 94–109)
CO2 SERPL-SCNC: 27 MMOL/L (ref 20–32)
CO2 SERPL-SCNC: 32 MMOL/L (ref 20–32)
CO2 SERPL-SCNC: 32 MMOL/L (ref 20–32)
CO2 SERPL-SCNC: 33 MMOL/L (ref 20–32)
CO2 SERPL-SCNC: 35 MMOL/L (ref 20–32)
CO2 SERPL-SCNC: 35 MMOL/L (ref 20–32)
CO2 SERPL-SCNC: 36 MMOL/L (ref 20–32)
CO2 SERPL-SCNC: 36 MMOL/L (ref 20–32)
CO2 SERPL-SCNC: 38 MMOL/L (ref 20–32)
CO2 SERPL-SCNC: 38 MMOL/L (ref 20–32)
CO2 SERPL-SCNC: 39 MMOL/L (ref 20–32)
CO2 SERPL-SCNC: 41 MMOL/L (ref 20–32)
CO2 SERPL-SCNC: 41 MMOL/L (ref 20–32)
CO2 SERPL-SCNC: 42 MMOL/L (ref 20–32)
CO2 SERPL-SCNC: 43 MMOL/L (ref 20–32)
CO2 SERPL-SCNC: 44 MMOL/L (ref 20–32)
COLOR UR AUTO: ABNORMAL
COLOR UR AUTO: YELLOW
CREAT SERPL-MCNC: 0.98 MG/DL (ref 0.52–1.04)
CREAT SERPL-MCNC: 1 MG/DL (ref 0.52–1.04)
CREAT SERPL-MCNC: 1.1 MG/DL (ref 0.52–1.04)
CREAT SERPL-MCNC: 1.1 MG/DL (ref 0.52–1.04)
CREAT SERPL-MCNC: 1.14 MG/DL (ref 0.52–1.04)
CREAT SERPL-MCNC: 1.14 MG/DL (ref 0.52–1.04)
CREAT SERPL-MCNC: 1.15 MG/DL (ref 0.52–1.04)
CREAT SERPL-MCNC: 1.21 MG/DL (ref 0.52–1.04)
CREAT SERPL-MCNC: 1.21 MG/DL (ref 0.52–1.04)
CREAT SERPL-MCNC: 1.23 MG/DL (ref 0.52–1.04)
CREAT SERPL-MCNC: 1.27 MG/DL (ref 0.52–1.04)
CREAT SERPL-MCNC: 1.28 MG/DL (ref 0.52–1.04)
CREAT SERPL-MCNC: 1.36 MG/DL (ref 0.52–1.04)
CREAT SERPL-MCNC: 1.41 MG/DL (ref 0.52–1.04)
CREAT SERPL-MCNC: 1.44 MG/DL (ref 0.52–1.04)
CREAT SERPL-MCNC: 1.61 MG/DL (ref 0.52–1.04)
CREAT SERPL-MCNC: 1.66 MG/DL (ref 0.52–1.04)
CREAT SERPL-MCNC: 1.81 MG/DL (ref 0.52–1.04)
CREAT UR-MCNC: 99 MG/DL
DESETHYLAMIODARONE SERPL-MCNC: 588 NG/ML
DIFFERENTIAL METHOD BLD: ABNORMAL
DLCOUNC-%PRED-PRE: 55 %
DLCOUNC-PRE: 10.65 ML/MIN/MMHG
DLCOUNC-PRED: 19.19 ML/MIN/MMHG
EOSINOPHIL # BLD AUTO: 0 10E9/L (ref 0–0.7)
EOSINOPHIL # BLD AUTO: 0 10E9/L (ref 0–0.7)
EOSINOPHIL # BLD AUTO: 0.1 10E9/L (ref 0–0.7)
EOSINOPHIL # BLD AUTO: 0.2 10E9/L (ref 0–0.7)
EOSINOPHIL # BLD AUTO: 0.3 10E9/L (ref 0–0.7)
EOSINOPHIL NFR BLD AUTO: 0 %
EOSINOPHIL NFR BLD AUTO: 0.2 %
EOSINOPHIL NFR BLD AUTO: 0.5 %
EOSINOPHIL NFR BLD AUTO: 0.8 %
EOSINOPHIL NFR BLD AUTO: 0.8 %
EOSINOPHIL NFR BLD AUTO: 0.9 %
EOSINOPHIL NFR BLD AUTO: 0.9 %
EOSINOPHIL NFR BLD AUTO: 1.9 %
EOSINOPHIL NFR BLD AUTO: 2.2 %
ERV-%PRED-PRE: 86 %
ERV-PRE: 0.12 L
ERV-PRED: 0.13 L
ERYTHROCYTE [DISTWIDTH] IN BLOOD BY AUTOMATED COUNT: 14.8 % (ref 10–15)
ERYTHROCYTE [DISTWIDTH] IN BLOOD BY AUTOMATED COUNT: 15 % (ref 10–15)
ERYTHROCYTE [DISTWIDTH] IN BLOOD BY AUTOMATED COUNT: 15 % (ref 10–15)
ERYTHROCYTE [DISTWIDTH] IN BLOOD BY AUTOMATED COUNT: 15.1 % (ref 10–15)
ERYTHROCYTE [DISTWIDTH] IN BLOOD BY AUTOMATED COUNT: 15.1 % (ref 10–15)
ERYTHROCYTE [DISTWIDTH] IN BLOOD BY AUTOMATED COUNT: 15.2 % (ref 10–15)
ERYTHROCYTE [DISTWIDTH] IN BLOOD BY AUTOMATED COUNT: 15.3 % (ref 10–15)
ERYTHROCYTE [DISTWIDTH] IN BLOOD BY AUTOMATED COUNT: 15.4 % (ref 10–15)
ERYTHROCYTE [DISTWIDTH] IN BLOOD BY AUTOMATED COUNT: 15.5 % (ref 10–15)
ERYTHROCYTE [DISTWIDTH] IN BLOOD BY AUTOMATED COUNT: 15.6 % (ref 10–15)
ERYTHROCYTE [DISTWIDTH] IN BLOOD BY AUTOMATED COUNT: 15.8 % (ref 10–15)
ERYTHROCYTE [DISTWIDTH] IN BLOOD BY AUTOMATED COUNT: 15.8 % (ref 10–15)
ERYTHROCYTE [DISTWIDTH] IN BLOOD BY AUTOMATED COUNT: 16.4 % (ref 10–15)
ERYTHROCYTE [DISTWIDTH] IN BLOOD BY AUTOMATED COUNT: 16.5 % (ref 10–15)
ERYTHROCYTE [DISTWIDTH] IN BLOOD BY AUTOMATED COUNT: 16.9 % (ref 10–15)
ERYTHROCYTE [DISTWIDTH] IN BLOOD BY AUTOMATED COUNT: 17.2 % (ref 10–15)
ERYTHROCYTE [DISTWIDTH] IN BLOOD BY AUTOMATED COUNT: 17.2 % (ref 10–15)
EXPTIME-PRE: 5.64 SEC
FEF2575-%PRED-PRE: 64 %
FEF2575-PRE: 0.95 L/SEC
FEF2575-PRED: 1.48 L/SEC
FEFMAX-%PRED-PRE: 67 %
FEFMAX-PRE: 3.07 L/SEC
FEFMAX-PRED: 4.55 L/SEC
FEV1-%PRED-PRE: 52 %
FEV1-PRE: 0.99 L
FEV1FEV6-PRE: 81 %
FEV1FEV6-PRED: 77 %
FEV1FVC-PRE: 81 %
FEV1FVC-PRED: 76 %
FEV1SVC-PRE: 62 %
FEV1SVC-PRED: 66 %
FIFMAX-PRE: 1.92 L/SEC
FRCPLETH-%PRED-PRE: 67 %
FRCPLETH-PRE: 1.87 L
FRCPLETH-PRED: 2.78 L
FVC-%PRED-PRE: 48 %
FVC-PRE: 1.22 L
FVC-PRED: 2.52 L
GFR SERPL CREATININE-BSD FRML MDRD: 25 ML/MIN/{1.73_M2}
GFR SERPL CREATININE-BSD FRML MDRD: 28 ML/MIN/{1.73_M2}
GFR SERPL CREATININE-BSD FRML MDRD: 29 ML/MIN/{1.73_M2}
GFR SERPL CREATININE-BSD FRML MDRD: 33 ML/MIN/{1.73_M2}
GFR SERPL CREATININE-BSD FRML MDRD: 34 ML/MIN/{1.73_M2}
GFR SERPL CREATININE-BSD FRML MDRD: 35 ML/MIN/{1.73_M2}
GFR SERPL CREATININE-BSD FRML MDRD: 38 ML/MIN/{1.73_M2}
GFR SERPL CREATININE-BSD FRML MDRD: 39 ML/MIN/{1.73_M2}
GFR SERPL CREATININE-BSD FRML MDRD: 40 ML/MIN/{1.73_M2}
GFR SERPL CREATININE-BSD FRML MDRD: 41 ML/MIN/{1.73_M2}
GFR SERPL CREATININE-BSD FRML MDRD: 41 ML/MIN/{1.73_M2}
GFR SERPL CREATININE-BSD FRML MDRD: 43 ML/MIN/{1.73_M2}
GFR SERPL CREATININE-BSD FRML MDRD: 44 ML/MIN/{1.73_M2}
GFR SERPL CREATININE-BSD FRML MDRD: 44 ML/MIN/{1.73_M2}
GFR SERPL CREATININE-BSD FRML MDRD: 46 ML/MIN/{1.73_M2}
GFR SERPL CREATININE-BSD FRML MDRD: 46 ML/MIN/{1.73_M2}
GFR SERPL CREATININE-BSD FRML MDRD: 51 ML/MIN/{1.73_M2}
GFR SERPL CREATININE-BSD FRML MDRD: 53 ML/MIN/{1.73_M2}
GLUCOSE BLDC GLUCOMTR-MCNC: 104 MG/DL (ref 70–99)
GLUCOSE BLDC GLUCOMTR-MCNC: 106 MG/DL (ref 70–99)
GLUCOSE BLDC GLUCOMTR-MCNC: 107 MG/DL (ref 70–99)
GLUCOSE BLDC GLUCOMTR-MCNC: 110 MG/DL (ref 70–99)
GLUCOSE BLDC GLUCOMTR-MCNC: 111 MG/DL (ref 70–99)
GLUCOSE BLDC GLUCOMTR-MCNC: 117 MG/DL (ref 70–99)
GLUCOSE BLDC GLUCOMTR-MCNC: 119 MG/DL (ref 70–99)
GLUCOSE BLDC GLUCOMTR-MCNC: 119 MG/DL (ref 70–99)
GLUCOSE BLDC GLUCOMTR-MCNC: 120 MG/DL (ref 70–99)
GLUCOSE BLDC GLUCOMTR-MCNC: 122 MG/DL (ref 70–99)
GLUCOSE BLDC GLUCOMTR-MCNC: 122 MG/DL (ref 70–99)
GLUCOSE BLDC GLUCOMTR-MCNC: 124 MG/DL (ref 70–99)
GLUCOSE BLDC GLUCOMTR-MCNC: 124 MG/DL (ref 70–99)
GLUCOSE BLDC GLUCOMTR-MCNC: 125 MG/DL (ref 70–99)
GLUCOSE BLDC GLUCOMTR-MCNC: 125 MG/DL (ref 70–99)
GLUCOSE BLDC GLUCOMTR-MCNC: 126 MG/DL (ref 70–99)
GLUCOSE BLDC GLUCOMTR-MCNC: 131 MG/DL (ref 70–99)
GLUCOSE BLDC GLUCOMTR-MCNC: 133 MG/DL (ref 70–99)
GLUCOSE BLDC GLUCOMTR-MCNC: 133 MG/DL (ref 70–99)
GLUCOSE BLDC GLUCOMTR-MCNC: 136 MG/DL (ref 70–99)
GLUCOSE BLDC GLUCOMTR-MCNC: 139 MG/DL (ref 70–99)
GLUCOSE BLDC GLUCOMTR-MCNC: 145 MG/DL (ref 70–99)
GLUCOSE BLDC GLUCOMTR-MCNC: 145 MG/DL (ref 70–99)
GLUCOSE BLDC GLUCOMTR-MCNC: 148 MG/DL (ref 70–99)
GLUCOSE BLDC GLUCOMTR-MCNC: 153 MG/DL (ref 70–99)
GLUCOSE BLDC GLUCOMTR-MCNC: 74 MG/DL (ref 70–99)
GLUCOSE BLDC GLUCOMTR-MCNC: 76 MG/DL (ref 70–99)
GLUCOSE BLDC GLUCOMTR-MCNC: 77 MG/DL (ref 70–99)
GLUCOSE BLDC GLUCOMTR-MCNC: 80 MG/DL (ref 70–99)
GLUCOSE BLDC GLUCOMTR-MCNC: 86 MG/DL (ref 70–99)
GLUCOSE BLDC GLUCOMTR-MCNC: 88 MG/DL (ref 70–99)
GLUCOSE BLDC GLUCOMTR-MCNC: 88 MG/DL (ref 70–99)
GLUCOSE BLDC GLUCOMTR-MCNC: 93 MG/DL (ref 70–99)
GLUCOSE BLDC GLUCOMTR-MCNC: 97 MG/DL (ref 70–99)
GLUCOSE BLDC GLUCOMTR-MCNC: 99 MG/DL (ref 70–99)
GLUCOSE SERPL-MCNC: 102 MG/DL (ref 70–99)
GLUCOSE SERPL-MCNC: 103 MG/DL (ref 70–99)
GLUCOSE SERPL-MCNC: 111 MG/DL (ref 70–99)
GLUCOSE SERPL-MCNC: 111 MG/DL (ref 70–99)
GLUCOSE SERPL-MCNC: 118 MG/DL (ref 70–99)
GLUCOSE SERPL-MCNC: 123 MG/DL (ref 70–99)
GLUCOSE SERPL-MCNC: 126 MG/DL (ref 70–99)
GLUCOSE SERPL-MCNC: 128 MG/DL (ref 70–99)
GLUCOSE SERPL-MCNC: 130 MG/DL (ref 70–99)
GLUCOSE SERPL-MCNC: 131 MG/DL (ref 70–99)
GLUCOSE SERPL-MCNC: 131 MG/DL (ref 70–99)
GLUCOSE SERPL-MCNC: 141 MG/DL (ref 70–99)
GLUCOSE SERPL-MCNC: 159 MG/DL (ref 70–99)
GLUCOSE SERPL-MCNC: 160 MG/DL (ref 70–99)
GLUCOSE SERPL-MCNC: 183 MG/DL (ref 70–99)
GLUCOSE SERPL-MCNC: 84 MG/DL (ref 70–99)
GLUCOSE SERPL-MCNC: 89 MG/DL (ref 70–99)
GLUCOSE SERPL-MCNC: 91 MG/DL (ref 70–99)
GLUCOSE SERPL-MCNC: 93 MG/DL (ref 70–99)
GLUCOSE UR STRIP-MCNC: NEGATIVE MG/DL
GRAM STN SPEC: ABNORMAL
GRAM STN SPEC: ABNORMAL
GRAM STN SPEC: NORMAL
GRAM STN SPEC: NORMAL
HBA1C MFR BLD: 5.6 % (ref 0–5.6)
HCO3 BLD-SCNC: 31 MMOL/L (ref 21–28)
HCO3 BLD-SCNC: 33 MMOL/L (ref 21–28)
HCO3 BLD-SCNC: 37 MMOL/L (ref 21–28)
HCO3 BLDV-SCNC: 30 MMOL/L (ref 21–28)
HCO3 BLDV-SCNC: 30 MMOL/L (ref 21–28)
HCO3 BLDV-SCNC: 37 MMOL/L (ref 21–28)
HCO3 BLDV-SCNC: 37 MMOL/L (ref 21–28)
HCO3 BLDV-SCNC: 38 MMOL/L (ref 21–28)
HCO3 BLDV-SCNC: 39 MMOL/L (ref 21–28)
HCO3 BLDV-SCNC: 40 MMOL/L (ref 21–28)
HCO3 BLDV-SCNC: 42 MMOL/L (ref 21–28)
HCO3 BLDV-SCNC: 42 MMOL/L (ref 21–28)
HCO3 BLDV-SCNC: 43 MMOL/L (ref 21–28)
HCO3 BLDV-SCNC: 44 MMOL/L (ref 21–28)
HCO3 BLDV-SCNC: 46 MMOL/L (ref 21–28)
HCO3 BLDV-SCNC: 47 MMOL/L (ref 21–28)
HCO3 BLDV-SCNC: 48 MMOL/L (ref 21–28)
HCO3 BLDV-SCNC: 48 MMOL/L (ref 21–28)
HCT VFR BLD AUTO: 26.2 % (ref 35–47)
HCT VFR BLD AUTO: 27 % (ref 35–47)
HCT VFR BLD AUTO: 27.3 % (ref 35–47)
HCT VFR BLD AUTO: 27.3 % (ref 35–47)
HCT VFR BLD AUTO: 27.4 % (ref 35–47)
HCT VFR BLD AUTO: 27.9 % (ref 35–47)
HCT VFR BLD AUTO: 31.2 % (ref 35–47)
HCT VFR BLD AUTO: 32.9 % (ref 35–47)
HCT VFR BLD AUTO: 33 % (ref 35–47)
HCT VFR BLD AUTO: 33 % (ref 35–47)
HCT VFR BLD AUTO: 33.2 % (ref 35–47)
HCT VFR BLD AUTO: 33.6 % (ref 35–47)
HCT VFR BLD AUTO: 33.8 % (ref 35–47)
HCT VFR BLD AUTO: 34 % (ref 35–47)
HCT VFR BLD AUTO: 34.7 % (ref 35–47)
HCT VFR BLD AUTO: 35.8 % (ref 35–47)
HCT VFR BLD AUTO: 36.4 % (ref 35–47)
HCT VFR BLD AUTO: 36.4 % (ref 35–47)
HCT VFR BLD AUTO: 36.8 % (ref 35–47)
HGB BLD-MCNC: 10 G/DL (ref 11.7–15.7)
HGB BLD-MCNC: 10 G/DL (ref 11.7–15.7)
HGB BLD-MCNC: 10.1 G/DL (ref 11.7–15.7)
HGB BLD-MCNC: 10.1 G/DL (ref 11.7–15.7)
HGB BLD-MCNC: 7.8 G/DL (ref 11.7–15.7)
HGB BLD-MCNC: 7.9 G/DL (ref 11.7–15.7)
HGB BLD-MCNC: 8 G/DL (ref 11.7–15.7)
HGB BLD-MCNC: 8.1 G/DL (ref 11.7–15.7)
HGB BLD-MCNC: 8.1 G/DL (ref 11.7–15.7)
HGB BLD-MCNC: 8.2 G/DL (ref 11.7–15.7)
HGB BLD-MCNC: 8.8 G/DL (ref 11.7–15.7)
HGB BLD-MCNC: 8.9 G/DL (ref 11.7–15.7)
HGB BLD-MCNC: 9.2 G/DL (ref 11.7–15.7)
HGB BLD-MCNC: 9.5 G/DL (ref 11.7–15.7)
HGB BLD-MCNC: 9.6 G/DL (ref 11.7–15.7)
HGB BLD-MCNC: 9.6 G/DL (ref 11.7–15.7)
HGB BLD-MCNC: 9.9 G/DL (ref 11.7–15.7)
HGB UR QL STRIP: ABNORMAL
HGB UR QL STRIP: NEGATIVE
HYALINE CASTS #/AREA URNS LPF: 64 /LPF (ref 0–2)
IC-%PRED-PRE: 48 %
IC-PRE: 1.34 L
IC-PRED: 2.76 L
IMM GRANULOCYTES # BLD: 0 10E9/L (ref 0–0.4)
IMM GRANULOCYTES # BLD: 0 10E9/L (ref 0–0.4)
IMM GRANULOCYTES # BLD: 0.1 10E9/L (ref 0–0.4)
IMM GRANULOCYTES NFR BLD: 0.3 %
IMM GRANULOCYTES NFR BLD: 0.3 %
IMM GRANULOCYTES NFR BLD: 0.6 %
IMM GRANULOCYTES NFR BLD: 0.8 %
IMM GRANULOCYTES NFR BLD: 0.9 %
IMM GRANULOCYTES NFR BLD: 1 %
INTERPRETATION ECG - MUSE: NORMAL
INTERPRETATION ECG - MUSE: NORMAL
KETONES UR STRIP-MCNC: 5 MG/DL
KETONES UR STRIP-MCNC: NEGATIVE MG/DL
LABORATORY COMMENT REPORT: NORMAL
LACTATE BLD-SCNC: 1.1 MMOL/L (ref 0.7–2)
LEUKOCYTE ESTERASE UR QL STRIP: ABNORMAL
LEUKOCYTE ESTERASE UR QL STRIP: NEGATIVE
LYMPHOCYTES # BLD AUTO: 0.7 10E9/L (ref 0.8–5.3)
LYMPHOCYTES # BLD AUTO: 0.8 10E9/L (ref 0.8–5.3)
LYMPHOCYTES # BLD AUTO: 0.9 10E9/L (ref 0.8–5.3)
LYMPHOCYTES # BLD AUTO: 1.2 10E9/L (ref 0.8–5.3)
LYMPHOCYTES # BLD AUTO: 1.2 10E9/L (ref 0.8–5.3)
LYMPHOCYTES # BLD AUTO: 2.4 10E9/L (ref 0.8–5.3)
LYMPHOCYTES # BLD AUTO: 2.4 10E9/L (ref 0.8–5.3)
LYMPHOCYTES NFR BLD AUTO: 10.2 %
LYMPHOCYTES NFR BLD AUTO: 15.8 %
LYMPHOCYTES NFR BLD AUTO: 27.5 %
LYMPHOCYTES NFR BLD AUTO: 28 %
LYMPHOCYTES NFR BLD AUTO: 4.2 %
LYMPHOCYTES NFR BLD AUTO: 4.5 %
LYMPHOCYTES NFR BLD AUTO: 5.3 %
LYMPHOCYTES NFR BLD AUTO: 7.1 %
LYMPHOCYTES NFR BLD AUTO: 9.7 %
Lab: ABNORMAL
Lab: ABNORMAL
Lab: NORMAL
MAGNESIUM SERPL-MCNC: 1.9 MG/DL (ref 1.6–2.3)
MAGNESIUM SERPL-MCNC: 2.2 MG/DL (ref 1.6–2.3)
MAGNESIUM SERPL-MCNC: 2.3 MG/DL (ref 1.6–2.3)
MAGNESIUM SERPL-MCNC: 2.3 MG/DL (ref 1.6–2.3)
MAGNESIUM SERPL-MCNC: 2.4 MG/DL (ref 1.6–2.3)
MAGNESIUM SERPL-MCNC: 2.5 MG/DL (ref 1.6–2.3)
MCH RBC QN AUTO: 27.6 PG (ref 26.5–33)
MCH RBC QN AUTO: 27.9 PG (ref 26.5–33)
MCH RBC QN AUTO: 28 PG (ref 26.5–33)
MCH RBC QN AUTO: 28.1 PG (ref 26.5–33)
MCH RBC QN AUTO: 28.2 PG (ref 26.5–33)
MCH RBC QN AUTO: 28.2 PG (ref 26.5–33)
MCH RBC QN AUTO: 28.3 PG (ref 26.5–33)
MCH RBC QN AUTO: 28.4 PG (ref 26.5–33)
MCH RBC QN AUTO: 28.6 PG (ref 26.5–33)
MCH RBC QN AUTO: 28.7 PG (ref 26.5–33)
MCH RBC QN AUTO: 28.7 PG (ref 26.5–33)
MCH RBC QN AUTO: 28.8 PG (ref 26.5–33)
MCH RBC QN AUTO: 28.8 PG (ref 26.5–33)
MCH RBC QN AUTO: 28.9 PG (ref 26.5–33)
MCH RBC QN AUTO: 29 PG (ref 26.5–33)
MCH RBC QN AUTO: 29.1 PG (ref 26.5–33)
MCH RBC QN AUTO: 29.2 PG (ref 26.5–33)
MCHC RBC AUTO-ENTMCNC: 26.5 G/DL (ref 31.5–36.5)
MCHC RBC AUTO-ENTMCNC: 27 G/DL (ref 31.5–36.5)
MCHC RBC AUTO-ENTMCNC: 27 G/DL (ref 31.5–36.5)
MCHC RBC AUTO-ENTMCNC: 27.2 G/DL (ref 31.5–36.5)
MCHC RBC AUTO-ENTMCNC: 27.5 G/DL (ref 31.5–36.5)
MCHC RBC AUTO-ENTMCNC: 27.7 G/DL (ref 31.5–36.5)
MCHC RBC AUTO-ENTMCNC: 28 G/DL (ref 31.5–36.5)
MCHC RBC AUTO-ENTMCNC: 28.2 G/DL (ref 31.5–36.5)
MCHC RBC AUTO-ENTMCNC: 28.2 G/DL (ref 31.5–36.5)
MCHC RBC AUTO-ENTMCNC: 28.3 G/DL (ref 31.5–36.5)
MCHC RBC AUTO-ENTMCNC: 28.4 G/DL (ref 31.5–36.5)
MCHC RBC AUTO-ENTMCNC: 28.5 G/DL (ref 31.5–36.5)
MCHC RBC AUTO-ENTMCNC: 28.5 G/DL (ref 31.5–36.5)
MCHC RBC AUTO-ENTMCNC: 29.3 G/DL (ref 31.5–36.5)
MCHC RBC AUTO-ENTMCNC: 29.3 G/DL (ref 31.5–36.5)
MCHC RBC AUTO-ENTMCNC: 29.4 G/DL (ref 31.5–36.5)
MCHC RBC AUTO-ENTMCNC: 29.6 G/DL (ref 31.5–36.5)
MCHC RBC AUTO-ENTMCNC: 29.7 G/DL (ref 31.5–36.5)
MCHC RBC AUTO-ENTMCNC: 29.8 G/DL (ref 31.5–36.5)
MCV RBC AUTO: 100 FL (ref 78–100)
MCV RBC AUTO: 101 FL (ref 78–100)
MCV RBC AUTO: 102 FL (ref 78–100)
MCV RBC AUTO: 103 FL (ref 78–100)
MCV RBC AUTO: 103 FL (ref 78–100)
MCV RBC AUTO: 104 FL (ref 78–100)
MCV RBC AUTO: 105 FL (ref 78–100)
MCV RBC AUTO: 107 FL (ref 78–100)
MCV RBC AUTO: 95 FL (ref 78–100)
MCV RBC AUTO: 96 FL (ref 78–100)
MCV RBC AUTO: 96 FL (ref 78–100)
MCV RBC AUTO: 97 FL (ref 78–100)
MCV RBC AUTO: 97 FL (ref 78–100)
MCV RBC AUTO: 98 FL (ref 78–100)
MCV RBC AUTO: 99 FL (ref 78–100)
MONOCYTES # BLD AUTO: 0.5 10E9/L (ref 0–1.3)
MONOCYTES # BLD AUTO: 0.7 10E9/L (ref 0–1.3)
MONOCYTES # BLD AUTO: 0.7 10E9/L (ref 0–1.3)
MONOCYTES # BLD AUTO: 0.8 10E9/L (ref 0–1.3)
MONOCYTES # BLD AUTO: 0.9 10E9/L (ref 0–1.3)
MONOCYTES # BLD AUTO: 1.3 10E9/L (ref 0–1.3)
MONOCYTES NFR BLD AUTO: 10.7 %
MONOCYTES NFR BLD AUTO: 11 %
MONOCYTES NFR BLD AUTO: 11.7 %
MONOCYTES NFR BLD AUTO: 5 %
MONOCYTES NFR BLD AUTO: 5.3 %
MONOCYTES NFR BLD AUTO: 5.6 %
MONOCYTES NFR BLD AUTO: 5.6 %
MONOCYTES NFR BLD AUTO: 6.1 %
MONOCYTES NFR BLD AUTO: 6.9 %
MUCOUS THREADS #/AREA URNS LPF: PRESENT /LPF
NEUTROPHILS # BLD AUTO: 11.2 10E9/L (ref 1.6–8.3)
NEUTROPHILS # BLD AUTO: 11.5 10E9/L (ref 1.6–8.3)
NEUTROPHILS # BLD AUTO: 13.8 10E9/L (ref 1.6–8.3)
NEUTROPHILS # BLD AUTO: 16.5 10E9/L (ref 1.6–8.3)
NEUTROPHILS # BLD AUTO: 5 10E9/L (ref 1.6–8.3)
NEUTROPHILS # BLD AUTO: 5.1 10E9/L (ref 1.6–8.3)
NEUTROPHILS # BLD AUTO: 5.7 10E9/L (ref 1.6–8.3)
NEUTROPHILS # BLD AUTO: 5.7 10E9/L (ref 1.6–8.3)
NEUTROPHILS # BLD AUTO: 9.7 10E9/L (ref 1.6–8.3)
NEUTROPHILS NFR BLD AUTO: 61 %
NEUTROPHILS NFR BLD AUTO: 65.3 %
NEUTROPHILS NFR BLD AUTO: 72 %
NEUTROPHILS NFR BLD AUTO: 76.5 %
NEUTROPHILS NFR BLD AUTO: 81.6 %
NEUTROPHILS NFR BLD AUTO: 83.9 %
NEUTROPHILS NFR BLD AUTO: 87.3 %
NEUTROPHILS NFR BLD AUTO: 87.8 %
NEUTROPHILS NFR BLD AUTO: 88.8 %
NITRATE UR QL: NEGATIVE
NITRATE UR QL: POSITIVE
NRBC # BLD AUTO: 0 10*3/UL
NRBC BLD AUTO-RTO: 0 /100
NT-PROBNP SERPL-MCNC: 1846 PG/ML (ref 0–1800)
NT-PROBNP SERPL-MCNC: 2318 PG/ML (ref 0–1800)
NT-PROBNP SERPL-MCNC: 2869 PG/ML (ref 0–1800)
NT-PROBNP SERPL-MCNC: 3278 PG/ML (ref 0–1800)
O2/TOTAL GAS SETTING VFR VENT: 28 %
O2/TOTAL GAS SETTING VFR VENT: 28 %
O2/TOTAL GAS SETTING VFR VENT: 30 %
O2/TOTAL GAS SETTING VFR VENT: 32 %
O2/TOTAL GAS SETTING VFR VENT: 32 %
O2/TOTAL GAS SETTING VFR VENT: 35 %
O2/TOTAL GAS SETTING VFR VENT: 40 %
O2/TOTAL GAS SETTING VFR VENT: 40 %
O2/TOTAL GAS SETTING VFR VENT: 60 %
O2/TOTAL GAS SETTING VFR VENT: 80 %
O2/TOTAL GAS SETTING VFR VENT: ABNORMAL %
OXYHGB MFR BLD: 89 % (ref 92–100)
OXYHGB MFR BLD: 97 % (ref 92–100)
OXYHGB MFR BLD: 98 % (ref 92–100)
OXYHGB MFR BLDV: 49 %
OXYHGB MFR BLDV: 62 %
OXYHGB MFR BLDV: 64 %
OXYHGB MFR BLDV: 85 %
PCO2 BLD: 39 MM HG (ref 35–45)
PCO2 BLD: 42 MM HG (ref 35–45)
PCO2 BLD: 51 MM HG (ref 35–45)
PCO2 BLDV: 103 MM HG (ref 40–50)
PCO2 BLDV: 47 MM HG (ref 40–50)
PCO2 BLDV: 51 MM HG (ref 40–50)
PCO2 BLDV: 70 MM HG (ref 40–50)
PCO2 BLDV: 78 MM HG (ref 40–50)
PCO2 BLDV: 79 MM HG (ref 40–50)
PCO2 BLDV: 80 MM HG (ref 40–50)
PCO2 BLDV: 81 MM HG (ref 40–50)
PCO2 BLDV: 85 MM HG (ref 40–50)
PCO2 BLDV: 86 MM HG (ref 40–50)
PCO2 BLDV: 89 MM HG (ref 40–50)
PCO2 BLDV: 90 MM HG (ref 40–50)
PCO2 BLDV: 90 MM HG (ref 40–50)
PCO2 BLDV: 93 MM HG (ref 40–50)
PCO2 BLDV: 95 MM HG (ref 40–50)
PCO2 BLDV: 96 MM HG (ref 40–50)
PCO2 BLDV: 96 MM HG (ref 40–50)
PCO2 BLDV: 99 MM HG (ref 40–50)
PH BLD: 7.46 PH (ref 7.35–7.45)
PH BLD: 7.48 PH (ref 7.35–7.45)
PH BLD: 7.54 PH (ref 7.35–7.45)
PH BLDV: 7.14 PH (ref 7.32–7.43)
PH BLDV: 7.19 PH (ref 7.32–7.43)
PH BLDV: 7.2 PH (ref 7.32–7.43)
PH BLDV: 7.24 PH (ref 7.32–7.43)
PH BLDV: 7.25 PH (ref 7.32–7.43)
PH BLDV: 7.25 PH (ref 7.32–7.43)
PH BLDV: 7.27 PH (ref 7.32–7.43)
PH BLDV: 7.28 PH (ref 7.32–7.43)
PH BLDV: 7.29 PH (ref 7.32–7.43)
PH BLDV: 7.3 PH (ref 7.32–7.43)
PH BLDV: 7.35 PH (ref 7.32–7.43)
PH BLDV: 7.35 PH (ref 7.32–7.43)
PH BLDV: 7.36 PH (ref 7.32–7.43)
PH BLDV: 7.36 PH (ref 7.32–7.43)
PH BLDV: 7.38 PH (ref 7.32–7.43)
PH BLDV: 7.39 PH (ref 7.32–7.43)
PH BLDV: 7.41 PH (ref 7.32–7.43)
PH BLDV: 7.47 PH (ref 7.32–7.43)
PH UR STRIP: 5 PH (ref 5–7)
PH UR STRIP: 8 PH (ref 5–7)
PHOSPHATE SERPL-MCNC: 2 MG/DL (ref 2.5–4.5)
PHOSPHATE SERPL-MCNC: 2.5 MG/DL (ref 2.5–4.5)
PHOSPHATE SERPL-MCNC: 3.5 MG/DL (ref 2.5–4.5)
PLATELET # BLD AUTO: 182 10E9/L (ref 150–450)
PLATELET # BLD AUTO: 184 10E9/L (ref 150–450)
PLATELET # BLD AUTO: 196 10E9/L (ref 150–450)
PLATELET # BLD AUTO: 196 10E9/L (ref 150–450)
PLATELET # BLD AUTO: 203 10E9/L (ref 150–450)
PLATELET # BLD AUTO: 204 10E9/L (ref 150–450)
PLATELET # BLD AUTO: 208 10E9/L (ref 150–450)
PLATELET # BLD AUTO: 213 10E9/L (ref 150–450)
PLATELET # BLD AUTO: 214 10E9/L (ref 150–450)
PLATELET # BLD AUTO: 215 10E9/L (ref 150–450)
PLATELET # BLD AUTO: 219 10E9/L (ref 150–450)
PLATELET # BLD AUTO: 223 10E9/L (ref 150–450)
PLATELET # BLD AUTO: 223 10E9/L (ref 150–450)
PLATELET # BLD AUTO: 224 10E9/L (ref 150–450)
PLATELET # BLD AUTO: 247 10E9/L (ref 150–450)
PLATELET # BLD AUTO: 250 10E9/L (ref 150–450)
PLATELET # BLD AUTO: 260 10E9/L (ref 150–450)
PLATELET # BLD AUTO: 263 10E9/L (ref 150–450)
PLATELET # BLD AUTO: 264 10E9/L (ref 150–450)
PLATELET # BLD EST: ABNORMAL 10*3/UL
PO2 BLD: 103 MM HG (ref 80–105)
PO2 BLD: 115 MM HG (ref 80–105)
PO2 BLD: 53 MM HG (ref 80–105)
PO2 BLDV: 21 MM HG (ref 25–47)
PO2 BLDV: 21 MM HG (ref 25–47)
PO2 BLDV: 23 MM HG (ref 25–47)
PO2 BLDV: 27 MM HG (ref 25–47)
PO2 BLDV: 28 MM HG (ref 25–47)
PO2 BLDV: 29 MM HG (ref 25–47)
PO2 BLDV: 34 MM HG (ref 25–47)
PO2 BLDV: 35 MM HG (ref 25–47)
PO2 BLDV: 39 MM HG (ref 25–47)
PO2 BLDV: 45 MM HG (ref 25–47)
PO2 BLDV: 48 MM HG (ref 25–47)
PO2 BLDV: 52 MM HG (ref 25–47)
PO2 BLDV: 53 MM HG (ref 25–47)
PO2 BLDV: 61 MM HG (ref 25–47)
PO2 BLDV: 74 MM HG (ref 25–47)
POTASSIUM SERPL-SCNC: 3 MMOL/L (ref 3.4–5.3)
POTASSIUM SERPL-SCNC: 3 MMOL/L (ref 3.4–5.3)
POTASSIUM SERPL-SCNC: 3.1 MMOL/L (ref 3.4–5.3)
POTASSIUM SERPL-SCNC: 3.2 MMOL/L (ref 3.4–5.3)
POTASSIUM SERPL-SCNC: 3.2 MMOL/L (ref 3.4–5.3)
POTASSIUM SERPL-SCNC: 3.4 MMOL/L (ref 3.4–5.3)
POTASSIUM SERPL-SCNC: 3.5 MMOL/L (ref 3.4–5.3)
POTASSIUM SERPL-SCNC: 3.5 MMOL/L (ref 3.4–5.3)
POTASSIUM SERPL-SCNC: 3.6 MMOL/L (ref 3.4–5.3)
POTASSIUM SERPL-SCNC: 3.6 MMOL/L (ref 3.4–5.3)
POTASSIUM SERPL-SCNC: 3.8 MMOL/L (ref 3.4–5.3)
POTASSIUM SERPL-SCNC: 3.8 MMOL/L (ref 3.4–5.3)
POTASSIUM SERPL-SCNC: 4 MMOL/L (ref 3.4–5.3)
POTASSIUM SERPL-SCNC: 4.3 MMOL/L (ref 3.4–5.3)
POTASSIUM SERPL-SCNC: 4.4 MMOL/L (ref 3.4–5.3)
POTASSIUM SERPL-SCNC: 4.4 MMOL/L (ref 3.4–5.3)
POTASSIUM SERPL-SCNC: 4.5 MMOL/L (ref 3.4–5.3)
POTASSIUM SERPL-SCNC: 4.6 MMOL/L (ref 3.4–5.3)
POTASSIUM SERPL-SCNC: 4.6 MMOL/L (ref 3.4–5.3)
POTASSIUM SERPL-SCNC: 4.9 MMOL/L (ref 3.4–5.3)
PROCALCITONIN SERPL-MCNC: 0.14 NG/ML
PROCALCITONIN SERPL-MCNC: 0.22 NG/ML
PROCALCITONIN SERPL-MCNC: <0.05 NG/ML
PROT SERPL-MCNC: 5.3 G/DL (ref 6.8–8.8)
PROT SERPL-MCNC: 5.3 G/DL (ref 6.8–8.8)
PROT SERPL-MCNC: 5.9 G/DL (ref 6.8–8.8)
PROT SERPL-MCNC: 6 G/DL (ref 6.8–8.8)
PROT SERPL-MCNC: 6 G/DL (ref 6.8–8.8)
PROT SERPL-MCNC: 6.2 G/DL (ref 6.8–8.8)
PROT SERPL-MCNC: 6.5 G/DL (ref 6.8–8.8)
PROT SERPL-MCNC: 6.6 G/DL (ref 6.8–8.8)
PROT SERPL-MCNC: 7.2 G/DL (ref 6.8–8.8)
RBC # BLD AUTO: 2.68 10E12/L (ref 3.8–5.2)
RBC # BLD AUTO: 2.74 10E12/L (ref 3.8–5.2)
RBC # BLD AUTO: 2.82 10E12/L (ref 3.8–5.2)
RBC # BLD AUTO: 2.84 10E12/L (ref 3.8–5.2)
RBC # BLD AUTO: 2.89 10E12/L (ref 3.8–5.2)
RBC # BLD AUTO: 2.9 10E12/L (ref 3.8–5.2)
RBC # BLD AUTO: 3.09 10E12/L (ref 3.8–5.2)
RBC # BLD AUTO: 3.15 10E12/L (ref 3.8–5.2)
RBC # BLD AUTO: 3.17 10E12/L (ref 3.8–5.2)
RBC # BLD AUTO: 3.22 10E12/L (ref 3.8–5.2)
RBC # BLD AUTO: 3.22 10E12/L (ref 3.8–5.2)
RBC # BLD AUTO: 3.32 10E12/L (ref 3.8–5.2)
RBC # BLD AUTO: 3.32 10E12/L (ref 3.8–5.2)
RBC # BLD AUTO: 3.35 10E12/L (ref 3.8–5.2)
RBC # BLD AUTO: 3.43 10E12/L (ref 3.8–5.2)
RBC # BLD AUTO: 3.46 10E12/L (ref 3.8–5.2)
RBC # BLD AUTO: 3.48 10E12/L (ref 3.8–5.2)
RBC # BLD AUTO: 3.49 10E12/L (ref 3.8–5.2)
RBC # BLD AUTO: 3.54 10E12/L (ref 3.8–5.2)
RBC #/AREA URNS AUTO: 1 /HPF (ref 0–2)
RBC #/AREA URNS AUTO: 2 /HPF (ref 0–2)
RBC #/AREA URNS AUTO: 2 /HPF (ref 0–2)
RBC #/AREA URNS AUTO: 3 /HPF (ref 0–2)
RBC MORPH BLD: NORMAL
RVPLETH-%PRED-PRE: 69 %
RVPLETH-PRE: 1.63 L
RVPLETH-PRED: 2.33 L
SALICYLATES SERPL-MCNC: <2 MG/DL
SARS-COV-2 RNA RESP QL NAA+PROBE: NEGATIVE
SODIUM SERPL-SCNC: 140 MMOL/L (ref 133–144)
SODIUM SERPL-SCNC: 142 MMOL/L (ref 133–144)
SODIUM SERPL-SCNC: 143 MMOL/L (ref 133–144)
SODIUM SERPL-SCNC: 144 MMOL/L (ref 133–144)
SODIUM SERPL-SCNC: 145 MMOL/L (ref 133–144)
SODIUM SERPL-SCNC: 147 MMOL/L (ref 133–144)
SODIUM SERPL-SCNC: 149 MMOL/L (ref 133–144)
SODIUM SERPL-SCNC: 150 MMOL/L (ref 133–144)
SODIUM UR-SCNC: 61 MMOL/L
SOURCE: ABNORMAL
SP GR UR STRIP: 1.01 (ref 1–1.03)
SP GR UR STRIP: 1.02 (ref 1–1.03)
SPECIMEN SOURCE: ABNORMAL
SPECIMEN SOURCE: ABNORMAL
SPECIMEN SOURCE: NORMAL
SQUAMOUS #/AREA URNS AUTO: 1 /HPF (ref 0–1)
SQUAMOUS #/AREA URNS AUTO: <1 /HPF (ref 0–1)
TLCPLETH-%PRED-PRE: 62 %
TLCPLETH-PRE: 3.21 L
TLCPLETH-PRED: 5.11 L
TROPONIN I SERPL-MCNC: 0.02 UG/L (ref 0–0.04)
TROPONIN I SERPL-MCNC: 0.03 UG/L (ref 0–0.04)
TROPONIN I SERPL-MCNC: 0.04 UG/L (ref 0–0.04)
TROPONIN I SERPL-MCNC: 0.07 UG/L (ref 0–0.04)
TROPONIN I SERPL-MCNC: <0.015 UG/L (ref 0–0.04)
TSH SERPL DL<=0.005 MIU/L-ACNC: 0.54 MU/L (ref 0.4–4)
TSH SERPL DL<=0.005 MIU/L-ACNC: 3.01 MU/L (ref 0.4–4)
UROBILINOGEN UR STRIP-MCNC: 0 MG/DL (ref 0–2)
UUN UR-MCNC: 926 MG/DL
UUN/CREAT 24H UR: 10 G/G CR
VA-%PRED-PRE: 56 %
VA-PRE: 2.7 L
VC-%PRED-PRE: 54 %
VC-PRE: 1.58 L
VC-PRED: 2.89 L
WBC # BLD AUTO: 10.2 10E9/L (ref 4–11)
WBC # BLD AUTO: 10.3 10E9/L (ref 4–11)
WBC # BLD AUTO: 11.9 10E9/L (ref 4–11)
WBC # BLD AUTO: 13.2 10E9/L (ref 4–11)
WBC # BLD AUTO: 13.3 10E9/L (ref 4–11)
WBC # BLD AUTO: 14.1 10E9/L (ref 4–11)
WBC # BLD AUTO: 14.7 10E9/L (ref 4–11)
WBC # BLD AUTO: 15.5 10E9/L (ref 4–11)
WBC # BLD AUTO: 16.6 10E9/L (ref 4–11)
WBC # BLD AUTO: 18.8 10E9/L (ref 4–11)
WBC # BLD AUTO: 21.1 10E9/L (ref 4–11)
WBC # BLD AUTO: 5.5 10E9/L (ref 4–11)
WBC # BLD AUTO: 6.5 10E9/L (ref 4–11)
WBC # BLD AUTO: 7.8 10E9/L (ref 4–11)
WBC # BLD AUTO: 8.4 10E9/L (ref 4–11)
WBC # BLD AUTO: 8.7 10E9/L (ref 4–11)
WBC # BLD AUTO: 8.8 10E9/L (ref 4–11)
WBC # BLD AUTO: 9.3 10E9/L (ref 4–11)
WBC # BLD AUTO: 9.6 10E9/L (ref 4–11)
WBC #/AREA URNS AUTO: 1 /HPF (ref 0–5)
WBC #/AREA URNS AUTO: 17 /HPF (ref 0–5)
WBC #/AREA URNS AUTO: 4 /HPF (ref 0–5)
WBC #/AREA URNS AUTO: <1 /HPF (ref 0–5)

## 2021-01-01 PROCEDURE — 250N000011 HC RX IP 250 OP 636: Performed by: INTERNAL MEDICINE

## 2021-01-01 PROCEDURE — 200N000001 HC R&B ICU

## 2021-01-01 PROCEDURE — 85730 THROMBOPLASTIN TIME PARTIAL: CPT | Performed by: INTERNAL MEDICINE

## 2021-01-01 PROCEDURE — 999N000009 HC STATISTIC AIRWAY CARE

## 2021-01-01 PROCEDURE — 84484 ASSAY OF TROPONIN QUANT: CPT | Performed by: INTERNAL MEDICINE

## 2021-01-01 PROCEDURE — 250N000013 HC RX MED GY IP 250 OP 250 PS 637: Performed by: FAMILY MEDICINE

## 2021-01-01 PROCEDURE — 99292 CRITICAL CARE ADDL 30 MIN: CPT | Mod: 25 | Performed by: FAMILY MEDICINE

## 2021-01-01 PROCEDURE — 94729 DIFFUSING CAPACITY: CPT | Mod: 26 | Performed by: INTERNAL MEDICINE

## 2021-01-01 PROCEDURE — 94003 VENT MGMT INPAT SUBQ DAY: CPT

## 2021-01-01 PROCEDURE — 83735 ASSAY OF MAGNESIUM: CPT | Performed by: INTERNAL MEDICINE

## 2021-01-01 PROCEDURE — 99233 SBSQ HOSP IP/OBS HIGH 50: CPT | Performed by: FAMILY MEDICINE

## 2021-01-01 PROCEDURE — 94640 AIRWAY INHALATION TREATMENT: CPT

## 2021-01-01 PROCEDURE — 999N000105 HC STATISTIC NO DOCUMENTATION TO SUPPORT CHARGE

## 2021-01-01 PROCEDURE — 999N000157 HC STATISTIC RCP TIME EA 10 MIN

## 2021-01-01 PROCEDURE — 85027 COMPLETE CBC AUTOMATED: CPT | Performed by: INTERNAL MEDICINE

## 2021-01-01 PROCEDURE — 93010 ELECTROCARDIOGRAM REPORT: CPT | Performed by: STUDENT IN AN ORGANIZED HEALTH CARE EDUCATION/TRAINING PROGRAM

## 2021-01-01 PROCEDURE — 250N000011 HC RX IP 250 OP 636: Performed by: FAMILY MEDICINE

## 2021-01-01 PROCEDURE — 94729 DIFFUSING CAPACITY: CPT

## 2021-01-01 PROCEDURE — 82803 BLOOD GASES ANY COMBINATION: CPT | Performed by: FAMILY MEDICINE

## 2021-01-01 PROCEDURE — 97530 THERAPEUTIC ACTIVITIES: CPT | Mod: GP

## 2021-01-01 PROCEDURE — 87635 SARS-COV-2 COVID-19 AMP PRB: CPT | Performed by: STUDENT IN AN ORGANIZED HEALTH CARE EDUCATION/TRAINING PROGRAM

## 2021-01-01 PROCEDURE — G0378 HOSPITAL OBSERVATION PER HR: HCPCS

## 2021-01-01 PROCEDURE — 250N000013 HC RX MED GY IP 250 OP 250 PS 637: Performed by: INTERNAL MEDICINE

## 2021-01-01 PROCEDURE — 99291 CRITICAL CARE FIRST HOUR: CPT | Mod: 25 | Performed by: INTERNAL MEDICINE

## 2021-01-01 PROCEDURE — 84132 ASSAY OF SERUM POTASSIUM: CPT | Performed by: INTERNAL MEDICINE

## 2021-01-01 PROCEDURE — 87040 BLOOD CULTURE FOR BACTERIA: CPT

## 2021-01-01 PROCEDURE — 99291 CRITICAL CARE FIRST HOUR: CPT | Mod: 24 | Performed by: INTERNAL MEDICINE

## 2021-01-01 PROCEDURE — 80048 BASIC METABOLIC PNL TOTAL CA: CPT | Performed by: FAMILY MEDICINE

## 2021-01-01 PROCEDURE — 96138 PSYCL/NRPSYC TECH 1ST: CPT | Mod: 95 | Performed by: CLINICAL NEUROPSYCHOLOGIST

## 2021-01-01 PROCEDURE — 93325 DOPPLER ECHO COLOR FLOW MAPG: CPT | Mod: 26 | Performed by: INTERNAL MEDICINE

## 2021-01-01 PROCEDURE — 94660 CPAP INITIATION&MGMT: CPT

## 2021-01-01 PROCEDURE — 5A1955Z RESPIRATORY VENTILATION, GREATER THAN 96 CONSECUTIVE HOURS: ICD-10-PCS | Performed by: INTERNAL MEDICINE

## 2021-01-01 PROCEDURE — 85025 COMPLETE CBC W/AUTO DIFF WBC: CPT | Performed by: STUDENT IN AN ORGANIZED HEALTH CARE EDUCATION/TRAINING PROGRAM

## 2021-01-01 PROCEDURE — 81001 URINALYSIS AUTO W/SCOPE: CPT | Performed by: INTERNAL MEDICINE

## 2021-01-01 PROCEDURE — 99232 SBSQ HOSP IP/OBS MODERATE 35: CPT | Performed by: INTERNAL MEDICINE

## 2021-01-01 PROCEDURE — 80053 COMPREHEN METABOLIC PANEL: CPT | Performed by: INTERNAL MEDICINE

## 2021-01-01 PROCEDURE — 84145 PROCALCITONIN (PCT): CPT | Performed by: INTERNAL MEDICINE

## 2021-01-01 PROCEDURE — 97161 PT EVAL LOW COMPLEX 20 MIN: CPT | Mod: GP

## 2021-01-01 PROCEDURE — 83036 HEMOGLOBIN GLYCOSYLATED A1C: CPT | Performed by: INTERNAL MEDICINE

## 2021-01-01 PROCEDURE — 36415 COLL VENOUS BLD VENIPUNCTURE: CPT | Performed by: FAMILY MEDICINE

## 2021-01-01 PROCEDURE — 5A09357 ASSISTANCE WITH RESPIRATORY VENTILATION, LESS THAN 24 CONSECUTIVE HOURS, CONTINUOUS POSITIVE AIRWAY PRESSURE: ICD-10-PCS | Performed by: FAMILY MEDICINE

## 2021-01-01 PROCEDURE — 250N000013 HC RX MED GY IP 250 OP 250 PS 637

## 2021-01-01 PROCEDURE — 250N000009 HC RX 250: Performed by: INTERNAL MEDICINE

## 2021-01-01 PROCEDURE — 83880 ASSAY OF NATRIURETIC PEPTIDE: CPT | Performed by: INTERNAL MEDICINE

## 2021-01-01 PROCEDURE — 93321 DOPPLER ECHO F-UP/LMTD STD: CPT | Mod: 26 | Performed by: INTERNAL MEDICINE

## 2021-01-01 PROCEDURE — 99291 CRITICAL CARE FIRST HOUR: CPT | Performed by: INTERNAL MEDICINE

## 2021-01-01 PROCEDURE — 36415 COLL VENOUS BLD VENIPUNCTURE: CPT | Performed by: INTERNAL MEDICINE

## 2021-01-01 PROCEDURE — 94726 PLETHYSMOGRAPHY LUNG VOLUMES: CPT

## 2021-01-01 PROCEDURE — 99291 CRITICAL CARE FIRST HOUR: CPT | Mod: 25 | Performed by: FAMILY MEDICINE

## 2021-01-01 PROCEDURE — 250N000011 HC RX IP 250 OP 636

## 2021-01-01 PROCEDURE — 87086 URINE CULTURE/COLONY COUNT: CPT | Performed by: INTERNAL MEDICINE

## 2021-01-01 PROCEDURE — 82803 BLOOD GASES ANY COMBINATION: CPT | Performed by: INTERNAL MEDICINE

## 2021-01-01 PROCEDURE — 250N000009 HC RX 250: Performed by: SURGERY

## 2021-01-01 PROCEDURE — 96365 THER/PROPH/DIAG IV INF INIT: CPT | Mod: 59 | Performed by: FAMILY MEDICINE

## 2021-01-01 PROCEDURE — 250N000013 HC RX MED GY IP 250 OP 250 PS 637: Performed by: SURGERY

## 2021-01-01 PROCEDURE — 120N000001 HC R&B MED SURG/OB

## 2021-01-01 PROCEDURE — 258N000003 HC RX IP 258 OP 636: Performed by: INTERNAL MEDICINE

## 2021-01-01 PROCEDURE — 999N000065 XR ABDOMEN PORT 1 VIEWS

## 2021-01-01 PROCEDURE — 94640 AIRWAY INHALATION TREATMENT: CPT | Mod: 76

## 2021-01-01 PROCEDURE — 85025 COMPLETE CBC W/AUTO DIFF WBC: CPT | Performed by: INTERNAL MEDICINE

## 2021-01-01 PROCEDURE — 99285 EMERGENCY DEPT VISIT HI MDM: CPT | Mod: 25 | Performed by: STUDENT IN AN ORGANIZED HEALTH CARE EDUCATION/TRAINING PROGRAM

## 2021-01-01 PROCEDURE — 99214 OFFICE O/P EST MOD 30 MIN: CPT | Mod: 95 | Performed by: INTERNAL MEDICINE

## 2021-01-01 PROCEDURE — 80048 BASIC METABOLIC PNL TOTAL CA: CPT | Performed by: INTERNAL MEDICINE

## 2021-01-01 PROCEDURE — 96133 NRPSYC TST EVAL PHYS/QHP EA: CPT | Mod: 95 | Performed by: CLINICAL NEUROPSYCHOLOGIST

## 2021-01-01 PROCEDURE — 85027 COMPLETE CBC AUTOMATED: CPT | Performed by: PHYSICIAN ASSISTANT

## 2021-01-01 PROCEDURE — 93005 ELECTROCARDIOGRAM TRACING: CPT | Performed by: STUDENT IN AN ORGANIZED HEALTH CARE EDUCATION/TRAINING PROGRAM

## 2021-01-01 PROCEDURE — 99214 OFFICE O/P EST MOD 30 MIN: CPT | Mod: 95 | Performed by: PHYSICIAN ASSISTANT

## 2021-01-01 PROCEDURE — 999N001017 HC STATISTIC GLUCOSE BY METER IP

## 2021-01-01 PROCEDURE — 36620 INSERTION CATHETER ARTERY: CPT | Mod: GC | Performed by: INTERNAL MEDICINE

## 2021-01-01 PROCEDURE — 250N000012 HC RX MED GY IP 250 OP 636 PS 637: Performed by: FAMILY MEDICINE

## 2021-01-01 PROCEDURE — 70450 CT HEAD/BRAIN W/O DYE: CPT | Mod: ME

## 2021-01-01 PROCEDURE — 96132 NRPSYC TST EVAL PHYS/QHP 1ST: CPT | Mod: 95 | Performed by: CLINICAL NEUROPSYCHOLOGIST

## 2021-01-01 PROCEDURE — 96367 TX/PROPH/DG ADDL SEQ IV INF: CPT | Mod: 59 | Performed by: FAMILY MEDICINE

## 2021-01-01 PROCEDURE — 82805 BLOOD GASES W/O2 SATURATION: CPT | Performed by: INTERNAL MEDICINE

## 2021-01-01 PROCEDURE — 80053 COMPREHEN METABOLIC PANEL: CPT | Performed by: STUDENT IN AN ORGANIZED HEALTH CARE EDUCATION/TRAINING PROGRAM

## 2021-01-01 PROCEDURE — 84484 ASSAY OF TROPONIN QUANT: CPT | Performed by: FAMILY MEDICINE

## 2021-01-01 PROCEDURE — 85027 COMPLETE CBC AUTOMATED: CPT | Performed by: FAMILY MEDICINE

## 2021-01-01 PROCEDURE — 94002 VENT MGMT INPAT INIT DAY: CPT

## 2021-01-01 PROCEDURE — 97535 SELF CARE MNGMENT TRAINING: CPT | Mod: GO

## 2021-01-01 PROCEDURE — 250N000012 HC RX MED GY IP 250 OP 636 PS 637: Performed by: INTERNAL MEDICINE

## 2021-01-01 PROCEDURE — 99222 1ST HOSP IP/OBS MODERATE 55: CPT | Performed by: INTERNAL MEDICINE

## 2021-01-01 PROCEDURE — 87205 SMEAR GRAM STAIN: CPT

## 2021-01-01 PROCEDURE — 99220 PR INITIAL OBSERVATION CARE,LEVEL III: CPT | Performed by: FAMILY MEDICINE

## 2021-01-01 PROCEDURE — 94010 BREATHING CAPACITY TEST: CPT | Mod: 26 | Performed by: INTERNAL MEDICINE

## 2021-01-01 PROCEDURE — 96139 PSYCL/NRPSYC TST TECH EA: CPT | Mod: 95 | Performed by: CLINICAL NEUROPSYCHOLOGIST

## 2021-01-01 PROCEDURE — 81001 URINALYSIS AUTO W/SCOPE: CPT | Performed by: STUDENT IN AN ORGANIZED HEALTH CARE EDUCATION/TRAINING PROGRAM

## 2021-01-01 PROCEDURE — 250N000009 HC RX 250: Performed by: FAMILY MEDICINE

## 2021-01-01 PROCEDURE — 84300 ASSAY OF URINE SODIUM: CPT | Performed by: FAMILY MEDICINE

## 2021-01-01 PROCEDURE — 999N000065 XR CHEST PORT 1 VW

## 2021-01-01 PROCEDURE — 87040 BLOOD CULTURE FOR BACTERIA: CPT | Performed by: INTERNAL MEDICINE

## 2021-01-01 PROCEDURE — 87070 CULTURE OTHR SPECIMN AEROBIC: CPT | Performed by: INTERNAL MEDICINE

## 2021-01-01 PROCEDURE — 81001 URINALYSIS AUTO W/SCOPE: CPT | Performed by: FAMILY MEDICINE

## 2021-01-01 PROCEDURE — 93005 ELECTROCARDIOGRAM TRACING: CPT

## 2021-01-01 PROCEDURE — 94010 BREATHING CAPACITY TEST: CPT

## 2021-01-01 PROCEDURE — 80048 BASIC METABOLIC PNL TOTAL CA: CPT | Performed by: PHYSICIAN ASSISTANT

## 2021-01-01 PROCEDURE — 87635 SARS-COV-2 COVID-19 AMP PRB: CPT | Performed by: FAMILY MEDICINE

## 2021-01-01 PROCEDURE — 96375 TX/PRO/DX INJ NEW DRUG ADDON: CPT | Mod: 59 | Performed by: FAMILY MEDICINE

## 2021-01-01 PROCEDURE — 93306 TTE W/DOPPLER COMPLETE: CPT | Mod: 26 | Performed by: INTERNAL MEDICINE

## 2021-01-01 PROCEDURE — 83605 ASSAY OF LACTIC ACID: CPT | Performed by: FAMILY MEDICINE

## 2021-01-01 PROCEDURE — 84484 ASSAY OF TROPONIN QUANT: CPT | Performed by: STUDENT IN AN ORGANIZED HEALTH CARE EDUCATION/TRAINING PROGRAM

## 2021-01-01 PROCEDURE — 99291 CRITICAL CARE FIRST HOUR: CPT | Mod: GC | Performed by: INTERNAL MEDICINE

## 2021-01-01 PROCEDURE — 80151 DRUG ASSAY AMIODARONE: CPT | Performed by: FAMILY MEDICINE

## 2021-01-01 PROCEDURE — 71045 X-RAY EXAM CHEST 1 VIEW: CPT

## 2021-01-01 PROCEDURE — 84100 ASSAY OF PHOSPHORUS: CPT | Performed by: FAMILY MEDICINE

## 2021-01-01 PROCEDURE — 999N000158 HC STATISTIC RCP TIME ED VENT EA 10 MIN

## 2021-01-01 PROCEDURE — 85025 COMPLETE CBC W/AUTO DIFF WBC: CPT | Performed by: FAMILY MEDICINE

## 2021-01-01 PROCEDURE — 255N000002 HC RX 255 OP 636: Performed by: INTERNAL MEDICINE

## 2021-01-01 PROCEDURE — 80053 COMPREHEN METABOLIC PANEL: CPT | Performed by: EMERGENCY MEDICINE

## 2021-01-01 PROCEDURE — 99233 SBSQ HOSP IP/OBS HIGH 50: CPT | Performed by: INTERNAL MEDICINE

## 2021-01-01 PROCEDURE — 93321 DOPPLER ECHO F-UP/LMTD STD: CPT

## 2021-01-01 PROCEDURE — 80053 COMPREHEN METABOLIC PANEL: CPT | Performed by: FAMILY MEDICINE

## 2021-01-01 PROCEDURE — 36556 INSERT NON-TUNNEL CV CATH: CPT | Performed by: FAMILY MEDICINE

## 2021-01-01 PROCEDURE — 96375 TX/PRO/DX INJ NEW DRUG ADDON: CPT

## 2021-01-01 PROCEDURE — 84540 ASSAY OF URINE/UREA-N: CPT | Performed by: FAMILY MEDICINE

## 2021-01-01 PROCEDURE — 87077 CULTURE AEROBIC IDENTIFY: CPT | Performed by: INTERNAL MEDICINE

## 2021-01-01 PROCEDURE — C9803 HOPD COVID-19 SPEC COLLECT: HCPCS | Performed by: FAMILY MEDICINE

## 2021-01-01 PROCEDURE — 97165 OT EVAL LOW COMPLEX 30 MIN: CPT | Mod: GO

## 2021-01-01 PROCEDURE — 96361 HYDRATE IV INFUSION ADD-ON: CPT | Performed by: STUDENT IN AN ORGANIZED HEALTH CARE EDUCATION/TRAINING PROGRAM

## 2021-01-01 PROCEDURE — C9803 HOPD COVID-19 SPEC COLLECT: HCPCS | Performed by: STUDENT IN AN ORGANIZED HEALTH CARE EDUCATION/TRAINING PROGRAM

## 2021-01-01 PROCEDURE — 87635 SARS-COV-2 COVID-19 AMP PRB: CPT | Performed by: INTERNAL MEDICINE

## 2021-01-01 PROCEDURE — 80143 DRUG ASSAY ACETAMINOPHEN: CPT | Performed by: FAMILY MEDICINE

## 2021-01-01 PROCEDURE — 93308 TTE F-UP OR LMTD: CPT | Mod: 26 | Performed by: INTERNAL MEDICINE

## 2021-01-01 PROCEDURE — 96360 HYDRATION IV INFUSION INIT: CPT | Performed by: STUDENT IN AN ORGANIZED HEALTH CARE EDUCATION/TRAINING PROGRAM

## 2021-01-01 PROCEDURE — 99310 SBSQ NF CARE HIGH MDM 45: CPT | Performed by: NURSE PRACTITIONER

## 2021-01-01 PROCEDURE — 96376 TX/PRO/DX INJ SAME DRUG ADON: CPT | Mod: 59 | Performed by: FAMILY MEDICINE

## 2021-01-01 PROCEDURE — 85025 COMPLETE CBC W/AUTO DIFF WBC: CPT | Performed by: EMERGENCY MEDICINE

## 2021-01-01 PROCEDURE — 82330 ASSAY OF CALCIUM: CPT | Performed by: INTERNAL MEDICINE

## 2021-01-01 PROCEDURE — 96374 THER/PROPH/DIAG INJ IV PUSH: CPT

## 2021-01-01 PROCEDURE — 3E043XZ INTRODUCTION OF VASOPRESSOR INTO CENTRAL VEIN, PERCUTANEOUS APPROACH: ICD-10-PCS | Performed by: INTERNAL MEDICINE

## 2021-01-01 PROCEDURE — 31500 INSERT EMERGENCY AIRWAY: CPT | Performed by: FAMILY MEDICINE

## 2021-01-01 PROCEDURE — 36415 COLL VENOUS BLD VENIPUNCTURE: CPT

## 2021-01-01 PROCEDURE — 87205 SMEAR GRAM STAIN: CPT | Performed by: INTERNAL MEDICINE

## 2021-01-01 PROCEDURE — 99292 CRITICAL CARE ADDL 30 MIN: CPT | Mod: 25 | Performed by: INTERNAL MEDICINE

## 2021-01-01 PROCEDURE — 84100 ASSAY OF PHOSPHORUS: CPT | Performed by: PHYSICIAN ASSISTANT

## 2021-01-01 PROCEDURE — 84443 ASSAY THYROID STIM HORMONE: CPT | Performed by: INTERNAL MEDICINE

## 2021-01-01 PROCEDURE — 258N000003 HC RX IP 258 OP 636: Performed by: FAMILY MEDICINE

## 2021-01-01 PROCEDURE — 83735 ASSAY OF MAGNESIUM: CPT | Performed by: PHYSICIAN ASSISTANT

## 2021-01-01 PROCEDURE — 99292 CRITICAL CARE ADDL 30 MIN: CPT | Performed by: FAMILY MEDICINE

## 2021-01-01 PROCEDURE — 97110 THERAPEUTIC EXERCISES: CPT | Mod: GO

## 2021-01-01 PROCEDURE — 94618 PULMONARY STRESS TESTING: CPT

## 2021-01-01 PROCEDURE — 94726 PLETHYSMOGRAPHY LUNG VOLUMES: CPT | Mod: 26 | Performed by: INTERNAL MEDICINE

## 2021-01-01 PROCEDURE — 82805 BLOOD GASES W/O2 SATURATION: CPT

## 2021-01-01 PROCEDURE — 87186 SC STD MICRODIL/AGAR DIL: CPT | Performed by: INTERNAL MEDICINE

## 2021-01-01 PROCEDURE — 99214 OFFICE O/P EST MOD 30 MIN: CPT | Performed by: INTERNAL MEDICINE

## 2021-01-01 PROCEDURE — 93306 TTE W/DOPPLER COMPLETE: CPT

## 2021-01-01 PROCEDURE — 87070 CULTURE OTHR SPECIMN AEROBIC: CPT

## 2021-01-01 PROCEDURE — 94664 DEMO&/EVAL PT USE INHALER: CPT

## 2021-01-01 PROCEDURE — 80179 DRUG ASSAY SALICYLATE: CPT | Performed by: FAMILY MEDICINE

## 2021-01-01 PROCEDURE — 99284 EMERGENCY DEPT VISIT MOD MDM: CPT | Mod: 25 | Performed by: EMERGENCY MEDICINE

## 2021-01-01 PROCEDURE — 99239 HOSP IP/OBS DSCHRG MGMT >30: CPT | Performed by: FAMILY MEDICINE

## 2021-01-01 PROCEDURE — 71250 CT THORAX DX C-: CPT | Mod: MG

## 2021-01-01 PROCEDURE — 04HL33Z INSERTION OF INFUSION DEVICE INTO LEFT FEMORAL ARTERY, PERCUTANEOUS APPROACH: ICD-10-PCS | Performed by: INTERNAL MEDICINE

## 2021-01-01 PROCEDURE — 83880 ASSAY OF NATRIURETIC PEPTIDE: CPT | Performed by: FAMILY MEDICINE

## 2021-01-01 PROCEDURE — 96376 TX/PRO/DX INJ SAME DRUG ADON: CPT

## 2021-01-01 PROCEDURE — 258N000003 HC RX IP 258 OP 636: Performed by: STUDENT IN AN ORGANIZED HEALTH CARE EDUCATION/TRAINING PROGRAM

## 2021-01-01 PROCEDURE — 81001 URINALYSIS AUTO W/SCOPE: CPT | Performed by: EMERGENCY MEDICINE

## 2021-01-01 PROCEDURE — 83735 ASSAY OF MAGNESIUM: CPT | Performed by: FAMILY MEDICINE

## 2021-01-01 PROCEDURE — 82947 ASSAY GLUCOSE BLOOD QUANT: CPT | Performed by: INTERNAL MEDICINE

## 2021-01-01 PROCEDURE — C9113 INJ PANTOPRAZOLE SODIUM, VIA: HCPCS

## 2021-01-01 PROCEDURE — 96116 NUBHVL XM PHYS/QHP 1ST HR: CPT | Mod: 95 | Performed by: CLINICAL NEUROPSYCHOLOGIST

## 2021-01-01 PROCEDURE — 84100 ASSAY OF PHOSPHORUS: CPT | Performed by: INTERNAL MEDICINE

## 2021-01-01 PROCEDURE — 84132 ASSAY OF SERUM POTASSIUM: CPT | Performed by: SURGERY

## 2021-01-01 PROCEDURE — 99284 EMERGENCY DEPT VISIT MOD MDM: CPT | Performed by: EMERGENCY MEDICINE

## 2021-01-01 RX ORDER — FUROSEMIDE 10 MG/ML
20 INJECTION INTRAMUSCULAR; INTRAVENOUS ONCE
Status: COMPLETED | OUTPATIENT
Start: 2021-01-01 | End: 2021-01-01

## 2021-01-01 RX ORDER — SODIUM CHLORIDE 9 MG/ML
INJECTION, SOLUTION INTRAVENOUS CONTINUOUS
Status: DISCONTINUED | OUTPATIENT
Start: 2021-01-01 | End: 2021-01-01

## 2021-01-01 RX ORDER — AMIODARONE HYDROCHLORIDE 200 MG/1
200 TABLET ORAL DAILY
Qty: 90 TABLET | Refills: 3 | Status: SHIPPED | OUTPATIENT
Start: 2021-01-01

## 2021-01-01 RX ORDER — LANOLIN ALCOHOL/MO/W.PET/CERES
3 CREAM (GRAM) TOPICAL AT BEDTIME
COMMUNITY
Start: 2021-01-01

## 2021-01-01 RX ORDER — NALOXONE HYDROCHLORIDE 0.4 MG/ML
0.4 INJECTION, SOLUTION INTRAMUSCULAR; INTRAVENOUS; SUBCUTANEOUS
Status: DISCONTINUED | OUTPATIENT
Start: 2021-01-01 | End: 2021-04-27 | Stop reason: HOSPADM

## 2021-01-01 RX ORDER — FUROSEMIDE 20 MG
20 TABLET ORAL
Status: DISCONTINUED | OUTPATIENT
Start: 2021-01-01 | End: 2021-01-01

## 2021-01-01 RX ORDER — POTASSIUM CHLORIDE 1.5 G/1.58G
20 POWDER, FOR SOLUTION ORAL ONCE
Status: COMPLETED | OUTPATIENT
Start: 2021-01-01 | End: 2021-01-01

## 2021-01-01 RX ORDER — HEPARIN SODIUM 10000 [USP'U]/100ML
0-5000 INJECTION, SOLUTION INTRAVENOUS CONTINUOUS
Status: DISCONTINUED | OUTPATIENT
Start: 2021-01-01 | End: 2021-04-27 | Stop reason: HOSPADM

## 2021-01-01 RX ORDER — LORAZEPAM 2 MG/ML
1 INJECTION INTRAMUSCULAR
Status: DISCONTINUED | OUTPATIENT
Start: 2021-01-01 | End: 2021-04-27 | Stop reason: HOSPADM

## 2021-01-01 RX ORDER — SIMETHICONE 125 MG
125 TABLET,CHEWABLE ORAL 2 TIMES DAILY PRN
COMMUNITY

## 2021-01-01 RX ORDER — FUROSEMIDE 10 MG/ML
20 INJECTION INTRAMUSCULAR; INTRAVENOUS EVERY 8 HOURS
Status: COMPLETED | OUTPATIENT
Start: 2021-01-01 | End: 2021-01-01

## 2021-01-01 RX ORDER — ZINC OXIDE 400 MG/G
1 PASTE TOPICAL PRN
COMMUNITY
Start: 2021-01-01

## 2021-01-01 RX ORDER — PIPERACILLIN SODIUM, TAZOBACTAM SODIUM 3; .375 G/15ML; G/15ML
3.38 INJECTION, POWDER, LYOPHILIZED, FOR SOLUTION INTRAVENOUS EVERY 6 HOURS
Status: DISCONTINUED | OUTPATIENT
Start: 2021-01-01 | End: 2021-04-27 | Stop reason: HOSPADM

## 2021-01-01 RX ORDER — MIDAZOLAM HCL IN 0.9 % NACL/PF 1 MG/ML
1-8 PLASTIC BAG, INJECTION (ML) INTRAVENOUS CONTINUOUS
Status: DISCONTINUED | OUTPATIENT
Start: 2021-01-01 | End: 2021-01-01

## 2021-01-01 RX ORDER — MAGNESIUM SULFATE HEPTAHYDRATE 40 MG/ML
2 INJECTION, SOLUTION INTRAVENOUS ONCE
Status: COMPLETED | OUTPATIENT
Start: 2021-01-01 | End: 2021-01-01

## 2021-01-01 RX ORDER — POTASSIUM CHLORIDE 20MEQ/15ML
40 LIQUID (ML) ORAL ONCE
Status: COMPLETED | OUTPATIENT
Start: 2021-01-01 | End: 2021-01-01

## 2021-01-01 RX ORDER — GABAPENTIN 600 MG/1
600 TABLET ORAL 2 TIMES DAILY
Status: DISCONTINUED | OUTPATIENT
Start: 2021-01-01 | End: 2021-01-01 | Stop reason: HOSPADM

## 2021-01-01 RX ORDER — ATORVASTATIN CALCIUM 40 MG/1
40 TABLET, FILM COATED ORAL DAILY
Qty: 30 TABLET | Refills: 11 | Status: SHIPPED | OUTPATIENT
Start: 2021-01-01

## 2021-01-01 RX ORDER — IPRATROPIUM BROMIDE AND ALBUTEROL SULFATE 2.5; .5 MG/3ML; MG/3ML
SOLUTION RESPIRATORY (INHALATION)
Status: DISCONTINUED
Start: 2021-01-01 | End: 2021-01-01 | Stop reason: HOSPADM

## 2021-01-01 RX ORDER — HEPARIN SODIUM 10000 [USP'U]/100ML
0-5000 INJECTION, SOLUTION INTRAVENOUS CONTINUOUS
Status: CANCELLED | OUTPATIENT
Start: 2021-01-01

## 2021-01-01 RX ORDER — FUROSEMIDE 10 MG/ML
40 INJECTION INTRAMUSCULAR; INTRAVENOUS EVERY 12 HOURS
Status: DISCONTINUED | OUTPATIENT
Start: 2021-01-01 | End: 2021-04-27 | Stop reason: HOSPADM

## 2021-01-01 RX ORDER — EPINEPHRINE IN 0.9 % SOD CHLOR 5 MG/250ML
.03-.3 PLASTIC BAG, INJECTION (ML) INTRAVENOUS CONTINUOUS
Status: DISCONTINUED | OUTPATIENT
Start: 2021-01-01 | End: 2021-01-01

## 2021-01-01 RX ORDER — AMIODARONE HYDROCHLORIDE 200 MG/1
200 TABLET ORAL DAILY
Qty: 90 TABLET | Refills: 0 | Status: SHIPPED | OUTPATIENT
Start: 2021-01-01 | End: 2021-01-01

## 2021-01-01 RX ORDER — SODIUM CHLORIDE 9 MG/ML
INJECTION, SOLUTION INTRAVENOUS CONTINUOUS
Status: DISCONTINUED | OUTPATIENT
Start: 2021-01-01 | End: 2021-04-27 | Stop reason: HOSPADM

## 2021-01-01 RX ORDER — OXYCODONE AND ACETAMINOPHEN 5; 325 MG/1; MG/1
1 TABLET ORAL DAILY PRN
Qty: 10 TABLET | Refills: 0 | Status: SHIPPED | OUTPATIENT
Start: 2021-01-01

## 2021-01-01 RX ORDER — ONDANSETRON 2 MG/ML
4 INJECTION INTRAMUSCULAR; INTRAVENOUS EVERY 6 HOURS PRN
Status: DISCONTINUED | OUTPATIENT
Start: 2021-01-01 | End: 2021-01-01 | Stop reason: HOSPADM

## 2021-01-01 RX ORDER — ACETAMINOPHEN 650 MG/1
650 SUPPOSITORY RECTAL EVERY 4 HOURS PRN
Status: DISCONTINUED | OUTPATIENT
Start: 2021-01-01 | End: 2021-04-27 | Stop reason: HOSPADM

## 2021-01-01 RX ORDER — FUROSEMIDE 10 MG/ML
20 INJECTION INTRAMUSCULAR; INTRAVENOUS EVERY 6 HOURS
Status: COMPLETED | OUTPATIENT
Start: 2021-01-01 | End: 2021-01-01

## 2021-01-01 RX ORDER — POTASSIUM CHLORIDE 1.5 G/1.58G
40 POWDER, FOR SOLUTION ORAL ONCE
Status: COMPLETED | OUTPATIENT
Start: 2021-01-01 | End: 2021-01-01

## 2021-01-01 RX ORDER — NOREPINEPHRINE BITARTRATE 0.06 MG/ML
0.03-0.4 INJECTION, SOLUTION INTRAVENOUS CONTINUOUS
Status: DISCONTINUED | OUTPATIENT
Start: 2021-01-01 | End: 2021-01-01

## 2021-01-01 RX ORDER — HYDROMORPHONE HYDROCHLORIDE 1 MG/ML
.3-.5 INJECTION, SOLUTION INTRAMUSCULAR; INTRAVENOUS; SUBCUTANEOUS
Status: DISCONTINUED | OUTPATIENT
Start: 2021-01-01 | End: 2021-04-27 | Stop reason: HOSPADM

## 2021-01-01 RX ORDER — LORAZEPAM 2 MG/ML
0.5 INJECTION INTRAMUSCULAR EVERY 6 HOURS PRN
Status: DISCONTINUED | OUTPATIENT
Start: 2021-01-01 | End: 2021-01-01 | Stop reason: HOSPADM

## 2021-01-01 RX ORDER — DEXTROSE MONOHYDRATE 25 G/50ML
25-50 INJECTION, SOLUTION INTRAVENOUS
Status: DISCONTINUED | OUTPATIENT
Start: 2021-01-01 | End: 2021-04-27 | Stop reason: HOSPADM

## 2021-01-01 RX ORDER — ONDANSETRON 2 MG/ML
4 INJECTION INTRAMUSCULAR; INTRAVENOUS EVERY 6 HOURS PRN
Status: DISCONTINUED | OUTPATIENT
Start: 2021-01-01 | End: 2021-04-27 | Stop reason: HOSPADM

## 2021-01-01 RX ORDER — METHYLPREDNISOLONE SODIUM SUCCINATE 125 MG/2ML
60 INJECTION, POWDER, LYOPHILIZED, FOR SOLUTION INTRAMUSCULAR; INTRAVENOUS EVERY 24 HOURS
Status: COMPLETED | OUTPATIENT
Start: 2021-01-01 | End: 2021-01-01

## 2021-01-01 RX ORDER — OXYCODONE AND ACETAMINOPHEN 5; 325 MG/1; MG/1
1 TABLET ORAL DAILY PRN
Qty: 10 TABLET | Refills: 0 | Status: SHIPPED | OUTPATIENT
Start: 2021-01-01 | End: 2021-01-01

## 2021-01-01 RX ORDER — BENZOCAINE/MENTHOL 6 MG-10 MG
1 LOZENGE MUCOUS MEMBRANE 2 TIMES DAILY PRN
COMMUNITY

## 2021-01-01 RX ORDER — HYDROMORPHONE HYDROCHLORIDE 1 MG/ML
0.2 SOLUTION ORAL
Status: DISCONTINUED | OUTPATIENT
Start: 2021-01-01 | End: 2021-01-01

## 2021-01-01 RX ORDER — NALOXONE HYDROCHLORIDE 0.4 MG/ML
0.2 INJECTION, SOLUTION INTRAMUSCULAR; INTRAVENOUS; SUBCUTANEOUS
Status: DISCONTINUED | OUTPATIENT
Start: 2021-01-01 | End: 2021-04-27 | Stop reason: HOSPADM

## 2021-01-01 RX ORDER — QUETIAPINE FUMARATE 25 MG/1
25 TABLET, FILM COATED ORAL EVERY 4 HOURS PRN
Status: DISCONTINUED | OUTPATIENT
Start: 2021-01-01 | End: 2021-01-01

## 2021-01-01 RX ORDER — SODIUM CHLORIDE 9 MG/ML
INJECTION, SOLUTION INTRAVENOUS CONTINUOUS
Status: ACTIVE | OUTPATIENT
Start: 2021-01-01 | End: 2021-01-01

## 2021-01-01 RX ORDER — POTASSIUM CHLORIDE 29.8 MG/ML
20 INJECTION INTRAVENOUS ONCE
Status: COMPLETED | OUTPATIENT
Start: 2021-01-01 | End: 2021-01-01

## 2021-01-01 RX ORDER — PREDNISONE 20 MG/1
40 TABLET ORAL DAILY
Status: DISCONTINUED | OUTPATIENT
Start: 2021-01-01 | End: 2021-01-01

## 2021-01-01 RX ORDER — METOPROLOL TARTRATE 1 MG/ML
2.5 INJECTION, SOLUTION INTRAVENOUS EVERY 6 HOURS PRN
Status: DISCONTINUED | OUTPATIENT
Start: 2021-01-01 | End: 2021-04-27 | Stop reason: HOSPADM

## 2021-01-01 RX ORDER — FUROSEMIDE 40 MG
40 TABLET ORAL DAILY
Status: DISCONTINUED | OUTPATIENT
Start: 2021-01-01 | End: 2021-01-01 | Stop reason: HOSPADM

## 2021-01-01 RX ORDER — TRIAMCINOLONE ACETONIDE 1 MG/G
1 CREAM TOPICAL 2 TIMES DAILY PRN
COMMUNITY

## 2021-01-01 RX ORDER — NICOTINE POLACRILEX 4 MG
15-30 LOZENGE BUCCAL
Status: DISCONTINUED | OUTPATIENT
Start: 2021-01-01 | End: 2021-01-01

## 2021-01-01 RX ORDER — LOPERAMIDE HCL 2 MG
2 CAPSULE ORAL 4 TIMES DAILY PRN
COMMUNITY

## 2021-01-01 RX ORDER — NICOTINE POLACRILEX 4 MG
15-30 LOZENGE BUCCAL
Status: DISCONTINUED | OUTPATIENT
Start: 2021-01-01 | End: 2021-04-27 | Stop reason: HOSPADM

## 2021-01-01 RX ORDER — HALOPERIDOL 5 MG/ML
5 INJECTION INTRAMUSCULAR EVERY 6 HOURS PRN
Status: DISCONTINUED | OUTPATIENT
Start: 2021-01-01 | End: 2021-01-01

## 2021-01-01 RX ORDER — FOLIC ACID 1 MG/1
1 TABLET ORAL DAILY
Status: DISCONTINUED | OUTPATIENT
Start: 2021-01-01 | End: 2021-01-01 | Stop reason: HOSPADM

## 2021-01-01 RX ORDER — LOSARTAN POTASSIUM 50 MG/1
50 TABLET ORAL DAILY
Status: DISCONTINUED | OUTPATIENT
Start: 2021-01-01 | End: 2021-01-01 | Stop reason: HOSPADM

## 2021-01-01 RX ORDER — ACETAMINOPHEN 325 MG/1
650 TABLET ORAL EVERY 4 HOURS PRN
Status: DISCONTINUED | OUTPATIENT
Start: 2021-01-01 | End: 2021-01-01 | Stop reason: HOSPADM

## 2021-01-01 RX ORDER — HEPARIN SODIUM 10000 [USP'U]/100ML
900 INJECTION, SOLUTION INTRAVENOUS CONTINUOUS
Status: DISCONTINUED | OUTPATIENT
Start: 2021-01-01 | End: 2021-01-01

## 2021-01-01 RX ORDER — HEPARIN SODIUM 10000 [USP'U]/100ML
0-5000 INJECTION, SOLUTION INTRAVENOUS CONTINUOUS
Status: DISCONTINUED | OUTPATIENT
Start: 2021-01-01 | End: 2021-01-01

## 2021-01-01 RX ORDER — CHLORHEXIDINE GLUCONATE ORAL RINSE 1.2 MG/ML
15 SOLUTION DENTAL EVERY 12 HOURS
Status: DISCONTINUED | OUTPATIENT
Start: 2021-01-01 | End: 2021-04-27 | Stop reason: HOSPADM

## 2021-01-01 RX ORDER — ONDANSETRON 4 MG/1
4 TABLET, ORALLY DISINTEGRATING ORAL EVERY 6 HOURS PRN
Status: DISCONTINUED | OUTPATIENT
Start: 2021-01-01 | End: 2021-01-01 | Stop reason: HOSPADM

## 2021-01-01 RX ORDER — AMINO AC/PROTEIN HYDR/WHEY PRO 10G-100/30
1 LIQUID (ML) ORAL EVERY 8 HOURS
Status: DISCONTINUED | OUTPATIENT
Start: 2021-01-01 | End: 2021-04-27 | Stop reason: HOSPADM

## 2021-01-01 RX ORDER — FENTANYL CITRATE 50 UG/ML
25-50 INJECTION, SOLUTION INTRAMUSCULAR; INTRAVENOUS
Status: DISCONTINUED | OUTPATIENT
Start: 2021-01-01 | End: 2021-01-01 | Stop reason: ALTCHOICE

## 2021-01-01 RX ORDER — HALOPERIDOL 5 MG/ML
2 INJECTION INTRAMUSCULAR EVERY 6 HOURS PRN
Status: DISCONTINUED | OUTPATIENT
Start: 2021-01-01 | End: 2021-01-01 | Stop reason: HOSPADM

## 2021-01-01 RX ORDER — AMIODARONE HYDROCHLORIDE 200 MG/1
200 TABLET ORAL DAILY
Status: DISCONTINUED | OUTPATIENT
Start: 2021-01-01 | End: 2021-01-01 | Stop reason: HOSPADM

## 2021-01-01 RX ORDER — ONDANSETRON 4 MG/1
4 TABLET, ORALLY DISINTEGRATING ORAL EVERY 6 HOURS PRN
Status: DISCONTINUED | OUTPATIENT
Start: 2021-01-01 | End: 2021-04-27 | Stop reason: HOSPADM

## 2021-01-01 RX ORDER — FENTANYL CITRATE 50 UG/ML
50-100 INJECTION, SOLUTION INTRAMUSCULAR; INTRAVENOUS
Status: DISCONTINUED | OUTPATIENT
Start: 2021-01-01 | End: 2021-01-01 | Stop reason: ALTCHOICE

## 2021-01-01 RX ORDER — PROPOFOL 10 MG/ML
INJECTION, EMULSION INTRAVENOUS
Status: DISCONTINUED
Start: 2021-01-01 | End: 2021-01-01 | Stop reason: HOSPADM

## 2021-01-01 RX ORDER — FUROSEMIDE 20 MG
TABLET ORAL
Qty: 360 TABLET | Refills: 11 | Status: SHIPPED | OUTPATIENT
Start: 2021-01-01

## 2021-01-01 RX ORDER — PREDNISONE 20 MG/1
20 TABLET ORAL DAILY
Status: DISCONTINUED | OUTPATIENT
Start: 2021-01-01 | End: 2021-01-01

## 2021-01-01 RX ORDER — AMOXICILLIN 250 MG
1 CAPSULE ORAL 2 TIMES DAILY PRN
Status: DISCONTINUED | OUTPATIENT
Start: 2021-01-01 | End: 2021-04-27 | Stop reason: HOSPADM

## 2021-01-01 RX ORDER — IPRATROPIUM BROMIDE AND ALBUTEROL SULFATE 2.5; .5 MG/3ML; MG/3ML
3 SOLUTION RESPIRATORY (INHALATION) EVERY 4 HOURS
Status: DISCONTINUED | OUTPATIENT
Start: 2021-01-01 | End: 2021-01-01 | Stop reason: HOSPADM

## 2021-01-01 RX ORDER — FENTANYL CITRATE 50 UG/ML
50 INJECTION, SOLUTION INTRAMUSCULAR; INTRAVENOUS ONCE
Status: COMPLETED | OUTPATIENT
Start: 2021-01-01 | End: 2021-01-01

## 2021-01-01 RX ORDER — DEXTROSE MONOHYDRATE 25 G/50ML
25-50 INJECTION, SOLUTION INTRAVENOUS
Status: DISCONTINUED | OUTPATIENT
Start: 2021-01-01 | End: 2021-01-01

## 2021-01-01 RX ORDER — NOREPINEPHRINE BITARTRATE 0.06 MG/ML
0.03-0.4 INJECTION, SOLUTION INTRAVENOUS CONTINUOUS
Status: DISCONTINUED | OUTPATIENT
Start: 2021-01-01 | End: 2021-01-01 | Stop reason: HOSPADM

## 2021-01-01 RX ORDER — ACETAMINOPHEN 650 MG/1
650 SUPPOSITORY RECTAL EVERY 4 HOURS PRN
Status: DISCONTINUED | OUTPATIENT
Start: 2021-01-01 | End: 2021-01-01 | Stop reason: HOSPADM

## 2021-01-01 RX ORDER — LANOLIN ALCOHOL/MO/W.PET/CERES
3 CREAM (GRAM) TOPICAL
Qty: 30 TABLET | Refills: 3 | Status: SHIPPED | OUTPATIENT
Start: 2021-01-01 | End: 2021-01-01

## 2021-01-01 RX ORDER — POLYETHYLENE GLYCOL 3350 17 G/17G
1 POWDER, FOR SOLUTION ORAL DAILY PRN
COMMUNITY

## 2021-01-01 RX ORDER — LEVOTHYROXINE SODIUM 75 UG/1
75 TABLET ORAL
Status: DISCONTINUED | OUTPATIENT
Start: 2021-01-01 | End: 2021-01-01 | Stop reason: HOSPADM

## 2021-01-01 RX ORDER — FUROSEMIDE 20 MG
20 TABLET ORAL
Status: DISCONTINUED | OUTPATIENT
Start: 2021-01-01 | End: 2021-01-01 | Stop reason: HOSPADM

## 2021-01-01 RX ORDER — LORAZEPAM 0.5 MG/1
0.25 TABLET ORAL
Status: DISCONTINUED | OUTPATIENT
Start: 2021-01-01 | End: 2021-04-27 | Stop reason: HOSPADM

## 2021-01-01 RX ORDER — FUROSEMIDE 10 MG/ML
60 INJECTION INTRAMUSCULAR; INTRAVENOUS DAILY
Status: DISCONTINUED | OUTPATIENT
Start: 2021-01-01 | End: 2021-01-01

## 2021-01-01 RX ORDER — DEXTROSE MONOHYDRATE 100 MG/ML
INJECTION, SOLUTION INTRAVENOUS CONTINUOUS PRN
Status: DISCONTINUED | OUTPATIENT
Start: 2021-01-01 | End: 2021-04-27 | Stop reason: HOSPADM

## 2021-01-01 RX ORDER — OXYCODONE AND ACETAMINOPHEN 5; 325 MG/1; MG/1
1 TABLET ORAL DAILY PRN
Qty: 30 TABLET | Refills: 0 | Status: ON HOLD | OUTPATIENT
Start: 2021-01-01 | End: 2021-01-01

## 2021-01-01 RX ORDER — ACETAMINOPHEN 325 MG/1
650 TABLET ORAL EVERY 4 HOURS PRN
Status: DISCONTINUED | OUTPATIENT
Start: 2021-01-01 | End: 2021-04-27 | Stop reason: HOSPADM

## 2021-01-01 RX ORDER — LOSARTAN POTASSIUM 50 MG/1
50 TABLET ORAL DAILY
Qty: 90 TABLET | Refills: 3 | Status: SHIPPED | OUTPATIENT
Start: 2021-01-01

## 2021-01-01 RX ORDER — ATORVASTATIN CALCIUM 20 MG/1
40 TABLET, FILM COATED ORAL AT BEDTIME
Status: DISCONTINUED | OUTPATIENT
Start: 2021-01-01 | End: 2021-01-01 | Stop reason: HOSPADM

## 2021-01-01 RX ORDER — LIDOCAINE 40 MG/G
CREAM TOPICAL
Status: DISCONTINUED | OUTPATIENT
Start: 2021-01-01 | End: 2021-01-01 | Stop reason: HOSPADM

## 2021-01-01 RX ORDER — MIRTAZAPINE 7.5 MG/1
7.5 TABLET, FILM COATED ORAL AT BEDTIME
Qty: 90 TABLET | Refills: 3 | Status: SHIPPED | OUTPATIENT
Start: 2021-01-01

## 2021-01-01 RX ORDER — MIRTAZAPINE 7.5 MG/1
7.5 TABLET, FILM COATED ORAL AT BEDTIME
Status: DISCONTINUED | OUTPATIENT
Start: 2021-01-01 | End: 2021-01-01 | Stop reason: HOSPADM

## 2021-01-01 RX ORDER — LORAZEPAM 2 MG/ML
1 INJECTION INTRAMUSCULAR EVERY 6 HOURS PRN
Status: DISCONTINUED | OUTPATIENT
Start: 2021-01-01 | End: 2021-01-01

## 2021-01-01 RX ORDER — AMOXICILLIN 250 MG
2 CAPSULE ORAL 2 TIMES DAILY PRN
Status: DISCONTINUED | OUTPATIENT
Start: 2021-01-01 | End: 2021-04-27 | Stop reason: HOSPADM

## 2021-01-01 RX ADMIN — PREDNISONE 40 MG: 20 TABLET ORAL at 09:29

## 2021-01-01 RX ADMIN — POTASSIUM CHLORIDE 20 MEQ: 1.5 POWDER, FOR SOLUTION ORAL at 19:59

## 2021-01-01 RX ADMIN — FENTANYL CITRATE 50 MCG: 50 INJECTION, SOLUTION INTRAMUSCULAR; INTRAVENOUS at 18:35

## 2021-01-01 RX ADMIN — Medication 1 PACKET: at 03:47

## 2021-01-01 RX ADMIN — Medication 15 ML: at 10:55

## 2021-01-01 RX ADMIN — RIVAROXABAN 20 MG: 10 TABLET, FILM COATED ORAL at 17:41

## 2021-01-01 RX ADMIN — PREDNISONE 20 MG: 20 TABLET ORAL at 08:39

## 2021-01-01 RX ADMIN — FUROSEMIDE 40 MG: 10 INJECTION, SOLUTION INTRAVENOUS at 01:01

## 2021-01-01 RX ADMIN — FUROSEMIDE 40 MG: 10 INJECTION, SOLUTION INTRAVENOUS at 01:06

## 2021-01-01 RX ADMIN — FENTANYL CITRATE 100 MCG: 50 INJECTION, SOLUTION INTRAMUSCULAR; INTRAVENOUS at 10:34

## 2021-01-01 RX ADMIN — FUROSEMIDE 20 MG: 10 INJECTION, SOLUTION INTRAMUSCULAR; INTRAVENOUS at 08:27

## 2021-01-01 RX ADMIN — AMIODARONE HYDROCHLORIDE 200 MG: 200 TABLET ORAL at 10:37

## 2021-01-01 RX ADMIN — Medication 50 MCG/HR: at 19:45

## 2021-01-01 RX ADMIN — ATORVASTATIN CALCIUM 40 MG: 20 TABLET, FILM COATED ORAL at 20:32

## 2021-01-01 RX ADMIN — AMIODARONE HYDROCHLORIDE 200 MG: 200 TABLET ORAL at 08:47

## 2021-01-01 RX ADMIN — FUROSEMIDE 40 MG: 10 INJECTION, SOLUTION INTRAVENOUS at 13:23

## 2021-01-01 RX ADMIN — Medication 1 PACKET: at 20:22

## 2021-01-01 RX ADMIN — RIVAROXABAN 20 MG: 10 TABLET, FILM COATED ORAL at 17:21

## 2021-01-01 RX ADMIN — FENTANYL CITRATE 50 MCG: 50 INJECTION, SOLUTION INTRAMUSCULAR; INTRAVENOUS at 11:43

## 2021-01-01 RX ADMIN — LORAZEPAM 1 MG: 2 INJECTION INTRAMUSCULAR; INTRAVENOUS at 13:38

## 2021-01-01 RX ADMIN — Medication 1 PACKET: at 05:03

## 2021-01-01 RX ADMIN — HEPARIN SODIUM 1050 UNITS/HR: 10000 INJECTION, SOLUTION INTRAVENOUS at 20:34

## 2021-01-01 RX ADMIN — LOSARTAN POTASSIUM 50 MG: 50 TABLET, FILM COATED ORAL at 08:39

## 2021-01-01 RX ADMIN — CHLORHEXIDINE GLUCONATE 0.12% ORAL RINSE 15 ML: 1.2 LIQUID ORAL at 08:32

## 2021-01-01 RX ADMIN — IPRATROPIUM BROMIDE AND ALBUTEROL SULFATE 3 ML: .5; 3 SOLUTION RESPIRATORY (INHALATION) at 01:32

## 2021-01-01 RX ADMIN — PIPERACILLIN SODIUM AND TAZOBACTAM SODIUM 3.38 G: 3; .375 INJECTION, POWDER, LYOPHILIZED, FOR SOLUTION INTRAVENOUS at 02:00

## 2021-01-01 RX ADMIN — PIPERACILLIN SODIUM AND TAZOBACTAM SODIUM 3.38 G: 3; .375 INJECTION, POWDER, LYOPHILIZED, FOR SOLUTION INTRAVENOUS at 13:52

## 2021-01-01 RX ADMIN — FUROSEMIDE 40 MG: 10 INJECTION, SOLUTION INTRAVENOUS at 12:44

## 2021-01-01 RX ADMIN — ATORVASTATIN CALCIUM 40 MG: 20 TABLET, FILM COATED ORAL at 21:42

## 2021-01-01 RX ADMIN — GABAPENTIN 600 MG: 600 TABLET, FILM COATED ORAL at 09:29

## 2021-01-01 RX ADMIN — FUROSEMIDE 40 MG: 10 INJECTION, SOLUTION INTRAVENOUS at 00:16

## 2021-01-01 RX ADMIN — INSULIN ASPART 1 UNITS: 100 INJECTION, SOLUTION INTRAVENOUS; SUBCUTANEOUS at 20:32

## 2021-01-01 RX ADMIN — CHLORHEXIDINE GLUCONATE 0.12% ORAL RINSE 15 ML: 1.2 LIQUID ORAL at 20:08

## 2021-01-01 RX ADMIN — SODIUM CHLORIDE: 9 INJECTION, SOLUTION INTRAVENOUS at 03:18

## 2021-01-01 RX ADMIN — MIRTAZAPINE 7.5 MG: 7.5 TABLET, FILM COATED ORAL at 21:13

## 2021-01-01 RX ADMIN — POTASSIUM CHLORIDE 20 MEQ: 1.5 POWDER, FOR SOLUTION ORAL at 07:44

## 2021-01-01 RX ADMIN — POTASSIUM CHLORIDE 40 MEQ: 1.5 POWDER, FOR SOLUTION ORAL at 05:42

## 2021-01-01 RX ADMIN — HEPARIN SODIUM 1050 UNITS/HR: 10000 INJECTION, SOLUTION INTRAVENOUS at 21:08

## 2021-01-01 RX ADMIN — HALOPERIDOL LACTATE 5 MG: 5 INJECTION, SOLUTION INTRAMUSCULAR at 22:41

## 2021-01-01 RX ADMIN — SODIUM CHLORIDE: 900 INJECTION INTRAVENOUS at 21:08

## 2021-01-01 RX ADMIN — POTASSIUM CHLORIDE 20 MEQ: 29.8 INJECTION, SOLUTION INTRAVENOUS at 23:30

## 2021-01-01 RX ADMIN — FUROSEMIDE 40 MG: 10 INJECTION, SOLUTION INTRAVENOUS at 12:27

## 2021-01-01 RX ADMIN — POTASSIUM CHLORIDE 40 MEQ: 1.5 POWDER, FOR SOLUTION ORAL at 15:36

## 2021-01-01 RX ADMIN — OMEPRAZOLE 40 MG: 20 CAPSULE, DELAYED RELEASE ORAL at 08:47

## 2021-01-01 RX ADMIN — IPRATROPIUM BROMIDE AND ALBUTEROL SULFATE 3 ML: .5; 3 SOLUTION RESPIRATORY (INHALATION) at 07:39

## 2021-01-01 RX ADMIN — PIPERACILLIN SODIUM AND TAZOBACTAM SODIUM 3.38 G: 3; .375 INJECTION, POWDER, LYOPHILIZED, FOR SOLUTION INTRAVENOUS at 07:53

## 2021-01-01 RX ADMIN — Medication 1 PACKET: at 20:07

## 2021-01-01 RX ADMIN — LEVOTHYROXINE SODIUM 75 MCG: 75 TABLET ORAL at 08:47

## 2021-01-01 RX ADMIN — QUETIAPINE 25 MG: 25 TABLET ORAL at 12:45

## 2021-01-01 RX ADMIN — HALOPERIDOL LACTATE 5 MG: 5 INJECTION, SOLUTION INTRAMUSCULAR at 00:46

## 2021-01-01 RX ADMIN — FUROSEMIDE 20 MG: 10 INJECTION, SOLUTION INTRAMUSCULAR; INTRAVENOUS at 00:50

## 2021-01-01 RX ADMIN — RIVAROXABAN 20 MG: 10 TABLET, FILM COATED ORAL at 17:09

## 2021-01-01 RX ADMIN — RIVAROXABAN 20 MG: 10 TABLET, FILM COATED ORAL at 17:35

## 2021-01-01 RX ADMIN — IPRATROPIUM BROMIDE AND ALBUTEROL SULFATE 3 ML: .5; 3 SOLUTION RESPIRATORY (INHALATION) at 19:36

## 2021-01-01 RX ADMIN — FUROSEMIDE 40 MG: 20 TABLET ORAL at 10:37

## 2021-01-01 RX ADMIN — CHLORHEXIDINE GLUCONATE 0.12% ORAL RINSE 15 ML: 1.2 LIQUID ORAL at 20:05

## 2021-01-01 RX ADMIN — MIRTAZAPINE 7.5 MG: 7.5 TABLET, FILM COATED ORAL at 20:32

## 2021-01-01 RX ADMIN — SODIUM CHLORIDE 250 MG: 9 INJECTION, SOLUTION INTRAVENOUS at 11:35

## 2021-01-01 RX ADMIN — FUROSEMIDE 40 MG: 10 INJECTION, SOLUTION INTRAVENOUS at 12:15

## 2021-01-01 RX ADMIN — FUROSEMIDE 20 MG: 20 TABLET ORAL at 08:39

## 2021-01-01 RX ADMIN — FUROSEMIDE 40 MG: 10 INJECTION, SOLUTION INTRAVENOUS at 00:55

## 2021-01-01 RX ADMIN — QUETIAPINE 25 MG: 25 TABLET ORAL at 22:28

## 2021-01-01 RX ADMIN — MIRTAZAPINE 7.5 MG: 7.5 TABLET, FILM COATED ORAL at 22:14

## 2021-01-01 RX ADMIN — Medication 1 PACKET: at 05:14

## 2021-01-01 RX ADMIN — QUETIAPINE 25 MG: 25 TABLET ORAL at 07:49

## 2021-01-01 RX ADMIN — MAGNESIUM SULFATE HEPTAHYDRATE 2 G: 40 INJECTION, SOLUTION INTRAVENOUS at 02:16

## 2021-01-01 RX ADMIN — FENTANYL CITRATE 50 MCG: 50 INJECTION, SOLUTION INTRAMUSCULAR; INTRAVENOUS at 09:10

## 2021-01-01 RX ADMIN — ACETAMINOPHEN 650 MG: 325 TABLET, FILM COATED ORAL at 00:31

## 2021-01-01 RX ADMIN — PANTOPRAZOLE SODIUM 40 MG: 40 TABLET, DELAYED RELEASE ORAL at 07:48

## 2021-01-01 RX ADMIN — ACETAMINOPHEN 650 MG: 325 TABLET, FILM COATED ORAL at 21:59

## 2021-01-01 RX ADMIN — MIDAZOLAM (PF) 1 MG/ML IN 0.9 % SODIUM CHLORIDE INTRAVENOUS SOLUTION 3 MG/HR: at 13:41

## 2021-01-01 RX ADMIN — FENTANYL CITRATE 50 MCG: 50 INJECTION, SOLUTION INTRAMUSCULAR; INTRAVENOUS at 08:42

## 2021-01-01 RX ADMIN — PIPERACILLIN SODIUM AND TAZOBACTAM SODIUM 3.38 G: 3; .375 INJECTION, POWDER, LYOPHILIZED, FOR SOLUTION INTRAVENOUS at 20:21

## 2021-01-01 RX ADMIN — LEVOTHYROXINE SODIUM 75 MCG: 50 TABLET ORAL at 07:24

## 2021-01-01 RX ADMIN — GABAPENTIN 600 MG: 600 TABLET, FILM COATED ORAL at 20:07

## 2021-01-01 RX ADMIN — CHLORHEXIDINE GLUCONATE 0.12% ORAL RINSE 15 ML: 1.2 LIQUID ORAL at 08:27

## 2021-01-01 RX ADMIN — IPRATROPIUM BROMIDE AND ALBUTEROL SULFATE 3 ML: .5; 3 SOLUTION RESPIRATORY (INHALATION) at 23:22

## 2021-01-01 RX ADMIN — GABAPENTIN 600 MG: 600 TABLET, FILM COATED ORAL at 08:27

## 2021-01-01 RX ADMIN — Medication 1 MG: at 21:13

## 2021-01-01 RX ADMIN — GABAPENTIN 600 MG: 600 TABLET, FILM COATED ORAL at 08:47

## 2021-01-01 RX ADMIN — FENTANYL CITRATE 100 MCG: 50 INJECTION, SOLUTION INTRAMUSCULAR; INTRAVENOUS at 21:08

## 2021-01-01 RX ADMIN — POTASSIUM CHLORIDE 20 MEQ: 1.5 POWDER, FOR SOLUTION ORAL at 06:09

## 2021-01-01 RX ADMIN — PANTOPRAZOLE SODIUM 40 MG: 40 TABLET, DELAYED RELEASE ORAL at 08:32

## 2021-01-01 RX ADMIN — ACETAMINOPHEN 650 MG: 325 TABLET, FILM COATED ORAL at 01:30

## 2021-01-01 RX ADMIN — HALOPERIDOL LACTATE 5 MG: 5 INJECTION, SOLUTION INTRAMUSCULAR at 08:55

## 2021-01-01 RX ADMIN — FUROSEMIDE 40 MG: 10 INJECTION, SOLUTION INTRAVENOUS at 13:02

## 2021-01-01 RX ADMIN — IPRATROPIUM BROMIDE AND ALBUTEROL SULFATE 3 ML: .5; 3 SOLUTION RESPIRATORY (INHALATION) at 20:52

## 2021-01-01 RX ADMIN — QUETIAPINE 25 MG: 25 TABLET ORAL at 11:43

## 2021-01-01 RX ADMIN — ACETAMINOPHEN 650 MG: 650 SUPPOSITORY RECTAL at 19:56

## 2021-01-01 RX ADMIN — CHLORHEXIDINE GLUCONATE 0.12% ORAL RINSE 15 ML: 1.2 LIQUID ORAL at 20:21

## 2021-01-01 RX ADMIN — GABAPENTIN 600 MG: 600 TABLET, FILM COATED ORAL at 10:38

## 2021-01-01 RX ADMIN — ATORVASTATIN CALCIUM 40 MG: 20 TABLET, FILM COATED ORAL at 21:13

## 2021-01-01 RX ADMIN — Medication 1 PACKET: at 20:08

## 2021-01-01 RX ADMIN — FUROSEMIDE 40 MG: 10 INJECTION, SOLUTION INTRAVENOUS at 14:08

## 2021-01-01 RX ADMIN — HEPARIN SODIUM 1050 UNITS/HR: 10000 INJECTION, SOLUTION INTRAVENOUS at 20:27

## 2021-01-01 RX ADMIN — Medication 1 PACKET: at 12:50

## 2021-01-01 RX ADMIN — Medication 50 MCG/HR: at 12:42

## 2021-01-01 RX ADMIN — LEVOTHYROXINE SODIUM 75 MCG: 50 TABLET ORAL at 08:20

## 2021-01-01 RX ADMIN — Medication 1 MG: at 20:32

## 2021-01-01 RX ADMIN — METOPROLOL TARTRATE 2.5 MG: 1 INJECTION, SOLUTION INTRAVENOUS at 15:19

## 2021-01-01 RX ADMIN — IPRATROPIUM BROMIDE AND ALBUTEROL SULFATE 3 ML: .5; 3 SOLUTION RESPIRATORY (INHALATION) at 15:48

## 2021-01-01 RX ADMIN — PIPERACILLIN SODIUM AND TAZOBACTAM SODIUM 3.38 G: 3; .375 INJECTION, POWDER, LYOPHILIZED, FOR SOLUTION INTRAVENOUS at 19:52

## 2021-01-01 RX ADMIN — IPRATROPIUM BROMIDE AND ALBUTEROL SULFATE 3 ML: .5; 3 SOLUTION RESPIRATORY (INHALATION) at 03:35

## 2021-01-01 RX ADMIN — FUROSEMIDE 20 MG: 20 TABLET ORAL at 17:21

## 2021-01-01 RX ADMIN — INSULIN ASPART 1 UNITS: 100 INJECTION, SOLUTION INTRAVENOUS; SUBCUTANEOUS at 16:02

## 2021-01-01 RX ADMIN — MIDAZOLAM 2 MG: 1 INJECTION INTRAMUSCULAR; INTRAVENOUS at 07:18

## 2021-01-01 RX ADMIN — CHLORHEXIDINE GLUCONATE 0.12% ORAL RINSE 15 ML: 1.2 LIQUID ORAL at 08:39

## 2021-01-01 RX ADMIN — PIPERACILLIN SODIUM AND TAZOBACTAM SODIUM 3.38 G: 3; .375 INJECTION, POWDER, LYOPHILIZED, FOR SOLUTION INTRAVENOUS at 08:27

## 2021-01-01 RX ADMIN — PIPERACILLIN SODIUM AND TAZOBACTAM SODIUM 3.38 G: 3; .375 INJECTION, POWDER, LYOPHILIZED, FOR SOLUTION INTRAVENOUS at 01:18

## 2021-01-01 RX ADMIN — Medication 1 PACKET: at 19:59

## 2021-01-01 RX ADMIN — LOSARTAN POTASSIUM 50 MG: 50 TABLET, FILM COATED ORAL at 09:29

## 2021-01-01 RX ADMIN — LORAZEPAM 0.5 MG: 2 INJECTION INTRAMUSCULAR; INTRAVENOUS at 18:23

## 2021-01-01 RX ADMIN — FENTANYL CITRATE 100 MCG: 50 INJECTION, SOLUTION INTRAMUSCULAR; INTRAVENOUS at 12:13

## 2021-01-01 RX ADMIN — Medication 1 PACKET: at 04:57

## 2021-01-01 RX ADMIN — Medication 0.02 MCG/KG/MIN: at 15:02

## 2021-01-01 RX ADMIN — FUROSEMIDE 40 MG: 10 INJECTION, SOLUTION INTRAVENOUS at 02:12

## 2021-01-01 RX ADMIN — IPRATROPIUM BROMIDE AND ALBUTEROL SULFATE 3 ML: .5; 3 SOLUTION RESPIRATORY (INHALATION) at 11:47

## 2021-01-01 RX ADMIN — QUETIAPINE 25 MG: 25 TABLET ORAL at 18:35

## 2021-01-01 RX ADMIN — PANTOPRAZOLE SODIUM 40 MG: 40 INJECTION, POWDER, FOR SOLUTION INTRAVENOUS at 11:56

## 2021-01-01 RX ADMIN — FENTANYL CITRATE 50 MCG: 50 INJECTION, SOLUTION INTRAMUSCULAR; INTRAVENOUS at 08:05

## 2021-01-01 RX ADMIN — IPRATROPIUM BROMIDE AND ALBUTEROL SULFATE 3 ML: .5; 3 SOLUTION RESPIRATORY (INHALATION) at 05:20

## 2021-01-01 RX ADMIN — GABAPENTIN 600 MG: 600 TABLET, FILM COATED ORAL at 20:21

## 2021-01-01 RX ADMIN — HEPARIN SODIUM 900 UNITS/HR: 10000 INJECTION, SOLUTION INTRAVENOUS at 21:16

## 2021-01-01 RX ADMIN — GABAPENTIN 600 MG: 600 TABLET, FILM COATED ORAL at 20:32

## 2021-01-01 RX ADMIN — FENTANYL CITRATE 50 MCG: 50 INJECTION, SOLUTION INTRAMUSCULAR; INTRAVENOUS at 18:54

## 2021-01-01 RX ADMIN — PANTOPRAZOLE SODIUM 40 MG: 40 TABLET, DELAYED RELEASE ORAL at 08:27

## 2021-01-01 RX ADMIN — RIVAROXABAN 20 MG: 10 TABLET, FILM COATED ORAL at 17:27

## 2021-01-01 RX ADMIN — PIPERACILLIN SODIUM AND TAZOBACTAM SODIUM 3.38 G: 3; .375 INJECTION, POWDER, LYOPHILIZED, FOR SOLUTION INTRAVENOUS at 01:07

## 2021-01-01 RX ADMIN — LORAZEPAM 1 MG: 2 INJECTION INTRAMUSCULAR; INTRAVENOUS at 00:35

## 2021-01-01 RX ADMIN — HEPARIN SODIUM 900 UNITS/HR: 10000 INJECTION, SOLUTION INTRAVENOUS at 09:20

## 2021-01-01 RX ADMIN — FUROSEMIDE 40 MG: 10 INJECTION, SOLUTION INTRAVENOUS at 01:36

## 2021-01-01 RX ADMIN — MIRTAZAPINE 7.5 MG: 7.5 TABLET, FILM COATED ORAL at 21:42

## 2021-01-01 RX ADMIN — SODIUM CHLORIDE: 900 INJECTION INTRAVENOUS at 08:32

## 2021-01-01 RX ADMIN — METOPROLOL TARTRATE 2.5 MG: 1 INJECTION, SOLUTION INTRAVENOUS at 23:28

## 2021-01-01 RX ADMIN — LEVOTHYROXINE SODIUM 75 MCG: 75 TABLET ORAL at 06:24

## 2021-01-01 RX ADMIN — PIPERACILLIN SODIUM AND TAZOBACTAM SODIUM 3.38 G: 3; .375 INJECTION, POWDER, LYOPHILIZED, FOR SOLUTION INTRAVENOUS at 08:33

## 2021-01-01 RX ADMIN — MIRTAZAPINE 7.5 MG: 7.5 TABLET, FILM COATED ORAL at 21:33

## 2021-01-01 RX ADMIN — PIPERACILLIN SODIUM AND TAZOBACTAM SODIUM 3.38 G: 3; .375 INJECTION, POWDER, LYOPHILIZED, FOR SOLUTION INTRAVENOUS at 20:08

## 2021-01-01 RX ADMIN — GABAPENTIN 600 MG: 600 TABLET, FILM COATED ORAL at 20:22

## 2021-01-01 RX ADMIN — HALOPERIDOL LACTATE 5 MG: 5 INJECTION, SOLUTION INTRAMUSCULAR at 22:51

## 2021-01-01 RX ADMIN — PIPERACILLIN SODIUM AND TAZOBACTAM SODIUM 3.38 G: 3; .375 INJECTION, POWDER, LYOPHILIZED, FOR SOLUTION INTRAVENOUS at 13:23

## 2021-01-01 RX ADMIN — FOLIC ACID 1 MG: 1 TABLET ORAL at 08:27

## 2021-01-01 RX ADMIN — OMEPRAZOLE 40 MG: 20 CAPSULE, DELAYED RELEASE ORAL at 08:39

## 2021-01-01 RX ADMIN — PIPERACILLIN SODIUM AND TAZOBACTAM SODIUM 3.38 G: 3; .375 INJECTION, POWDER, LYOPHILIZED, FOR SOLUTION INTRAVENOUS at 14:17

## 2021-01-01 RX ADMIN — PIPERACILLIN SODIUM AND TAZOBACTAM SODIUM 3.38 G: 3; .375 INJECTION, POWDER, LYOPHILIZED, FOR SOLUTION INTRAVENOUS at 01:51

## 2021-01-01 RX ADMIN — HUMAN ALBUMIN MICROSPHERES AND PERFLUTREN 9 ML: 10; .22 INJECTION, SOLUTION INTRAVENOUS at 11:37

## 2021-01-01 RX ADMIN — Medication 15 ML: at 07:47

## 2021-01-01 RX ADMIN — RIVAROXABAN 20 MG: 10 TABLET, FILM COATED ORAL at 00:34

## 2021-01-01 RX ADMIN — IPRATROPIUM BROMIDE AND ALBUTEROL SULFATE 3 ML: .5; 3 SOLUTION RESPIRATORY (INHALATION) at 23:20

## 2021-01-01 RX ADMIN — PIPERACILLIN SODIUM AND TAZOBACTAM SODIUM 3.38 G: 3; .375 INJECTION, POWDER, LYOPHILIZED, FOR SOLUTION INTRAVENOUS at 07:46

## 2021-01-01 RX ADMIN — LORAZEPAM 1 MG: 2 INJECTION INTRAMUSCULAR; INTRAVENOUS at 21:43

## 2021-01-01 RX ADMIN — LORAZEPAM 1 MG: 2 INJECTION INTRAMUSCULAR; INTRAVENOUS at 05:28

## 2021-01-01 RX ADMIN — Medication 15 ML: at 08:41

## 2021-01-01 RX ADMIN — CHLORHEXIDINE GLUCONATE 0.12% ORAL RINSE 15 ML: 1.2 LIQUID ORAL at 19:54

## 2021-01-01 RX ADMIN — QUETIAPINE 25 MG: 25 TABLET ORAL at 08:32

## 2021-01-01 RX ADMIN — FOLIC ACID 1 MG: 1 TABLET ORAL at 08:47

## 2021-01-01 RX ADMIN — METHYLPREDNISOLONE SODIUM SUCCINATE 62.5 MG: 125 INJECTION, POWDER, FOR SOLUTION INTRAMUSCULAR; INTRAVENOUS at 14:01

## 2021-01-01 RX ADMIN — LEVOTHYROXINE SODIUM 75 MCG: 75 TABLET ORAL at 06:36

## 2021-01-01 RX ADMIN — ATORVASTATIN CALCIUM 40 MG: 20 TABLET, FILM COATED ORAL at 22:14

## 2021-01-01 RX ADMIN — FENTANYL CITRATE 100 MCG: 50 INJECTION, SOLUTION INTRAMUSCULAR; INTRAVENOUS at 03:57

## 2021-01-01 RX ADMIN — FOLIC ACID 1 MG: 1 TABLET ORAL at 10:37

## 2021-01-01 RX ADMIN — PANTOPRAZOLE SODIUM 40 MG: 40 TABLET, DELAYED RELEASE ORAL at 07:24

## 2021-01-01 RX ADMIN — SODIUM CHLORIDE: 9 INJECTION, SOLUTION INTRAVENOUS at 20:14

## 2021-01-01 RX ADMIN — IPRATROPIUM BROMIDE AND ALBUTEROL SULFATE 3 ML: .5; 3 SOLUTION RESPIRATORY (INHALATION) at 00:42

## 2021-01-01 RX ADMIN — IPRATROPIUM BROMIDE AND ALBUTEROL SULFATE 3 ML: .5; 3 SOLUTION RESPIRATORY (INHALATION) at 11:26

## 2021-01-01 RX ADMIN — IPRATROPIUM BROMIDE AND ALBUTEROL SULFATE 3 ML: .5; 3 SOLUTION RESPIRATORY (INHALATION) at 04:33

## 2021-01-01 RX ADMIN — LEVOTHYROXINE SODIUM 75 MCG: 75 TABLET ORAL at 05:50

## 2021-01-01 RX ADMIN — LORAZEPAM 1 MG: 2 INJECTION INTRAMUSCULAR; INTRAVENOUS at 05:51

## 2021-01-01 RX ADMIN — METHYLPREDNISOLONE SODIUM SUCCINATE 62.5 MG: 125 INJECTION, POWDER, FOR SOLUTION INTRAMUSCULAR; INTRAVENOUS at 13:07

## 2021-01-01 RX ADMIN — PIPERACILLIN SODIUM AND TAZOBACTAM SODIUM 3.38 G: 3; .375 INJECTION, POWDER, LYOPHILIZED, FOR SOLUTION INTRAVENOUS at 14:06

## 2021-01-01 RX ADMIN — IPRATROPIUM BROMIDE AND ALBUTEROL SULFATE 3 ML: .5; 3 SOLUTION RESPIRATORY (INHALATION) at 14:10

## 2021-01-01 RX ADMIN — ACETAMINOPHEN 650 MG: 325 TABLET, FILM COATED ORAL at 13:07

## 2021-01-01 RX ADMIN — Medication 15 ML: at 08:37

## 2021-01-01 RX ADMIN — Medication 100 MCG/HR: at 15:12

## 2021-01-01 RX ADMIN — CHLORHEXIDINE GLUCONATE 0.12% ORAL RINSE 15 ML: 1.2 LIQUID ORAL at 19:56

## 2021-01-01 RX ADMIN — LORAZEPAM 1 MG: 2 INJECTION INTRAMUSCULAR; INTRAVENOUS at 21:03

## 2021-01-01 RX ADMIN — LOSARTAN POTASSIUM 50 MG: 50 TABLET, FILM COATED ORAL at 08:27

## 2021-01-01 RX ADMIN — METHYLPREDNISOLONE SODIUM SUCCINATE 62.5 MG: 125 INJECTION, POWDER, FOR SOLUTION INTRAMUSCULAR; INTRAVENOUS at 15:11

## 2021-01-01 RX ADMIN — LOSARTAN POTASSIUM 50 MG: 50 TABLET, FILM COATED ORAL at 10:39

## 2021-01-01 RX ADMIN — AMIODARONE HYDROCHLORIDE 200 MG: 200 TABLET ORAL at 08:27

## 2021-01-01 RX ADMIN — POTASSIUM CHLORIDE 40 MEQ: 1.5 POWDER, FOR SOLUTION ORAL at 06:17

## 2021-01-01 RX ADMIN — FUROSEMIDE 20 MG: 10 INJECTION, SOLUTION INTRAMUSCULAR; INTRAVENOUS at 17:35

## 2021-01-01 RX ADMIN — LORAZEPAM 1 MG: 2 INJECTION INTRAMUSCULAR; INTRAVENOUS at 07:52

## 2021-01-01 RX ADMIN — POTASSIUM & SODIUM PHOSPHATES POWDER PACK 280-160-250 MG 1 PACKET: 280-160-250 PACK at 07:48

## 2021-01-01 RX ADMIN — FOLIC ACID 1 MG: 1 TABLET ORAL at 17:41

## 2021-01-01 RX ADMIN — FOLIC ACID 1 MG: 1 TABLET ORAL at 09:28

## 2021-01-01 RX ADMIN — LEVOTHYROXINE SODIUM 75 MCG: 75 TABLET ORAL at 08:27

## 2021-01-01 RX ADMIN — Medication 1 PACKET: at 11:30

## 2021-01-01 RX ADMIN — IPRATROPIUM BROMIDE AND ALBUTEROL SULFATE 3 ML: .5; 3 SOLUTION RESPIRATORY (INHALATION) at 07:38

## 2021-01-01 RX ADMIN — Medication 0.03 MCG/KG/MIN: at 06:04

## 2021-01-01 RX ADMIN — CHLORHEXIDINE GLUCONATE 0.12% ORAL RINSE 15 ML: 1.2 LIQUID ORAL at 07:52

## 2021-01-01 RX ADMIN — AMIODARONE HYDROCHLORIDE 200 MG: 200 TABLET ORAL at 08:39

## 2021-01-01 RX ADMIN — CHLORHEXIDINE GLUCONATE 0.12% ORAL RINSE 15 ML: 1.2 LIQUID ORAL at 07:48

## 2021-01-01 RX ADMIN — Medication 0.08 MCG/KG/MIN: at 12:40

## 2021-01-01 RX ADMIN — IPRATROPIUM BROMIDE AND ALBUTEROL SULFATE 3 ML: .5; 3 SOLUTION RESPIRATORY (INHALATION) at 07:42

## 2021-01-01 RX ADMIN — FENTANYL CITRATE 100 MCG: 50 INJECTION, SOLUTION INTRAMUSCULAR; INTRAVENOUS at 22:27

## 2021-01-01 RX ADMIN — FUROSEMIDE 20 MG: 10 INJECTION, SOLUTION INTRAMUSCULAR; INTRAVENOUS at 15:11

## 2021-01-01 RX ADMIN — SODIUM CHLORIDE 500 ML: 9 INJECTION, SOLUTION INTRAVENOUS at 15:07

## 2021-01-01 RX ADMIN — PIPERACILLIN SODIUM AND TAZOBACTAM SODIUM 3.38 G: 3; .375 INJECTION, POWDER, LYOPHILIZED, FOR SOLUTION INTRAVENOUS at 20:05

## 2021-01-01 RX ADMIN — LEVOTHYROXINE SODIUM 75 MCG: 50 TABLET ORAL at 08:38

## 2021-01-01 RX ADMIN — POTASSIUM CHLORIDE 20 MEQ: 1.5 POWDER, FOR SOLUTION ORAL at 06:07

## 2021-01-01 RX ADMIN — HEPARIN SODIUM 1200 UNITS/HR: 10000 INJECTION, SOLUTION INTRAVENOUS at 13:41

## 2021-01-01 RX ADMIN — LEVOTHYROXINE SODIUM 75 MCG: 50 TABLET ORAL at 08:27

## 2021-01-01 RX ADMIN — FENTANYL CITRATE 100 MCG: 50 INJECTION, SOLUTION INTRAMUSCULAR; INTRAVENOUS at 20:54

## 2021-01-01 RX ADMIN — LEVOTHYROXINE SODIUM 75 MCG: 50 TABLET ORAL at 08:32

## 2021-01-01 RX ADMIN — OMEPRAZOLE 40 MG: 20 CAPSULE, DELAYED RELEASE ORAL at 08:27

## 2021-01-01 RX ADMIN — IPRATROPIUM BROMIDE AND ALBUTEROL SULFATE 3 ML: .5; 3 SOLUTION RESPIRATORY (INHALATION) at 23:14

## 2021-01-01 RX ADMIN — ACETAMINOPHEN 650 MG: 325 TABLET, FILM COATED ORAL at 01:01

## 2021-01-01 RX ADMIN — LORAZEPAM 1 MG: 2 INJECTION INTRAMUSCULAR; INTRAVENOUS at 21:31

## 2021-01-01 RX ADMIN — PIPERACILLIN SODIUM AND TAZOBACTAM SODIUM 3.38 G: 3; .375 INJECTION, POWDER, LYOPHILIZED, FOR SOLUTION INTRAVENOUS at 14:35

## 2021-01-01 RX ADMIN — AMIODARONE HYDROCHLORIDE 200 MG: 200 TABLET ORAL at 09:46

## 2021-01-01 RX ADMIN — ACETAMINOPHEN 650 MG: 325 TABLET, FILM COATED ORAL at 15:14

## 2021-01-01 RX ADMIN — POTASSIUM CHLORIDE 20 MEQ: 29.8 INJECTION, SOLUTION INTRAVENOUS at 00:49

## 2021-01-01 RX ADMIN — ATORVASTATIN CALCIUM 40 MG: 20 TABLET, FILM COATED ORAL at 21:33

## 2021-01-01 RX ADMIN — FENTANYL CITRATE 100 MCG: 50 INJECTION, SOLUTION INTRAMUSCULAR; INTRAVENOUS at 12:44

## 2021-01-01 RX ADMIN — IPRATROPIUM BROMIDE AND ALBUTEROL SULFATE 3 ML: .5; 3 SOLUTION RESPIRATORY (INHALATION) at 20:01

## 2021-01-01 RX ADMIN — FUROSEMIDE 40 MG: 10 INJECTION, SOLUTION INTRAVENOUS at 13:14

## 2021-01-01 RX ADMIN — OMEPRAZOLE 40 MG: 20 CAPSULE, DELAYED RELEASE ORAL at 10:39

## 2021-01-01 RX ADMIN — POTASSIUM CHLORIDE 40 MEQ: 20 SOLUTION ORAL at 12:24

## 2021-01-01 RX ADMIN — SODIUM CHLORIDE: 9 INJECTION, SOLUTION INTRAVENOUS at 06:04

## 2021-01-01 RX ADMIN — FUROSEMIDE 20 MG: 10 INJECTION, SOLUTION INTRAMUSCULAR; INTRAVENOUS at 07:47

## 2021-01-01 RX ADMIN — FUROSEMIDE 20 MG: 10 INJECTION, SOLUTION INTRAMUSCULAR; INTRAVENOUS at 20:07

## 2021-01-01 RX ADMIN — Medication 15 ML: at 17:35

## 2021-01-01 RX ADMIN — QUETIAPINE 25 MG: 25 TABLET ORAL at 18:54

## 2021-01-01 RX ADMIN — MIDAZOLAM (PF) 1 MG/ML IN 0.9 % SODIUM CHLORIDE INTRAVENOUS SOLUTION 6 MG/HR: at 05:49

## 2021-01-01 RX ADMIN — Medication 15 ML: at 10:31

## 2021-01-01 RX ADMIN — FOLIC ACID 1 MG: 1 TABLET ORAL at 08:39

## 2021-01-01 RX ADMIN — ONDANSETRON 4 MG: 2 INJECTION INTRAMUSCULAR; INTRAVENOUS at 22:28

## 2021-01-01 RX ADMIN — LEVOTHYROXINE SODIUM 75 MCG: 75 TABLET ORAL at 06:17

## 2021-01-01 RX ADMIN — GABAPENTIN 600 MG: 600 TABLET, FILM COATED ORAL at 08:39

## 2021-01-01 RX ADMIN — MIDAZOLAM 2 MG: 1 INJECTION INTRAMUSCULAR; INTRAVENOUS at 08:00

## 2021-01-01 RX ADMIN — IPRATROPIUM BROMIDE AND ALBUTEROL SULFATE 3 ML: .5; 3 SOLUTION RESPIRATORY (INHALATION) at 15:30

## 2021-01-01 RX ADMIN — POTASSIUM CHLORIDE 20 MEQ: 1.5 POWDER, FOR SOLUTION ORAL at 00:31

## 2021-01-01 RX ADMIN — LORAZEPAM 0.5 MG: 2 INJECTION INTRAMUSCULAR; INTRAVENOUS at 18:26

## 2021-01-01 RX ADMIN — POTASSIUM CHLORIDE 40 MEQ: 20 SOLUTION ORAL at 14:03

## 2021-01-01 RX ADMIN — LORAZEPAM 1 MG: 2 INJECTION INTRAMUSCULAR; INTRAVENOUS at 18:26

## 2021-01-01 RX ADMIN — ACETAMINOPHEN 650 MG: 325 TABLET, FILM COATED ORAL at 20:21

## 2021-01-01 RX ADMIN — MIDAZOLAM HYDROCHLORIDE 1 MG/HR: 5 INJECTION, SOLUTION INTRAMUSCULAR; INTRAVENOUS at 08:33

## 2021-01-01 RX ADMIN — Medication 1 PACKET: at 11:43

## 2021-01-01 RX ADMIN — OMEPRAZOLE 40 MG: 20 CAPSULE, DELAYED RELEASE ORAL at 09:29

## 2021-01-01 RX ADMIN — IPRATROPIUM BROMIDE AND ALBUTEROL SULFATE 3 ML: .5; 3 SOLUTION RESPIRATORY (INHALATION) at 11:16

## 2021-01-01 RX ADMIN — LEVOTHYROXINE SODIUM 75 MCG: 50 TABLET ORAL at 07:48

## 2021-01-01 RX ADMIN — GABAPENTIN 600 MG: 600 TABLET, FILM COATED ORAL at 19:34

## 2021-01-01 RX ADMIN — ACETAMINOPHEN 650 MG: 325 TABLET, FILM COATED ORAL at 21:18

## 2021-01-01 ASSESSMENT — ACTIVITIES OF DAILY LIVING (ADL)
ADLS_ACUITY_SCORE: 24
ADLS_ACUITY_SCORE: 28
ADLS_ACUITY_SCORE: 28
ADLS_ACUITY_SCORE: 25
ADLS_ACUITY_SCORE: 22
ADLS_ACUITY_SCORE: 21
ADLS_ACUITY_SCORE: 24
ADLS_ACUITY_SCORE: 22
ADLS_ACUITY_SCORE: 24
DEPENDENT_IADLS:: CLEANING;COOKING;LAUNDRY;SHOPPING;MEAL PREPARATION;MEDICATION MANAGEMENT;MONEY MANAGEMENT;TRANSPORTATION
ADLS_ACUITY_SCORE: 24
ADLS_ACUITY_SCORE: 24
ADLS_ACUITY_SCORE: 28
ADLS_ACUITY_SCORE: 27
ADLS_ACUITY_SCORE: 22
ADLS_ACUITY_SCORE: 28
ADLS_ACUITY_SCORE: 22
ADLS_ACUITY_SCORE: 26
ADLS_ACUITY_SCORE: 22
ADLS_ACUITY_SCORE: 24
ADLS_ACUITY_SCORE: 24
ADLS_ACUITY_SCORE: 22
ADLS_ACUITY_SCORE: 22
ADLS_ACUITY_SCORE: 25
ADLS_ACUITY_SCORE: 24
ADLS_ACUITY_SCORE: 22
IADL_COMMENTS: FACILITY COMPLETES
ADLS_ACUITY_SCORE: 24
ADLS_ACUITY_SCORE: 24
ADLS_ACUITY_SCORE: 22
ADLS_ACUITY_SCORE: 22
ADLS_ACUITY_SCORE: 27
ADLS_ACUITY_SCORE: 25
ADLS_ACUITY_SCORE: 28
ADLS_ACUITY_SCORE: 24
DEPENDENT_IADLS:: CLEANING;COOKING;LAUNDRY;SHOPPING;MEAL PREPARATION;MEDICATION MANAGEMENT;MONEY MANAGEMENT;TRANSPORTATION
ADLS_ACUITY_SCORE: 22
ADLS_ACUITY_SCORE: 26
ADLS_ACUITY_SCORE: 22
ADLS_ACUITY_SCORE: 26
ADLS_ACUITY_SCORE: 24
ADLS_ACUITY_SCORE: 24
ADLS_ACUITY_SCORE: 22
ADLS_ACUITY_SCORE: 24
ADLS_ACUITY_SCORE: 26
ADLS_ACUITY_SCORE: 22
ADLS_ACUITY_SCORE: 24
ADLS_ACUITY_SCORE: 22
ADLS_ACUITY_SCORE: 26
ADLS_ACUITY_SCORE: 28
ADLS_ACUITY_SCORE: 24
ADLS_ACUITY_SCORE: 22
ADLS_ACUITY_SCORE: 21
ADLS_ACUITY_SCORE: 24
ADLS_ACUITY_SCORE: 24
ADLS_ACUITY_SCORE: 26
ADLS_ACUITY_SCORE: 24
ADLS_ACUITY_SCORE: 22

## 2021-01-01 ASSESSMENT — ENCOUNTER SYMPTOMS
ABDOMINAL PAIN: 0
SINUS PAIN: 0
TREMORS: 0
SPEECH DIFFICULTY: 0
DYSURIA: 0
RHINORRHEA: 0
CHEST TIGHTNESS: 0
VOICE CHANGE: 0
SORE THROAT: 0
WEAKNESS: 0
BACK PAIN: 0
TROUBLE SWALLOWING: 0
NUMBNESS: 0
DYSPHORIC MOOD: 0
FREQUENCY: 1
PALPITATIONS: 0
NECK PAIN: 0
HEMATURIA: 0
FACIAL ASYMMETRY: 0
NERVOUS/ANXIOUS: 0
WOUND: 0
NECK STIFFNESS: 0
FACIAL SWELLING: 0
CHILLS: 0
COUGH: 0
SHORTNESS OF BREATH: 1
DIAPHORESIS: 0
FEVER: 0
LIGHT-HEADEDNESS: 0
HEADACHES: 0
APPETITE CHANGE: 0
FLANK PAIN: 0
VOMITING: 0
SINUS PRESSURE: 0
ACTIVITY CHANGE: 0
FATIGUE: 0
NAUSEA: 0

## 2021-01-01 ASSESSMENT — MIFFLIN-ST. JEOR
SCORE: 1471.47
SCORE: 1653.37
SCORE: 1470.88
SCORE: 1457.88
SCORE: 1487.88
SCORE: 1462.4

## 2021-01-14 NOTE — TELEPHONE ENCOUNTER
Called and spoke NEYMAR Piña [phone] at the [place] [facility].  Over the past week, she has had increased confusion around suppertime and in the evening.   Has dx mild Alzheimer's.  RN did cognitive assessment in the day yesterday and pt had a perfect score of 15/15  Family concerned because she keeps calling them; she has hx UTI, would like to rule that out.  RN says she does not have a fever.  Denies urinary sx when asked, though family says she has always denied sx.    Advise, what kind of visit would you like her to do?      Kelley KABA RN, BSN

## 2021-01-14 NOTE — TELEPHONE ENCOUNTER
Called NEYMAR Piña [phone] at the [facility]. She will call daughters and let them know to schedule a VV or F2F with Dr. Zacarias.    Kelley KABA RN, BSN

## 2021-01-14 NOTE — TELEPHONE ENCOUNTER
Reason for Call:  Other     Detailed comments: pt has been more confused in the late afternoons and early mornings for the last week. Pts family would like to rule out a UTI. Would you like to do phone or clinic visit or place an order?    Phone Number Patient can be reached at: Ayana [phone]    Best Time:     Can we leave a detailed message on this number? YES    Call taken on 1/14/2021 at 11:55 AM by Stefani Neal

## 2021-01-14 NOTE — TELEPHONE ENCOUNTER
Confusion that only occurs in the evening is not a symptom of a bladder infection.  This is more consistent with sundowning related to Alzheimer's.  She can follow-up with me in a manner that is most convenient with her-either virtual or in person

## 2021-01-18 NOTE — TELEPHONE ENCOUNTER
"S-(situation): See family's request below    B-(background): See 1/14/2021 telephone note, daughters made no appt, now they want an appt or an order to check urine, their plan outlined below    A-(assessment):   Called and spoke with nurse Ayana phone # [phone]  See notes/concern in Epic from last week. Ayana says pt had no other sx at all, just thinking it may be sundowner's  Her VS have been \"normal.\"  Pt has always had dribbling of urine on her way to the toilet, but over the weekend, she has had an increase in urinary incontinence. Refusing to let staff help change her.  Today she is reporting nausea.  She is having a hard time with transfers, unsteady on her feet; despite this she actually has not fallen for the past couple weeks.  Has hx UTIs.  Staff has not reported any foul smell; just \"a bit potent\" but not foul.  Refusing to come out for meals; meals are being brought to her room.  She does have water by her but they do not know how much she drinks of it.   Family would have  supplies from a [facility] lab, and bring to the [facility] to get a sample. Transportation is a problem to come in for a visit, but they will try.   VS today  97.1   P 57  R 18  /79  O2: 98% on 2 lpm    R-(recommendations): OV appt made for tomorrow with ONIEL Zacarias DO. Kelley will call and change to a VV if family unable to get her into clinic for the appt.     Kelley KABA, RN, BSN        "

## 2021-01-19 NOTE — ED NOTES
Nieves BLACKMON called from [facility] [phone]      Increase confusion x 2 weeks, hx of dementia.    Urinary incontinence increase the last 3 days and standing up and urinating at recliner.   C/o of weakness and unable to walk well with 4 wheel walker.    Chante, pt's daughter, will be coming down to see pt.

## 2021-01-19 NOTE — ED NOTES
Discussed care with patients daughter, Chante.  She reports that the patient has been having increased confusion over the last few weeks.  Prior to that it had been initially just at night but now she notices it throughout the day.  Pt did have a fall at approx the time that the confusion changed.  She tripped over the oxygen tubing.  She was not seen for that fall as far as the daughter knows.  Daughter states that they increased her percocet due to pain after the fall.  She reports this has caused confusion in the past.  MD notified of what daughter reported.

## 2021-01-19 NOTE — DISCHARGE INSTRUCTIONS
The exact cause of your symptoms is not clear at this time.  There is no sign of urine infection or signs of new problems on the CAT scan of your head.  If your symptoms continue or if new symptoms develop, please return to the emergency department or follow-up with primary care for further evaluation.

## 2021-01-19 NOTE — ED PROVIDER NOTES
"  History     Chief Complaint   Patient presents with     Altered Mental Status     history of UTI's.  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  Patient reported has had about 3 days of increasing urinary incontinence as well as urinary frequency but denies any dysuria, fevers, chills, or flank pain.  Has apparently also had some increased unsteadiness with walking although there has been no report of falls and patient does not recall falling or hitting her head.  Patient also reports some increasing forgetfulness and some slight increased confusion which is relatively new over perhaps the past 2 weeks or so.  Patient denies fevers or chills.  Has not been having other infectious symptoms such as cough, sore throat, runny nose, abdominal pain, nausea, vomiting.  Patient denies any headache or vision changes.  Denies any numbness, tingling, or weakness.  Patient does admit to being more unstable on her feet but denies any vertigo.  Patient denies any focal weakness in her arms or legs but does report her left leg seems to \"nearly give out\" more frequently recently.  She does have a history of previous back problems but denies significant back pain currently.  Has had UTIs in the past and was sent in for evaluation of possible UTI.  Patient frustrated regarding being sent in as she feels there is nothing emergently wrong and she would have preferred to stay and get this checked out during normal hours however her staff insisted she be brought in tonight.    ==================================================================    CHART REVIEW:              END CHART " REVIEW  ==================================================================        Allergies:  Allergies   Allergen Reactions     Ace Inhibitors Cough     Aspirin Other (See Comments)     GI Bleeding     Citalopram Other (See Comments)     Tremor       Penicillins Unknown     Childhood Rxn     Tramadol Other (See Comments)     Lethargy     Furosemide Rash     Lisinopril-Hydrochlorothiazide Cough     Torsemide Rash       Problem List:    Patient Active Problem List    Diagnosis Date Noted     Heart failure with preserved ejection fraction, NYHA class II (H) 06/08/2020     Priority: Medium     Elevated hemidiaphragm 05/22/2020     Priority: Medium     Markedly elevated right hemidiaphragm, 1st noted 12/25/2019       Chronic respiratory failure with hypercapnia (H) 05/21/2020     Priority: Medium     Hypoxemia 05/20/2020     Priority: Medium     Presence of IVC filter 05/01/2020     Priority: Medium     PLACED 2013  Discussed considering removal in 6-12 months, post-COVID.       Coronary artery disease involving native coronary artery of native heart without angina pectoris 05/01/2020     Priority: Medium     Presumed CAD.  Stress test 4/2020 Perfusion defect in the inferolateral wall and septal area seen on stress test.  Patient reports no history of acute MI.       Stenosis of right vertebral artery 05/01/2020     Priority: Medium     Noted on CTA for TIA 4/2020.  Started statin       History of discitis 05/01/2020     Priority: Medium     She , had a spinal fusion done by Dr. Zapata in [place] about 2010 that went very poorly. She ended up having diskitis, infection and needed roughly 12 weeks of IV antibiotics. She had three subsequent surgeries to try to clean the issues up and, unfortunately, had significant scar tissue in her lumbar spine and chronic pain due to this       History of upper gastrointestinal bleeding 05/01/2020     Priority: Medium     On aspirin       Wide-complex tachycardia (H) 04/28/2020      "Priority: Medium     [facility] presented 4/2020 with palpitations associated with shortness of breath. Heart rate was noted to be in 165 beats per minute. She was given adenosine 6 mg followed by 12 mg. Subsequently she was given 150 joules shock. Subsequently patient was given 200 joules of shock. Patient was also given 150 mg bolus of amiodarone followed by drip. She converted to sinus rhythm after amiodarone bolus. Transferred to [facility]. EP was consulted and they felt it was unlikely to be VT         Late onset Alzheimer's disease without behavioral disturbance (H) 02/20/2020     Priority: Medium     Worsening as of 2/2020       History of TIA (transient ischemic attack) and stroke 12/26/2019     Priority: Medium     History of TIAs- scant details available  During hospitalization 4/202:  Stroke code called on 4/23. Head CT negative. Not candidate for tpa or embolectomy. Concern for embolic effect with possible a fib vs flutter and non AC cardioversion at OSH. Bubble study negative. Likely had TIA.  Neurology was involved and she was started on xarelto as above         Supraventricular tachycardia (H) 01/25/2019     Priority: Medium     HOLTER suggested long runs Vtach.  Cardiac EP eval [facility] dul feels this is SVT with aberancy, beta blocker dose increased.  Stress testing 4/2019 \"most likely\" normal, EF 80%       Lumbosacral radiculopathy at L5 03/15/2017     Priority: Medium     S/P lumbar spinal fusion 03/15/2017     Priority: Medium     L3-S1       Hypothyroidism 02/15/2016     Priority: Medium     CKD (chronic kidney disease) stage 2, GFR 60-89 ml/min 12/18/2015     Priority: Medium     Other iron deficiency anemia 12/18/2015     Priority: Medium     400 venofer 12/2019 (noted to be b12, folate AND b12 def at same time)       Hyperlipidemia 12/18/2015     Priority: Medium     Back pain with right-sided radiculopathy 11/20/2015     Priority: Medium     INJECTION Dr Huddleston 3/15/17 " modestly helpful.  Fusion [facility] 6/2010-- multiple complications, profound scar tissue entrapping right sided nerves-- chronic pain       Shoulder pain 02/16/2015     Priority: Medium     Severe OA per xr 2015.  Not surg candidate.  Left shoulder inj 2/16/15.  Right shoulder inj 2/16/15, 9/6/2016, 12/7/18, 1/3/2020       History of DVT of lower extremity 09/25/2013     Priority: Medium     Initial DVT following back surgery in 2013, IVC filter in place permanently.   Acute left lower extremity DVT 4/2020o and now on indefinite anticoagulation with rivaroxaban. ultrasound 4/2020 - Occlusive left mid to distal femoral vein involving the popliteal vein and nonocclusive clot in the left common femoral and proximal femoral  Needs anticoagulation indefinintely.        Neuropathy 09/25/2013     Priority: Medium     S/P hip replacement 09/25/2013     Priority: Medium     Osteoarthrosis, hip 05/02/2012     Priority: Medium     Right hip. Significant OA changes on xrays.  Failed conservative rx, 9/24/13 with Right CHILO/misterling       Personal history of colonic polyps 02/06/2006     Priority: Medium     8/05=1 tubulovillous polyp, recheck 9/07=1polyp       Diverticulosis of colon 08/05/2005     Priority: Medium     Per colonoscopy       Esophageal reflux 08/05/2004     Priority: Medium     Osteoporosis 08/05/2004     Priority: Medium     Dexa 10/06 normal, recheck 2 yrs.   Dexa 2/08 good. 1/2011 mild worsening with frax score 10%major/0.8%other. REPEAT 2012^^^^^^^^^^^^^^^^^^^^^       Primary osteoarthritis involving multiple joints 08/05/2004     Priority: Medium     Complex/multiple back surgeries [facility] summer 2010--ultimately with fusion.  Right knee-hinkle, rooster comb injections-x5 fall 2008.  Right shoulder cortisone 1/08, 10/08, 1/3/20.  Left hip injection 5/2011. Right hip injected [facility] 1/20/11, 3/15/11, also 9/2011 by [facility] (all trochanter bursa), -1/18/12 (r hip again).  Left knee  6/25/14, 9/17/14, 4/24/15.       Essential hypertension 08/05/2004     Priority: Medium     Echo 02/2016-- EF 55-60%, valves ok, right heart ok       Vitreous degeneration 03/14/2002     Priority: Medium     HX of bilateral vitreous detachment       Counseling on health care directive 06/15/2001     Priority: Medium     Class 1 obesity due to excess calories with serious comorbidity in adult 05/21/2020     Priority: Low        Past Medical History:    Past Medical History:   Diagnosis Date     Basal cell carcinoma of face 7/19/2017     History of DVT of lower extremity 09/25/2013     Neuropathy 9/25/2013     TIA (transient ischemic attack) 12/26/2019     Wide-complex tachycardia (H) 04/28/2020       Past Surgical History:    Past Surgical History:   Procedure Laterality Date     APPENDECTOMY      teenager     AS LUMBAR SPINE FUSN,POST INTRBDY  2010     AS REVISE TOTAL HIP REPLACEMENT Right 2013     CHOLECYSTECTOMY  1967     ENDOMETRIAL SAMPLING (BIOPSY)  2001     SALPINGO OOPHORECTOMY,R/L/DI Left 1967     VENA CAVA FILTER  2010    Permanent        Family History:    Family History   Problem Relation Age of Onset     Cancer Mother         multiple myeloma     Abdominal Aortic Aneurysm Father      Hypertension Father      Prostate Cancer Father        Social History:  Marital Status:   [5]  Social History     Tobacco Use     Smoking status: Never Smoker     Smokeless tobacco: Never Used   Substance Use Topics     Alcohol use: Not Currently     Frequency: Never     Drug use: Never        Medications:         acetaminophen (TYLENOL) 500 MG tablet       amiodarone (PACERONE) 200 MG tablet       atorvastatin (LIPITOR) 40 MG tablet       folic acid (FOLVITE) 1 MG tablet       furosemide (LASIX) 20 MG tablet       gabapentin (NEURONTIN) 600 MG tablet       levothyroxine (SYNTHROID/LEVOTHROID) 75 MCG tablet       losartan (COZAAR) 25 MG tablet       nystatin (MYCOSTATIN) 702354 UNIT/GM external cream       omeprazole  (PRILOSEC) 40 MG DR capsule       order for DME       oxyCODONE-acetaminophen (PERCOCET) 5-325 MG tablet       rivaroxaban ANTICOAGULANT (XARELTO ANTICOAGULANT) 20 MG TABS tablet       vitamin B-12 (CYANOCOBALAMIN) 1000 MCG tablet       Angel Carb-Mag Hydrox-Simeth (ROLAIDS ADVANCED PO)       hydrocortisone (CORTAID) 1 % external cream       Hyprom-Naphaz-Polysorb-Zn Sulf (CLEAR EYES COMPLETE) SOLN       loperamide (IMODIUM A-D) 2 MG tablet       order for DME       order for DME       polyethylene glycol (MIRALAX) 17 GM/SCOOP powder       simethicone (MYLICON) 125 MG chewable tablet       triamcinolone (KENALOG) 0.1 % external cream       zinc oxide (DESITIN) 40 % external ointment          Review of Systems   Constitutional: Negative for activity change, appetite change, chills, diaphoresis, fatigue and fever.   HENT: Negative for congestion, facial swelling, hearing loss, rhinorrhea, sinus pressure, sinus pain, sore throat, trouble swallowing and voice change.    Eyes: Negative for visual disturbance (Chronic use of corrective lenses but nothing acutely abnormal).   Respiratory: Positive for shortness of breath (Chronic). Negative for cough and chest tightness.    Cardiovascular: Negative for chest pain, palpitations and leg swelling.   Gastrointestinal: Negative for abdominal pain, nausea and vomiting.   Genitourinary: Positive for enuresis, frequency and urgency. Negative for decreased urine volume, dysuria, flank pain, hematuria and vaginal bleeding.   Musculoskeletal: Negative for back pain, neck pain and neck stiffness.   Skin: Negative for rash and wound.   Neurological: Negative for tremors, syncope, facial asymmetry, speech difficulty, weakness, light-headedness, numbness and headaches.        Feels unsteady when walking.   Psychiatric/Behavioral: Negative for dysphoric mood. The patient is not nervous/anxious.    All other systems reviewed and are negative.      Physical Exam   BP: (!) 143/68  Pulse:  57  Temp: 97.8  F (36.6  C)  Resp: 18  Weight: 96.6 kg (213 lb)  SpO2: 92 %      Physical Exam  Vitals signs and nursing note reviewed.   Constitutional:       Appearance: She is well-developed. She is not ill-appearing or diaphoretic.      Comments: Alert sitting upright in no distress.  Communicating easily with clear speech.   HENT:      Head: Normocephalic and atraumatic.      Comments: No visible bruising     Nose: Nose normal.   Eyes:      Conjunctiva/sclera: Conjunctivae normal.      Pupils: Pupils are equal, round, and reactive to light.   Cardiovascular:      Rate and Rhythm: Regular rhythm. Bradycardia present.      Pulses: Normal pulses.      Heart sounds: Murmur present.   Pulmonary:      Effort: Pulmonary effort is normal.      Breath sounds: Normal breath sounds. No wheezing or rhonchi.   Abdominal:      Palpations: Abdomen is soft.      Tenderness: There is no abdominal tenderness. There is no guarding.   Musculoskeletal:      Right lower leg: Edema present.      Left lower leg: Edema present.        Legs:       Comments: Moving all extremities equally   Skin:     General: Skin is warm and dry.      Capillary Refill: Capillary refill takes less than 2 seconds.   Neurological:      General: No focal deficit present.      Mental Status: She is alert.      Sensory: No sensory deficit.      Motor: No weakness.      Comments: Oriented to person and time.  Knows that she is at the hospital but does not know the name or exact location of her current hospital.  Nose her current residence.  Does have intention tremor   Psychiatric:         Mood and Affect: Mood normal.         ED Course        Procedures                   Results for orders placed or performed during the hospital encounter of 01/18/21 (from the past 24 hour(s))   UA reflex to Microscopic   Result Value Ref Range    Color Urine Yellow     Appearance Urine Clear     Glucose Urine Negative NEG^Negative mg/dL    Bilirubin Urine Negative  NEG^Negative    Ketones Urine Negative NEG^Negative mg/dL    Specific Gravity Urine 1.025 1.003 - 1.035    Blood Urine Negative NEG^Negative    pH Urine 5.0 5.0 - 7.0 pH    Protein Albumin Urine 30 (A) NEG^Negative mg/dL    Urobilinogen mg/dL 0.0 0.0 - 2.0 mg/dL    Nitrite Urine Negative NEG^Negative    Leukocyte Esterase Urine Negative NEG^Negative    Source Catheterized Urine     RBC Urine 2 0 - 2 /HPF    WBC Urine 1 0 - 5 /HPF    Squamous Epithelial /HPF Urine <1 0 - 1 /HPF    Mucous Urine Present (A) NEG^Negative /LPF   CBC with platelets differential   Result Value Ref Range    WBC 6.5 4.0 - 11.0 10e9/L    RBC Count 3.48 (L) 3.8 - 5.2 10e12/L    Hemoglobin 10.1 (L) 11.7 - 15.7 g/dL    Hematocrit 35.8 35.0 - 47.0 %     (H) 78 - 100 fl    MCH 29.0 26.5 - 33.0 pg    MCHC 28.2 (L) 31.5 - 36.5 g/dL    RDW 15.8 (H) 10.0 - 15.0 %    Platelet Count 224 150 - 450 10e9/L    Diff Method Automated Method     % Neutrophils 76.5 %    % Lymphocytes 10.2 %    % Monocytes 11.7 %    % Eosinophils 0.8 %    % Basophils 0.5 %    % Immature Granulocytes 0.3 %    Nucleated RBCs 0 0 /100    Absolute Neutrophil 5.0 1.6 - 8.3 10e9/L    Absolute Lymphocytes 0.7 (L) 0.8 - 5.3 10e9/L    Absolute Monocytes 0.8 0.0 - 1.3 10e9/L    Absolute Eosinophils 0.1 0.0 - 0.7 10e9/L    Absolute Basophils 0.0 0.0 - 0.2 10e9/L    Abs Immature Granulocytes 0.0 0 - 0.4 10e9/L    Absolute Nucleated RBC 0.0    Comprehensive metabolic panel   Result Value Ref Range    Sodium 144 133 - 144 mmol/L    Potassium 4.5 3.4 - 5.3 mmol/L    Chloride 102 94 - 109 mmol/L    Carbon Dioxide 41 (H) 20 - 32 mmol/L    Anion Gap 1 (L) 3 - 14 mmol/L    Glucose 102 (H) 70 - 99 mg/dL    Urea Nitrogen 22 7 - 30 mg/dL    Creatinine 0.98 0.52 - 1.04 mg/dL    GFR Estimate 53 (L) >60 mL/min/[1.73_m2]    GFR Estimate If Black 61 >60 mL/min/[1.73_m2]    Calcium 8.3 (L) 8.5 - 10.1 mg/dL    Bilirubin Total 0.2 0.2 - 1.3 mg/dL    Albumin 3.4 3.4 - 5.0 g/dL    Protein Total 6.6 (L)  6.8 - 8.8 g/dL    Alkaline Phosphatase 101 40 - 150 U/L    ALT 15 0 - 50 U/L    AST 13 0 - 45 U/L   CT Head w/o Contrast    Narrative    EXAM: CT HEAD W/O CONTRAST  LOCATION: [facility]  DATE/TIME: 1/18/2021 10:21 PM    INDICATION: Mental status change, unknown cause  COMPARISON: 07/20/2020.  TECHNIQUE: Routine CT Head without IV contrast. Multiplanar reformats. Dose reduction techniques were used.    FINDINGS:  INTRACRANIAL CONTENTS: No intracranial hemorrhage, extraaxial collection, or mass effect.  No CT evidence of acute infarct. There is scattered diffuse low attenuation within the periventricular and subcortical white matter consistent with diffuse small   vessel ischemic disease. There is encephalomalacia again noted involving the posterior right frontal lobe and the right parietal lobe. The ventricular system, basal cisterns and the cortical sulci are consistent with diffuse volume loss.     VISUALIZED ORBITS/SINUSES/MASTOIDS: No intraorbital abnormality. No paranasal sinus mucosal disease. No middle ear or mastoid effusion.    BONES/SOFT TISSUES: No acute abnormality.      Impression    IMPRESSION:  1.  No CT finding of a mass, hemorrhage or focal area suggestive of acute infarct.  2.  Diffuse age related changes along with encephalomalacia posterior right frontal to right parietal lobes.       Medications - No data to display    10:03 PM: Received update via RN from patient's daughter.  Patient reportedly did have a fall a few weeks back after getting tripped up on her oxygen.  Was apparently not evaluated at that time but has had also an increase in her Percocet use due to chronic toe pain.  Raises concern for intracranial hemorrhage as well as possible confusion and instability secondary to increased opiate use    11:32 PM: Discussed with Daughter Chante by phone. Updated.    Assessments & Plan (with Medical Decision Making)  85-year-old female with a history of previous DVTs on Xarelto as  well as chronic oxygen dependency and Alzheimer's presenting for evaluation of some urinary incontinence.  Exact timeframe is slightly hazy but over the past 1 to 3 days has had some increased urinary incontinence along with some confusion.  Patient has had some waxing and waning confusion ongoing possibly worse over the past 2 weeks but patient is quite alert and aware in the emergency department currently.  Urinalysis obtained showed no evidence of UTI.  Basic labs showed no evidence of infection or renal insufficiency nor significant electrolyte abnormality.  Mild anemia is roughly at baseline.  Initially there is no report of falls however daughter did report there may have been a fall a few weeks back.  The patient is on Xarelto and had CT performed to look for evidence of subacute hemorrhage.  Also given her combination of symptoms, consideration for normal pressure hydrocephalus is considered.  CT showed no evidence of acute or subacute hemorrhage nor evidence of hydrocephalus, CT not significant change compared to previous.  Exact cause of her new symptoms unclear.  Possibly secondary to progressive dementia.  Also there is some report of increased opiate use for pain control which may be contributing.  No evidence for acute or life-threatening cause of her symptoms currently.  Recommended continued monitoring and primary care follow-up     I have reviewed the nursing notes.    I have reviewed the findings, diagnosis, plan and need for follow up with the patient.       Discharge Medication List as of 1/19/2021 12:01 AM          Final diagnoses:   Urinary incontinence, unspecified type   Confusion - possibly due to underlying dementia       1/18/2021   [facility] EMERGENCY DEPT     Lakhani, Donta Smith MD  01/19/21 0052

## 2021-02-16 PROBLEM — E66.01 MORBID OBESITY (H): Status: ACTIVE | Noted: 2021-01-01

## 2021-02-16 NOTE — PATIENT INSTRUCTIONS
Tremor:  1. See neurology -  help distinguish between parkinsons or alzheimers - you call them    Sleep problems/Confusion/ Alzheimers:  1. Formal memory testing (neuropsych)- they will call you to schedule  2. Will wait on trying medication for memory until we get a firm diagnosis -Alzheimers is treated with different medications than Parkinson's related memory problem  3. Melatonin 3 mg at bedtime to help with sleep - improve sleep wake cycle.  4. Mood might improve as COVID restrictions loosen    Low Oxygen:  1. Get another oxygen evaluation - to see if you need more oxygen.  They will call you to schedule  2. Consider seeing Lung Doctor - you call them to schedule.    Knee Pain:  1. Due to moderate-severe arthritis.  Recommend seeing Sports medicine - injections? They will call you to schedule  2. Would start with Neoprene style knee sleeve, Tylenol and voltaren gel      Treatments for arthritis:  1. Over the counter pain medications   A. Tylenol (Acetaminophen) 500-1000 mg 3 x day as needed   B. Ibuprofen (or other NSAIDs such as Aleve, Aspirin, Advil) 400-600 mg 3 x day as needed - cannot use due to blood thinner  2. Over the counter topical   A. Aspercream with Lidocaine, Capsaicin, Icy Hot, Biofreeze, Salon Pas, Blue Emu, Voltaren  3. Prescription pain medications (NSAIDS)- cannot use due to blood thinners  4. Physical therapy   5. Heat, ice, stretching, braces, modified activity  6. Sports Medicine or Orthopedics for Injections (steroids, 'rooster comb')  7. Joint replacement      Skin Problem:   1. See Derm.  Low priority - you call to schedule

## 2021-02-16 NOTE — PROGRESS NOTES
Assessment & Plan     Chronic respiratory failure with hypercapnia (H) -at least partially due to elevated hemidiaphragm.  Heart failure and morbid obesity might contribute as well.  She is developing hypoxia to the mid 80s with minimal exertion and needs further oxygen evaluation to determine if she needs to use more than 2 L of oxygen and how much.  Her use of oxygen is new over the last year and has not seen pulmonary so referral was placed.  She and daughter were overwhelmed after today's visit and advised that the pulmonary referral is lower priority but the PFT oxygen evaluation should be the highest priority.  - PULMONARY MEDICINE REFERRAL  - General PFT Lab (Please always keep checked); Future  - 6 minute walk test; Future    Late onset Alzheimer's disease without behavioral disturbance (H) -nursing home staff is concerned that she might have Parkinson's versus essential tremor.  This certainly could be true.  Will defer starting medications until diagnosis is known.  Needs neuropsych and neurology.  Start melatonin to help with sundowning behaviors.  May consider trazodone.  Patient and daughter felt that mood and sleep would improve as Covid restrictions are loosened at her nursing home  - NEUROPSYCHOLOGY REFERRAL  - NEUROLOGY ADULT REFERRAL  - melatonin 3 MG tablet; Take 1 tablet (3 mg) by mouth nightly as needed for sleep    Insomnia due to medical condition -start melatonin as above.  Clearly having some sundowning type behavior    Tremor -essential tremor versus Parkinson's?  - NEUROLOGY ADULT REFERRAL    Arthritis of left knee -end-stage arthritis.  Discussed brace, Voltaren, continued Tylenol.    - Orthopedic & Spine  Referral; Future    Morbid obesity (H) -contributes to arthritis    Rash and nonspecific skin eruption -patient brought up at the end of the visit  - DERMATOLOGY ADULT REFERRAL        Patient Instructions   Tremor:  1. See neurology -  help distinguish between parkinsons  or alzheimers - you call them    Sleep problems/Confusion/ Alzheimers:  1. Formal memory testing (neuropsych)- they will call you to schedule  2. Will wait on trying medication for memory until we get a firm diagnosis -Alzheimers is treated with different medications than Parkinson's related memory problem  3. Melatonin 3 mg at bedtime to help with sleep - improve sleep wake cycle.  4. Mood might improve as COVID restrictions loosen    Low Oxygen:  1. Get another oxygen evaluation - to see if you need more oxygen.  They will call you to schedule  2. Consider seeing Lung Doctor - you call them to schedule.    Knee Pain:  1. Due to moderate-severe arthritis.  Recommend seeing Sports medicine - injections? They will call you to schedule  2. Would start with Neoprene style knee sleeve, Tylenol and voltaren gel      Treatments for arthritis:  1. Over the counter pain medications   A. Tylenol (Acetaminophen) 500-1000 mg 3 x day as needed   B. Ibuprofen (or other NSAIDs such as Aleve, Aspirin, Advil) 400-600 mg 3 x day as needed - cannot use due to blood thinner  2. Over the counter topical   A. Aspercream with Lidocaine, Capsaicin, Icy Hot, Biofreeze, Salon Pas, Blue Emu, Voltaren  3. Prescription pain medications (NSAIDS)- cannot use due to blood thinners  4. Physical therapy   5. Heat, ice, stretching, braces, modified activity  6. Sports Medicine or Orthopedics for Injections (steroids, 'rooster comb')  7. Joint replacement      Skin Problem:   1. See Derm.  Low priority - you call to schedule          No follow-ups on file.    Abbie Zacarias DO  [facility]    Markus Tucker is a 85 year old who presents for the following health issues  accompanied by her daughter sachin:    HPI   Chief Complaint   Patient presents with     Tremors     Sleep Problem     Depression     Patient was 25 min late to appointment       Abnormal Mood Symptoms  Onset/Duration: April 2020, worse x 1 month    Description: feeling down , depressed   Depression (if yes, do PHQ-9): YES  Anxiety (if yes, do BÁRBARA-7): YES   And anxiety with her difficulty breathing .  Accompanying Signs & Symptoms:  Still participating in activities that you used to enjoy: no  Fatigue: YES  Irritability: YES  Difficulty concentrating: YES  Changes in appetite: no  Problems with sleep: YES  Heart racing/beating fast: no  Abnormally elevated, expansive, or irritable mood: no  Persistently increased activity or energy: no  Thoughts of hurting yourself or others: no  History:  Recent stress or major life event: YES- April 2020, moved from [facility]   Prior depression or anxiety: None  Family history of depression or anxiety: no  Alcohol/drug use: no  Difficulty sleeping: YES  Precipitating or alleviating factors: None  Therapies tried and outcome: talking to her children, visits from children  No flowsheet data found.  No flowsheet data found.    Insomnia  Onset/Duration: April 2020  Description:   Frequency of insomnia:  several times a week  Time to fall asleep (sleep latency): 2-4 hours   Middle of night awakening:  YES  Early morning awakening:  no  Progression of Symptoms:  worsening  Accompanying Signs & Symptoms:  Daytime sleepiness/napping: YES  Excessive snoring/apnea: no  Restless legs: no  Waking to urinate: YES- once   Chronic pain:  YES  Depression symptoms (if yes, do PHQ9): YES  Anxiety symptoms (if yes, do BÁRBARA-7): YES  History:  Prior Insomnia: no  New stressful situation: YES- Moved here 4/2020  Precipitating factors:   Caffeine intake: YES- 1-2 cups per day   OTC decongestants: no  Any new medications: no  Alleviating factors:  Self medicating (alcohol, etc.):  no  Stress-reduction (exercise, yoga, meditation etc): no  Therapies tried and outcome: None       Concern - Tremors   Onset: many years: ongoing in Jaw, hands , getting worse  Description: Tremors in Jaw Area, Now Left Leg , Hands  Intensity: severe  Progression of  "Symptoms:  worsening  Accompanying Signs & Symptoms: Numbness in 3 fingers in Left Hand   Previous history of similar problem: none   Precipitating factors:        Worsened by: when standing up and standing still   Alleviating factors:        Improved by: none   Therapies tried and outcome: None  --tremble in voice    Confusion: staff note sundowning type behavior, alteration of sleep/wake, confusion at night. Diagnosed with Alzheimers by former PCP, no formal Neuropsych testing. Or been on meds    Left knee pain: can buckle when standing. Has fallen several times due to this.  --has had injections in the knee -somewhat helpful.    CT 7/2020 - IMPRESSION:  1.  No fracture or joint malalignment.  2.  Soft tissue contusion-hematoma in the anterolateral knee subcutaneous tissues.  3.  Moderate-advanced left knee tricompartmental degenerative arthrosis.  4.  Small left knee joint effusion.    Derm problem: bump on bridge of nose, present for years, will pick off then come right back.    Hip problem: has had hip replacement and 'feels it is moving around' and 'not set right'.  Feels that hardware is loosening.    Shortness of breath/Hypoxia: staff and family has noted purple fingers, shortness of breath despite oxygen use.    Review of Systems   Constitutional, HEENT, cardiovascular, pulmonary, GI, , musculoskeletal, neuro, skin, endocrine and psych systems are negative, except as otherwise noted.      Objective    /82 (BP Location: Right arm, Patient Position: Chair, Cuff Size: Adult Large)   Pulse 59   Temp 97.4  F (36.3  C) (Tympanic)   Ht 1.651 m (5' 5\")   Wt 101.2 kg (223 lb)   SpO2 95%   BMI 37.11 kg/m    Body mass index is 37.11 kg/m .  Physical Exam   GENERAL APPEARANCE: alert, no distress and morbidly obese, in wheelchair, wearing oxygen  RESP: Crackles at right lung base, otherwise clear  CV: regular rates and rhythm, normal S1 S2, no S3 or S4 and no murmur, click or rub  MS: 4/5 strength " bilateral UE/LE  SKIN: scaly nodule on bridge of nose  NEURO: Occasional myoclonic jerking of arms and legs, mild resting tremor of jaw and bilateral hands, worse with activity  PSYCH: mentation appears impaired, affect normal/bright and poor attention span

## 2021-02-25 NOTE — TELEPHONE ENCOUNTER
Patient was scheduled for a virtual appointment with Dr. Reyes today (new patient).  She had also been referred for neuropsych testing, which has not been scheduled yet.      At Dr. Reyes' direction I called Kelley, the RN at [facility] assisted [facility].  I advised her we could still do the visit today with Dr. Reyes, but that the results of the neuropsych testing would be invaluable in determining a care plan.  Kelley was given the phone for scheduling the neuropsych testing.  She will coordinate with [facility]'s family to schedule this, and then will call us to schedule an appointment.

## 2021-03-08 NOTE — PROGRESS NOTES
6MWT completed    SpO2 on RA at rest = 86%, HR 54.    SpO2 on 2L at rest = 94%, HR 56,  /70. RPD=0  SpO2 on 2L with walk = 92%, HR 75, /70. RPD=2    Due to very limited mobility, patient marched in place for test.  Patient arrived on empty O2 tank. RA sats 86%.  Rested on currently prescribed 2L O2, then attempted to stand and move in place.  Able to do this about 2:30 mins before legs began jumping/twitching.  Unable to stand any longer.  Test stopped.  Ear probe used.  Test flowsheet sent for scanning.

## 2021-03-09 NOTE — TELEPHONE ENCOUNTER
"Requested Prescriptions   Pending Prescriptions Disp Refills     atorvastatin (LIPITOR) 40 MG tablet [Pharmacy Med Name: atorvastatin 40 mg tablet] 30 tablet 11     Sig: Take 1 tablet (40 mg) by mouth daily       Statins Protocol Failed - 3/9/2021  8:00 AM        Failed - LDL on file in past 12 months     No lab results found.          Passed - No abnormal creatine kinase in past 12 months     No lab results found.             Passed - Recent (12 mo) or future (30 days) visit within the authorizing provider's specialty     Patient has had an office visit with the authorizing provider or a provider within the authorizing providers department within the previous 12 mos or has a future within next 30 days. See \"Patient Info\" tab in inbasket, or \"Choose Columns\" in Meds & Orders section of the refill encounter.              Passed - Medication is active on med list        Passed - Patient is age 18 or older        Passed - No active pregnancy on record        Passed - No positive pregnancy test in past 12 months             "

## 2021-03-15 NOTE — RESULT ENCOUNTER NOTE
Patient was not able to exercise/walk long enough to get a full oxygen evaluation.  She should wear 2 L oxygen at all times.  She should make appointment to see Pulmonary, referral placed at last visit.

## 2021-03-21 NOTE — PROGRESS NOTES
Caitlin Sales is a 85 year old female who is being evaluated via a billable video visit.      How would you like to obtain your AVS? Mail a copy  If the video visit is dropped, the invitation should be resent by: Text to cell phone: [phone] Kelley  Will anyone else be joining your video visit? No        CC:  WCT thought d/t SVT with aberrancy  On Amiodarone    VITALS:  Off O2 her O2 sats 81%; her O2 went back up to 92-94% on 2L  BPs 140-150s/80s typically 180/90s  HR 60 bpm    BRIEF HPI:  Caitlin is an 85 year old yo F who sees Dr. Murillo for h/o:    1. WCT - Thought to be aberrant SVT by cardiologist in [place] and tx with metoprolol. ER visit 4/2020 unresponsive to adenosine and required DCCV and transferred to [facility]. Placed on Amio load at [facility] 4/2020  2. Resolved CM - EF 40% at [facility] 4/2020 but normalized 5/2020 60-65%  3. Chronic Respiratory Failure/hypercarbia  - Admitted to [facility] 5/2020 with O2 sats 80%. Known elevated hemidiaphragm  4. H/o DVT - Following back surgery 2013; IVC filter in place. DVT again noted 4/2020. Remains on chronic AC with Xarelto  5. H/o TIA 4/2020- d/t stenosis of R vertebral artery    Dr. Murillo had a virtual visit with her 6/2020 to review her hospitalization at [facility] 5/2020 for respiratory failure.  There was concern that amiodarone (started 1 m prior) could have contributed, but when Dr. Murillo spoke with her 6/2020, she was doing well in [facility] with O2 sats 95%. EKG showed NSR with narrower QRS than LBBB. Continued BB and amiodarone rec'd with 3 m follow-up. Unfortunately, she's now just back for follow-up 11 m later.    INTERVAL HISTORY:  Caitlin notes that she has had no further episodes of racing heartbeat/palpitations. She has not had any ER visits or hospitalizations since she was hospitalized 5/2020.    She denies any problems with chest pain, pressure or tightness. Her mobility is significantly reduced due to significant shortness of breath and tremors  "of the upper and lower extremities. Kelley, the nurse who accompanies her today, notes that these have been much worse over the last 6 months. Indeed, I noted that her oximeter testing done 3/2021 was aborted due to leg twitching.    She lives in an apartment (1 bedroom) and Kelley estimates it is about 25 feet to bathroom. When she walks there, she notes that she is short of breath even on 2 L of oxygen. The kitchen is much closer to her living room, and she notes no shortness of breath associated with walking there.    When Kelley arrived in her room, her nasal cannula oxygen was in her nose, but the canister was turned off. Her O2 sats were down to 81%! Kelley noted cyanosis of the fingertips, but Caitlin did not feel short of breath. Her canister was turned back up to 2 L, and sats improved to the low 90s.    She sees pulmonology in May. She sees neurology in April. There is a concern she may have Parkinson's. She reportedly has noted \"jaw jumping\" for years, but now has significant tremors of the upper and lower extremities, which interfere with mobility. These have been getting worse over the last 6 months.    DIAGNOSTICS:  Echocardiogram 5/2020 - EF 60-65%. No RWMA. RV mildly dilated with nl EF. Trace-mild MR. 1+ TR with RVSP 35. Mod Ao Sclerosis with trace AI  Stress Test 4/2020 - Fixed inferolateral and septal defects. No sig ischemia.  Component      Latest Ref Rng & Units 10/1/2020 1/18/2021          11:19 AM  8:25 PM   Sodium      133 - 144 mmol/L 142 144   Potassium      3.4 - 5.3 mmol/L 4.1 4.5   Chloride      94 - 109 mmol/L 102 102   Carbon Dioxide      20 - 32 mmol/L 39 (H) 41 (H)   Anion Gap      3 - 14 mmol/L 1 (L) 1 (L)   Glucose      70 - 99 mg/dL 78 102 (H)   Urea Nitrogen      7 - 30 mg/dL 25 22   Creatinine      0.52 - 1.04 mg/dL 1.29 (H) 0.98   GFR Estimate      >60 mL/min/1.73:m2 38 (L) 53 (L)   GFR Estimate If Black      >60 mL/min/1.73:m2 44 (L) 61   Calcium      8.5 - 10.1 " mg/dL 8.7 8.3 (L)     Component      Latest Ref Rng & Units 6/4/2020           1:21 PM   TSH      0.40 - 4.00 mU/L 2.33     Component      Latest Ref Rng & Units 10/1/2020 1/18/2021          11:19 AM  8:25 PM   Bilirubin Total      0.2 - 1.3 mg/dL 0.2 0.2   Albumin      3.4 - 5.0 g/dL 3.8 3.4   Protein Total      6.8 - 8.8 g/dL 7.4 6.6 (L)   Alkaline Phosphatase      40 - 150 U/L 74 101   ALT      0 - 50 U/L 19 15   AST      0 - 45 U/L 18 13     REVIEW OF SYSTEMS:  Negative with the exception of that noted above    PHYSICAL EXAM:  There is no height or weight on file to calculate BMI.   BP typically 140s, now 180s/90s  HR 60s, regular  O2 sats 81% on nurse's arrival as O2 was not on. Sats increased to 92% after 2 L was restored    Physical Exam    GENERAL: Healthy, alert and no distress at rest. She's wearing O2 via nc  EYES: Eyes grossly normal to inspection.  No discharge or erythema, or obvious scleral/conjunctival abnormalities.  RESP: No audible wheeze, cough, or visible cyanosis.  No visible retractions or increased work of breathing at rest  SKIN: Visible skin clear. No significant rash, abnormal pigmentation or lesions.  NEURO: Cranial nerves grossly intact.  Mentation and speech appropriate for age.  PSYCH: Mentation appears normal, affect normal/bright, judgement and insight intact, normal speech and appearance well-groomed.      PLAN:  1. Increase losartan to 50 mg daily. Hold for SBP less than 110 mmHg  2. Continue amiodarone for now, but very suspicious this could be contributing to tremors. Some concern that could be contributing to shortness of breath, but no elevated right hemidiaphragm    ASSESSMENT/PLAN:    1. SVT with aberrancy    Required DCCV as above; remains on Amio with good results    No recurrence that she's aware of    Echo with normalized EF      PLAN:    Continue amiodarone for now, but will reach out to PMD, Neuro and Pulm re: potential toxicities    2. On Amio therapy    Started for  SVT/aberrancy requiring DCCV 4/2020    TSH and hepatic panel 6/2020 were wnl; hepatic checked again 1/2021 and looked good    PFTs 3/2021 showed mod-sev restriction with FVC 48% predicted and FEV1 52% predicted and and mod reduction in DLCO 55%. In 6/2020, uncorrected DLCO was 70% predicted     PLAN:    Due for labs in next few months (thyroid, hepatic, CBC, BMP)    See me or Dr. Murillo (virtual OK) 2m after she's been evaluated by her specialty docs d/t concern for potential amio toxicities      3. Hypertension    Blood pressure typically 140s, but had been up to 180s today, likely due to unrecognized hypoxia    BMP 1/2021 showed normalized electrolytes/renal function     PLAN:    Increase losartan to 50 mg daily. Hold for SBP <110 mmHg    Call if issues on this dose/doses held more than 1 time/week for not meeting parameters.     CURRENT MEDICATIONS:  Current Outpatient Medications   Medication Sig Dispense Refill     acetaminophen (TYLENOL) 500 MG tablet Take 2 tablets (1,000 mg) by mouth 3 times daily (Patient taking differently: Take 1,000 mg by mouth 3 times daily )       amiodarone (PACERONE) 200 MG tablet Take 1 tablet (200 mg) by mouth daily 90 tablet 3     atorvastatin (LIPITOR) 40 MG tablet Take 1 tablet (40 mg) by mouth daily 30 tablet 11     folic acid (FOLVITE) 1 MG tablet Take 1 mg by mouth daily At 3 pm       furosemide (LASIX) 20 MG tablet Take 1 tab (20 mg) every other day, alternating with 2 tabs (40 mg) every other day 360 tablet 11     gabapentin (NEURONTIN) 600 MG tablet Take 1 tablet (600 mg) by mouth 2 times daily 180 tablet 3     levothyroxine (SYNTHROID/LEVOTHROID) 75 MCG tablet Take 75 mcg by mouth daily        losartan (COZAAR) 50 MG tablet Take 1 tablet (50 mg) by mouth daily Hold for SBP <110 mmHg 90 tablet 3     melatonin 3 MG tablet Take 1 tablet (3 mg) by mouth nightly as needed for sleep 30 tablet 3     nystatin (MYCOSTATIN) 733575 UNIT/GM external cream Apply topically 2 times daily  30 g 11     omeprazole (PRILOSEC) 40 MG DR capsule Take 1 capsule (40 mg) by mouth daily 30 capsule 0     order for DME Equipment being ordered: walker with 4 wheels 1 Device 0     order for DME O2 2 LPM continuos (Patient taking differently: O2 2 LPM continuous) 1 Device 0     order for DME Equipment being ordered: knee high compression stockings 20-30 mm compression 1 Units 1     rivaroxaban ANTICOAGULANT (XARELTO ANTICOAGULANT) 20 MG TABS tablet Take 1 tablet (20 mg) by mouth daily (with dinner) 90 tablet 3     vitamin B-12 (CYANOCOBALAMIN) 1000 MCG tablet Take 1,000 mcg by mouth daily           ORDERS PLACED:  Orders Placed This Encounter   Procedures     Hepatic panel     TSH with free T4 reflex     Basic metabolic panel     CBC with platelets     Follow-Up with Cardiac Advanced Practice Provider     Orders Placed This Encounter   Medications     amiodarone (PACERONE) 200 MG tablet     Sig: Take 1 tablet (200 mg) by mouth daily     Dispense:  90 tablet     Refill:  3     losartan (COZAAR) 50 MG tablet     Sig: Take 1 tablet (50 mg) by mouth daily Hold for SBP <110 mmHg     Dispense:  90 tablet     Refill:  3     Note dose increase     Medications Discontinued During This Encounter   Medication Reason     losartan (COZAAR) 25 MG tablet Reorder     amiodarone (PACERONE) 200 MG tablet          Encounter Diagnoses   Name Primary?     Wide-complex tachycardia (H)      On amiodarone therapy Yes     Acute diastolic heart failure (H)      Essential hypertension          ALLERGIES     Allergies   Allergen Reactions     Ace Inhibitors Cough     Aspirin Other (See Comments)     GI Bleeding     Citalopram Other (See Comments)     Tremor       Penicillins Unknown     Childhood Rxn     Tramadol Other (See Comments)     Lethargy     Furosemide Rash     Lisinopril-Hydrochlorothiazide Cough     Torsemide Rash       PAST MEDICAL HISTORY:  Past Medical History:   Diagnosis Date     Basal cell carcinoma of face 7/19/2017      History of DVT of lower extremity 09/25/2013     Neuropathy 9/25/2013     TIA (transient ischemic attack) 12/26/2019     Wide-complex tachycardia (H) 04/28/2020       PAST SURGICAL HISTORY:  Past Surgical History:   Procedure Laterality Date     APPENDECTOMY      teenager     AS LUMBAR SPINE FUSN,POST INTRBDY  2010     AS REVISE TOTAL HIP REPLACEMENT Right 2013     CHOLECYSTECTOMY  1967     ENDOMETRIAL SAMPLING (BIOPSY)  2001     SALPINGO OOPHORECTOMY,R/L/DI Left 1967     VENA CAVA FILTER  2010    Permanent        FAMILY HISTORY:  Family History   Problem Relation Age of Onset     Cancer Mother         multiple myeloma     Abdominal Aortic Aneurysm Father      Hypertension Father      Prostate Cancer Father      Breast Cancer Daughter        SOCIAL HISTORY:  Social History     Socioeconomic History     Marital status:      Spouse name: None     Number of children: None     Years of education: None     Highest education level: None   Occupational History     None   Social Needs     Financial resource strain: None     Food insecurity     Worry: None     Inability: None     Transportation needs     Medical: None     Non-medical: None   Tobacco Use     Smoking status: Never Smoker     Smokeless tobacco: Never Used   Substance and Sexual Activity     Alcohol use: Not Currently     Frequency: Never     Drug use: Never     Sexual activity: Not Currently     Partners: Male   Lifestyle     Physical activity     Days per week: None     Minutes per session: None     Stress: None   Relationships     Social connections     Talks on phone: None     Gets together: None     Attends Restorationism service: None     Active member of club or organization: None     Attends meetings of clubs or organizations: None     Relationship status: None     Intimate partner violence     Fear of current or ex partner: None     Emotionally abused: None     Physically abused: None     Forced sexual activity: None   Other Topics Concern     None    Social History Narrative     None       Video-Visit Details    Type of service:  Video Visit    Video Start Time: 1243  Video End Time:  1307  Duration: 24 minutes    Originating Location (pt. Location): Assisted Living    Distant Location (provider location): Home Office    Platform used for Video Visit: Jonh Elizabeth PA-C [facility]

## 2021-04-02 NOTE — PROGRESS NOTES
I had a virtual visit with Caitlin and her nurse Kelley.  Could you pls fax a paper copy of my orders:    Increase losartan to 50 mg daily. Hold for SBP <110 mmHg   Follow-up with Sarah or Dr. Murillo 6/2021    # is [phone], attn: Kelley.    I already sent Rx for higher dose of losartan to [facility].    Thank you!  Sarah

## 2021-04-02 NOTE — LETTER
4/2/2021    Abbie Zacarias, DO  [address] [facility] [address]    RE: Caitlin Sales       Dear Colleague,    I had the pleasure of seeing Caitlin Sales in the [facility].    Caitlin Sales is a 85 year old female who is being evaluated via a billable video visit.      How would you like to obtain your AVS? Mail a copy  If the video visit is dropped, the invitation should be resent by: Text to cell phone: [phone] Kelley  Will anyone else be joining your video visit? No        CC:  WCT thought d/t SVT with aberrancy  On Amiodarone    VITALS:  Off O2 her O2 sats 81%; her O2 went back up to 92-94% on 2L  BPs 140-150s/80s typically 180/90s  HR 60 bpm    BRIEF HPI:  Caitlin is an 85 year old yo F who sees Dr. Murillo for h/o:    1. WCT - Thought to be aberrant SVT by cardiologist in [place] and tx with metoprolol. ER visit 4/2020 unresponsive to adenosine and required DCCV and transferred to [facility]. Placed on Amio load at [facility] 4/2020  2. Resolved CM - EF 40% at [facility] 4/2020 but normalized 5/2020 60-65%  3. Chronic Respiratory Failure/hypercarbia  - Admitted to [facility] 5/2020 with O2 sats 80%. Known elevated hemidiaphragm  4. H/o DVT - Following back surgery 2013; IVC filter in place. DVT again noted 4/2020. Remains on chronic AC with Xarelto  5. H/o TIA 4/2020- d/t stenosis of R vertebral artery    Dr. Murillo had a virtual visit with her 6/2020 to review her hospitalization at [facility] 5/2020 for respiratory failure.  There was concern that amiodarone (started 1 m prior) could have contributed, but when Dr. Murillo spoke with her 6/2020, she was doing well in [facility] with O2 sats 95%. EKG showed NSR with narrower QRS than LBBB. Continued BB and amiodarone rec'd with 3 m follow-up. Unfortunately, she's now just back for follow-up 11 m later.    INTERVAL HISTORY:  Caitlin notes that she has had no further episodes of racing  "heartbeat/palpitations. She has not had any ER visits or hospitalizations since she was hospitalized 5/2020.    She denies any problems with chest pain, pressure or tightness. Her mobility is significantly reduced due to significant shortness of breath and tremors of the upper and lower extremities. Kelley, the nurse who accompanies her today, notes that these have been much worse over the last 6 months. Indeed, I noted that her oximeter testing done 3/2021 was aborted due to leg twitching.    She lives in an apartment (1 bedroom) and Kelley estimates it is about 25 feet to bathroom. When she walks there, she notes that she is short of breath even on 2 L of oxygen. The kitchen is much closer to her living room, and she notes no shortness of breath associated with walking there.    When Kelley arrived in her room, her nasal cannula oxygen was in her nose, but the canister was turned off. Her O2 sats were down to 81%! Kelley noted cyanosis of the fingertips, but Caitlin did not feel short of breath. Her canister was turned back up to 2 L, and sats improved to the low 90s.    She sees pulmonology in May. She sees neurology in April. There is a concern she may have Parkinson's. She reportedly has noted \"jaw jumping\" for years, but now has significant tremors of the upper and lower extremities, which interfere with mobility. These have been getting worse over the last 6 months.    DIAGNOSTICS:  Echocardiogram 5/2020 - EF 60-65%. No RWMA. RV mildly dilated with nl EF. Trace-mild MR. 1+ TR with RVSP 35. Mod Ao Sclerosis with trace AI  Stress Test 4/2020 - Fixed inferolateral and septal defects. No sig ischemia.  Component      Latest Ref Rng & Units 10/1/2020 1/18/2021          11:19 AM  8:25 PM   Sodium      133 - 144 mmol/L 142 144   Potassium      3.4 - 5.3 mmol/L 4.1 4.5   Chloride      94 - 109 mmol/L 102 102   Carbon Dioxide      20 - 32 mmol/L 39 (H) 41 (H)   Anion Gap      3 - 14 mmol/L 1 (L) 1 (L) "   Glucose      70 - 99 mg/dL 78 102 (H)   Urea Nitrogen      7 - 30 mg/dL 25 22   Creatinine      0.52 - 1.04 mg/dL 1.29 (H) 0.98   GFR Estimate      >60 mL/min/1.73:m2 38 (L) 53 (L)   GFR Estimate If Black      >60 mL/min/1.73:m2 44 (L) 61   Calcium      8.5 - 10.1 mg/dL 8.7 8.3 (L)     Component      Latest Ref Rng & Units 6/4/2020           1:21 PM   TSH      0.40 - 4.00 mU/L 2.33     Component      Latest Ref Rng & Units 10/1/2020 1/18/2021          11:19 AM  8:25 PM   Bilirubin Total      0.2 - 1.3 mg/dL 0.2 0.2   Albumin      3.4 - 5.0 g/dL 3.8 3.4   Protein Total      6.8 - 8.8 g/dL 7.4 6.6 (L)   Alkaline Phosphatase      40 - 150 U/L 74 101   ALT      0 - 50 U/L 19 15   AST      0 - 45 U/L 18 13     REVIEW OF SYSTEMS:  Negative with the exception of that noted above    PHYSICAL EXAM:  There is no height or weight on file to calculate BMI.   BP typically 140s, now 180s/90s  HR 60s, regular  O2 sats 81% on nurse's arrival as O2 was not on. Sats increased to 92% after 2 L was restored    Physical Exam    GENERAL: Healthy, alert and no distress at rest. She's wearing O2 via nc  EYES: Eyes grossly normal to inspection.  No discharge or erythema, or obvious scleral/conjunctival abnormalities.  RESP: No audible wheeze, cough, or visible cyanosis.  No visible retractions or increased work of breathing at rest  SKIN: Visible skin clear. No significant rash, abnormal pigmentation or lesions.  NEURO: Cranial nerves grossly intact.  Mentation and speech appropriate for age.  PSYCH: Mentation appears normal, affect normal/bright, judgement and insight intact, normal speech and appearance well-groomed.      PLAN:  1. Increase losartan to 50 mg daily. Hold for SBP less than 110 mmHg  2. Continue amiodarone for now, but very suspicious this could be contributing to tremors. Some concern that could be contributing to shortness of breath, but no elevated right hemidiaphragm    ASSESSMENT/PLAN:    1. SVT with  aberrancy    Required DCCV as above; remains on Amio with good results    No recurrence that she's aware of    Echo with normalized EF      PLAN:    Continue amiodarone for now, but will reach out to PMD, Neuro and Pulm re: potential toxicities    2. On Amio therapy    Started for SVT/aberrancy requiring DCCV 4/2020    TSH and hepatic panel 6/2020 were wnl; hepatic checked again 1/2021 and looked good    PFTs 3/2021 showed mod-sev restriction with FVC 48% predicted and FEV1 52% predicted and and mod reduction in DLCO 55%. In 6/2020, uncorrected DLCO was 70% predicted     PLAN:    Due for labs in next few months (thyroid, hepatic, CBC, BMP)    See me or Dr. Murillo (virtual OK) 2m after she's been evaluated by her specialty docs d/t concern for potential amio toxicities      3. Hypertension    Blood pressure typically 140s, but had been up to 180s today, likely due to unrecognized hypoxia    BMP 1/2021 showed normalized electrolytes/renal function     PLAN:    Increase losartan to 50 mg daily. Hold for SBP <110 mmHg    Call if issues on this dose/doses held more than 1 time/week for not meeting parameters.     CURRENT MEDICATIONS:  Current Outpatient Medications   Medication Sig Dispense Refill     acetaminophen (TYLENOL) 500 MG tablet Take 2 tablets (1,000 mg) by mouth 3 times daily (Patient taking differently: Take 1,000 mg by mouth 3 times daily )       amiodarone (PACERONE) 200 MG tablet Take 1 tablet (200 mg) by mouth daily 90 tablet 3     atorvastatin (LIPITOR) 40 MG tablet Take 1 tablet (40 mg) by mouth daily 30 tablet 11     folic acid (FOLVITE) 1 MG tablet Take 1 mg by mouth daily At 3 pm       furosemide (LASIX) 20 MG tablet Take 1 tab (20 mg) every other day, alternating with 2 tabs (40 mg) every other day 360 tablet 11     gabapentin (NEURONTIN) 600 MG tablet Take 1 tablet (600 mg) by mouth 2 times daily 180 tablet 3     levothyroxine (SYNTHROID/LEVOTHROID) 75 MCG tablet Take 75 mcg by mouth daily         losartan (COZAAR) 50 MG tablet Take 1 tablet (50 mg) by mouth daily Hold for SBP <110 mmHg 90 tablet 3     melatonin 3 MG tablet Take 1 tablet (3 mg) by mouth nightly as needed for sleep 30 tablet 3     nystatin (MYCOSTATIN) 842970 UNIT/GM external cream Apply topically 2 times daily 30 g 11     omeprazole (PRILOSEC) 40 MG DR capsule Take 1 capsule (40 mg) by mouth daily 30 capsule 0     order for DME Equipment being ordered: walker with 4 wheels 1 Device 0     order for DME O2 2 LPM continuos (Patient taking differently: O2 2 LPM continuous) 1 Device 0     order for DME Equipment being ordered: knee high compression stockings 20-30 mm compression 1 Units 1     rivaroxaban ANTICOAGULANT (XARELTO ANTICOAGULANT) 20 MG TABS tablet Take 1 tablet (20 mg) by mouth daily (with dinner) 90 tablet 3     vitamin B-12 (CYANOCOBALAMIN) 1000 MCG tablet Take 1,000 mcg by mouth daily           ORDERS PLACED:  Orders Placed This Encounter   Procedures     Hepatic panel     TSH with free T4 reflex     Basic metabolic panel     CBC with platelets     Follow-Up with Cardiac Advanced Practice Provider     Orders Placed This Encounter   Medications     amiodarone (PACERONE) 200 MG tablet     Sig: Take 1 tablet (200 mg) by mouth daily     Dispense:  90 tablet     Refill:  3     losartan (COZAAR) 50 MG tablet     Sig: Take 1 tablet (50 mg) by mouth daily Hold for SBP <110 mmHg     Dispense:  90 tablet     Refill:  3     Note dose increase     Medications Discontinued During This Encounter   Medication Reason     losartan (COZAAR) 25 MG tablet Reorder     amiodarone (PACERONE) 200 MG tablet          Encounter Diagnoses   Name Primary?     Wide-complex tachycardia (H)      On amiodarone therapy Yes     Acute diastolic heart failure (H)      Essential hypertension          ALLERGIES     Allergies   Allergen Reactions     Ace Inhibitors Cough     Aspirin Other (See Comments)     GI Bleeding     Citalopram Other (See Comments)     Tremor        Penicillins Unknown     Childhood Rxn     Tramadol Other (See Comments)     Lethargy     Furosemide Rash     Lisinopril-Hydrochlorothiazide Cough     Torsemide Rash       PAST MEDICAL HISTORY:  Past Medical History:   Diagnosis Date     Basal cell carcinoma of face 7/19/2017     History of DVT of lower extremity 09/25/2013     Neuropathy 9/25/2013     TIA (transient ischemic attack) 12/26/2019     Wide-complex tachycardia (H) 04/28/2020       PAST SURGICAL HISTORY:  Past Surgical History:   Procedure Laterality Date     APPENDECTOMY      teenager     AS LUMBAR SPINE FUSN,POST INTRBDY  2010     AS REVISE TOTAL HIP REPLACEMENT Right 2013     CHOLECYSTECTOMY  1967     ENDOMETRIAL SAMPLING (BIOPSY)  2001     SALPINGO OOPHORECTOMY,R/L/DI Left 1967     VENA CAVA FILTER  2010    Permanent        FAMILY HISTORY:  Family History   Problem Relation Age of Onset     Cancer Mother         multiple myeloma     Abdominal Aortic Aneurysm Father      Hypertension Father      Prostate Cancer Father      Breast Cancer Daughter        SOCIAL HISTORY:  Social History     Socioeconomic History     Marital status:      Spouse name: None     Number of children: None     Years of education: None     Highest education level: None   Occupational History     None   Social Needs     Financial resource strain: None     Food insecurity     Worry: None     Inability: None     Transportation needs     Medical: None     Non-medical: None   Tobacco Use     Smoking status: Never Smoker     Smokeless tobacco: Never Used   Substance and Sexual Activity     Alcohol use: Not Currently     Frequency: Never     Drug use: Never     Sexual activity: Not Currently     Partners: Male   Lifestyle     Physical activity     Days per week: None     Minutes per session: None     Stress: None   Relationships     Social connections     Talks on phone: None     Gets together: None     Attends Gnosticism service: None     Active member of club or organization:  None     Attends meetings of clubs or organizations: None     Relationship status: None     Intimate partner violence     Fear of current or ex partner: None     Emotionally abused: None     Physically abused: None     Forced sexual activity: None   Other Topics Concern     None   Social History Narrative     None       Video-Visit Details    Type of service:  Video Visit    Video Start Time: 1243  Video End Time:  1307  Duration: 24 minutes    Originating Location (pt. Location): Assisted Living    Distant Location (provider location): Home Office    [facility] Visit: [facility]      Maris Elizabeth PA-C MSPAS  cc:   Piotr Nair MD  [address]  [place]  [place] [address]

## 2021-04-02 NOTE — PATIENT INSTRUCTIONS
"Caitlin -it was nice to speak with you and Kelley today!    1. Today we reviewed that the amiodarone appears to be doing a good job controlling your fast heart rates.  2. We noted that your blood pressure was pretty high  3. We reviewed that you have upcoming appointments with both the lung doctor and the neurologist for concerns regarding your breathing and a tremor. These could absolutely be related to lung disease and/or neurologic disease, but I do worry that it could be made worse with the amiodarone. Unfortunately, it appears that amiodarone is doing a good job!       Medication Changes:    1. Increase losartan to 50 mg daily. You will not get this medicine if you have a blood pressure <110 mmHg on the top. I sent a new prescription into [facility].  2. For now, stay on amiodarone but I will write to the neurologist and the pulmonologist to make sure they do not think this is related to your current issues. You mentioned that you had the \"jaw tremor\" for years, even before you started amiodarone, so it may not be related at all. We also know that your lungs show an elevated breathing muscle (hemidiaphragm), which would make you short of breath but would not be caused because of the amiodarone.    Recommendations:    1. Sometime in the next 2 months, get blood work to look at your liver and thyroid. This is needed because of the amiodarone. I have put the order in the computer, so you would just need to call to set up an appointment at the [facility] to get this done.    Follow-up:    See me or Dr. Murillo for cardiology follow up in 6/2021 but CALL Cardiology nurses Gale & Verónica @ [phone] for any issues, questions or concerns in the interim.      To schedule a future appointment, we kindly ask that you call cardiology scheduling at [phone] three months prior to requested visit date.    Important Phone Numbers for [facility]):    [place] [facility] Verónica & Gale: " [phone]  Cardiology [phone]  [place] [facility] [phone]  [place] [facility] [phone]  [facility]: [phone]

## 2021-04-08 PROBLEM — N18.30 CHRONIC KIDNEY DISEASE, STAGE 3 (H): Status: ACTIVE | Noted: 2021-01-01

## 2021-04-08 NOTE — PROGRESS NOTES
"Caitlin is a 85 year old who is being evaluated via a billable video visit.      How would you like to obtain your AVS? MyChart  If the video visit is dropped, the invitation should be resent by: Text to cell phone: [phone]-  Text msg today please  Will anyone else be joining your video visit? Yes: Ayana RN @ [facility] (assisted living) with questions @ [phone].\". How would they like to receive their invitation? Text to cell phone: [phone]      Video Start Time: 10:03 AM    Assessment & Plan     Neuropathy -she is on the maximum dose of gabapentin.  Many of the other drug's we would use to manage neuropathy have drug interactions with amiodarone.  She is using the Percocet 2-4 times a week and finds it very helpful.  No side effects.  She has a history of overuse of Percocet prior to her moving into the long-term care facility.  She is worried she will run into troubles with opiates again.  The nursing staff at her long-term care facility manages all medications.  Long discussion about the risks and benefits of opiates to manage her current symptoms.  Since she is using 3-4 Percocet per week and it is controlled by nursing staff I feel that the risk of misuse of the opiates is quite low  - oxyCODONE-acetaminophen (PERCOCET) 5-325 MG tablet; Take 1 tablet by mouth daily as needed for pain    Insomnia due to medical condition -melatonin not helpful.  Start mirtazapine.  May consider quetiapine.  - mirtazapine (REMERON) 7.5 MG tablet; Take 1 tablet (7.5 mg) by mouth At Bedtime    Lumbosacral radiculopathy at L5  - oxyCODONE-acetaminophen (PERCOCET) 5-325 MG tablet; Take 1 tablet by mouth daily as needed for pain    Stage 3a chronic kidney disease Known issue that I take into account for their medical decisions, no current exacerbations or new concerns          There are no Patient Instructions on file for this visit.    No follow-ups on file.    Abbie Zacarias, DO  [facility] " "[facility]    Markus Tucker is a 85 year old who presents for the following health issues  accompanied by her RN Ayana:    [facility]     Authorization to Share Protected Health Information   5/1/2020   Jade Mata (daughter) [phone]   Chante Soliman (daughter) [phone]   Gale Reardon (daughter) [phone]   All information may be shared with all three daughters.      Pt has reported during scheduling time: \"\"Dr. Zacarias denied refills of percocet without appt; Amanda continues to have foot/toe pain, but it sounds like nerve pain (burning/stabbing).  Need to discuss pain control/medication options.  Call NEYMAR Piña @ [facility] (assisted living) with questions @ [phone].\"      RN wonder if need increase gabapentin or new rx for nerve pinch with oxy        Medication Followup of oxyCODONE-acetaminophen (PERCOCET) 5-325 MG tablet     Taking Medication as prescribed: doesn't have anything -     Side Effects:  None    Medication Helping Symptoms:  Yes    Checked [place] , last [place]ycodone #14 on 1/14    She uses the percocet at night for 'shooting pain in left big toe' that radiates to lower leg.  Pain is shooting and burning.    Was using percocet 3-4 nights    Was told it was nerve damage that occurred by previous spine surgery many years ago.    Already taking Tylenol 1000 mg TID, gabapentin 600 BID.    Tolerating percocet well, no side effects.       Insomnia:   --still not sleeping well, melatonin not helping.    Tremor:  --plans for Neuropsych testing tomorrow and Neurology a week later  --had a stumble while sitting in recliner.  Landed on arm of recliner and now has large bruise there - on xarelto.  Staff is working w/her on safe transfers.  She declines physical therapy because feels like it has not helped in the past.  --family reports a jaw tremor for many years.  Staff is noticing tremor is worse the last 6 months.  'legs are jump and it is hard for her to " walk'.  Cardiology wondered if the tremor was side effects of amiodarone.        Review of Systems   Constitutional, HEENT, cardiovascular, pulmonary, gi and gu systems are negative, except as otherwise noted.      Objective           Vitals:  No vitals were obtained today due to virtual visit.    Physical Exam   GENERAL: alert, no distress, elderly and fatigued  EYES: Eyes grossly normal to inspection.  No discharge or erythema, or obvious scleral/conjunctival abnormalities.  RESP: No audible wheeze, cough, or visible cyanosis.  No visible retractions or increased work of breathing.    SKIN: Visible skin clear. No significant rash, abnormal pigmentation or lesions.  NEURO: Cranial nerves grossly intact.  Mentation and speech appropriate for age.  PSYCH: Mentation appears normal, affect normal/bright, judgement and insight intact, normal speech and appearance well-groomed.              Video-Visit Details    Type of service:  Video Visit    Video End Time:10:25 AM    Originating Location (pt. Location): Long term Care    Distant Location (provider location):  [facility]     Platform used for Video Visit: [employer]

## 2021-04-09 NOTE — PROGRESS NOTES
Pt was seen for neuropsychological evaluation at the request of Abbie Zacarias DO for the purposes of diagnostic clarification and treatment planning. 196 minutes of test administration and scoring were provided by this writer. Please see Dr. Luis Munguia's report for a full interpretation of the findings.      Video start 12:49  Video stop 2:57      Evette Kirby  Psychometrist

## 2021-04-09 NOTE — LETTER
4/9/2021       RE: Caitlin Sales  [address]  [facility] [address]     Dear Colleague,    Thank you for referring your patient, Caitlin Sales, to the [facility]UROPSYCHOLOGY [place] at [facility]. Please see a copy of my visit note below.    Pt was seen for neuropsychological evaluation at the request of Abbie Zacarias DO for the purposes of diagnostic clarification and treatment planning. 196 minutes of test administration and scoring were provided by this writer. Please see Dr. Luis Munguia's report for a full interpretation of the findings.      Video start 12:49  Video stop 2:57      Evette Kirby  Psychometrist      Caitlin Sales is an 85-year-old woman who is being evaluated via a billable video visit. Telemedicine services are necessary because of the COVID-19 pandemic.     Patient would like the video invitation sent by phone: [phone]  Will anyone else be joining your video visit? No     Visit Details  Type of service: Video Conference  Interview:     Start Time: 12:34 PM     End Time: 12:40 PM     Start Time: 2:59 PM     End Time: 3:30 PM  Formal Testing:     Start Time: 12:49 PM     End Time: 2:57 PM  Originating Location (pt. Location): Home   Distant Location (provider location): [facility] NEUROPSYCHOLOGY   Platform used for Video Visit: [facility]  [facility]      NEUROPSYCHOLOGICAL EVALUATION    RELEVANT HISTORY AND REASON FOR REFERRAL    This is a report of neuropsychological consultation regarding Caitlin Sales, an 85-year-old, right-handed woman with 16 years of formal education. She comes to me with a pre-existing diagnosis of dementia from Alzheimer s disease, rendered by an outside primary care provider in February 2020. In the medical records, I see an emergency department visit on 1/22/2019 with complaints of  extra confusion,  and physicians at that  visit were concerned that opioid use was contributing to memory loss. She apparently had a TIA in December 2019. In February 2020, she was having problems with medication management, taking extra doses because she had forgotten about the previous doses. Her primary care provider diagnosed dementia from Alzheimer s disease and felt she lacked decision-making capacity at that time. She has not had formal psychometric testing before. Her family moved her into a skilled nursing facility about a year ago, and staff there have had concerns about Parkinson s disease/essential tremor or similar problems, with longstanding motor issues that seem to be rapidly worsening of late. She comes to me on referral from Dr. Abbie Zacarias in this context, to aid in diagnosis and treatment planning.    Ms. Sales is accompanied today during the interview by two daughters. Ms. 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 She endorses the presence of some cognitive issues but cannot describe them well, as she is quickly off topic and prone to confusion throughout the interview.    Her daughters describe increasing shakiness and inability to control her body or stay upright. There is a question of parkinsonian conditions. She has had tremors in her jaw and hands for many years, and in the last few months her whole body has been shaky. During today s visit, she took a restroom break and almost fell while walking about 10 feet from the restroom to her chair. She has been using a walker. She is visibly leaning to her right side throughout the interview, and she speaks with a noticeably quavering voice.    There are no obvious indications of hallucinatory experiences. She has talked about having visits with neighbors who moved away many years ago or having recently gone places that she actually has not gone to, though  this is just as likely a matter of confusion. 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 Such issues do not occur during the daytime, when she is much more coherent compared to the evenings. The daughters describe a sundowning pattern, with significant changes coming on every day around 3:00 or 4:00 PM and lasting into the next morning. She demonstrates increased anxiety and neediness in the evenings, frequently calling facility staffers or going out to find staff for comfort.     Ms. Sales tells me she has never been aware of having had a stroke, but she says medical providers have told her she had both a stroke and a heart attack. There have been several neuroimaging studies in recent years. Head CT on 1/2/2019 showed no acute abnormalities, and there were indications of moderate to extensive atrophy and mild small vessel ischemic disease. Head CT on 12/22/2019 also showed no acute abnormalities and signs of moderate atrophy and small vessel ischemic disease. 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 Head CT on 1/18/2021 showed no acute abnormalities and signs of encephalomalacia in the posterior right frontal and right parietal lobes.    She reports no history of seizures.    As noted above, there were concerns about overuse of oxycodone in the last couple of years. That medication is still on her current list in our system, but I am told today that she weaned off it and stopped using it fully as of yesterday. She was dependent upon oxycodone for many years in treating chronic pain. She had been using high doses of oxycodone and gabapentin together, and this produced extreme somnolence such that she could sleep around the clock. They have been switching her to Tylenol, and so far things seem good. Ms. Sales  tells me that she never liked taking oxycodone and did not want to take it, but that she was physiologically dependent upon it. It has apparently been a rough couple of weeks for tapering off oxycodone.    She reports no use of alcohol at all for more than 20 years. She reports no history of problems with alcohol, but she stopped using it because she did not want to combine alcohol when she started to use narcotic and/or opioid medications.    She has days of sadness or depressed feelings but says there are no lasting symptoms. She is discouraged by needing to live in a facility, and she would rather be at home. She misses her children. She notes that her mood was notably low upon the initial moved to her facility but feels that things are better now. Regardless, she says people keep telling her that she should do something about her depression, despite the fact that she does not subjectively feel depressed. Her baseline personality and temperament is described as always optimistic and kind (this is apparent in today s visit). Her temperament changes noticeably in the evenings, getting more fragile and anxious.    Ms. Sales feels that her sleep is fine, and she is aware that staff members at her facility say she is up a lot and calling them. She just last night started taking mirtazapine to help with sleep. It seemed to be helpful for that first dose. She says that she often dozes off during the daytime. There are no indications of REM sleep behaviors.    Additional medical history includes hypertension, coronary artery disease, tachycardia, hyperlipidemia, hypothyroidism, stage-2 chronic kidney disease, presence of IVC filter, history of deep vein thrombosis, and obesity, among other concerns. Current medications include acetaminophen, amiodarone, atorvastatin, folic acid, furosemide, gabapentin, levothyroxine, loperamide, losartan, melatonin, mirtazapine, omeprazole, oxycodone, polyethylene glycol, rivaroxaban,  simethicone, triamcinolone, and vitamin B12.    Regarding early life history, there are some indications of an anxious or inhibited temperament. She tells me she was  terrified  of school upon starting , so she stayed at home for that year instead. She also got extra help in school from her mother for the first few years, though she recalls that she generally did well in school. In high school, she was valedictorian or salutatorian. She went on to college and earned a degree in education.    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 She stayed at home to raise children and run the household for a while, and then after the children were grown up, she went back to teaching.    The only known family history of dementia is for an aunt who lived to be [facility] and experienced cognitive decline at the end of her life.    BEHAVIORAL OBSERVATIONS    Ms. Sales was polite and cooperative with the evaluation. In the interview, she was easily off topic and confused. She was able to provide an okay history with repetitions of questions and when her daughters would give her cues or reminders of certain events, but there were still issues with the precision of her self-report. She demonstrated grossly appropriate insight into the referral concerns. She was notably leaning to her right side throughout the interview. She spoke with a quavering voice. Articulation was slightly low, and her speech was choppy or disfluent at times. During the testing session, she showed signs of fatigue as the testing went on and became increasingly distractible. Some visuospatial/nonverbal tasks could not be administered because she could not comprehend the test instructions or make sense of the stimuli--for one, she said that the (static) images displayed on the screen appeared to be moving around when she tried to look at them. Otherwise, she was  able to initially comprehend test instructions appropriately, but then she needed near-constant reminders of what she was supposed to be doing. Thought processes were scattered and perseverative. She put forth appropriate effort and persisted well with all requested procedures. The test results may provide reasonable reflections of her cognitive abilities. However, the conditions of the assessment are not standard as the visit was conducted by videoconference instead of in person, which may render inaccurate any comparisons to standard normative data.     MEASURES ADMINISTERED    The following measures were administered by a trained psychometrist, under my direct supervision:    Orientation: Time, Place, Basic Personal Information, Recent [place] Presidents; Wide Range Achievement Test 4: Word Reading; Wechsler Adult Intelligence Scales-IV: Vocabulary, Similarities, Matrix Reasoning, Digit Span; [place] Naming Test; Controlled Oral Word Association Test; Animal Naming Test; Complex Ideational Material; Clock Drawing; Oral Trail-Making Test; Test of Sustained Attention & Tracking; Repeatable Battery for the Assessment of Neuropsychological Status: Immediate and Delayed Stories; Giles Verbal Learning Test-Revised; ILS Health and Safety Questionnaire; Geriatric Depression Scale.    RESULTS AND INTERPRETATION    Orientation was exceptionally low for time, believing the current month was October, and being 12th days off for the date and 1 day off for the day of the week. Orientation to place was low, not being able to state what city she was in or the name of the place she was at. Orientation to basic personal information was normal. Orientation to basic cultural information was low, being able to name the two most recent [place] presidents but having no guesses for other presidents from the last 40 years. Performance on a reading and pronunciation test that is validated for estimating premorbid intelligence was average. Abstract  verbal analogical reasoning was average. Demonstrating vocabulary knowledge was average. Practical reasoning for a variety of health and safety scenarios was below normal expectations. Clock drawing was abnormal, with significant micrographia as well as incorrect hands for the requested time. Immediate auditory attention and working memory were below average for repeating and rearranging digit strings. Performances were exceptionally low across a variety of brief verbal tasks requiring executive management of attention and concentration. Confrontation naming was in the lower average range for her age. Letter-based verbal fluency was also in the low side of average for her age. Category-based verbal fluency was abnormally low. Oral comprehension and making inferences from sentences and brief paragraphs was abnormally low. Immediate verbal memory for short stories was average, and delayed story recall was average. Learning a word list over repeated readings was below average. Delayed free recall of the list was exceptionally low (zero recall), but delayed recognition of the list was average.     On a brief self-report inventory, she endorsed mildly elevated symptoms related to depression and anxiety (GDS = 16/30). Some of her endorsements were cognitive in nature, such as feeling like her mind is not as clear as it used to be, having trouble making decisions, and having trouble concentrating. Most endorsements were emotional in nature. She has dropped many of her activities and interests. She feels like her life is empty. She often gets bored. She often feels helpless. She often gets restless and fidgety. She prefers staying at home rather than going out and doing new things. She prefers to avoid social gatherings. She frequently worries about the future. It is hard to get started on new projects. She does not have sufficient energy. She feels like her situation is hopeless. She frequently gets upset over little  things. She does not enjoy getting up in the morning.    IMPRESSIONS AND RECOMMENDATIONS    In the context of nonstandard and limited assessment via video conference instead of in person (as necessitated by the current COVID-19 situation), the cognitive test results are abnormal. Ms. Sales demonstrates significant difficulties with attention, concentration, mental speed, and executive functioning. Orientation is lower than expected. Her ability to reason about matters of health and safety is below expectations. Visuoconstructional abilities are abnormal (markedly micrographic), and she was unable to complete some other visuospatial tasks because she could not properly perceive the images (stated they seemed to be moving around). Learning and memory performances are variable, but the difficulties may be more about executive/attentional interference rather than rapid forgetting. Her speech is somewhat halting and she has a quavering voice, but she does not seem to show severe dysnomia. Overall, the pattern of performances does not fit with the typical expectations of Alzheimer s disease. The cognitive data do indicate some diffuse cerebral dysfunction, but probably with maximal effects in the frontal and subfrontal regions, and probably more for the right hemisphere compared to the left hemisphere (though this video visit is limited in its ability to precisely localize and lateralize dysfunction). I cannot rule out an atypical presentation of Alzheimer s disease, but the clinical picture and neuropsychological profile is more consistent with expectations for right-hemisphere predominant cerebrovascular compromise and with parkinsonian conditions.    She appears to be at least mildly depressed based on her endorsements on a symptom inventory, but in interview she says she does not feel depressed. She is aware that other people around her think she is depressed. It would be appropriate to initiate clinical intervention  for low mood and her endorsed feelings of hopelessness and helplessness. She is more likely to benefit from pharmacologic interventions.    A diagnosis of dementia is appropriate. The staging is mild at this time. Additional neurological evaluations are needed to assess her increasing motoric dyscontrol area I see she has an upcoming appointment with a neurologist, Dr. Kelley Reyes. I defer to Dr. Reyes regarding the form of additional workups are treatment options.    I am glad that Ms. Sales is already in a skilled nursing facility. Her cognitive impairments and reported overnight agitation would be significant problems if she were to live independently.    A neuropsychological baseline has been established. Longitudinal monitoring would be appropriate. I would like to see her for reevaluation in approximately 9-12 months. Ideally, the reevaluation would be in person rather than via telemedicine, to provide a more complete picture of nonverbal/spatial abilities and psychomotor functioning.    Luis Munguia, PhD, LP, ABPP-CN  Board Certified in Clinical Neuropsychology  Licensed Psychologist [id number]      Time spent: 37 minutes neurobehavioral status exam including interview, clinical assessment by licensed and board-certified neuropsychologist (CPT 35710, 85391). 95 minutes neuropsychological testing evaluation by licensed and board-certified neuropsychologist, including integration of patient data, interpretation of standardized test results and clinical data, clinical decision-making, treatment planning, report, and interactive feedback to the patient (CPT 46202, [id number]). 196 minutes of psychological and neuropsychological test administration and scoring by technician (CPT 61129, 17488). Diagnoses: F03.91, F39

## 2021-04-10 PROBLEM — G30.1 LATE ONSET ALZHEIMER'S DISEASE WITHOUT BEHAVIORAL DISTURBANCE (H): Chronic | Status: ACTIVE | Noted: 2020-02-20

## 2021-04-10 PROBLEM — I25.10 CORONARY ARTERY DISEASE INVOLVING NATIVE CORONARY ARTERY OF NATIVE HEART WITHOUT ANGINA PECTORIS: Chronic | Status: ACTIVE | Noted: 2020-01-01

## 2021-04-10 PROBLEM — R00.0 WIDE-COMPLEX TACHYCARDIA: Chronic | Status: ACTIVE | Noted: 2020-01-01

## 2021-04-10 PROBLEM — R09.89 LABILE BLOOD PRESSURE: Status: ACTIVE | Noted: 2021-01-01

## 2021-04-10 PROBLEM — M62.81 GENERALIZED MUSCLE WEAKNESS: Status: ACTIVE | Noted: 2021-01-01

## 2021-04-10 PROBLEM — I50.30 HEART FAILURE WITH PRESERVED EJECTION FRACTION, NYHA CLASS II (H): Status: ACTIVE | Noted: 2020-01-01

## 2021-04-10 PROBLEM — N17.9 ACUTE KIDNEY INJURY (H): Status: ACTIVE | Noted: 2021-01-01

## 2021-04-10 PROBLEM — F02.80 LATE ONSET ALZHEIMER'S DISEASE WITHOUT BEHAVIORAL DISTURBANCE (H): Chronic | Status: ACTIVE | Noted: 2020-02-20

## 2021-04-10 PROBLEM — J96.12 CHRONIC RESPIRATORY FAILURE WITH HYPERCAPNIA (H): Chronic | Status: ACTIVE | Noted: 2020-01-01

## 2021-04-10 PROBLEM — Z86.73 HISTORY OF TIA (TRANSIENT ISCHEMIC ATTACK) AND STROKE: Chronic | Status: ACTIVE | Noted: 2019-12-26

## 2021-04-10 NOTE — ED PROVIDER NOTES
History     Chief Complaint   Patient presents with     Generalized Weakness     weakness, tremor, low bp and heart rate     HPI  Caitlin Sales is a 85 year old female with medical history which includes oxygen dependent COPD and Alzheimer's disease who presents by EMS from assisted living facility for evaluation of episode of generalized weakness and increased tremulousness.  EMS reports that the patient has a baseline tremor but seems significantly increased from baseline, no sign of focal weakness or altered mentation.  The patient is alert in the department but is not oriented to place.  She denies active pain, dyspnea, or any specific complaint.  Will await daughter arrival for further information.    Allergies:  Allergies   Allergen Reactions     Ace Inhibitors Cough     Aspirin Other (See Comments)     GI Bleeding     Citalopram Other (See Comments)     Tremor       Penicillins Unknown     Childhood Rxn     Tramadol Other (See Comments)     Lethargy     Furosemide Rash     Lisinopril-Hydrochlorothiazide Cough     Torsemide Rash       Problem List:    Patient Active Problem List    Diagnosis Date Noted     Generalized muscle weakness 04/10/2021     Priority: Medium     Acute kidney injury (H) 04/10/2021     Priority: Medium     Labile blood pressure 04/10/2021     Priority: Medium     Chronic kidney disease, stage 3 04/08/2021     Priority: Medium     Morbid obesity (H) 02/16/2021     Priority: Medium     Heart failure with preserved ejection fraction, NYHA class II (H) 06/08/2020     Priority: Medium     Elevated hemidiaphragm 05/22/2020     Priority: Medium     Markedly elevated right hemidiaphragm, 1st noted 12/25/2019       Chronic respiratory failure with hypercapnia (H) 05/21/2020     Priority: Medium     Hypoxemia 05/20/2020     Priority: Medium     Presence of IVC filter 05/01/2020     Priority: Medium     PLACED 2013  Discussed considering removal in 6-12 months, post-COVID.       Coronary  artery disease involving native coronary artery of native heart without angina pectoris 05/01/2020     Priority: Medium     Presumed CAD.  Stress test 4/2020 Perfusion defect in the inferolateral wall and septal area seen on stress test.  Patient reports no history of acute MI.       Stenosis of right vertebral artery 05/01/2020     Priority: Medium     Noted on CTA for TIA 4/2020.  Started statin       History of discitis 05/01/2020     Priority: Medium     She , had a spinal fusion done by Dr. Zapata in [place] about 2010 that went very poorly. She ended up having diskitis, infection and needed roughly 12 weeks of IV antibiotics. She had three subsequent surgeries to try to clean the issues up and, unfortunately, had significant scar tissue in her lumbar spine and chronic pain due to this       History of upper gastrointestinal bleeding 05/01/2020     Priority: Medium     On aspirin       Wide-complex tachycardia (H) 04/28/2020     Priority: Medium     [facility] presented 4/2020 with palpitations associated with shortness of breath. Heart rate was noted to be in 165 beats per minute. She was given adenosine 6 mg followed by 12 mg. Subsequently she was given 150 joules shock. Subsequently patient was given 200 joules of shock. Patient was also given 150 mg bolus of amiodarone followed by drip. She converted to sinus rhythm after amiodarone bolus. Transferred to [facility]. EP was consulted and they felt it was unlikely to be VT         Late onset Alzheimer's disease without behavioral disturbance (H) 02/20/2020     Priority: Medium     Worsening as of 2/2020       History of TIA (transient ischemic attack) and stroke 12/26/2019     Priority: Medium     History of TIAs- scant details available  During hospitalization 4/202:  Stroke code called on 4/23. Head CT negative. Not candidate for tpa or embolectomy. Concern for embolic effect with possible a fib vs flutter and non AC cardioversion  "at OSH. Bubble study negative. Likely had TIA.  Neurology was involved and she was started on xarelto as above         Supraventricular tachycardia (H) 01/25/2019     Priority: Medium     HOLTER suggested long runs Vtach.  Cardiac EP eval [facility] dul feels this is SVT with aberancy, beta blocker dose increased.  Stress testing 4/2019 \"most likely\" normal, EF 80%       Lumbosacral radiculopathy at L5 03/15/2017     Priority: Medium     S/P lumbar spinal fusion 03/15/2017     Priority: Medium     L3-S1       Hypothyroidism 02/15/2016     Priority: Medium     CKD (chronic kidney disease) stage 2, GFR 60-89 ml/min 12/18/2015     Priority: Medium     Other iron deficiency anemia 12/18/2015     Priority: Medium     400 venofer 12/2019 (noted to be b12, folate AND b12 def at same time)       Hyperlipidemia 12/18/2015     Priority: Medium     Back pain with right-sided radiculopathy 11/20/2015     Priority: Medium     INJECTION Dr Huddleston 3/15/17 modestly helpful.  Fusion [place] 6/2010-- multiple complications, profound scar tissue entrapping right sided nerves-- chronic pain       Shoulder pain 02/16/2015     Priority: Medium     Severe OA per xr 2015.  Not surg candidate.  Left shoulder inj 2/16/15.  Right shoulder inj 2/16/15, 9/6/2016, 12/7/18, 1/3/2020       History of DVT of lower extremity 09/25/2013     Priority: Medium     Initial DVT following back surgery in 2013, IVC filter in place permanently.   Acute left lower extremity DVT 4/2020o and now on indefinite anticoagulation with rivaroxaban. ultrasound 4/2020 - Occlusive left mid to distal femoral vein involving the popliteal vein and nonocclusive clot in the left common femoral and proximal femoral  Needs anticoagulation indefinintely.        Neuropathy 09/25/2013     Priority: Medium     S/P hip replacement 09/25/2013     Priority: Medium     Osteoarthrosis, hip 05/02/2012     Priority: Medium     Right hip. Significant OA changes on xrays.  Failed " conservative rx, 9/24/13 with Right CHILO/misterling       Personal history of colonic polyps 02/06/2006     Priority: Medium     8/05=1 tubulovillous polyp, recheck 9/07=1polyp       Diverticulosis of colon 08/05/2005     Priority: Medium     Per colonoscopy       Esophageal reflux 08/05/2004     Priority: Medium     Osteoporosis 08/05/2004     Priority: Medium     Dexa 10/06 normal, recheck 2 yrs.   Dexa 2/08 good. 1/2011 mild worsening with frax score 10%major/0.8%other. REPEAT 2012^^^^^^^^^^^^^^^^^^^^^       Primary osteoarthritis involving multiple joints 08/05/2004     Priority: Medium     Complex/multiple back surgeries [place] summer 2010--ultimately with fusion.  Right knee-hinkle, rooster comb injections-x5 fall 2008.  Right shoulder cortisone 1/08, 10/08, 1/3/20.  Left hip injection 5/2011. Right hip injected [facility] 1/20/11, 3/15/11, also 9/2011 by [facility] pain [facility] (all trochanter bursa), -1/18/12 (r hip again).  Left knee 6/25/14, 9/17/14, 4/24/15.       Essential hypertension 08/05/2004     Priority: Medium     Echo 02/2016-- EF 55-60%, valves ok, right heart ok       Vitreous degeneration 03/14/2002     Priority: Medium     HX of bilateral vitreous detachment       Counseling on health care directive 06/15/2001     Priority: Medium     Class 1 obesity due to excess calories with serious comorbidity in adult 05/21/2020     Priority: Low        Past Medical History:    Past Medical History:   Diagnosis Date     Basal cell carcinoma of face 7/19/2017     History of DVT of lower extremity 09/25/2013     Neuropathy 9/25/2013     TIA (transient ischemic attack) 12/26/2019     Wide-complex tachycardia (H) 04/28/2020       Past Surgical History:    Past Surgical History:   Procedure Laterality Date     APPENDECTOMY      teenager     AS LUMBAR SPINE FUSN,POST INTRBDY  2010     AS REVISE TOTAL HIP REPLACEMENT Right 2013     CHOLECYSTECTOMY  1967     ENDOMETRIAL SAMPLING (BIOPSY)  2001     SALPINGO  "OOPHORECTOMY,R/L/DI Left 1967     VENA CAVA FILTER  2010    Permanent        Family History:    Family History   Problem Relation Age of Onset     Cancer Mother         multiple myeloma     Abdominal Aortic Aneurysm Father      Hypertension Father      Prostate Cancer Father      Breast Cancer Daughter        Social History:  Marital Status:   [5]  Social History     Tobacco Use     Smoking status: Never Smoker     Smokeless tobacco: Never Used   Substance Use Topics     Alcohol use: Not Currently     Frequency: Never     Drug use: Never        Medications:    No current outpatient medications on file.        Review of Systems   able to obtain secondary to clinical condition...      Physical Exam   BP: 104/77  Pulse: 55  Temp: 98.4  F (36.9  C)  Resp: 26  Height: 165.1 cm (5' 5\")  Weight: 102.1 kg (225 lb)  SpO2: 92 %      Physical Exam  Constitutional:  Well developed, well nourished.  Appears nontoxic and in no acute distress.    HENT:  Normocephalic and atraumatic.  Symmetric in appearance.  Eyes:  Conjunctivae are normal.  Neck:  Neck supple.  Cardiovascular:  No cyanosis.  RRR.  No audible murmurs noted.    Respiratory:  Effort normal without sign of respiratory distress.  No audible wheezing or stridor.  CTAB.   Gastrointestinal:  Soft nondistended abdomen.  Nontender and without guarding.  No rigidity or rebound tenderness.    Musculoskeletal:  Moves extremities spontaneously.  Neurological:  Patient is alert but not oriented to place or time.  Skin:  Skin is warm and dry.  Psychiatric:  Normal mood and affect.      ED Course        Procedures              Critical Care time:  none         EKG Interpretation:      Interpreted by: Kingston Platt  Time reviewed: Upon completion    Symptoms at time of EKG: Tremulous  Rhythm: Sinus  Rate: 50 bpm  Axis: Left  Conduction: None atypical   ST Segments/ T Waves: No pathologic ST-elevations or T-wave abnormalities.  Q Waves: None  Comparison to prior: Similar " morphology to previous     Clinical Impression: No sign of ischemia               Results for orders placed or performed during the hospital encounter of 04/10/21 (from the past 24 hour(s))   CBC with platelets differential   Result Value Ref Range    WBC 8.4 4.0 - 11.0 10e9/L    RBC Count 3.49 (L) 3.8 - 5.2 10e12/L    Hemoglobin 10.0 (L) 11.7 - 15.7 g/dL    Hematocrit 36.8 35.0 - 47.0 %     (H) 78 - 100 fl    MCH 28.7 26.5 - 33.0 pg    MCHC 27.2 (L) 31.5 - 36.5 g/dL    RDW 15.0 10.0 - 15.0 %    Platelet Count 247 150 - 450 10e9/L    Diff Method Manual Differential     % Neutrophils 61.0 %    % Lymphocytes 28.0 %    % Monocytes 11.0 %    % Eosinophils 0.0 %    % Basophils 0.0 %    Absolute Neutrophil 5.1 1.6 - 8.3 10e9/L    Absolute Lymphocytes 2.4 0.8 - 5.3 10e9/L    Absolute Monocytes 0.9 0.0 - 1.3 10e9/L    Absolute Eosinophils 0.0 0.0 - 0.7 10e9/L    Absolute Basophils 0.0 0.0 - 0.2 10e9/L    RBC Morphology Normal     Platelet Estimate       Automated count confirmed.  Platelet morphology is normal.   Comprehensive metabolic panel   Result Value Ref Range    Sodium 142 133 - 144 mmol/L    Potassium 4.4 3.4 - 5.3 mmol/L    Chloride 101 94 - 109 mmol/L    Carbon Dioxide 41 (H) 20 - 32 mmol/L    Anion Gap <1 (L) 3 - 14 mmol/L    Glucose 111 (H) 70 - 99 mg/dL    Urea Nitrogen 37 (H) 7 - 30 mg/dL    Creatinine 1.81 (H) 0.52 - 1.04 mg/dL    GFR Estimate 25 (L) >60 mL/min/[1.73_m2]    GFR Estimate If Black 29 (L) >60 mL/min/[1.73_m2]    Calcium 7.9 (L) 8.5 - 10.1 mg/dL    Bilirubin Total 0.3 0.2 - 1.3 mg/dL    Albumin 3.5 3.4 - 5.0 g/dL    Protein Total 7.2 6.8 - 8.8 g/dL    Alkaline Phosphatase 98 40 - 150 U/L    ALT 15 0 - 50 U/L    AST 10 0 - 45 U/L   Troponin I   Result Value Ref Range    Troponin I ES <0.015 0.000 - 0.045 ug/L   UA with Microscopic reflex to Culture    Specimen: Midstream Urine   Result Value Ref Range    Color Urine Yellow     Appearance Urine Clear     Glucose Urine Negative NEG^Negative  mg/dL    Bilirubin Urine Negative NEG^Negative    Ketones Urine Negative NEG^Negative mg/dL    Specific Gravity Urine 1.021 1.003 - 1.035    Blood Urine Negative NEG^Negative    pH Urine 5.0 5.0 - 7.0 pH    Protein Albumin Urine 30 (A) NEG^Negative mg/dL    Urobilinogen mg/dL 0.0 0.0 - 2.0 mg/dL    Nitrite Urine Negative NEG^Negative    Leukocyte Esterase Urine Negative NEG^Negative    Source Midstream Urine     WBC Urine <1 0 - 5 /HPF    RBC Urine 2 0 - 2 /HPF    Bacteria Urine Few (A) NEG^Negative /HPF    Squamous Epithelial /HPF Urine <1 0 - 1 /HPF    Mucous Urine Present (A) NEG^Negative /LPF    Hyaline Casts 64 (H) 0 - 2 /LPF       Medications   lactated ringers BOLUS 500 mL (has no administration in time range)   amiodarone (PACERONE) tablet 200 mg (has no administration in time range)   atorvastatin (LIPITOR) tablet 40 mg (has no administration in time range)   folic acid (FOLVITE) tablet 1 mg (has no administration in time range)   levothyroxine (SYNTHROID/LEVOTHROID) tablet 75 mcg (has no administration in time range)   omeprazole (priLOSEC) CR capsule 40 mg (has no administration in time range)   rivaroxaban ANTICOAGULANT (XARELTO) tablet 20 mg (has no administration in time range)   acetaminophen (TYLENOL) tablet 650 mg (has no administration in time range)   acetaminophen (TYLENOL) Suppository 650 mg (has no administration in time range)   melatonin tablet 1 mg (has no administration in time range)   ondansetron (ZOFRAN-ODT) ODT tab 4 mg (has no administration in time range)     Or   ondansetron (ZOFRAN) injection 4 mg (has no administration in time range)   0.9% sodium chloride BOLUS (0 mLs Intravenous Stopped 4/10/21 1615)       Assessments & Plan (with Medical Decision Making)   Caitlin Sales is a 85 year old female who presented to the department by EMS for evaluation of increased tremulousness and labile blood pressures.  Patient's daughter arrived shortly after she, admits that the patient  has been increasingly listless over the past 24-48 hours and today is too weak to ambulate with her walker.  Daughter recalls the patient's losartan being increased a few days ago, also started mirtazapine as a sleep aid two days ago.  No reports of fever, illness, or injury.  Blood pressure readings are labile but pulse of 50s is baseline per records.  She does not seem to be suffering from symptoms of hypotension, CBC without leukocytosis or anemia, troponin within reference range.  However patient's creatinine is significantly elevated from baseline, could represent recent medication changes.  She has received gentle hydration.  Consulted hospitalist Dr. Meeks who has accepted ongoing care for the patient at this time.  Temporary transition orders were placed per hospital protocol to prevent any potential delay in patient care.    Patient and daughter have been informed of results and the recommendation for admission.  They have verbalized an understanding, all questions answered, and in agreement with the plan.      Disclaimer:  This note consists of symbols derived from keyboarding, dictation, and/or voice recognition software.  As a result, there may be errors in the script that have gone undetected.  Please consider this when interpreting information found in the chart.        I have reviewed the nursing notes.    I have reviewed the findings, diagnosis, plan and need for follow up with the patient.       Current Discharge Medication List          Final diagnoses:   Generalized muscle weakness   Labile blood pressure   Acute kidney injury (H)       4/10/2021   [facility] EMERGENCY DEPT     Kingston Platt DO  04/10/21 [id number]

## 2021-04-11 NOTE — PROGRESS NOTES
[facility] TRANSPORT NOTE  Data:   Reason for Transport:  Need for BiPAP-increased PCO2 readings    Caitlin Sales was transported to [facility]   via cart at 0215, arrival in room 0230.  Patient was accompanied by Registered Nurse. Equipment used for transport: Oxygen  Nasal Cannula, Cardiac monitor  and Pulse oximeter. Family was aware of reason for transport: yes    Action:  Report: received from DAISY Dodge/S RN    Response:  Patient's condition when transferred off unit was stable..    Cindy Johanson, RN

## 2021-04-11 NOTE — PROGRESS NOTES
At 2230, recorder contacted MD Flowers because Echo Hardy, RN had mentioned to recorder that patient was much more lethargic than the ED report had mentioned. Also notified MD that staff were having a hard time keeping her on continuous pulse ox because of poor perfusion to her hands and feet, and difficulty with the probe staying on her earlobe. He verbalized acknowledgment, stated he had assessed her just prior and to keep watching the waveform on the pulse ox to ensure accurate readings.     At 2330 provider was contacted again because patient is no longer oriented to time, place, or situation as she had been previously. Also notified him that staff were still struggling with the pulse ox and she would go from high 90s to low 60s and back up to 90s all within seconds with good waveform. Requested VBG. Provider ordered VBG.     0009: Provider notified of VBG results with CO2 of 99. Provider requests ABG. RT was contacted.     0034: Notified provider that ABG was not obtained because RT was unable to draw.     Provider came to the floor and went in with RT to attempt to get an ABG. Again ABG was not obtained. He educated patient on breathing techniques and told recorder to order a VBG to be redrawn in one hour.     When VBG was redrawn, CO2 was 96. Provider Zoya was contacted with this value. He then placed orders for ICU transfer and BiPap. ICU contacted.

## 2021-04-11 NOTE — PROGRESS NOTES
Patient continued to be restless , pulling off bipap, VBG's completed. Worsening VBG,s, gave haldol as ordered, now at 1000 patient is sleeping w/bipap on.Will watch for changes.Daughter called and update given.Explained need for nonviolent restraints.Daughter states patient started a new medication for sleep about two days ago and daughter noticed a drastic change in mentation.

## 2021-04-11 NOTE — PROGRESS NOTES
Patient's daughter Luisana was contaced to update that patient was being transferred to the ICU. Patient's condition explained to the daughter and she verbalizes understanding. She has no questions or concerns.

## 2021-04-11 NOTE — ED NOTES
"Patient reports \"rash\" on her buttocks. Patient buttock is red but blanchable. Patient repositioned on right side.   "

## 2021-04-11 NOTE — PROGRESS NOTES
Increasingly agitated, wanting mask off, restless, playing with mask, tugging it off face, weepy and worrying about family and clothing, continue to reassure her, showed her clothing in closet, reoriented to room, place and situation.. did finally remove mask and placed on NC at 2 LPM. Very agitated and almost crying now wanting to go home.   Lab here now for VBG labs, drawn with Oxygen at 2LPM NC, but agitated and fridgety.   Given privacy to void( at present patient has periwick in place) returned to room and removed oxygen and to get saturation back up increased to 10 LPM and slowly decreased to 3 LPM saturations now at 92%.  ?? If presence of staff in room making situation worse or better.  Continue to monitor.

## 2021-04-11 NOTE — PROVIDER NOTIFICATION
DATE:  4/11/2021   TIME OF RECEIPT FROM LAB:  7741  LAB TEST:  CO2  LAB VALUE:  95  RESULTS GIVEN TO PROVIDER VIA TEXT PAGE (PROVIDER):  Richardson Sharpe MD  TIME LAB VALUE REPORTED TO PROVIDER:   2571

## 2021-04-11 NOTE — H&P
[facility]    History and Physical  Hospital Medicine       Date of Admission:  4/10/2021  Date of Service: 4/10/2021     Assessment & Plan   Caitlin Sales is a 85 year old female who presents on 4/10/2021 with LORI    Principal Problem:    Acute kidney injury (H)    CKD (chronic kidney disease) stage 2, GFR 60-89 ml/min  Creatinine   Date Value Ref Range Status   04/10/2021 1.81 (H) 0.52 - 1.04 mg/dL Final     Creatinine on admission 1.81.  Baseline 0.98  Could be related to recent medication change  Received normal saline bolus of 500 ml in the ED  -Gentle hydration with IV fluids  -Hold losartan for now, consider lower the dose to 25 mg  -Repeat BMP in the morning  -Avoid nephrotoxins    Active Problems:   Chronic respiratory failure with hypercapnia (H)  Patient with history of chronic respiratory failure with hypercapnia and is on 2 L oxygen at home  PFT 03/08/2021: Moderate-severe Restriction - Moderate reduction in DLCO   She has been on amiodarone therapy for wide-complex tachycardia since 04/22/2020.  She has a history of elevated hemidiaphragm, heart failure and morbid obesity which could be contributing as well.  Patient was noted by he nurse after admission to have episodes of occasional hypoxia and pulse ox was not reliable for oxygen saturation measurement.  VBG was obtained and showed PCO2 venous at 99 repeat 96 , pH venous 7.25, bicarb 43 which is consistent with partially compensated respiratory acidosis, likely due to chronic CO2 retention.  Attempt to obtain  ABG unsuccessful  PFT with moderate to severe restriction and moderate reduction and Rochelle which could be related to amiodarone side effects vs pulmonary fibrosis   - patient was started on BiPAP and was transferred  to the ICU  -Amiodarone level  -Repeat VBG the a.m.  -Consider alternative to amiodarone to treat arrhythmia  -Follow-up with pulmonology as outpatient    Essential hypertension  Hold losartan due  to LORI      Coronary artery disease involving native coronary artery of native heart without angina pectoris    Heart failure with preserved ejection fraction, NYHA class II (H)  Stress test done on 04/20/2020 showed severe reversible perfusion defect of the inferolateral wall and distal septal distribution.  No discrete areas of ischemia.  She is currently asymptomatic.  Resume home medication Lipitor      Generalized muscle weakness  Exam showed normal strength in the upper extremities.  She has truncal and lower extremity weakness.  She has a history of chronic back pain with radiculopathy.  She had lumbar spine fusion surgery and right total hip replacement surgery.  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  IVC filter was placed permanently.  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      Esophageal reflux  Resume home PPI    Late onset Alzheimer's disease without behavioral disturbance (H)  Stable.  Patient is currently staying at assisted living facility  Normal mentation today  Continue to monitor    Other iron deficiency anemia  Hemoglobin admission 10  No concern for current bleeding  Repeat CBC in the morning    Hypothyroidism  Chronic stable problem.  Resume home levothyroxine 75 MCG    Hyperlipidemia    History of TIA (transient ischemic attack) and stroke  Resume home Lipitor 40 mg daily   Resume home Xarelto    Covid ruled out  This patient was evaluated during a global COVID-19 pandemic, which necessitated consideration that the patient might be at risk for infection with the SARS-CoV-2 virus that causes COVID-19  Covid PCR is negative       Diet: Regular Diet Adult    DVT Prophylaxis: patient on Xarelto   Lindsey Catheter: not present  Code Status:   Full code  Lines: IV  line     Disposition Plan   Expected discharge: 2 - 3 days, recommended to prior living arrangement once renal function improved and safe disposition plan/ TCU bed available.  Entered: Peggy Meeks MD 04/10/2021, 7:37 PM     Status: Patient is appropriate for Observation   Peggy Meeks MD        The patient's care was discussed with the Patient.    Primary Care Physician   Abbie Zacarias [phone]    History is obtained from the patient,  and review of old records via the EMR.    History of Present Illness   Caitlin Sales is a 85 year old female with past medical history of chronic respiratory failure on 2 L oxygen at home, Alzheimer dementia, CAD, hypertension, hyper lipidemia, hypothyroidism history of lower extremity DVT now presents on 4/10/2021 with generalized weakness and worsening tremor.  Patient has baseline tremor, but seems to have increased tremor from baseline.   Losartan was increased by her cardiologist on 04/02/2021  PCP notes reviewed and showed mirtazapine was started on 04/08 to help with insomnia.  Patient denies any fever, sick contact, injury or fall.  Labs showed normal CBC, and normal troponin.  Creatinine was noted to be significantly elevated above baseline.  She was given normal saline 500 cc bolus.    Review of Systems   The 10 point Review of Systems is negative other than noted in the HPI or here.     Past Medical History      Past Medical History:   Diagnosis Date     Basal cell carcinoma of face 7/19/2017     History of DVT of lower extremity 09/25/2013     Neuropathy 9/25/2013     TIA (transient ischemic attack) 12/26/2019     Wide-complex tachycardia (H) 04/28/2020     Patient Active Problem List    Diagnosis Date Noted     Generalized muscle weakness 04/10/2021     Priority: High     Acute kidney injury (H) 04/10/2021     Priority: High     Labile blood pressure 04/10/2021     Priority: Medium     Chronic kidney disease, stage 3 04/08/2021     Priority: Medium      Morbid obesity (H) 02/16/2021     Priority: Medium     Heart failure with preserved ejection fraction, NYHA class II (H) 06/08/2020     Priority: Medium     Elevated hemidiaphragm 05/22/2020     Priority: Medium     Markedly elevated right hemidiaphragm, 1st noted 12/25/2019       Chronic respiratory failure with hypercapnia (H) 05/21/2020     Priority: Medium     Hypoxemia 05/20/2020     Priority: Medium     Presence of IVC filter 05/01/2020     Priority: Medium     PLACED 2013  Discussed considering removal in 6-12 months, post-COVID.       Coronary artery disease involving native coronary artery of native heart without angina pectoris 05/01/2020     Priority: Medium     Presumed CAD.  Stress test 4/2020 Perfusion defect in the inferolateral wall and septal area seen on stress test.  Patient reports no history of acute MI.       Stenosis of right vertebral artery 05/01/2020     Priority: Medium     Noted on CTA for TIA 4/2020.  Started statin       History of discitis 05/01/2020     Priority: Medium     She , had a spinal fusion done by Dr. Zapata in [place] about 2010 that went very poorly. She ended up having diskitis, infection and needed roughly 12 weeks of IV antibiotics. She had three subsequent surgeries to try to clean the issues up and, unfortunately, had significant scar tissue in her lumbar spine and chronic pain due to this       History of upper gastrointestinal bleeding 05/01/2020     Priority: Medium     On aspirin       Wide-complex tachycardia (H) 04/28/2020     Priority: Medium     [facility] presented 4/2020 with palpitations associated with shortness of breath. Heart rate was noted to be in 165 beats per minute. She was given adenosine 6 mg followed by 12 mg. Subsequently she was given 150 joules shock. Subsequently patient was given 200 joules of shock. Patient was also given 150 mg bolus of amiodarone followed by drip. She converted to sinus rhythm after  "amiodarone bolus. Transferred to [facility]. EP was consulted and they felt it was unlikely to be VT         Late onset Alzheimer's disease without behavioral disturbance (H) 02/20/2020     Priority: Medium     Worsening as of 2/2020       History of TIA (transient ischemic attack) and stroke 12/26/2019     Priority: Medium     History of TIAs- scant details available  During hospitalization 4/202:  Stroke code called on 4/23. Head CT negative. Not candidate for tpa or embolectomy. Concern for embolic effect with possible a fib vs flutter and non AC cardioversion at OSH. Bubble study negative. Likely had TIA.  Neurology was involved and she was started on xarelto as above         Supraventricular tachycardia (H) 01/25/2019     Priority: Medium     HOLTER suggested long runs Vtach.  Cardiac EP eval [facility] dul feels this is SVT with aberancy, beta blocker dose increased.  Stress testing 4/2019 \"most likely\" normal, EF 80%       Lumbosacral radiculopathy at L5 03/15/2017     Priority: Medium     S/P lumbar spinal fusion 03/15/2017     Priority: Medium     L3-S1       Hypothyroidism 02/15/2016     Priority: Medium     CKD (chronic kidney disease) stage 2, GFR 60-89 ml/min 12/18/2015     Priority: Medium     Other iron deficiency anemia 12/18/2015     Priority: Medium     400 venofer 12/2019 (noted to be b12, folate AND b12 def at same time)       Hyperlipidemia 12/18/2015     Priority: Medium     Back pain with right-sided radiculopathy 11/20/2015     Priority: Medium     INJECTION Dr Huddleston 3/15/17 modestly helpful.  Fusion [facility] 6/2010-- multiple complications, profound scar tissue entrapping right sided nerves-- chronic pain       Shoulder pain 02/16/2015     Priority: Medium     Severe OA per xr 2015.  Not surg candidate.  Left shoulder inj 2/16/15.  Right shoulder inj 2/16/15, 9/6/2016, 12/7/18, 1/3/2020       History of DVT of lower extremity 09/25/2013     Priority: Medium     Initial DVT following back " surgery in 2013, IVC filter in place permanently.   Acute left lower extremity DVT 4/2020o and now on indefinite anticoagulation with rivaroxaban. ultrasound 4/2020 - Occlusive left mid to distal femoral vein involving the popliteal vein and nonocclusive clot in the left common femoral and proximal femoral  Needs anticoagulation indefinintely.        Neuropathy 09/25/2013     Priority: Medium     S/P hip replacement 09/25/2013     Priority: Medium     Osteoarthrosis, hip 05/02/2012     Priority: Medium     Right hip. Significant OA changes on xrays.  Failed conservative rx, 9/24/13 with Right CHILO/misterling       Personal history of colonic polyps 02/06/2006     Priority: Medium     8/05=1 tubulovillous polyp, recheck 9/07=1polyp       Diverticulosis of colon 08/05/2005     Priority: Medium     Per colonoscopy       Esophageal reflux 08/05/2004     Priority: Medium     Osteoporosis 08/05/2004     Priority: Medium     Dexa 10/06 normal, recheck 2 yrs.   Dexa 2/08 good. 1/2011 mild worsening with frax score 10%major/0.8%other. REPEAT 2012^^^^^^^^^^^^^^^^^^^^^       Primary osteoarthritis involving multiple joints 08/05/2004     Priority: Medium     Complex/multiple back surgeries [place] summer 2010--ultimately with fusion.  Right knee-hinkle, rooster comb injections-x5 fall 2008.  Right shoulder cortisone 1/08, 10/08, 1/3/20.  Left hip injection 5/2011. Right hip injected [facility] 1/20/11, 3/15/11, also 9/2011 by [facility] (all trochanter bursa), -1/18/12 (r hip again).  Left knee 6/25/14, 9/17/14, 4/24/15.       Essential hypertension 08/05/2004     Priority: Medium     Echo 02/2016-- EF 55-60%, valves ok, right heart ok       Vitreous degeneration 03/14/2002     Priority: Medium     HX of bilateral vitreous detachment       Counseling on health care directive 06/15/2001     Priority: Medium     Class 1 obesity due to excess calories with serious comorbidity in adult 05/21/2020     Priority: Low         Past Surgical History     Past Surgical History:   Procedure Laterality Date     APPENDECTOMY      teenager     AS LUMBAR SPINE FUSN,POST INTRBDY  2010     AS REVISE TOTAL HIP REPLACEMENT Right 2013     CHOLECYSTECTOMY  1967     ENDOMETRIAL SAMPLING (BIOPSY)  2001     SALPINGO OOPHORECTOMY,R/L/DI Left 1967     VENA CAVA FILTER  2010    Permanent         Prior to Admission Medications   Prior to Admission Medications   Prescriptions Last Dose Informant Patient Reported? Taking?   acetaminophen (TYLENOL) 500 MG tablet  Nursing Home No No   Sig: Take 2 tablets (1,000 mg) by mouth 3 times daily   Patient taking differently: Take 1,000 mg by mouth 3 times daily    amiodarone (PACERONE) 200 MG tablet   No No   Sig: Take 1 tablet (200 mg) by mouth daily   atorvastatin (LIPITOR) 40 MG tablet   No No   Sig: Take 1 tablet (40 mg) by mouth daily   folic acid (FOLVITE) 1 MG tablet  Nursing Home Yes No   Sig: Take 1 mg by mouth daily At 3 pm   furosemide (LASIX) 20 MG tablet  Nursing Home No No   Sig: Take 1 tab (20 mg) every other day, alternating with 2 tabs (40 mg) every other day   gabapentin (NEURONTIN) 600 MG tablet  Nursing Home No No   Sig: Take 1 tablet (600 mg) by mouth 2 times daily   levothyroxine (SYNTHROID/LEVOTHROID) 75 MCG tablet  Nursing Home Yes No   Sig: Take 75 mcg by mouth daily    losartan (COZAAR) 50 MG tablet   No No   Sig: Take 1 tablet (50 mg) by mouth daily Hold for SBP <110 mmHg   mirtazapine (REMERON) 7.5 MG tablet   No No   Sig: Take 1 tablet (7.5 mg) by mouth At Bedtime   nystatin (MYCOSTATIN) 784592 UNIT/GM external cream  Nursing Home No No   Sig: Apply topically 2 times daily   omeprazole (PRILOSEC) 40 MG DR capsule  Nursing Home No No   Sig: Take 1 capsule (40 mg) by mouth daily   order for [facility]  Nursing Home No No   Sig: Equipment being ordered: knee high compression stockings 20-30 mm compression   order for [facility]  Nursing Home No No   Sig: O2 2 LPM continuos   Patient taking differently:  O2 2 LPM continuous   order for DME  Nursing Home No No   Sig: Equipment being ordered: walker with 4 wheels   oxyCODONE-acetaminophen (PERCOCET) 5-325 MG tablet   No No   Sig: Take 1 tablet by mouth daily as needed for pain   rivaroxaban ANTICOAGULANT (XARELTO ANTICOAGULANT) 20 MG TABS tablet  Nursing Home No No   Sig: Take 1 tablet (20 mg) by mouth daily (with dinner)   vitamin B-12 (CYANOCOBALAMIN) 1000 MCG tablet  Nursing Home Yes No   Sig: Take 1,000 mcg by mouth daily      Facility-Administered Medications: None     Allergies   Allergies   Allergen Reactions     Ace Inhibitors Cough     Aspirin Other (See Comments)     GI Bleeding     Citalopram Other (See Comments)     Tremor       Penicillins Unknown     Childhood Rxn     Tramadol Other (See Comments)     Lethargy     Furosemide Rash     Lisinopril-Hydrochlorothiazide Cough     Torsemide Rash       Family History    Family History   Problem Relation Age of Onset     Cancer Mother         multiple myeloma     Abdominal Aortic Aneurysm Father      Hypertension Father      Prostate Cancer Father      Breast Cancer Daughter        Social History   Social History     Socioeconomic History     Marital status:      Spouse name: Not on file     Number of children: Not on file     Years of education: Not on file     Highest education level: Not on file   Occupational History     Not on file   Social Needs     Financial resource strain: Not on file     Food insecurity     Worry: Not on file     Inability: Not on file     Transportation needs     Medical: Not on file     Non-medical: Not on file   Tobacco Use     Smoking status: Never Smoker     Smokeless tobacco: Never Used   Substance and Sexual Activity     Alcohol use: Not Currently     Frequency: Never     Drug use: Never     Sexual activity: Not Currently     Partners: Male   Lifestyle     Physical activity     Days per week: Not on file     Minutes per session: Not on file     Stress: Not on file  "  Relationships     Social connections     Talks on phone: Not on file     Gets together: Not on file     Attends [employer] service: Not on file     Active member of club or organization: Not on file     Attends meetings of clubs or organizations: Not on file     Relationship status: Not on file     Intimate partner violence     Fear of current or ex partner: Not on file     Emotionally abused: Not on file     Physically abused: Not on file     Forced sexual activity: Not on file   Other Topics Concern     Not on file   Social History Narrative     Not on file       Physical Exam   /46   Pulse 56   Temp 98.4  F (36.9  C) (Oral)   Resp 21   Ht 1.651 m (5' 5\")   Wt 102.1 kg (225 lb)   SpO2 95%   BMI 37.44 kg/m       Weight: 225 lbs 0 oz Body mass index is 37.44 kg/m .     Constitutional:Elderly not in acute distress,  Alert, oriented, cooperative, appears nontoxic  Eyes: Eyes are clear, pupils are reactive.  HENT: . No evidence of cranial trauma.  Lymph/Hematologic: No epitrochlear, axillary, anterior or posterior cervical, or supraclavicular lymphadenopathy is appreciated.  Cardiovascular: Systolic murmur , bradycardia, Regular rate , palpable  peripheral pulses in wrists bilaterally.   Respiratory: Clear to auscultation bilaterally.   GI: Soft, non-tender, normal bowel sounds, no hepatomegaly.  Genitourinary: Deferred  Musculoskeletal:   is symmetric  Unable to lift both LE against gravity   Skin: Warm and dry, no rashes.   Neurologic: Neck supple. Cranial nerves are grossly intact. .     Data   Data reviewed today:   Recent Labs   Lab 04/10/21  1506   WBC 8.4   HGB 10.0*   *         POTASSIUM 4.4   CHLORIDE 101   CO2 41*   BUN 37*   CR 1.81*   ANIONGAP <1*   FROY 7.9*   *   ALBUMIN 3.5   PROTTOTAL 7.2   BILITOTAL 0.3   ALKPHOS 98   ALT 15   AST 10   TROPI <0.015       No results found for this or any previous visit (from the past 24 hour(s)).    I personally reviewed no " images or EKG's today

## 2021-04-11 NOTE — PROGRESS NOTES
Patient able to wake with voice, Is keeping the Bipap on . R T made few changes to settings earlier and patient responding well-VBG's improved.Patient able to swallow medications when awake. Swallow intact.Will continue to watch for changes.C/oback pain ,gave tylenol for this.

## 2021-04-11 NOTE — PROGRESS NOTES
Patient was alert and oriented when she arrived at the floor. Started to become more and more confused. Notified MD. Pt was transferred to ICU for Bipap. Daughter notified.

## 2021-04-11 NOTE — PLAN OF CARE
OT/PT orders received. Pt not appropriate for therapy today due to medical status/agitation. Will plan to initiate therapies tomorrow.     Sarah Vieira, OTR/L

## 2021-04-11 NOTE — PROGRESS NOTES
Patient very agitated, pulling off bipap, oxymask placed and patient pulling this off as well. Gave ativan hr earlier due to increased agitation and saturation dropping because of pulling everything off that is placed on her face. Will ask MD for other options to help manage increased restlessness.

## 2021-04-11 NOTE — PROGRESS NOTES
Patient continues to be confused, restless and pulling on BiPAP hose, attemptted to reassure to leave alone, staff RN in room for patient reassurance,  Cares Clustered and encouraged to rest.  Easily disturbed by noise and staff talking in hallway.  Able to maintain saturations in the low 90's on present settings of 12/6, 35% FiO2.  Continue to monitor closely.

## 2021-04-11 NOTE — PLAN OF CARE
[place] [place]    Patient admitted to room 2305 at approximately 2127 via cart from emergency room. Patient was accompanied by transport tech.     Verbal SBAR report received from Nely BLACKMON prior to patient arrival.     Patient trasferred to bed via air josie. Patient alert and oriented X 3. The patient is not having any pain.  . Admission vital signs: Blood pressure 134/59, pulse 52, temperature 97.2  F (36.2  C), temperature source Oral, resp. rate 24, height 1.651 m (5' 5\"), weight 102 kg (224 lb 13.9 oz), SpO2 100 %, not currently breastfeeding. Patient was oriented to plan of care, call light, bed controls, tv, telephone, bathroom and visiting hours.     Risk Assessment    The following safety risks were identified during admission: fall. Yellow risk band applied: YES.     Skin Initial Assessment    This writer admitted this patient and completed a full skin assessment and Kashif score in the Adult PCS flowsheet. Appropriate interventions initiated as needed.     Secondary skin check completed by Dalton.  Skin Assessment findings:   L shin red and dry, blister  R shin interior medial side, dry pink and blister. R foot, blister that is red and purple to the 3rd toe on the dorsal side.    L abdomen/lindsay pinpoint red dots    Maceration under breasts    Nonblanchable redness behind ears and on bridge of the nose from glasses. Glasses removed and will apply foam over the NC    Large black/purple bruising L hip. Scattered bruising throughout body including legs and arms.    Blanchable redness buttocks and gluteal area meplex applied.         Education    Patient has a Garvin to Observation order: Yes  Observation education completed and documented: Yes      Vanessa Hardy RN      "

## 2021-04-11 NOTE — PROVIDER NOTIFICATION
DATE:  4/11/2021   TIME OF RECEIPT FROM LAB:  0010  LAB TEST:  VBG CO2 level  LAB VALUE:  99  RESULTS GIVEN WITH READ-BACK TO (PROVIDER):  Zineldin  TIME LAB VALUE REPORTED TO PROVIDER:   0015    Provider orders ABG at this time. RT contacted to complete.

## 2021-04-11 NOTE — CONSULTS
Care Management Initial Consult    General Information  Assessment completed with: VM-chart review, Care Team Member,    Type of CM/SW Visit: Initial Assessment    Primary Care Provider verified and updated as needed: Yes   Readmission within the last 30 days: no previous admission in last 30 days      Reason for Consult: discharge planning  Advance Care Planning: Advance Care Planning Reviewed: present on chart          Communication Assessment  Patient's communication style: spoken language (English or Bilingual)    Hearing Difficulty or Deaf: no   Wear Glasses or Blind: yes    Cognitive  Cognitive/Neuro/Behavioral: .WDL except, mood/behavior  Level of Consciousness: lethargic  Arousal Level: arouses to repeated stimulation  Orientation: disoriented x 4  Mood/Behavior: hyperactive (agitated, impulsive)     Speech: MICHELLE (unable to assess)    Living Environment:   People in home: facility resident     Current living Arrangements: assisted living  Name of Facility: ([facility])   Able to return to prior arrangements: yes       Family/Social Support:  Care provided by: self, child(tristin), other (see comments)(AI)  Provides care for: no one, unable/limited ability to care for self  Marital Status: Single  Children, Facility resident(s)/Staff          Description of Support System: Supportive, Involved    Support Assessment: Adequate family and caregiver support, Adequate social supports    Current Resources:   Patient receiving home care services: No     Community Resources:    Equipment currently used at home: walker, standard  Supplies currently used at home:      Employment/Financial:  Employment Status: retired        Financial Concerns: insurance, none           Lifestyle & Psychosocial Needs:        Socioeconomic History     Marital status:      Spouse name: Not on file     Number of children: Not on file     Years of education: Not on file     Highest education level: Not on file      Tobacco Use     Smoking status: Never Smoker     Smokeless tobacco: Never Used   Substance and Sexual Activity     Alcohol use: Not Currently     Frequency: Never     Drug use: Never     Sexual activity: Not Currently     Partners: Male       Functional Status:  Prior to admission patient needed assistance:   Dependent ADLs:: Ambulation-walker, Wheelchair-independent, Grooming, Wheelchair-with assist, Bathing, Dressing  Dependent IADLs:: Cleaning, Cooking, Laundry, Shopping, Meal Preparation, Medication Management, Money Management, Transportation  Assesssment of Functional Status: Not at  functional baseline    Mental Health Status:  Mental Health Status: No Current Concerns       Chemical Dependency Status:  Chemical Dependency Status: No Current Concerns             Values/Beliefs:  Spiritual, Cultural Beliefs, Oriental orthodox Practices, Values that affect care: no               Additional Information:    Pt is admitted here from [facility] (Phone: [phone] or [phone] Fax: [phone]). This writer called the [facility] to get background information as pt is agitated and requiring extra care in the ICU.    Staff at the [facility] reports that pt is confused at baseline. Staff reports that she needs minimal assistance, however; most recently she has been needing more assistance. Pt usually walks indepedently with a walker but has a wheelchair if needed. Staff reports they help her with medications, cleaning, meals, laundry.  Staff reports that they are unable to do a 2 person transfer and do not have allen lifts.     Care management team will need to follow as pt is quite ill today.  Therapy to assess, pt may require additional help at home or maybe TCU depending on therapy assessment.    Marielle Mendez [facility], [facility] [phone]

## 2021-04-11 NOTE — PROVIDER NOTIFICATION
DATE:  4/11/2021   TIME OF RECEIPT FROM LAB:  0205  LAB TEST:  VBG CO2  LAB VALUE:  96  RESULTS GIVEN WITH READ-BACK TO (PROVIDER):  Ck Kenny  TIME LAB VALUE REPORTED TO PROVIDER:   0208     Provider to put in transfer to ICU orders and BiPap orders. ICU charge RN notified.

## 2021-04-11 NOTE — PROGRESS NOTES
Recorder completed secondary skin assessment with primary RN. Agree with assessment and charting.

## 2021-04-11 NOTE — PROGRESS NOTES
[facility]    Hospitalist Progress Note    Date of Service (when I saw the patient): 04/11/2021    Assessment & Plan   Caitlin Sales is a 85 year old female who presents on 4/10/2021 with LORI.     Acute kidney injury (H)  CKD (chronic kidney disease) stage 2, GFR 60-89 ml/min  Creatinine on admission 1.81.  Baseline 0.98.  Could be related to recent medication change. Losartan was increased by her Cardiologist on 04/02/2021.  Received normal saline bolus of 500 ml in the ED  - Normal saline 100 ml/hr IV overnight  - Hold losartan for now, consider lower the dose to 25 mg  - Hold Lasix  - Cr improved from 1.81 to 1.66  - Repeat BMP in the morning    Chronic respiratory failure with hypercapnia (H)  Patient with history of chronic respiratory failure with hypercapnia and is on 2 L oxygen at home.  PFT 03/08/2021: moderate-severe restriction - moderate reduction in DLCO.  She has been on amiodarone therapy for wide-complex tachycardia since 04/22/2020, which can cause pulmonary fibrosis.  She has a history of elevated hemidiaphragm, heart failure and morbid obesity which could be contributing as well.  - Patient was noted by he nurse after admission to have episodes of occasional hypoxia and pulse ox was not reliable for oxygen saturation measurement.  VBG was obtained and showed PCO2 venous at 99 repeat 96, pH venous 7.25, bicarb 43 which is consistent with partially compensated respiratory acidosis, likely due to chronic CO2 retention.   - Patient was started on BiPAP and was transferred  to the ICU  - AM VBG on 4/11: 7.28 / 90 / 52 / 42  - Patient not cooperating with leaving Bipap on, placed in restraints  - Sedation with lorazepam and haloperidol due to agitation  - Repeat VBG this afternoon  - CXR to evaluate for pneumonia or pulmonary edema, demonstrates elevated R hemidiaphragm and R basilar atelectasis.  Small bilateral pleural effusions without pneumothorax.  - Solumedrol 60  mg IV daily started 4/11  - Duonebs q 4 hours  - Consider High-Resolution CT to evaluate for interstitial lung disease  - Defer to Cardiology to consider alternative to amiodarone to treat arrhythmia  - Follow-up with Pulmonology as outpatient    Essential hypertension  - Hold losartan due to LORI.     Coronary artery disease involving native coronary artery of native heart without angina pectoris  Heart failure with preserved ejection fraction, NYHA class II (H)  Stress test done on 04/20/2020 showed severe reversible perfusion defect of the inferolateral wall and distal septal distribution.  No discrete areas of ischemia.  She is currently asymptomatic.   - Resume home medication Lipitor     Generalized muscle weakness  Exam showed normal strength in the upper extremities.  She has truncal and lower extremity weakness.  She has a history of chronic back pain with radiculopathy.  She had lumbar spine fusion surgery and right total hip replacement surgery.  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  IVC filter was placed permanently.  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     Esophageal reflux  - Resume home omeprazole      Late onset Alzheimer's disease without behavioral disturbance (H)  Acute metabolic encephalopathy  Stable.  Patient is currently staying at an assisted living facility.  Normal mentation on admission, A&O x 3.  - Continue home Remeron  - Became lethargic overnight, nurses note having difficulty maintaining good waveform on oximeter  - VBG demonstrated hypercapnea, patient transferred to ICU for Bipap      Iron deficiency anemia  Hemoglobin admission 10.  No overt bleeding.  - Hgb from 10.0 to 8.8, possibly  dilutional  - Repeat CBC in the morning      Hypothyroidism  Chronic stable problem.  Resume home levothyroxine 75 MCG.  - Check TSH in AM      Hyperlipidemia  History of TIA (transient ischemic attack) and stroke  - Resume home Lipitor 40 mg daily   - Resume home Xarelto     Covid-19 negative  This patient was evaluated during a global COVID-19 pandemic, which necessitated consideration that the patient might be at risk for infection with the SARS-CoV-2 virus that causes COVID-19  - Covid PCR is negative     Diet: Regular Diet Adult    DVT Prophylaxis: patient on Xarelto   Lindsey Catheter: not present  Code Status:   Full code  Lines: IV line      Disposition: Expected discharge in 3-5 days once mentation and respiratory status improves.    Richardson Sharpe    Interval History   Patient seen on Bipap in the ICU.  She is sedated and not answering questions at this time.    -Data reviewed today: I reviewed all new labs and imaging results over the last 24 hours. I personally reviewed no images or EKG's today.    Physical Exam   Temp: 97.5  F (36.4  C) Temp src: Axillary BP: 113/77 Pulse: 66   Resp: 16 SpO2: 90 % O2 Device: BiPAP/CPAP Oxygen Delivery: 5 LPM  Vitals:    04/10/21 1431 04/10/21 2127 04/11/21 0252   Weight: 102.1 kg (225 lb) 102 kg (224 lb 13.9 oz) 103.7 kg (228 lb 9.9 oz)     Vital Signs with Ranges  Temp:  [97.2  F (36.2  C)-97.5  F (36.4  C)] 97.5  F (36.4  C)  Pulse:  [] 66  Resp:  [13-32] 16  BP: ()/() 113/77  FiO2 (%):  [35 %] 35 %  SpO2:  [81 %-100 %] 90 %  I/O last 3 completed shifts:  In: 1000 [P.O.:50; I.V.:450; IV Piggyback:500]  Out: 300 [Urine:300]    Gen: Well nourished, debilitated elderly female, on Bipap, no acute distressed  HEENT: Atraumatic, normocephalic; sclera non-injected, anicterric  Lungs: Clear to ausculation, no wheezes, no rhonchi, no rales  Heart: Regular rate, regular rhythm, no gallops, no rubs, no murmurs  GI: Bowel sound normal, no hepatosplenomegaly,  no masses, non-tender, non-distended, no guarding, no rebound tenderness  Lymph: No lymphadenopathy, 1 + BLE edema  Skin: No rashes, no chronic venous stasis     Medications     sodium chloride 100 mL/hr at 04/11/21 1113       amiodarone  200 mg Oral Daily     atorvastatin  40 mg Oral At Bedtime     folic acid  1 mg Oral Daily     gabapentin  600 mg Oral BID     ipratropium - albuterol 0.5 mg/2.5 mg/3 mL  3 mL Nebulization Q4H     lactated ringers  500 mL Intravenous Once     levothyroxine  75 mcg Oral QAM AC     methylPREDNISolone  62.5 mg Intravenous Q24H     mirtazapine  7.5 mg Oral At Bedtime     omeprazole  40 mg Oral QAM     rivaroxaban ANTICOAGULANT  20 mg Oral Daily with supper     sodium chloride (PF)  3 mL Intracatheter Q8H       Data   Recent Labs   Lab 04/11/21  0416 04/10/21  1506   WBC 7.8 8.4   HGB 8.8* 10.0*   * 105*    247    142   POTASSIUM 4.9 4.4   CHLORIDE 105 101   CO2 38* 41*   BUN 39* 37*   CR 1.66* 1.81*   ANIONGAP <1* <1*   FROY 7.7* 7.9*   GLC 91 111*   ALBUMIN  --  3.5   PROTTOTAL  --  7.2   BILITOTAL  --  0.3   ALKPHOS  --  98   ALT  --  15   AST  --  10   TROPI  --  <0.015       Recent Results (from the past 24 hour(s))   XR Chest Port 1 View    Narrative    CHEST PORTABLE ONE VIEW April 11, 2021 1:47 PM     HISTORY: Hypercapnia.    COMPARISON: Chest x-ray 10/01/2020.      Impression    IMPRESSION: Portable chest. Atelectatic lung base changes are present.  Elevation right hemidiaphragm is unchanged. Heart appears enlarged. No  evidence of pneumothorax. Small bilateral pleural effusions suspected.    KEIRA WIGGINS MD

## 2021-04-11 NOTE — PROGRESS NOTES
VBG shows C02 went up again, is 95, patient  has been on bipap since 0900 ,is still sleeping. Message sent   to MD.

## 2021-04-11 NOTE — PROGRESS NOTES
[facility]    Hospitalist Progress Note    Date of Service (when I saw the patient): 04/11/2021    Assessment & Plan   Caitlin Sales is a 85 year old female who presents on 4/10/2021 with LORI.     Acute kidney injury (H)  CKD (chronic kidney disease) stage 2, GFR 60-89 ml/min  Creatinine on admission 1.81.  Baseline 0.98.  Could be related to recent medication change. Losartan was increased by her Cardiologist on 04/02/2021.  Received normal saline bolus of 500 ml in the ED  - Normal saline 100 ml/hr IV overnight  - Hold losartan for now, consider lower the dose to 25 mg  - Hold Lasix  - Cr improved from 1.81 to 1.66  - Repeat BMP in the morning    Chronic respiratory failure with hypercapnia (H)  Patient with history of chronic respiratory failure with hypercapnia and is on 2 L oxygen at home.  PFT 03/08/2021: moderate-severe restriction - moderate reduction in DLCO.  She has been on amiodarone therapy for wide-complex tachycardia since 04/22/2020, which can cause pulmonary fibrosis.  She has a history of elevated hemidiaphragm, heart failure and morbid obesity which could be contributing as well.  - Patient was noted by he nurse after admission to have episodes of occasional hypoxia and pulse ox was not reliable for oxygen saturation measurement.  VBG was obtained and showed PCO2 venous at 99 repeat 96, pH venous 7.25, bicarb 43 which is consistent with partially compensated respiratory acidosis, likely due to chronic CO2 retention.   - Patient was started on BiPAP and was transferred  to the ICU  - AM VBG on 4/11: 7.28 / 90 / 52 / 42  - Patient not cooperating with leaving Bipap on, placed in restraints  - Sedation with lorazepam and haloperidol due to agitation  - Repeat VBG this afternoon  - CXR to evaluate for pneumonia or pulmonary edema  - Solumedrol 60 mg IV daily started 4/11  - Duonebs q 4 hours  - Defer to Cardiology to consider alternative to amiodarone to treat  arrhythmia  - Follow-up with Pulmonology as outpatient    Essential hypertension  - Hold losartan due to LORI.     Coronary artery disease involving native coronary artery of native heart without angina pectoris  Heart failure with preserved ejection fraction, NYHA class II (H)  Stress test done on 04/20/2020 showed severe reversible perfusion defect of the inferolateral wall and distal septal distribution.  No discrete areas of ischemia.  She is currently asymptomatic.   - Resume home medication Lipitor     Generalized muscle weakness  Exam showed normal strength in the upper extremities.  She has truncal and lower extremity weakness.  She has a history of chronic back pain with radiculopathy.  She had lumbar spine fusion surgery and right total hip replacement surgery.  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  IVC filter was placed permanently.  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     Esophageal reflux  - Resume home omeprazole      Late onset Alzheimer's disease without behavioral disturbance (H)  Acute metabolic encephalopathy  Stable.  Patient is currently staying at an assisted living facility.  Normal mentation on admission, A&O x 3.  - Continue home Remeron  - Became lethargic overnight, nurses note having difficulty maintaining good waveform on oximeter  - VBG demonstrated hypercapnea, patient transferred to ICU for Bipap      Iron deficiency anemia  Hemoglobin admission 10.  No overt bleeding.  - Hgb from 10.0 to 8.8, possibly dilutional  - Repeat CBC in the morning      Hypothyroidism  Chronic stable problem.  Resume home levothyroxine 75 MCG.  - Check TSH in AM      Hyperlipidemia  History of TIA (transient  ischemic attack) and stroke  - Resume home Lipitor 40 mg daily   - Resume home Xarelto     Covid-19 negative  This patient was evaluated during a global COVID-19 pandemic, which necessitated consideration that the patient might be at risk for infection with the SARS-CoV-2 virus that causes COVID-19  - Covid PCR is negative     Diet: Regular Diet Adult    DVT Prophylaxis: patient on Xarelto   Lindsey Catheter: not present  Code Status:   Full code  Lines: IV line      Disposition: Expected discharge in 3-5 days once mentation and respiratory status improves.    Richardson Sharpe    Interval History   Patient seen on Bipap in the ICU.  She is sedated and not answering questions at this time.    -Data reviewed today: I reviewed all new labs and imaging results over the last 24 hours. I personally reviewed no images or EKG's today.    Physical Exam   Temp: 97.5  F (36.4  C) Temp src: Axillary BP: 113/77 Pulse: 66   Resp: 16 SpO2: 90 % O2 Device: Oxymask Oxygen Delivery: 5 LPM  Vitals:    04/10/21 1431 04/10/21 2127 04/11/21 0252   Weight: 102.1 kg (225 lb) 102 kg (224 lb 13.9 oz) 103.7 kg (228 lb 9.9 oz)     Vital Signs with Ranges  Temp:  [97.2  F (36.2  C)-98.4  F (36.9  C)] 97.5  F (36.4  C)  Pulse:  [] 66  Resp:  [12-32] 16  BP: ()/() 113/77  FiO2 (%):  [35 %] 35 %  SpO2:  [78 %-100 %] 90 %  I/O last 3 completed shifts:  In: 500 [IV Piggyback:500]  Out: 300 [Urine:300]    Gen: Well nourished, debilitated elderly female, on Bipap, no acute distressed  HEENT: Atraumatic, normocephalic; sclera non-injected, anicterric  Lungs: Clear to ausculation, no wheezes, no rhonchi, no rales  Heart: Regular rate, regular rhythm, no gallops, no rubs, no murmurs  GI: Bowel sound normal, no hepatosplenomegaly, no masses, non-tender, non-distended, no guarding, no rebound tenderness  Lymph: No lymphadenopathy, 1 + BLE edema  Skin: No rashes, no chronic venous stasis     Medications       amiodarone  200 mg Oral Daily      atorvastatin  40 mg Oral At Bedtime     folic acid  1 mg Oral Daily     lactated ringers  500 mL Intravenous Once     levothyroxine  75 mcg Oral QAM AC     omeprazole  40 mg Oral QAM     rivaroxaban ANTICOAGULANT  20 mg Oral Daily with supper     sodium chloride (PF)  3 mL Intracatheter Q8H       Data   Recent Labs   Lab 04/11/21  0416 04/10/21  1506   WBC 7.8 8.4   HGB 8.8* 10.0*   * 105*    247    142   POTASSIUM 4.9 4.4   CHLORIDE 105 101   CO2 38* 41*   BUN 39* 37*   CR 1.66* 1.81*   ANIONGAP <1* <1*   FROY 7.7* 7.9*   GLC 91 111*   ALBUMIN  --  3.5   PROTTOTAL  --  7.2   BILITOTAL  --  0.3   ALKPHOS  --  98   ALT  --  15   AST  --  10   TROPI  --  <0.015       No results found for this or any previous visit (from the past 24 hour(s)).

## 2021-04-11 NOTE — PROVIDER NOTIFICATION
Pt placed on BiPAP in ICU for support.  12/6, 35%. Pt tolerates well.       04/11/21 0300   Tech Time   $Tech Time (10 minute increments) 4   Mode: CPAP/ BiPAP/ AVAPS/ AVAPS AE   CPAP/BiPAP/ AVAPS/ AVAPS AE Mode BiPAP S/T   CPAP/BiPAP/Settings   $BIPAP/CPAP Therapy continuous   IPAP/EPAP (cmH2O) 12/6   Rate (breaths/min) 14   Oxygen (%) 35   Timed Inspiration (sec) 0.7   IPAP RISE  Settings (V60) 2   CPAP/BiPAP Patient Parameters   IPAP (cm H2O) 12 cmH2O   EPAP (cm H2O) 6 cmH2O   Pressure Support (cm H2O) 6 cmH2O   RR Total (breaths/min) 28 breaths/min   Vt (mL) 250 mL   Minute Ventilation (L/min) 6 L/min   Peak Inspiratory Pressure (cm H2O) 14 cmH2O   Pt.  Leak (L/min) 33 L/min   CPAP/BiPAP/AVAPS/AVAPS AE Alarms   High Pressure (cm H2O) 20 cmH2O   Low Pressure (cm H2O) 5   Low Pressure Delay (sec) 20 sec   Lo Min Vent 2   High Rate (breaths/min) 40 breaths/min   Low Rate (breaths/min) 10   CPAP/BiPAP/AVAPS/AVAPS AE Patient Assessment   Interface Face Mask - Small   Skin Integrity intact   RT Device Skin Assessment   Oxygen Delivery Device CPAP/BiPAP Mask   Site Appearance neck circumference Clean and dry   Site Appearance bridge of nose Clean and dry   Site Appearance occiput Clean and dry   Strap Tightness Finger Allowance between head and device strap   Skin Interventions Taken Skin barrier applied

## 2021-04-11 NOTE — PROGRESS NOTES
Web vocered messages to [facility] Dr. Ck Da Silva to check VBG results just drawn at 2655.  Awaiting any change in orders

## 2021-04-11 NOTE — PROGRESS NOTES
Patient awake now and talking to daughter, requesting to keep bipap off.Ativan given again for restlessness.Will try to encourage bipap.

## 2021-04-11 NOTE — PROGRESS NOTES
Medicated for anxiety and rest with IV Ativan, did get more relaxed and drifted off to sleep, saturations maintained at mid 90's at rest with BiPAP now back on at 12/6 and 35% FiO2.  Continue to monitor cl;osely.

## 2021-04-12 NOTE — PLAN OF CARE
"Attempted to ambulate pt with gait belt and assist of 2-3 but were unsuccessful as pt starts to get \"shaky\" and sits back down. Complained of L buttocks/leg pain and R shoulder pain, given PRN tylenol. Unable to pivot transfer so transferred via ceiling lift. Pt sat up in the chair for breakfast and lunch, able to feed herself- ate % of her trays. Converses appropriately with staff, remains forgetful. Placed back in bed after lunch as she was falling asleep in the chair. Placed on BiPap which made pt anxious, given PRN ativan and respiratory contacted to adjust mask and settings. Pt resting in bed. Call light within reach. Alarms activated and audible.   "

## 2021-04-12 NOTE — PROGRESS NOTES
Care Management Follow Up    Length of Stay (days): 1    Expected Discharge Date: 04/14/21     Concerns to be Addressed: discharge planning     Patient plan of care discussed at interdisciplinary rounds: Yes    Anticipated Discharge Disposition:  Per PT/OT today, pt will benefit from going to a  Transitional Care facility.     Anticipated Discharge Services:  TCU  Anticipated Discharge DME: Walker    Patient/family educated on Medicare website which has current facility and service quality ratings: yes  Education Provided on the Discharge Plan:  Yes  Patient/Family in Agreement with the Plan: yes    Referrals Placed by CM/SW: Internal Clinic Care Coordination, Post Acute Facilities    Private pay costs discussed: transportation costs, Gale requests that [facility] wheelchair transport at discharge.     Additional Information:  Pt lives at [facility] (Phone: [phone] or [phone] Fax: [phone]).  Staff at the [facility] reports that pt is confused at baseline. Staff reports that she needs minimal assistance, however; most recently she has been needing more assistance. Pt usually walks indepedently with a walker but has a wheelchair if needed. Staff reports they help her with medications, cleaning, meals, laundry.  Staff reports that they are unable to do a 2 person transfer and do not have allen lifts.     PT/OT today recommend that pt needs a TCU upon discharge.      IBIS called pt's daughter, Chante, to discuss discharge planning.  Chante states there is no HCD and that pt's 6 children typically make medical decisions together.  IBIS educated on above.  Chante requested that she call & speak with her sister, Gale, and have Gale call this writer back with locations.    IBIS received return call from daughter, Gale, who shared that the pt has 6 children, no HCD, but she is the nominated family spokesperson.  IBIS updated treatment team sticky note.    Gale stated all children met via Zoom  last evening and discussed TCU locations due to pt's weakness.  Family requests that referrals be sent to:  1.  [facility] (Phone: [phone] Fax: [phone])  2.  [facility] (Main: [phone] Admissions: [phone] Fax: [phone])  3.  [facility] (Phone: [phone] Admissions: [phone] Fax: [phone])-- not taking admissions due to staffing, so did not send a referral.    Referrals sent to top 2 choices today.    SW also updated NEYMAR Conti at [facility] with above information.     Care Management team to follow for discharge plans.    EN Purcell

## 2021-04-12 NOTE — PROGRESS NOTES
Pt disoriented and pleasantly confused. Oxygenating on BiPAP through night, 17/6. Rate 18, 40%. 4L NC while off BiPAP. PRN Haldol and Ativan given for agitation and anxiety with relief. PRN Tylenol given for back pain with relief. Up to chair with assist x2 and ceiling lift. Large BM. Lindsey intact and patent. Slept comfortably through night with help of sleep aide Melatonin and Haldol/Ativan.    Marianne Melchor RN

## 2021-04-12 NOTE — PROGRESS NOTES
Initial IP Physical Therapy Evaluation     04/12/21 0917   Quick Adds   Type of Visit Initial PT Evaluation   Living Environment   People in home alone   Current Living Arrangements assisted living   Transportation Anticipated agency   Self-Care   Usual Activity Tolerance moderate   Current Activity Tolerance poor   Equipment Currently Used at Home walker, standard;wheelchair, manual   Disability/Function   Hearing Difficulty or Deaf no   Wear Glasses or Blind yes   Vision Management glasses   Concentrating, Remembering or Making Decisions Difficulty yes   Concentration Management Redirection   Difficulty Communicating no   Walking or Climbing Stairs Difficulty yes   Walking or Climbing Stairs ambulation difficulty, assistance 1 person;stair climbing difficulty, requires equipment   Mobility Management FWW   Dressing/Bathing Difficulty yes   Dressing/Bathing bathing difficulty, assistance 1 person   Toileting issues yes   Toileting Management Ax1 at baseline   Doing Errands Independently Difficulty (such as shopping) yes   Errands Management Family assists   Fall history within last six months yes   Number of times patient has fallen within last six months 3   Change in Functional Status Since Onset of Current Illness/Injury yes   General Information   Onset of Illness/Injury or Date of Surgery 04/11/21   Referring Physician Peggy Meeks MD   Patient/Family Therapy Goals Statement (PT) None stated   Pertinent History of Current Problem (include personal factors and/or comorbidities that impact the POC) Caitlin Sales is a 85 year old female who presents on 4/10/2021 with LORI   Existing Precautions/Restrictions fall   Cognition   Orientation Status (Cognition) oriented to;person;disoriented to;place;time   Affect/Mental Status (Cognition) confused   Follows Commands (Cognition) follows multi-step commands   Behavioral Issues difficulty managing stress   Pain Assessment   Patient Currently in Pain Yes, see  Vital Sign flowsheet  (L hip/buttocks and LLE pain, does not rate)   Integumentary/Edema   Integumentary/Edema other (describe)   Integumentary/Edema Comments Generalized swelling BLE   Range of Motion (ROM)   ROM Comment BLE WFL   Strength   Strength Comments BLE deconditioned 3+/5 generally   Bed Mobility   Comment (Bed Mobility) Supine<>sit ModAx2, difficulty moving LE and trunk, able to use LUE to help push to sit   Transfers   Transfer Safety Comments Sit<>stand MinAx2 with FWW, cuing for hand positioning and body mechanics, unsteady upon standing requiring ModAx2 standing balance. Stood x2 for 20 seconds then 1 minute. Increasingly shaky with standing, flexed posture, anxious. Unsafe to transfer or ambulate at this time. Mechanical ceiling lift to chair at this time.   Gait/Stairs (Locomotion)   Comment (Gait/Stairs) Unable to weight shift or step at this time due to BLE weakness/pain   Balance   Balance other (describe)   Balance Comments ModAx2 standing balance with FWW   Sensory Examination   Sensory Perception WFL   Coordination   Coordination no deficits were identified   Muscle Tone   Muscle Tone no deficits were identified   Clinical Impression   Criteria for Skilled Therapeutic Intervention yes, treatment indicated   PT Diagnosis (PT) Generalized weakness   Influenced by the following impairments BLE weakness, decreased activity tolerance, increased pain   Functional limitations due to impairments Mobility, transfers   Clinical Presentation Stable/Uncomplicated   Clinical Presentation Rationale Clinical judgment   Clinical Decision Making (Complexity) low complexity   Therapy Frequency (PT) 6x/week   Predicted Duration of Therapy Intervention (days/wks) 4 days   Planned Therapy Interventions (PT) bed mobility training;gait training;home exercise program;strengthening;transfer training;neuromuscular re-education   Anticipated Equipment Needs at Discharge (PT) walker, standard;wheelchair   Risk &  Benefits of therapy have been explained evaluation/treatment results reviewed;care plan/treatment goals reviewed;risks/benefits reviewed;current/potential barriers reviewed;participants voiced agreement with care plan;participants included;patient   PT Discharge Planning    PT Discharge Recommendation (DC Rec) Transitional Care Facility   PT Rationale for DC Rec Patient is currently unable to transfer and requires Ax2 with bed mobility, ceiling lift to chair. TCU to progress patient to baseline of Ax1 for mobility   PT Brief overview of current status  Ax2 bed mobility, Ax2 sit to stand and standing balance, unable to transfer   Total Evaluation Time   Total Evaluation Time (Minutes) 10     Neo Haq, PT, DPT

## 2021-04-12 NOTE — PROGRESS NOTES
"   04/12/21 1000   Quick Adds   Type of Visit Initial Occupational Therapy Evaluation   Living Environment   People in home alone   Current Living Arrangements assisted living   Home Accessibility no concerns   Transportation Anticipated agency   Self-Care   Usual Activity Tolerance moderate   Current Activity Tolerance poor   Equipment Currently Used at Home walker, rolling;wheelchair, manual   Disability/Function   Hearing Difficulty or Deaf no   Wear Glasses or Blind yes   Vision Management wears glasses   Concentrating, Remembering or Making Decisions Difficulty yes   Concentration Management per [facility] staff. confusion at baseline    Difficulty Communicating no   Walking or Climbing Stairs Difficulty yes   Walking or Climbing Stairs ambulation difficulty, requires equipment   Mobility Management independent with RW at baseline. Does have w/c to use if needed.    Dressing/Bathing Difficulty yes   Dressing/Bathing bathing difficulty, requires equipment;bathing difficulty, assistance 1 person   Toileting issues yes   Toileting Management Ax1 at baseline using grab bars   Doing Errands Independently Difficulty (such as shopping) yes   Errands Management family assists    Fall history within last six months yes   Number of times patient has fallen within last six months 3   Change in Functional Status Since Onset of Current Illness/Injury yes   General Information   Onset of Illness/Injury or Date of Surgery 04/11/21   Referring Physician Peggy Meeks MD   Patient/Family Therapy Goal Statement (OT) pt currently wanting to rest despite education on out of chair activity. open to trying later or tomorrow.    Additional Occupational Profile Info/Pertinent History of Current Problem Caitlin Sales is a 85 year old female who presents on 4/10/2021 with LORI. weakness    Cognitive Status Examination   Orientation Status person;time  (Place \"[place]\" in \"[employer]". )   Affect/Mental Status (Cognitive) " confused;low arousal/lethargic   Follows Commands does not follow one-step commands;75-90% accuracy;delayed response/completion   Cognitive Status Comments per [facility] staff. Confused at baseline.    Pain Assessment   Patient Currently in Pain No   Range of Motion Comprehensive   Comment, General Range of Motion Pt states baseline difficulty moving R UE. States since she had back surgery. AAROM for L UE shoulder FF limited to ~90 degrees.    Strength Comprehensive (MMT)   Comment, General Manual Muscle Testing (MMT) Assessment Diffculty bringing L UE up against gravity. states she can normally lift it to ~90 degrees.    Bed Mobility   Comment (Bed Mobility) Pt up in chair upon arrival. Declines out of chair activity.    Upper Body Dressing Assessment/Training   [place] Level (Upper Body Dressing) moderate assist (50% patient effort)   Lower Body Dressing Assessment/Training   [place] Level (Lower Body Dressing) maximum assist (25% patient effort)   Grooming Assessment/Training   [place] Level (Grooming) moderate assist (50% patient effort)   Instrumental Activities of Daily Living (IADL)   IADL Comments facility completes    Clinical Impression   Criteria for Skilled Therapeutic Interventions Met (OT) yes;skilled treatment is necessary   OT Diagnosis decreased independence with ADLs and functional mobility    OT Problem List-Impairments impacting ADL balance;strength   Assessment of Occupational Performance 3-5 Performance Deficits   Identified Performance Deficits toilet transfer, LB dressing, g/h, functional mobility    Planned Therapy Interventions (OT) ADL retraining;strengthening;progressive activity/exercise   Clinical Decision Making Complexity (OT) low complexity   Therapy Frequency (OT) Daily   Predicted Duration of Therapy 2-3 days   Risk & Benefits of therapy have been explained evaluation/treatment results reviewed;care plan/treatment goals reviewed;risks/benefits reviewed;current/potential  barriers reviewed;participants voiced agreement with care plan;participants included;patient   OT Discharge Planning    OT Discharge Recommendation (DC Rec) Transitional Care Facility   OT Rationale for DC Rec decreased independence with ADLs and functional mobility    OT Brief overview of current status  Currently using lift to transfer from bed to chair. AI unable to provide this support.    Total Evaluation Time (Minutes)   Total Evaluation Time (Minutes) 8

## 2021-04-12 NOTE — PROGRESS NOTES
[facility] Hospitalist Service      Subjective:  On bipap overnight but needed sedation  Now sleeping overnight    Review of Systems:  unable    Physical Exam:  Vitals Were Reviewed    Patient Vitals for the past 16 hrs:   BP Temp Temp src Pulse Resp SpO2   04/12/21 0600 134/70 -- -- 52 22 99 %   04/12/21 0500 138/71 -- -- 58 12 99 %   04/12/21 0400 120/54 98.3  F (36.8  C) Axillary 56 23 98 %   04/12/21 0300 114/48 -- -- 58 23 97 %   04/12/21 0200 115/49 -- -- 57 21 96 %   04/12/21 0100 131/62 -- -- 59 16 94 %   04/12/21 0000 129/57 -- -- 60 21 95 %   04/11/21 2300 113/54 -- -- 67 23 96 %   04/11/21 2215 134/76 98.8  F (37.1  C) Oral 63 14 93 %   04/11/21 2200 (!) 125/106 -- -- 62 18 95 %   04/11/21 2100 (!) 143/76 -- -- 90 25 93 %   04/11/21 2030 -- -- -- 89 24 97 %   04/11/21 2015 -- -- -- 66 25 95 %   04/11/21 2000 101/83 -- -- 74 23 (!) 83 %   04/11/21 1930 125/76 98.9  F (37.2  C) Axillary 71 24 (!) 87 %   04/11/21 1915 -- -- -- 69 17 93 %   04/11/21 1900 (!) 158/96 -- -- 66 25 (!) 84 %   04/11/21 1700 125/56 -- -- 56 (!) 7 --   04/11/21 1546 -- 97.9  F (36.6  C) Axillary -- -- --   04/11/21 1500 -- -- Axillary -- -- --         Intake/Output Summary (Last 24 hours) at 4/12/2021 0638  Last data filed at 4/12/2021 0600  Gross per 24 hour   Intake 2350 ml   Output 1200 ml   Net 1150 ml       GENERAL APPEARANCE: somnulent  EYES: conjunctiva clear, eyes grossly normal  RESP: course crackles bases, mildly tachypneic  CV: regular rate and rhythm, normal S1 S2, no S3 or S4 and no murmur, click or rub   ABDOMEN: soft, nontender, no HSM or masses and bowel sounds normal  MS: no clubbing, cyanosis; tr edema  SKIN: clear without significant rashes or lesions  NEURO: somnolent from sedation currently  Lab:  Recent Labs   Lab Test 04/12/21  0452 04/11/21  0416    143   POTASSIUM 4.6 4.9   CHLORIDE 108 105   CO2 36* 38*   ANIONGAP <1* <1*   * 91   BUN 29 39*   CR 1.00 1.66*   FROY 7.8* 7.7*     CBC RESULTS:    Recent Labs   Lab Test 04/12/21  0452 04/11/21  0416   WBC 5.5 7.8   RBC 3.17* 3.15*   HGB 8.9* 8.8*   HCT 33.0* 33.2*    182       Results for orders placed or performed during the hospital encounter of 04/10/21 (from the past 24 hour(s))   Blood gas venous   Result Value Ref Range    Ph Venous 7.28 (L) 7.32 - 7.43 pH    PCO2 Venous 90 (HH) 40 - 50 mm Hg    PO2 Venous 52 (H) 25 - 47 mm Hg    Bicarbonate Venous 42 (H) 21 - 28 mmol/L    Base Excess Venous 12.6 mmol/L    FIO2 40%    Care Management / Social Work IP Consult    Narrative    Marielle Mendez, AYO     4/11/2021  1:28 PM  Care Management Initial Consult    General Information  Assessment completed with: VM-chart review, Care Team Member,    Type of CM/SW Visit: Initial Assessment    Primary Care Provider verified and updated as needed: Yes   Readmission within the last 30 days: no previous admission in   last 30 days      Reason for Consult: discharge planning  Advance Care Planning: Advance Care Planning Reviewed: present on   chart          Communication Assessment  Patient's communication style: spoken language (English or   Bilingual)    Hearing Difficulty or Deaf: no   Wear Glasses or Blind: yes    Cognitive  Cognitive/Neuro/Behavioral: .WDL except, mood/behavior  Level of   Consciousness: lethargic  Arousal Level: arouses to repeated   stimulation  Orientation: disoriented x 4  Mood/Behavior:   hyperactive (agitated, impulsive)     Speech: MICHELLE (unable to   assess)    Living Environment:   People in home: facility resident     Current living Arrangements: assisted living  Name of Facility:   ([facility])   Able to return to prior arrangements: yes       Family/Social Support:  Care provided by: self, child(tristin), other (see comments)(long-term)  Provides care for: no one, unable/limited ability to care for   self  Marital Status: Single  Children, Facility resident(s)/Staff          Description of Support System: Supportive,  Involved    Support Assessment: Adequate family and caregiver support,   Adequate social supports    Current Resources:   Patient receiving home care services: No     Community Resources:    Equipment currently used at home: walker, standard  Supplies currently used at home:      Employment/Financial:  Employment Status: retired        Financial Concerns: insurance, none           Lifestyle & Psychosocial Needs:        Socioeconomic History     Marital status:      Spouse name: Not on file     Number of children: Not on file     Years of education: Not on file     Highest education level: Not on file     Tobacco Use     Smoking status: Never Smoker     Smokeless tobacco: Never Used   Substance and Sexual Activity     Alcohol use: Not Currently     Frequency: Never     Drug use: Never     Sexual activity: Not Currently     Partners: Male       Functional Status:  Prior to admission patient needed assistance:   Dependent ADLs:: Ambulation-walker, Wheelchair-independent,   Grooming, Wheelchair-with assist, Bathing, Dressing  Dependent IADLs:: Cleaning, Cooking, Laundry, Shopping, Meal   Preparation, Medication Management, Money Management,   Transportation  Assesssment of Functional Status: Not at  functional baseline    Mental Health Status:  Mental Health Status: No Current Concerns       Chemical Dependency Status:  Chemical Dependency Status: No Current Concerns             Values/Beliefs:  Spiritual, Cultural Beliefs, [employer] Practices, Values that   affect care: no               Additional Information:    Pt is admitted here from [facility] (Phone:   [phone] or [phone] Fax: [phone]). This writer   called the [facility] to get background information as pt is agitated   and requiring extra care in the ICU.    Staff at the [facility] reports that pt is confused at baseline. Staff   reports that she needs minimal assistance, however; most recently   she has been needing more assistance. Pt  usually walks   indepedently with a walker but has a wheelchair if needed. Staff   reports they help her with medications, cleaning, meals, laundry.    Staff reports that they are unable to do a 2 person transfer and   do not have allen lifts.     Care management team will need to follow as pt is quite ill   today.  Therapy to assess, pt may require additional help at home   or maybe TCU depending on therapy assessment.    Marielle Mendez [facility], [facility] [phone]           XR Chest Port 1 View    Narrative    CHEST PORTABLE ONE VIEW April 11, 2021 1:47 PM     HISTORY: Hypercapnia.    COMPARISON: Chest x-ray 10/01/2020.      Impression    IMPRESSION: Portable chest. Atelectatic lung base changes are present.  Elevation right hemidiaphragm is unchanged. Heart appears enlarged. No  evidence of pneumothorax. Small bilateral pleural effusions suspected.    KEIRA WIGGINS MD   Blood gas venous   Result Value Ref Range    Ph Venous 7.27 (L) 7.32 - 7.43 pH    PCO2 Venous 95 (HH) 40 - 50 mm Hg    PO2 Venous 28 25 - 47 mm Hg    Bicarbonate Venous 43 (H) 21 - 28 mmol/L    Base Excess Venous 13.9 mmol/L    FIO2 Hide    Sodium random urine   Result Value Ref Range    Sodium Urine mmol/L 61 mmol/L   Creatinine random urine   Result Value Ref Range    Creatinine Urine Random 99 mg/dL   Urea nitrogen random urine with Creat Ratio   Result Value Ref Range    Urea Nitrogen, Ur 926 mg/dL    Urea Nitrogen Urine g/g Cr 10 g/g Cr   Blood gas venous   Result Value Ref Range    Ph Venous 7.30 (L) 7.32 - 7.43 pH    PCO2 Venous 86 (HH) 40 - 50 mm Hg    PO2 Venous 29 25 - 47 mm Hg    Bicarbonate Venous 42 (H) 21 - 28 mmol/L    Base Excess Venous 12.8 mmol/L    FIO2 Hide    TSH with free T4 reflex   Result Value Ref Range    TSH 0.54 0.40 - 4.00 mU/L   Basic metabolic panel   Result Value Ref Range    Sodium 143 133 - 144 mmol/L    Potassium 4.6 3.4 - 5.3 mmol/L    Chloride 108 94 - 109 mmol/L    Carbon Dioxide 36  (H) 20 - 32 mmol/L    Anion Gap <1 (L) 3 - 14 mmol/L    Glucose 159 (H) 70 - 99 mg/dL    Urea Nitrogen 29 7 - 30 mg/dL    Creatinine 1.00 0.52 - 1.04 mg/dL    GFR Estimate 51 (L) >60 mL/min/[1.73_m2]    GFR Estimate If Black 60 (L) >60 mL/min/[1.73_m2]    Calcium 7.8 (L) 8.5 - 10.1 mg/dL   CBC with platelets   Result Value Ref Range    WBC 5.5 4.0 - 11.0 10e9/L    RBC Count 3.17 (L) 3.8 - 5.2 10e12/L    Hemoglobin 8.9 (L) 11.7 - 15.7 g/dL    Hematocrit 33.0 (L) 35.0 - 47.0 %     (H) 78 - 100 fl    MCH 28.1 26.5 - 33.0 pg    MCHC 27.0 (L) 31.5 - 36.5 g/dL    RDW 14.8 10.0 - 15.0 %    Platelet Count 184 150 - 450 10e9/L   Blood gas venous   Result Value Ref Range    Ph Venous 7.24 (L) 7.32 - 7.43 pH    PCO2 Venous 93 (HH) 40 - 50 mm Hg    PO2 Venous 48 (H) 25 - 47 mm Hg    Bicarbonate Venous 40 (H) 21 - 28 mmol/L    Base Excess Venous 9.8 mmol/L    FIO2 40        Assessment and Plan:    Caitlin Sales is a 85 year old female who presents on 4/10/2021 generalized weakness, tremor and LORI.     Acute kidney injury (H)  CKD (chronic kidney disease) stage 2, GFR 60-89 ml/min  Creatinine on admission 1.81.  Baseline 0.98.  Could be related to recent medication change. Losartan was increased by her Cardiologist on 04/02/2021.  Received normal saline bolus of 500 ml in the ED  - Holding  losartan for now, consider lower the dose to 25 mg  - Holding Lasix    Creat now 1.0  Stop iv fluids.  I/o +1350  Weight is dramatically up     Acute on chronic hypoxic and hypercarbic resp failure  History of restrictive lung ds  History of diastolic chf  Patient with history of chronic respiratory failure with hypercapnia and is on 2 L oxygen at home.  PFT 03/08/2021: moderate-severe restriction - moderate reduction in DLCO.  She has been on amiodarone therapy for wide-complex tachycardia since 04/22/2020, which can cause pulmonary fibrosis.  She has a history of elevated hemidiaphragm, heart failure and morbid obesity which  could be contributing as well.  - Patient was noted by he nurse after admission to have episodes of occasional hypoxia and pulse ox was not reliable for oxygen saturation measurement.  VBG was obtained and showed PCO2 venous at 99 repeat 96, pH venous 7.25, bicarb 43 which is consistent with partially compensated respiratory acidosis, likely due to chronic CO2 retention.   - Patient was started on BiPAP and was transferred  to the ICU  - Patient not cooperating with leaving Bipap on, placed in restraints  - Sedation with lorazepam and haloperidol due to agitation  - CXR to evaluate for pneumonia or pulmonary edema, demonstrates elevated R hemidiaphragm and R basilar atelectasis.  Small bilateral pleural effusions without pneumothorax.  - Solumedrol 60 mg IV daily started 4/11  - Duonebs q 4 hours    Will recheck cxr and possibly get CT to further eval resp status. Weight is up. Check bnp.  Echo ordered. Will give one dose lasix 20 iv now  Currently on bipap 40% oxygen,  vbg 7.24/93     Essential hypertension  Holding losartan due to LORI.  BP normal-continue to hold     Coronary artery disease involving native coronary artery of native heart without angina pectoris  Heart failure with preserved ejection fraction, NYHA class II (H)  Stress test done on 04/20/2020 showed severe reversible perfusion defect of the inferolateral wall and distal septal distribution.  No discrete areas of ischemia.  She is currently asymptomatic.  On pta Lipitor     Generalized muscle weakness  Exam showed normal strength in the upper extremities.  She has truncal and lower extremity weakness.  She has a history of chronic back pain with radiculopathy.  She had lumbar spine fusion surgery and right total hip replacement surgery.  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  IVC filter was placed permanently.   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     Esophageal reflux  - Resume home omeprazole      Late onset Alzheimer's disease without behavioral disturbance (H)  Acute metabolic encephalopathy  Stable.  Patient is currently staying at an assisted living facility.  Normal mentation on admission, A&O x 3.  - Continue home Remeron  - Became lethargic overnight, nurses note having difficulty maintaining good waveform on oximeter  - VBG demonstrated hypercapnea, patient transferred to ICU for Bipap      Iron deficiency anemia  Hemoglobin admission 10.  No overt bleeding.  - Hgb from 10.0 to 8.8, possibly dilutional  - Repeat CBC in the morning      Hypothyroidism  Chronic stable problem.  Resume home levothyroxine 75 MCG.  - Check TSH in AM      Hyperlipidemia  History of TIA (transient ischemic attack) and stroke  - Resume home Lipitor 40 mg daily   - Resume home Xarelto     Covid-19 negative  This patient was evaluated during a global COVID-19 pandemic, which necessitated consideration that the patient might be at risk for infection with the SARS-CoV-2 virus that causes COVID-19  - Covid PCR is negative     Diet: Regular Diet Adult    DVT Prophylaxis: patient on Xarelto   Lindsey Catheter: not present  Code Status:   Full code  Lines: IV line      Disposition:   Pt presented with weakness and tremor and then developed increase hypoxia and hypercarbia in hospital. Pt had lasix held and has been on iv fluids. intial   LORI that has corrected. Will check bnp, echo and repeat cxr. May need to do chest CT.    Unclear how much of problem is interstial lung ds ( on amiodarone). May now have chf exacerbation.    May need diuresis. Continue iv steroids.     Last cards note 4/20    1. WCT - Thought to be aberrant SVT by cardiologist in [place] and tx with metoprolol. ER visit  4/2020 unresponsive to adenosine and required DCCV and transferred to [facility]. Placed on Amio load at [facility] 4/2020  2. Resolved CM - EF 40% at [facility] 4/2020 but normalized 5/2020 60-65%  3. Chronic Respiratory Failure/hypercarbia  - Admitted to [facility] 5/2020 with O2 sats 80%. Known elevated hemidiaphragm  4. H/o DVT - Following back surgery 2013; IVC filter in place. DVT again noted 4/2020. Remains on chronic AC with Xarelto  5. 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 Murillo had a virtual visit with her 6/2020 to review her hospitalization at [facility] 5/2020 for respiratory failure.  There was concern that amiodarone (started 1 m prior) could have contributed, but when Dr. Murillo spoke with her 6/2020, she was doing well [facility] with O2 sats 95%. EKG showed NSR with narrower QRS than LBBB. Continued BB and amiodarone rec'd with 3 m follow-up. Unfortunately, she's now just back for follow-up 11 m later.     1:14 PM  CT                                                         IMPRESSION:  1.  Small bilateral pleural effusions are present.  2. Hazy bilateral perihilar opacities are nonspecific but can relate  to mild/early pulmonary edema from of fluid overload/CHF.  3. Bibasilar parenchymal abnormality is favored to relate to  atelectasis. Correlate clinically for signs of pneumonia.    Suspect CHF--continue diuresis    1000 ml out w     Family contact for all medical & discharge information is Sonia Reardon, [phone].    [place] lives in [place] in [facility], had lived in [facility] prior  Had previously been on a lot of narcs.  After getting off meds -cognition.  Typically very weak-hard to walk from br to chair.  She still has some minor cognition  She was just put on oxygen at [place].

## 2021-04-13 NOTE — PLAN OF CARE
Major Shift Events:  Pt off BiPap throughout the day, 02 titrated to 3 LPM via nasal cannula throughout the day. Pt sat up in the chair this afternoon, tolerated well. Able to feed herself and wash up. Given IV lasix per MD orders, large amounts of urine output noted. Denies any difficulty breathing.   Plan: Continue to monitor oxygen saturation.   For vital signs and complete assessments, please see documentation flowsheets.

## 2021-04-13 NOTE — PROGRESS NOTES
[facility] TRANSPORT NOTE  Data:   Reason for Transport:  Med Surg from ICU    Caitlin Sales was transported to Med surg via cart at 1635.  Patient was accompanied by Registered Nurse. Equipment used for transport: Oxygen  Nasal Cannula. Family was aware of reason for transport: yes    Action:  Report: received from NEYMAR Ferguson    Response:  Patient's condition was stable.    Rosario Crow RN

## 2021-04-13 NOTE — PROGRESS NOTES
Care Management Follow Up    Length of Stay (days): 2    Expected Discharge Date: 04/14/21     Concerns to be Addressed: discharge planning     Patient plan of care discussed at interdisciplinary rounds: Yes    Anticipated Discharge Disposition: Transitional Care     Anticipated Discharge Services:    Anticipated Discharge DME: [facility]    Patient/family educated on Medicare website which has current facility and service quality ratings: yes  Education Provided on the Discharge Plan:  Yes  Patient/Family in Agreement with the Plan: yes    Referrals Placed by CM/SW: Internal Clinic Care Coordination, Post Acute Facilities  Private pay costs discussed: transportation costs.  CTS discussed transportation options with daughter, gale.  Discussed vendor, mode of transportation & anticipated private pay costs.  Gale agree to private pay costs associated with [facility] (Ph: [phone]) services.    Additional Information:  Plan remains that pt will need TCU level of care prior to returning to [facility] (Phone: [phone] or [phone] Fax: [phone]).    Per family request, referrals for TCU cares were sent to:  1.  [facility] (Phone: [phone] Fax: [phone])  2.  [facility] (Main: [phone] Admissions: [phone] Fax: [phone])  3.  [facility] (Phone: [phone] Admissions: [phone] Fax: [phone])-- not taking admissions due to staffing, so did not send a referral.    Pt was a 1:1 last evening due to the need for bipap.      Care Management team to follow for discharge plans.    EN Purcell

## 2021-04-13 NOTE — PLAN OF CARE
PT: Patient declines PT today due to fatigue after working with OT. Will re-try 4/14 as appropriate.    Neo Haq, PT, DPT

## 2021-04-13 NOTE — PROGRESS NOTES
[facility]      Subjective:  Denies difficulty breathing  No real complaints offered    Review of Systems:  CONSTITUTIONAL: NEGATIVE for fever, chills, change in weight  INTEGUMENTARY/SKIN: NEGATIVE for worrisome rashes, moles or lesions  EYES: NEGATIVE for vision changes or irritation  ENT/MOUTH: NEGATIVE for ear, mouth and throat problems  RESP: NEGATIVE for significant cough or SOB  BREAST: NEGATIVE for masses, tenderness or discharge  CV: NEGATIVE for chest pain, palpitations or peripheral edema  GI: NEGATIVE for nausea, abdominal pain, heartburn, or change in bowel habits  : NEGATIVE for frequency, dysuria, or hematuria  MUSCULOSKELETAL: NEGATIVE for significant arthralgias or myalgia  NEURO: NEGATIVE for weakness, dizziness or paresthesias  ENDOCRINE: NEGATIVE for temperature intolerance, skin/hair changes  HEME: NEGATIVE for bleeding problems  PSYCHIATRIC: NEGATIVE for changes in mood or affect    Physical Exam:  Vitals Were Reviewed    Patient Vitals for the past 16 hrs:   BP Temp Temp src Pulse Resp SpO2 Weight   04/13/21 0700 (!) 145/95 -- -- 55 (!) 7 97 % --   04/13/21 0600 138/73 -- -- 57 9 96 % 100.7 kg (222 lb 0.1 oz)   04/13/21 0500 (!) 155/80 -- -- 56 13 94 % --   04/13/21 0400 139/68 97.9  F (36.6  C) Axillary 54 13 95 % --   04/13/21 0300 129/58 -- -- 52 12 94 % --   04/13/21 0200 115/51 -- -- 53 12 92 % --   04/13/21 0100 129/56 -- -- 58 12 96 % --   04/13/21 0000 (!) 148/71 98.8  F (37.1  C) Axillary 59 9 97 % --   04/12/21 2300 139/71 -- -- 63 10 96 % --   04/12/21 2240 -- 98  F (36.7  C) Axillary -- -- -- --   04/12/21 2200 (!) 140/77 -- -- 64 13 93 % --   04/12/21 2100 (!) 141/69 -- -- 73 13 90 % --   04/12/21 2000 (!) 152/79 -- -- 74 11 93 % --   04/12/21 1900 -- -- -- 58 24 94 % --   04/12/21 1800 -- -- -- 73 11 90 % --   04/12/21 1700 125/67 -- -- 59 20 94 % --   04/12/21 1600 96/47 -- -- (!) 49 18 95 % --         Intake/Output Summary (Last 24 hours) at 4/13/2021  0712  Last data filed at 4/13/2021 0638  Gross per 24 hour   Intake 410 ml   Output 3875 ml   Net -3465 ml       GENERAL APPEARANCE: on bipap, alert, confused  EYES: conjunctiva clear, eyes grossly normal  RESP: some coarse crackles at bases  CV: regular rate and rhythm, normal S1 S2, no S3 or S4 and no murmur, click or rub   ABDOMEN: soft, nontender, no HSM or masses and bowel sounds normal  MS: no clubbing, cyanosis; mild pitting edema  SKIN: clear without significant rashes or lesions    Lab:  Recent Labs   Lab Test 04/13/21 0454 04/12/21 0452    143   POTASSIUM 4.4 4.6   CHLORIDE 100 108   CO2 39* 36*   ANIONGAP 1* <1*   * 159*   BUN 29 29   CR 1.15* 1.00   FROY 8.5 7.8*     CBC RESULTS:   Recent Labs   Lab Test 04/13/21 0454 04/12/21 0452   WBC 9.3 5.5   RBC 3.35* 3.17*   HGB 9.5* 8.9*   HCT 33.6* 33.0*    184       Results for orders placed or performed during the hospital encounter of 04/10/21 (from the past 24 hour(s))   XR Chest Port 1 View    Narrative    CHEST ONE VIEW  4/12/2021 8:47 AM     HISTORY: Hypoxia and hypercarbia.    COMPARISON: April 11, 2021      Impression    IMPRESSION: Improved atelectasis and/or infiltrate at the right base  with an elevated right hemidiaphragm which is stable. Question some  minimal atelectasis and/or infiltrate at the left base.    VALERIO MEIER MD   CT Chest w/o Contrast    Narrative    CT CHEST W/O CONTRAST 4/12/2021 11:47 AM    HISTORY: Abnormal chest x-ray. Hypoxic. Pleural effusion.    TECHNIQUE: CT of the chest is performed without IV contrast.    Assessed structures to the limits no IV contrast administration  include the lungs, mediastinum, pleura, and chest wall.    Radiation exposure is reduced using automated exposure control,  adjustment of the mA and/or kV according to patient size, or iterative  reconstruction technique.    COMPARISON: 7/9/2020.    FINDINGS: Hazy perihilar opacities are present. Bibasilar parenchymal  abnormality is  also seen. No convincing significant interstitial lung  disease is evident. Small bilateral pleural effusions are present,  right slightly larger than left. Cardiomegaly is again seen. Moderate  coronary artery calcification is present. A hiatal hernia is again  demonstrated.      Impression    IMPRESSION:  1.  Small bilateral pleural effusions are present.  2. Hazy bilateral perihilar opacities are nonspecific but can relate  to mild/early pulmonary edema from of fluid overload/CHF.  3. Bibasilar parenchymal abnormality is favored to relate to  atelectasis. Correlate clinically for signs of pneumonia.    MAY JENSEN MD   Echocardiogram Complete    Narrative    [id number]  [id number]  [id number]  [id number]^JIGNA^VALERIO^STAS     [facility]  Echocardiography Laboratory  [address] [place] [address]     Name: LUIS ALBERTO RICHARD  MRN: [id number]  : 1935  Study Date: 2021 02:53 PM  Age: 85 yrs  Gender: Female  Patient Location: [facility]  Reason For Study: Dyspnea  Ordering Physician: VALERIO BENITO  Referring Physician: Abbie Zacarias  Performed By: Lian Martinez RDCS     BSA: 2.1 m2  Height: 65 in  Weight: 228 lb  HR: 55  BP: 117/53 mmHg  ______________________________________________________________________________  Procedure  Complete Portable Echo Adult.  ______________________________________________________________________________  Interpretation Summary     Left ventricular systolic function is normal.  The visual ejection fraction is estimated at 55-60%.  There is moderate (2+) tricuspid regurgitation.  Right ventricular systolic pressure is elevated, consistent with moderate  pulmonary hypertension.  There is mild (1+) mitral regurgitation.  Compared to , degree of TR is worse. RVSP is unchanged. The study was  technically difficult.  ______________________________________________________________________________  Left Ventricle  The left ventricle is normal in size.  There is mild concentric left  ventricular hypertrophy. Left ventricular systolic function is normal. The  visual ejection fraction is estimated at 55-60%. Grade II or moderate  diastolic dysfunction. No regional wall motion abnormalities noted.     Right Ventricle  The right ventricle is mildly dilated. The right ventricular systolic function  is normal.     Atria  The left atrium is moderately dilated. The right atrium is mild to moderately  dilated.     Mitral Valve  There is mild mitral annular calcification. The mitral valve leaflets are  mildly thickened. There is mild (1+) mitral regurgitation.     Tricuspid Valve  There is moderate (2+) tricuspid regurgitation. The right ventricular systolic  pressure is approximated at 35.8 mmHg plus the right atrial pressure. Right  ventricular systolic pressure is elevated, consistent with moderate pulmonary  hypertension. IVC diameter >2.1 cm collapsing <50% with sniff suggests a high  RA pressure estimated at 15 mmHg or greater.     Aortic Valve  The aortic valve is not well visualized. AV sclerosis noted. There is trace  aortic regurgitation. The mean AoV pressure gradient is 10mmHg.     Pulmonic Valve  The pulmonic valve is not well visualized.     Vessels  The ascending aorta is Borderline dilated.     Pericardium  There is no pericardial effusion.     Rhythm  Sinus rhythm was noted.  ______________________________________________________________________________  MMode/2D Measurements & Calculations  IVSd: 1.2 cm     LVIDd: 4.9 cm  LVIDs: 2.9 cm  LVPWd: 1.1 cm  FS: 40.3 %  LV mass(C)d: 212.6 grams  LV mass(C)dI: 101.7 grams/m2  Ao root diam: 3.3 cm  LA dimension: 4.1 cm  asc Aorta Diam: 3.8 cm  LA/Ao: 1.3  LVOT diam: 2.3 cm  LVOT area: 4.3 cm2  LA Volume (BP): 96.0 ml  LA Volume Index (BP): 45.9 ml/m2  RWT: 0.46     Doppler Measurements & Calculations  MV E max lee: 88.3 cm/sec  MV A max lee: 99.6 cm/sec  MV E/A: 0.89  MV dec time: 0.20 sec  Ao V2 max: 239.7  cm/sec  Ao max P.0 mmHg  Ao V2 mean: 146.4 cm/sec  Ao mean PG: 10.2 mmHg  Ao V2 VTI: 51.5 cm  SUZANNE(I,D): 3.0 cm2  SUZANNE(V,D): 2.8 cm2  LV V1 max P.6 mmHg  LV V1 max: 154.9 cm/sec  LV V1 VTI: 35.4 cm  SV(LVOT): 152.3 ml  SI(LVOT): 72.8 ml/m2  PA acc time: 0.10 sec  TR max [place]: 299.2 cm/sec  TR max P.8 mmHg  AV [place] Ratio (DI): 0.65  SUZANNE Index (cm2/m2): 1.4  E/E' av.2  Lateral E/e': 8.8  Medial E/e': 15.7     ______________________________________________________________________________  Report approved by: Michaelle Hawley 2021 04:53 PM         CBC with platelets   Result Value Ref Range    WBC 9.3 4.0 - 11.0 10e9/L    RBC Count 3.35 (L) 3.8 - 5.2 10e12/L    Hemoglobin 9.5 (L) 11.7 - 15.7 g/dL    Hematocrit 33.6 (L) 35.0 - 47.0 %     78 - 100 fl    MCH 28.4 26.5 - 33.0 pg    MCHC 28.3 (L) 31.5 - 36.5 g/dL    RDW 15.0 10.0 - 15.0 %    Platelet Count 203 150 - 450 10e9/L   Blood gas venous   Result Value Ref Range    Ph Venous 7.36 7.32 - 7.43 pH    PCO2 Venous 78 (HH) 40 - 50 mm Hg    PO2 Venous 45 25 - 47 mm Hg    Bicarbonate Venous 44 (H) 21 - 28 mmol/L    Base Excess Venous 15.2 mmol/L    FIO2 40    Basic metabolic panel   Result Value Ref Range    Sodium 140 133 - 144 mmol/L    Potassium 4.4 3.4 - 5.3 mmol/L    Chloride 100 94 - 109 mmol/L    Carbon Dioxide 39 (H) 20 - 32 mmol/L    Anion Gap 1 (L) 3 - 14 mmol/L    Glucose 131 (H) 70 - 99 mg/dL    Urea Nitrogen 29 7 - 30 mg/dL    Creatinine 1.15 (H) 0.52 - 1.04 mg/dL    GFR Estimate 43 (L) >60 mL/min/[1.73_m2]    GFR Estimate If Black 50 (L) >60 mL/min/[1.73_m2]    Calcium 8.5 8.5 - 10.1 mg/dL       Assessment and Plan:    Caitlin Sales is a 85 year old female who presents on 4/10/2021 generalized weakness, tremor and LORI.     Acute kidney injury (H)  CKD (chronic kidney disease) stage 2, GFR 60-89 ml/min  Creatinine on admission 1.81.  Baseline 0.98.  Could be related to recent medication change. Losartan was increased by her  Cardiologist on 04/02/2021.  Received normal saline bolus of 500 ml in the ED  Lasix and losartan initially held.     Creat now 1.0--1.15  No iv fluids  I/o - 2115  Weight is down but still above baseline     Acute on chronic hypoxic and hypercarbic resp failure  History of restrictive lung ds  History of diastolic chf  Patient with history of chronic respiratory failure with hypercapnia and is on 2 L oxygen at home.  PFT 03/08/2021: moderate-severe restriction - moderate reduction in DLCO.  She has been on amiodarone therapy for wide-complex tachycardia since 04/22/2020, which can cause pulmonary fibrosis.  She has a history of elevated hemidiaphragm, heart failure and morbid obesity which could be contributing as well.  - Patient was noted by he nurse after admission to have episodes of occasional hypoxia and pulse ox was not reliable for oxygen saturation measurement.  VBG was obtained and showed PCO2 venous at 99 repeat 96, pH venous 7.25, bicarb 43 which is consistent with partially compensated respiratory acidosis, likely due to chronic CO2 retention.   - Patient was started on BiPAP and was transferred  to the ICU  - Patient not cooperating with leaving Bipap on, placed in restraints  - Sedation with lorazepam and haloperidol due to agitation  - CXR to evaluate for pneumonia or pulmonary edema, demonstrates elevated R hemidiaphragm and R basilar atelectasis.  Small bilateral pleural effusions without pneumothorax.  - Solumedrol 60 mg IV daily started 4/11  - Duonebs q 4 hours    4/12/21 echo-  Left ventricular systolic function is normal.  The visual ejection fraction is estimated at 55-60%.  There is moderate (2+) tricuspid regurgitation.  Right ventricular systolic pressure is elevated, consistent with moderate  pulmonary hypertension.  There is mild (1+) mitral regurgitation.  Compared to 5/20, degree of TR is worse. RVSP is unchanged. The study was  technically difficult.    On bipap overnight  Ph  7.36/78  Will stop bipap and try using nc oxygen and repeat vbg at 9:00  Continue some diuresis (lasix 20 q 6 times three doses)  Unsure if component of restrictive lung ds.-will continue solumedrol for now and change to prednisone soon.     Essential hypertension  Losartan initially held due to carlos a  Restart losartan 04/13/21      Coronary artery disease involving native coronary artery of native heart without angina pectoris  Heart failure with preserved ejection fraction, NYHA class II (H)  Stress test done on 04/20/2020 showed severe reversible perfusion defect of the inferolateral wall and distal septal distribution.  No discrete areas of ischemia.  She is currently asymptomatic.  On pta Lipitor     Generalized muscle weakness  Exam showed normal strength in the upper extremities.  She has truncal and lower extremity weakness.  She has a history of chronic back pain with radiculopathy.  She had lumbar spine fusion surgery and right total hip replacement surgery.  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  IVC filter was placed permanently.  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     Esophageal reflux  - Resume home omeprazole      Late onset Alzheimer's disease without behavioral disturbance (H)  Acute metabolic encephalopathy  Stable.  Patient is currently staying at an assisted living facility.  Normal mentation on admission, A&O x 3.  - Continue home Remeron    Pt more confused since ICU admit.      Iron deficiency anemia  Hemoglobin admission 10.  No overt bleeding.  - Hgb from 10.0 to 8.8, possibly dilutional  - Repeat CBC in the morning      Hypothyroidism  Chronic stable problem.  Resume home levothyroxine 75 MCG.  -  Check TSH in AM      Hyperlipidemia  History of TIA (transient ischemic attack) and stroke  - Resume home Lipitor 40 mg daily   - Resume home Xarelto     Covid-19 negative  This patient was evaluated during a global COVID-19 pandemic, which necessitated consideration that the patient might be at risk for infection with the SARS-CoV-2 virus that causes COVID-19  - Covid PCR is negative     Diet: Regular Diet Adult    DVT Prophylaxis: patient on Xarelto   Lindsey Catheter: not present  Code Status:   Full code  Lines: IV line      Disposition:   Pt presented with weakness and tremor and then developed increase hypoxia and hypercarbia in hospital. Pt had lasix held and has been on iv fluids. Intial   LORI that has corrected. Now appears to have chf exacerbation. Also some element of interstitial lung disease based on pulmonary function tests.  Patient is on amiodarone.  Will need outpatient follow-up of this.  She chronically uses oxygen.    Will trial pt off bipap and recheck vbg at 9 am  Continue diuresis, restart losartan. Change solumedrol to prednisone 4/14/21.        Previous cards note-  1. WCT - Thought to be aberrant SVT by cardiologist in [place] and tx with metoprolol. ER visit 4/2020 unresponsive to adenosine and required DCCV and transferred to [facility]. Placed on Amio load at [facility] 4/2020  2. Resolved CM - EF 40% at [facility] 4/2020 but normalized 5/2020 60-65%  3. Chronic Respiratory Failure/hypercarbia  - Admitted to [facility] 5/2020 with O2 sats 80%. Known elevated hemidiaphragm  4. H/o DVT - Following back surgery 2013; IVC filter in place. DVT again noted 4/2020. Remains on chronic AC with Xarelto  5. 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 Murillo had a virtual visit with her 6/2020 to review her hospitalization at [facility] 5/2020 for respiratory failure.  There was concern that amiodarone (started 1 m prior) could have contributed, but when Dr. Murillo spoke with her 6/2020, she was doing well in  [facility] with O2 sats 95%. EKG showed NSR with narrower QRS than LBBB. Continued BB and amiodarone rec'd with 3 m follow-up. Unfortunately, she's now just back for follow-up 11 m later.    Family contact for all medical & discharge information is Sonia Reardon, [phone].     [place] lives in [place] in [place], had lived in [facility] prior  Had previously been on a lot of narcs.  After getting off meds -cognition improved  Typically very weak-hard to walk from br to chair.  She still has some minor cognition  She was just put on oxygen at [place]    9:55 AM   vbg 7.36/80  Fully compensated hypercarbia  Leave off bipap and wean oxygen  On four liters nc now.    1:03 PM discussed with daughter

## 2021-04-13 NOTE — PLAN OF CARE
Major Shift Events: Oriented to self. Confused. Forgetful. Bipap applied at 2200. Ativan given x2 and haldol given x1 for anxiety/agitation/restlessness relating to the BIPAP. VSS. Denies pain. IV patent. Incontinent of stool x1. Linen changed. Family called on iPad throughout night. Patient required 1:1 sitter for frequent reorientation and redirection. Regularly tries to take off BIPAP mask.   Plan: Continue with plan of care and notify MD with any concerns.   For vital signs and complete assessments, please see documentation flowsheets.

## 2021-04-14 NOTE — PROGRESS NOTES
Name  Caitlin Sales 2021       MRN  [id number]   Provider LO         1935   [facility]       Age  85   Station OP       Sex  Female   Visit Type Video       Education  16   Platform Zoom       Handedness RH                        ORIENTATION     CLOCK DRAWING      Time  -44    Command  Imparied      Personal Information           Place  0 /2   ORAL TRAILS      Presidents  2 /6     Raw z Errors         Trails A  11 -1.7 0   WRAT-4 READING     Trails B  DC     Raw  60           SS  104    TSAT       %ile  61      Raw z    Grade Equivalence 12.2    Total Time  276 -5.14          Total Errors  16 -5.48    WAIS-IV               Raw SS RDS  RBANS       Digit Span  13 4 7    Raw z SS/%ile   Vocabulary  29 9   Line Orientation could not administer 0   Matrix Reasoning could not comprehend   Story Immediate 17 0.44 11   Similarities 21 10   Story Delay  8 0.21 10   EST VIQ   98                HVLT       BOSTON NAMING TEST       Raw T    Raw 47     Trial 1  4     SS 9 %ile 29-40   Trial 2  3     T 0 MAS 0   Trial 3  6     Total Stim. Correct 0    Learning  2     Total Phon. Correct 6    Total Recall 13 31          Delayed Recall 0 23    COWAT      Percent Retention 0 <20    Form CFL     True Positives 10     Raw 26     False Positives 0     SS 9     Discrimination Index 10 48    %ile 29-40 z 0                ILS HEALTH & SAFETY QUESTIONNAIRE    ANIMAL FLUENCY     Total  29     Raw 7     Classification Low     SS 2 z 0          T 18                  GDS       COMPLEX IDEATIONAL MATERIAL    Total  16     Raw  6    Interpretation Mild/Moderate    SS  2           T  6

## 2021-04-14 NOTE — PROGRESS NOTES
Pt was seen by RT and given Duoneb q4h.  Breath sounds clear and diminished, no change after neb. SpO2 91% on 3 LPM nasal cannula.  RR 20-22 with 8pm neb. Tolerating nebs well.

## 2021-04-14 NOTE — PROGRESS NOTES
Care Management Follow Up    Length of Stay (days): 3    Expected Discharge Date: 04/15/21     Concerns to be Addressed: discharge planning     Patient plan of care discussed at interdisciplinary rounds: Yes    Anticipated Discharge Disposition: Transitional Care and once pt is back to baseline, will return to [facility] (Phone: [phone] or [phone] Fax: [phone])     Anticipated Discharge Services:  TCU cares  Anticipated Discharge DME: Walker    Patient/family educated on Medicare website which has current facility and service quality ratings: yes  Education Provided on the Discharge Plan:  Yes  Patient/Family in Agreement with the Plan: yes    Referrals Placed by CM/SW: Internal Clinic Care Coordination, Post Acute Facilities  Private pay costs discussed: transportation costs; daughter Gale, requests that wheelchair transport be used for discharge.    Additional Information:  SW following for discharge planning.  Per PT/OT, pt not able to engage with therapies for past 2 days, discussed with MD & he anticipates that pt will remain hospitalized another 1-2 days.      Per family request, referrals for TCU cares were sent to:  1.  [facility] (Phone: [phone] Fax: [phone])- declined due to pt being high risk of needing 1:1 cares.    2.  [facility] (Main: [phone] Admissions: [phone] Fax: [phone])- no bed available until 4-16; Mehreen reviewing for cares  3.  [facility] (Phone: [phone] Admissions: [phone] Fax: [phone])-- not taking admissions due to staffing.  4.  [facility] (Main Phone:[phone] Admissions Phone:[phone] Fax: [phone])- spoke with Fabienne who will review for bed.    Pt will need [facility] wheelchair for transportation due to 02 needs & family request.    Care Management team to follow for discharge plans.    EN Purcell

## 2021-04-14 NOTE — PROGRESS NOTES
"Patient alert to self only. Ceiling lift needed for transfers. Patient was able to feed herself 100% of her supper. As the night progressed, she became more tearful, calling out, and expressing that she needed to go home. Writer attempted a calming approach and lavender prior to administering Ativan. Approximately one hour later, patient still was tearful and her legs were over the side of her bed. Patient addiment that she was leaving. PRN Haldol was administered at this time.     /55   Pulse 76   Temp 99.1  F (37.3  C) (Oral)   Resp 16   Ht 1.651 m (5' 5\")   Wt 100.7 kg (222 lb 0.1 oz)   SpO2 92%   BMI 36.94 kg/m      "

## 2021-04-14 NOTE — PROGRESS NOTES
Patient alert to self and time. Off of 1:1 since afternoon of 4/13, appropriate behaviors this shift.

## 2021-04-14 NOTE — PLAN OF CARE
OT: attempted x2. Pt lethargic. Is able to wake to voice and declines OOB activity both times despite encouragement. Falls asleep right away. Will attempt back tomorrow.

## 2021-04-14 NOTE — PROGRESS NOTES
Caitlin Sales is an 85-year-old woman who is being evaluated via a billable video visit. Telemedicine services are necessary because of the COVID-19 pandemic.     Patient would like the video invitation sent by phone: [phone]  Will anyone else be joining your video visit? No     Visit Details  Type of service: Video Conference  Interview:     Start Time: 12:34 PM     End Time: 12:40 PM     Start Time: 2:59 PM     End Time: 3:30 PM  Formal Testing:     Start Time: 12:49 PM     End Time: 2:57 PM  Originating Location (pt. Location): Home   Distant Location (provider location): [facility] NEUROPSYCHOLOGY   Platform used for Video Visit: [facility]  [facility]      NEUROPSYCHOLOGICAL EVALUATION    RELEVANT HISTORY AND REASON FOR REFERRAL    This is a report of neuropsychological consultation regarding Caitlin Sales, an 85-year-old, right-handed woman with 16 years of formal education. She comes to me with a pre-existing diagnosis of dementia from Alzheimer s disease, rendered by an outside primary care provider in February 2020. In the medical records, I see an emergency department visit on 1/22/2019 with complaints of  extra confusion,  and physicians at that visit were concerned that opioid use was contributing to memory loss. She apparently had a TIA in December 2019. In February 2020, she was having problems with medication management, taking extra doses because she had forgotten about the previous doses. Her primary care provider diagnosed dementia from Alzheimer s disease and felt she lacked decision-making capacity at that time. She has not had formal psychometric testing before. Her family moved her into a skilled nursing facility about a year ago, and staff there have had concerns about Parkinson s disease/essential tremor or similar problems, with longstanding motor issues that seem to be rapidly worsening of late. She comes to me on referral from Dr. Abbie Zacarias in  this context, to aid in diagnosis and treatment planning.    Ms. Sales is accompanied today during the interview by two daughters. Ms. 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 She endorses the presence of some cognitive issues but cannot describe them well, as she is quickly off topic and prone to confusion throughout the interview.    Her daughters describe increasing shakiness and inability to control her body or stay upright. There is a question of parkinsonian conditions. She has had tremors in her jaw and hands for many years, and in the last few months her whole body has been shaky. During today s visit, she took a restroom break and almost fell while walking about 10 feet from the restroom to her chair. She has been using a walker. She is visibly leaning to her right side throughout the interview, and she speaks with a noticeably quavering voice.    There are no obvious indications of hallucinatory experiences. She has talked about having visits with neighbors who moved away many years ago or having recently gone places that she actually has not gone to, though this is just as likely a matter of confusion. 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 Such issues do not occur during the daytime, when she is much more coherent compared to the evenings. The daughters describe a sundowning pattern, with significant changes coming on every day around 3:00 or 4:00 PM and lasting into the next morning. She demonstrates increased anxiety and neediness in the evenings, frequently calling facility staffers or going out to find staff for comfort.     Ms. Sales tells me she has never been aware of having had a stroke, but she says medical providers have told her she had both a stroke and a heart attack. There  have been several neuroimaging studies in recent years. Head CT on 1/2/2019 showed no acute abnormalities, and there were indications of moderate to extensive atrophy and mild small vessel ischemic disease. Head CT on 12/22/2019 also showed no acute abnormalities and signs of moderate atrophy and small vessel ischemic disease. 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 Head CT on 1/18/2021 showed no acute abnormalities and signs of encephalomalacia in the posterior right frontal and right parietal lobes.    She reports no history of seizures.    As noted above, there were concerns about overuse of oxycodone in the last couple of years. That medication is still on her current list in our system, but I am told today that she weaned off it and stopped using it fully as of yesterday. She was dependent upon oxycodone for many years in treating chronic pain. She had been using high doses of oxycodone and gabapentin together, and this produced extreme somnolence such that she could sleep around the clock. They have been switching her to Tylenol, and so far things seem good. Ms. Sales tells me that she never liked taking oxycodone and did not want to take it, but that she was physiologically dependent upon it. It has apparently been a rough couple of weeks for tapering off oxycodone.    She reports no use of alcohol at all for more than 20 years. She reports no history of problems with alcohol, but she stopped using it because she did not want to combine alcohol when she started to use narcotic and/or opioid medications.    She has days of sadness or depressed feelings but says there are no lasting symptoms. She is discouraged by needing to live in a facility, and she would rather be at home. She misses her children. She notes that her mood was notably low upon the initial moved to her facility but feels that  things are better now. Regardless, she says people keep telling her that she should do something about her depression, despite the fact that she does not subjectively feel depressed. Her baseline personality and temperament is described as always optimistic and kind (this is apparent in today s visit). Her temperament changes noticeably in the evenings, getting more fragile and anxious.    Ms. Sales feels that her sleep is fine, and she is aware that staff members at her facility say she is up a lot and calling them. She just last night started taking mirtazapine to help with sleep. It seemed to be helpful for that first dose. She says that she often dozes off during the daytime. There are no indications of REM sleep behaviors.    Additional medical history includes hypertension, coronary artery disease, tachycardia, hyperlipidemia, hypothyroidism, stage-2 chronic kidney disease, presence of IVC filter, history of deep vein thrombosis, and obesity, among other concerns. Current medications include acetaminophen, amiodarone, atorvastatin, folic acid, furosemide, gabapentin, levothyroxine, loperamide, losartan, melatonin, mirtazapine, omeprazole, oxycodone, polyethylene glycol, rivaroxaban, simethicone, triamcinolone, and vitamin B12.    Regarding early life history, there are some indications of an anxious or inhibited temperament. She tells me she was  terrified  of school upon starting , so she stayed at home for that year instead. She also got extra help in school from her mother for the first few years, though she recalls that she generally did well in school. In high school, she was valedictorian or salutatorian. She went on to college and earned a degree in education.    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 She stayed at home to raise children and run the household for a while, and then after the  children were grown up, she went back to teaching.    The only known family history of dementia is for an aunt who lived to be 106 and experienced cognitive decline at the end of her life.    BEHAVIORAL OBSERVATIONS    Ms. Sales was polite and cooperative with the evaluation. In the interview, she was easily off topic and confused. She was able to provide an okay history with repetitions of questions and when her daughters would give her cues or reminders of certain events, but there were still issues with the precision of her self-report. She demonstrated grossly appropriate insight into the referral concerns. She was notably leaning to her right side throughout the interview. She spoke with a quavering voice. Articulation was slightly low, and her speech was choppy or disfluent at times. During the testing session, she showed signs of fatigue as the testing went on and became increasingly distractible. Some visuospatial/nonverbal tasks could not be administered because she could not comprehend the test instructions or make sense of the stimuli--for one, she said that the (static) images displayed on the screen appeared to be moving around when she tried to look at them. Otherwise, she was able to initially comprehend test instructions appropriately, but then she needed near-constant reminders of what she was supposed to be doing. Thought processes were scattered and perseverative. She put forth appropriate effort and persisted well with all requested procedures. The test results may provide reasonable reflections of her cognitive abilities. However, the conditions of the assessment are not standard as the visit was conducted by videoconference instead of in person, which may render inaccurate any comparisons to standard normative data.     MEASURES ADMINISTERED    The following measures were administered by a trained psychometrist, under my direct supervision:    Orientation: Time, Place, Basic Personal  Information, Recent [place] Presidents; Wide Range Achievement Test 4: Word Reading; Wechsler Adult Intelligence Scales-IV: Vocabulary, Similarities, Matrix Reasoning, Digit Span; Hamlin Naming Test; Controlled Oral Word Association Test; Animal Naming Test; Complex Ideational Material; Clock Drawing; Oral Trail-Making Test; Test of Sustained Attention & Tracking; Repeatable Battery for the Assessment of Neuropsychological Status: Immediate and Delayed Stories; Giles Verbal Learning Test-Revised; ILS Health and Safety Questionnaire; Geriatric Depression Scale.    RESULTS AND INTERPRETATION    Orientation was exceptionally low for time, believing the current month was October, and being 12th days off for the date and 1 day off for the day of the week. Orientation to place was low, not being able to state what city she was in or the name of the place she was at. Orientation to basic personal information was normal. Orientation to basic cultural information was low, being able to name the two most recent [place] presidents but having no guesses for other presidents from the last 40 years. Performance on a reading and pronunciation test that is validated for estimating premorbid intelligence was average. Abstract verbal analogical reasoning was average. Demonstrating vocabulary knowledge was average. Practical reasoning for a variety of health and safety scenarios was below normal expectations. Clock drawing was abnormal, with significant micrographia as well as incorrect hands for the requested time. Immediate auditory attention and working memory were below average for repeating and rearranging digit strings. Performances were exceptionally low across a variety of brief verbal tasks requiring executive management of attention and concentration. Confrontation naming was in the lower average range for her age. Letter-based verbal fluency was also in the low side of average for her age. Category-based verbal fluency was  abnormally low. Oral comprehension and making inferences from sentences and brief paragraphs was abnormally low. Immediate verbal memory for short stories was average, and delayed story recall was average. Learning a word list over repeated readings was below average. Delayed free recall of the list was exceptionally low (zero recall), but delayed recognition of the list was average.     On a brief self-report inventory, she endorsed mildly elevated symptoms related to depression and anxiety (GDS = 16/30). Some of her endorsements were cognitive in nature, such as feeling like her mind is not as clear as it used to be, having trouble making decisions, and having trouble concentrating. Most endorsements were emotional in nature. She has dropped many of her activities and interests. She feels like her life is empty. She often gets bored. She often feels helpless. She often gets restless and fidgety. She prefers staying at home rather than going out and doing new things. She prefers to avoid social gatherings. She frequently worries about the future. It is hard to get started on new projects. She does not have sufficient energy. She feels like her situation is hopeless. She frequently gets upset over little things. She does not enjoy getting up in the morning.    IMPRESSIONS AND RECOMMENDATIONS    In the context of nonstandard and limited assessment via video conference instead of in person (as necessitated by the current COVID-19 situation), the cognitive test results are abnormal. Ms. Sales demonstrates significant difficulties with attention, concentration, mental speed, and executive functioning. Orientation is lower than expected. Her ability to reason about matters of health and safety is below expectations. Visuoconstructional abilities are abnormal (markedly micrographic), and she was unable to complete some other visuospatial tasks because she could not properly perceive the images (stated they seemed to be  moving around). Learning and memory performances are variable, but the difficulties may be more about executive/attentional interference rather than rapid forgetting. Her speech is somewhat halting and she has a quavering voice, but she does not seem to show severe dysnomia. Overall, the pattern of performances does not fit with the typical expectations of Alzheimer s disease. The cognitive data do indicate some diffuse cerebral dysfunction, but probably with maximal effects in the frontal and subfrontal regions, and probably more for the right hemisphere compared to the left hemisphere (though this video visit is limited in its ability to precisely localize and lateralize dysfunction). I cannot rule out an atypical presentation of Alzheimer s disease, but the clinical picture and neuropsychological profile is more consistent with expectations for right-hemisphere predominant cerebrovascular compromise and with parkinsonian conditions.    She appears to be at least mildly depressed based on her endorsements on a symptom inventory, but in interview she says she does not feel depressed. She is aware that other people around her think she is depressed. It would be appropriate to initiate clinical intervention for low mood and her endorsed feelings of hopelessness and helplessness. She is more likely to benefit from pharmacologic interventions.    A diagnosis of dementia is appropriate. The staging is mild at this time. Additional neurological evaluations are needed to assess her increasing motoric dyscontrol area I see she has an upcoming appointment with a neurologist, Dr. Kelley Reyes. I defer to Dr. Reyes regarding the form of additional workups are treatment options.    I am glad that Ms. Sales is already in a skilled nursing facility. Her cognitive impairments and reported overnight agitation would be significant problems if she were to live independently.    A neuropsychological baseline has  been established. Longitudinal monitoring would be appropriate. I would like to see her for reevaluation in approximately 9-12 months. Ideally, the reevaluation would be in person rather than via telemedicine, to provide a more complete picture of nonverbal/spatial abilities and psychomotor functioning.    Luis Munguia, PhD, LP, ABPP-CN  Board Certified in Clinical Neuropsychology  Licensed Psychologist [id number]      Time spent: 37 minutes neurobehavioral status exam including interview, clinical assessment by licensed and board-certified neuropsychologist (CPT 81472, [id number]). 95 minutes neuropsychological testing evaluation by licensed and board-certified neuropsychologist, including integration of patient data, interpretation of standardized test results and clinical data, clinical decision-making, treatment planning, report, and interactive feedback to the patient (CPT 92137, [id number]). 196 minutes of psychological and neuropsychological test administration and scoring by technician (CPT 40774, [id number]). Diagnoses: F03.91, F39

## 2021-04-14 NOTE — PLAN OF CARE
Patient slept quietly through out writer's shift.    Critical Labs called to writer PCO2 = 85      (bicarb)                             HCO3 = 47  These critical labs not called to physician D/T same labs critical  Previous day. (within same range)

## 2021-04-14 NOTE — PROGRESS NOTES
[facility]      Subjective:  Patient was tearful and yelling out last night.  Given as needed Haldol and ativan and slept.  Now this morning she is sluggish.  She arouses but has not eaten.  She currently is on just 1 L of oxygen.    Review of Systems:  Unable to fully review due to use sedation  She denies shortness of breath and pain    Physical Exam:  Vitals Were Reviewed    Patient Vitals for the past 16 hrs:   BP Temp Temp src Pulse Resp SpO2   04/14/21 1121 (!) 176/60 97.9  F (36.6  C) Oral 52 18 94 %   04/14/21 0924 (!) 169/66 -- -- 55 -- --   04/14/21 0822 104/49 98  F (36.7  C) Oral 51 18 94 %   04/14/21 0600 -- 97.5  F (36.4  C) Oral -- 18 93 %   04/13/21 2237 116/55 99.1  F (37.3  C) Oral 76 16 92 %   04/13/21 2001 -- -- -- 63 20 91 %   04/13/21 1943 104/49 98.7  F (37.1  C) Oral 70 18 92 %         Intake/Output Summary (Last 24 hours) at 4/14/2021 1142  Last data filed at 4/14/2021 0600  Gross per 24 hour   Intake 480 ml   Output 1625 ml   Net -1145 ml       GENERAL APPEARANCE: Sluggish arouses with some difficulty and answers questions  EYES: conjunctiva clear, eyes grossly normal  RESP: Some bibasilar crackles, mild increased work of breathing  CV: regular rate and rhythm, normal S1 S2, no S3 or S4 and no murmur, click or rub   ABDOMEN: soft, nontender, no HSM or masses and bowel sounds normal  MS: no clubbing, cyanosis; no edema  NEURO: Sluggish currently    Lab:  Recent Labs   Lab Test 04/14/21 0515 04/13/21 0454    140   POTASSIUM 4.6 4.4   CHLORIDE 101 100   CO2 39* 39*   ANIONGAP 3 1*   * 131*   BUN 44* 29   CR 1.27* 1.15*   FROY 8.4* 8.5     CBC RESULTS:   Recent Labs   Lab Test 04/14/21 0515 04/13/21 0454   WBC 10.2 9.3   RBC 3.32* 3.35*   HGB 9.6* 9.5*   HCT 34.0* 33.6*    203       Results for orders placed or performed during the hospital encounter of 04/10/21 (from the past 24 hour(s))   CBC with platelets   Result Value Ref Range    WBC 10.2  4.0 - 11.0 10e9/L    RBC Count 3.32 (L) 3.8 - 5.2 10e12/L    Hemoglobin 9.6 (L) 11.7 - 15.7 g/dL    Hematocrit 34.0 (L) 35.0 - 47.0 %     (H) 78 - 100 fl    MCH 28.9 26.5 - 33.0 pg    MCHC 28.2 (L) 31.5 - 36.5 g/dL    RDW 15.4 (H) 10.0 - 15.0 %    Platelet Count 213 150 - 450 10e9/L   Blood gas venous   Result Value Ref Range    Ph Venous 7.35 7.32 - 7.43 pH    PCO2 Venous 85 (HH) 40 - 50 mm Hg    PO2 Venous 28 25 - 47 mm Hg    Bicarbonate Venous 47 (HH) 21 - 28 mmol/L    Base Excess Venous 18.2 mmol/L    FIO2 32    Basic metabolic panel   Result Value Ref Range    Sodium 143 133 - 144 mmol/L    Potassium 4.6 3.4 - 5.3 mmol/L    Chloride 101 94 - 109 mmol/L    Carbon Dioxide 39 (H) 20 - 32 mmol/L    Anion Gap 3 3 - 14 mmol/L    Glucose 130 (H) 70 - 99 mg/dL    Urea Nitrogen 44 (H) 7 - 30 mg/dL    Creatinine 1.27 (H) 0.52 - 1.04 mg/dL    GFR Estimate 39 (L) >60 mL/min/[1.73_m2]    GFR Estimate If Black 45 (L) >60 mL/min/[1.73_m2]    Calcium 8.4 (L) 8.5 - 10.1 mg/dL       Assessment and Plan:    Caitlin Sales is a 85 year old female who presents on 4/10/2021 generalized weakness, tremor and LORI.     Acute kidney injury (H)  CKD (chronic kidney disease) stage 2, GFR 60-89 ml/min  Creatinine on admission 1.81.  Baseline 0.98.  Could be related to recent medication change. Losartan was increased by her Cardiologist on 04/02/2021.  Received normal saline bolus of 500 ml in the ED  Lasix and losartan initially held.     Creat now 1.0--1.15--1.27  Diuresed 4/13/21   I/o - 2905  Weight is down-- 222 lb     Acute on chronic hypoxic and hypercarbic resp failure  History of restrictive lung ds  Acute diastolic and right heart failure  Pulmonary htn  Patient with history of chronic respiratory failure with hypercapnia and is on 2 L oxygen at home.  PFT 03/08/2021: moderate-severe restriction - moderate reduction in DLCO.  She has been on amiodarone therapy for wide-complex tachycardia since 04/22/2020, which can  cause pulmonary fibrosis.  She has a history of elevated hemidiaphragm, heart failure and morbid obesity which could be contributing as well.  - Patient was noted by he nurse after admission to have episodes of occasional hypoxia and pulse ox was not reliable for oxygen saturation measurement.  VBG was obtained and showed PCO2 venous at 99 repeat 96, pH venous 7.25, bicarb 43 which is consistent with partially compensated respiratory acidosis, likely due to chronic CO2 retention.   - Patient was started on BiPAP and was transferred  to the ICU  - Patient not cooperating with leaving Bipap on, placed in restraints  - Sedation with lorazepam and haloperidol due to agitation  - CXR to evaluate for pneumonia or pulmonary edema, demonstrates elevated R hemidiaphragm and R basilar atelectasis.  Small bilateral pleural effusions without pneumothorax.  - Solumedrol 60 mg IV daily started 4/11  - Duonebs q 4 hours     4/12/21 echo-  Left ventricular systolic function is normal.  The visual ejection fraction is estimated at 55-60%.  There is moderate (2+) tricuspid regurgitation.  Right ventricular systolic pressure is elevated, consistent with moderate  pulmonary hypertension.  There is mild (1+) mitral regurgitation.  Compared to 5/20, degree of TR is worse. RVSP is unchanged. The study was  technically difficult.     On 2 l nc  Ph 7.35/85  Pt has improved with iv diuresis  Unsure if component of restrictive lung ds.-on empiric prednisone for that-dropping dose to 20 mg daily. Probably will run just a short course  Previously on lasix 20 every other day and 40 every other day  Will start Lasix 20 twice daily      Essential hypertension  Losartan initially held due to carlos a  Restarted losartan 04/13/21      Coronary artery disease involving native coronary artery of native heart without angina pectoris  Heart failure with preserved ejection fraction, NYHA class II (H)  Stress test done on 04/20/2020 showed severe reversible  perfusion defect of the inferolateral wall and distal septal distribution.  No discrete areas of ischemia.  She is currently asymptomatic.  On pta Lipitor     Generalized muscle weakness  Exam showed normal strength in the upper extremities.  She has truncal and lower extremity weakness.  She has a history of chronic back pain with radiculopathy.  She had lumbar spine fusion surgery and right total hip replacement surgery.  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  IVC filter was placed permanently.  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     Esophageal reflux  - Resume home omeprazole      Late onset Alzheimer's disease without behavioral disturbance (H)  Acute metabolic encephalopathy  Stable.  Patient is currently staying at an assisted living facility.  Normal mentation on admission, A&O x 3.  - Continue home Remeron     Pt more confused since ICU admit.      Iron deficiency anemia  Hemoglobin admission 10.  No overt bleeding.  - Hgb from 10.0 to 8.8, possibly dilutional  - Repeat CBC in the morning      Hypothyroidism  Chronic stable problem.  Resume home levothyroxine 75 MCG.  - Check TSH in AM      Hyperlipidemia  History of TIA (transient ischemic attack) and stroke  - Resume home Lipitor 40 mg daily   - Resume home Xarelto     Covid-19 negative  This patient was evaluated during a global COVID-19 pandemic, which necessitated consideration that the patient might be at risk for infection with the SARS-CoV-2 virus that causes COVID-19  - Covid PCR is negative     Diet: Regular Diet Adult    DVT Prophylaxis: patient on Xarelto   Lindsey Catheter:  present  Code Status:   Full code  Lines: IV line      Disposition:   Pt  presented with weakness and tremor and then developed increased hypoxia and hypercarbia in hospital after having IV fluids and lasix held.    Now appears to have chf exacerbation and has improved . Also some element of interstitial lung disease based on pulmonary function tests.  Patient is on amiodarone.  Will need outpatient follow-up of this.  She chronically uses oxygen. She has some stable chronic hypercarbia now.     She received Haldol last night and she is sluggish now.  I reduce the as needed Haldol prn order to 2 mg and ativan to 0.5 mg.    Pt may need tcu.    Pull navas soon.    Discussed with daughter.        Previous cards note-  1. WCT - Thought to be aberrant SVT by cardiologist in [place] and tx with metoprolol. ER visit 4/2020 unresponsive to adenosine and required DCCV and transferred to [facility]. Placed on Amio load at [facility] 4/2020  2. Resolved CM - EF 40% at [facility] 4/2020 but normalized 5/2020 60-65%  3. Chronic Respiratory Failure/hypercarbia  - Admitted to [facility] 5/2020 with O2 sats 80%. Known elevated hemidiaphragm  4. H/o DVT - Following back surgery 2013; IVC filter in place. DVT again noted 4/2020. Remains on chronic AC with Xarelto  5. 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 Murillo had a virtual visit with her 6/2020 to review her hospitalization at [facility] 5/2020 for respiratory failure.  There was concern that amiodarone (started 1 m prior) could have contributed, but when Dr. Murillo spoke with her 6/2020, she was doing well in [facility] with O2 sats 95%. EKG showed NSR with narrower QRS than LBBB. Continued BB and amiodarone rec'd with 3 m follow-up. Unfortunately, she's now just back for follow-up 11 m later.     Family contact for all medical & discharge information is Sonia Reardon, [phone].  [place] lives [place] in [facility], had lived in [facility] prior.  Had previously been on a lot of narcs.  After getting off meds -cognition improved  Typically very weak-hard  to walk from br to chair.  She was just put on oxygen at [place]    1:28 PM  Apparently pt not engaging with pt ot in last two days.  It this is not improving--it could preclude tcu

## 2021-04-14 NOTE — PLAN OF CARE
Vital signs stable, weaned to 2L oxygen this AM per respiratory therapy, attempted to wean to 1L but de-satted into low 80's. Alert to self and time. Denies pain and shortness of breath. Very drowsy this shift becoming more arousable throughout the day. Refusing to get up to the chair. Turned and repositioned in bed as allowed. Lindsey in place with adequate output. Refused breakfast, ate 100% of lunch with assistance.

## 2021-04-15 NOTE — PLAN OF CARE
PT: Patient lethargic and declining PT today x2. Will re-attempt 4/15 as appropriate.    Neo Haq, PT, DPT

## 2021-04-15 NOTE — PLAN OF CARE
Vital signs stable. Alert to self and time. Denies pain and shortness of breath. Tolerating diet. Up to chair with assist x 2 and walker. Lindsey removed at 1400, awaiting void.

## 2021-04-15 NOTE — PROGRESS NOTES
Care Management Follow Up    Length of Stay (days): 4    Expected Discharge Date: 04/16/21     Concerns to be Addressed: discharge planning     Patient plan of care discussed at interdisciplinary rounds: Yes    Anticipated Discharge Disposition: Transitional Care and once pt is back to baseline, will return to [facility] (Phone: [phone] or [phone] Fax: [phone])     Anticipated Discharge Services:  TCU cares  Anticipated Discharge DME: Walker    Patient/family educated on Medicare website which has current facility and service quality ratings: yes  Education Provided on the Discharge Plan:  yes  Patient/Family in Agreement with the Plan: yes    Referrals Placed by CM/SW: Internal Clinic Care Coordination, Post Acute Facilities  Private pay costs discussed: transportation costs    Additional Information:  Per family request, referrals for TCU cares were sent to:  1.  [facility] (Phone: [phone] Fax: [phone])- declined due to pt being high risk of needing 1:1 cares.    2.  [facility] (Main: [phone] Admissions: [phone] Fax: [phone])- no bed available until 4-16; Mehreen reviewing for cares  3.  [facility] (Phone: [phone] Admissions: [phone] Fax: [phone])-- not taking admissions due to staffing.  4.  [facility] Phone (Main Phone:[phone] Admissions Phone:[phone] Fax: [phone])- spoke with Fabienne who will review for bed.     Pt will need [facility] wheelchair for transportation due to 02 needs & family request.     Care Management team to follow for discharge plans.     EN Purcell

## 2021-04-15 NOTE — PROGRESS NOTES
[facility]      Subjective:  Eating well  Calm   No complaints    Review of Systems:  CONSTITUTIONAL: NEGATIVE for fever, chills, change in weight  INTEGUMENTARY/SKIN: NEGATIVE for worrisome rashes, moles or lesions  EYES: NEGATIVE for vision changes or irritation  ENT/MOUTH: NEGATIVE for ear, mouth and throat problems  RESP: NEGATIVE for significant cough or SOB  BREAST: NEGATIVE for masses, tenderness or discharge  CV: NEGATIVE for chest pain, palpitations or peripheral edema  GI: NEGATIVE for nausea, abdominal pain, heartburn, or change in bowel habits  : NEGATIVE for frequency, dysuria, or hematuria  MUSCULOSKELETAL: NEGATIVE for significant arthralgias or myalgia  NEURO: sleepy  ENDOCRINE: NEGATIVE for temperature intolerance, skin/hair changes  HEME: NEGATIVE for bleeding problems  PSYCHIATRIC: NEGATIVE for changes in mood or affect    Physical Exam:  Vitals Were Reviewed    Patient Vitals for the past 16 hrs:   BP Temp Temp src Pulse Resp SpO2   04/15/21 1046 (!) 142/50 97.7  F (36.5  C) Axillary 63 16 92 %   04/15/21 0710 (!) 158/56 97.7  F (36.5  C) Axillary 58 16 94 %   04/15/21 0314 (!) 152/56 97.5  F (36.4  C) Oral 62 16 93 %   04/14/21 2229 121/41 98.6  F (37  C) Oral 64 18 92 %         Intake/Output Summary (Last 24 hours) at 4/15/2021 1208  Last data filed at 4/15/2021 1139  Gross per 24 hour   Intake 340 ml   Output 1725 ml   Net -1385 ml       GENERAL APPEARANCE: sleepy  EYES: conjunctiva clear, eyes grossly normal  RESP: crackles left base  CV: regular rate and rhythm, normal S1 S2, no S3 or S4 and no murmur, click or rub   ABDOMEN: soft, nontender, no HSM or masses and bowel sounds normal  MS: no clubbing, cyanosis; tr edema  SKIN: clear without significant rashes or lesions    Lab:  Recent Labs   Lab Test 04/15/21  0547 04/14/21  0515   * 143   POTASSIUM 4.3 4.6   CHLORIDE 102 101   CO2 43* 39*   ANIONGAP <1* 3   * 130*   BUN 45* 44*   CR 1.41* 1.27*   FROY  8.0* 8.4*     CBC RESULTS:   Recent Labs   Lab Test 04/15/21  0547 04/14/21  0515   WBC 10.3 10.2   RBC 3.22* 3.32*   HGB 9.2* 9.6*   HCT 32.9* 34.0*    213       Results for orders placed or performed during the hospital encounter of 04/10/21 (from the past 24 hour(s))   CBC with platelets   Result Value Ref Range    WBC 10.3 4.0 - 11.0 10e9/L    RBC Count 3.22 (L) 3.8 - 5.2 10e12/L    Hemoglobin 9.2 (L) 11.7 - 15.7 g/dL    Hematocrit 32.9 (L) 35.0 - 47.0 %     (H) 78 - 100 fl    MCH 28.6 26.5 - 33.0 pg    MCHC 28.0 (L) 31.5 - 36.5 g/dL    RDW 15.6 (H) 10.0 - 15.0 %    Platelet Count 219 150 - 450 10e9/L   Blood gas venous   Result Value Ref Range    Ph Venous 7.39 7.32 - 7.43 pH    PCO2 Venous 79 (HH) 40 - 50 mm Hg    PO2 Venous 61 (H) 25 - 47 mm Hg    Bicarbonate Venous 48 (HH) 21 - 28 mmol/L    Base Excess Venous 19.6 mmol/L    FIO2 28    Basic metabolic panel   Result Value Ref Range    Sodium 145 (H) 133 - 144 mmol/L    Potassium 4.3 3.4 - 5.3 mmol/L    Chloride 102 94 - 109 mmol/L    Carbon Dioxide 43 (H) 20 - 32 mmol/L    Anion Gap <1 (L) 3 - 14 mmol/L    Glucose 111 (H) 70 - 99 mg/dL    Urea Nitrogen 45 (H) 7 - 30 mg/dL    Creatinine 1.41 (H) 0.52 - 1.04 mg/dL    GFR Estimate 34 (L) >60 mL/min/[1.73_m2]    GFR Estimate If Black 39 (L) >60 mL/min/[1.73_m2]    Calcium 8.0 (L) 8.5 - 10.1 mg/dL       Assessment and Plan:    Caitlin Sales is a 85 year old female who presents on 4/10/2021 generalized weakness, tremor and LORI.     Acute kidney injury (H)  CKD (chronic kidney disease) stage 2, GFR 60-89 ml/min  Creatinine on admission 1.81.  Baseline 0.98.  Could be related to recent medication change. Losartan was increased by her Cardiologist on 04/02/2021.  Received normal saline bolus of 500 ml in the ED  Lasix and losartan initially held.     Creat now 1.0--1.15--1.27--1.41 (holding lasix for the rest of the day)  Diuresed 4/13/21   I/o - 4230  Weight is down-- 222 lb     Acute on chronic  hypoxic and hypercarbic resp failure  History of restrictive lung ds  Acute diastolic and right heart failure  Pulmonary htn  Patient with history of chronic respiratory failure with hypercapnia and is on 2 L oxygen at home.  PFT 03/08/2021: moderate-severe restriction - moderate reduction in DLCO.  She has been on amiodarone therapy for wide-complex tachycardia since 04/22/2020, which can cause pulmonary fibrosis.  She has a history of elevated hemidiaphragm, heart failure and morbid obesity which could be contributing as well.  - Patient was noted by he nurse after admission to have episodes of occasional hypoxia and pulse ox was not reliable for oxygen saturation measurement.  VBG was obtained and showed PCO2 venous at 99 repeat 96, pH venous 7.25, bicarb 43 which is consistent with partially compensated respiratory acidosis, likely due to chronic CO2 retention.   - Patient was started on BiPAP and was transferred  to the ICU  - Patient not cooperating with leaving Bipap on, placed in restraints  - Sedation with lorazepam and haloperidol due to agitation  - CXR to evaluate for pneumonia or pulmonary edema, demonstrates elevated R hemidiaphragm and R basilar atelectasis.  Small bilateral pleural effusions without pneumothorax.  - Solumedrol 60 mg IV daily started 4/11  - Duonebs q 4 hours     4/12/21 echo-  Left ventricular systolic function is normal.  The visual ejection fraction is estimated at 55-60%.  There is moderate (2+) tricuspid regurgitation.  Right ventricular systolic pressure is elevated, consistent with moderate  pulmonary hypertension.  There is mild (1+) mitral regurgitation.  Compared to 5/20, degree of TR is worse. RVSP is unchanged. The study was  technically difficult.     On 2 l nc  Ph 7.39/79  Pt has improved with iv diuresis  Unsure if component of restrictive lung ds.-on empiric prednisone for that-dropping dose to 20 mg daily. Probably will run just a short course  Previously on lasix 20  every other day and 40 every other day  On  Lasix 20 twice daily (was on 20 every other day and 40 every other day)  Creat  Up to 1.41 --hold lasix until tomorrow        Essential hypertension  Losartan initially held due to carlos a  Restarted losartan 04/13/21      Coronary artery disease involving native coronary artery of native heart without angina pectoris  Heart failure with preserved ejection fraction, NYHA class II (H)  Stress test done on 04/20/2020 showed severe reversible perfusion defect of the inferolateral wall and distal septal distribution.  No discrete areas of ischemia.  She is currently asymptomatic.  On pta Lipitor     Generalized muscle weakness  Exam showed normal strength in the upper extremities.  She has truncal and lower extremity weakness.  She has a history of chronic back pain with radiculopathy.  She had lumbar spine fusion surgery and right total hip replacement surgery.  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  IVC filter was placed permanently.  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     Esophageal reflux  - Resume home omeprazole      Late onset Alzheimer's disease without behavioral disturbance (H)  Acute metabolic encephalopathy  Stable.  Patient is currently staying at an assisted living facility.  Normal mentation on admission, A&O x 3.  - Continue home Remeron     Pt more confused since ICU admit.      Iron deficiency anemia  Hemoglobin admission 10.  No overt bleeding.  - Hgb from 10.0 to 8.8, possibly dilutional  - Repeat CBC in the morning      Hypothyroidism  Chronic stable problem.  Resume home levothyroxine 75 MCG.  - Check TSH in AM      Hyperlipidemia  History of TIA (transient  ischemic attack) and stroke  - Resume home Lipitor 40 mg daily   - Resume home Xarelto     Covid-19 negative  This patient was evaluated during a global COVID-19 pandemic, which necessitated consideration that the patient might be at risk for infection with the SARS-CoV-2 virus that causes COVID-19  - Covid PCR is negative     Diet: Regular Diet Adult    DVT Prophylaxis: patient on Xarelto   Navas Catheter:  present  Code Status:   Full code  Lines: IV line      Disposition:   Pt presented with weakness and tremor and was found to have LORI. Then developed increased hypoxia and hypercarbia in hospital after having IV fluids and lasix held.     Now appears to have chf exacerbation and that has improved . Also some element of interstitial lung disease based on pulmonary function tests.  Patient is on amiodarone.  Will need outpatient follow-up of this.  She chronically uses oxygen two. She has some stable chronic hypercarbia now.     Pt may need tcu-trying to determine ability to participate in therapy. She appears to be participating more as she improves clinically.    Discontinue navas.    Will watch creat and try to determine dispo.  LIkely discharge tomorrow.     Message left with update on [employer] machine.        Previous cards note-  1. WCT - Thought to be aberrant SVT by cardiologist in [place] and tx with metoprolol. ER visit 4/2020 unresponsive to adenosine and required DCCV and transferred to [facility]. Placed on Amio load at [facility] 4/2020  2. Resolved CM - EF 40% at [facility] 4/2020 but normalized 5/2020 60-65%  3. Chronic Respiratory Failure/hypercarbia  - Admitted to [facility] 5/2020 with O2 sats 80%. Known elevated hemidiaphragm  4. H/o DVT - Following back surgery 2013; IVC filter in place. DVT again noted 4/2020. Remains on chronic AC with Xarelto  5. 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 Murillo had a virtual visit with her 6/2020 to review her hospitalization at [facility] 5/2020 for respiratory  failure.  There was concern that amiodarone (started 1 m prior) could have contributed, but when Dr. Murillo spoke with her 6/2020, she was doing well in [facility] with O2 sats 95%. EKG showed NSR with narrower QRS than LBBB. Continued BB and amiodarone rec'd with 3 m follow-up. Unfortunately, she's now just back for follow-up 11 m later.     Family contact for all medical & discharge information is Sonia Reardon, [phone].  [place] lives [place] in [facility], had lived in [facility] prior.  Had previously been on a lot of narcs.  After getting off meds -cognition improved  Typically very weak-hard to walk from br to chair.  She was just put on oxygen at [place]

## 2021-04-15 NOTE — PLAN OF CARE
Patient alert to self, calm and cooperative with staff. Repeats questions on occasion but follows instruction from staff. Patient sleeping intermittently throughout night and appears in good spirits. Bruising outlined in chart and appears to be healing. LS clear but diminished, patient experiences SOB with ambulation. A2 gait belt and walker for pivot transfer to commode and bed.

## 2021-04-15 NOTE — PROVIDER NOTIFICATION
DATE:  4/15/2021   TIME OF RECEIPT FROM LAB:  0620  LAB TEST:  VBG CO2 and Bicarb  LAB VALUE:  CO2 is 79, bicarb is 48  RESULTS GIVEN WITH READ-BACK TO (PROVIDER):  PADMINI Mahan  TIME LAB VALUE REPORTED TO PROVIDER:   0609

## 2021-04-15 NOTE — PROGRESS NOTES
Patient alert this evening, oriented to self only.  Out of bed and ambulated to chair with assist of 2 and walker.  Continues on 2L/nc.  Receiving oral lasix, has navas catheter for urine output.  Good appetite.

## 2021-04-16 NOTE — PROGRESS NOTES
Oxygen Documentation:   I certify that this patient, Caitlin Sales has been under my care (or a nurse practitioner or physican's assistant working with me). This is the face-to-face encounter for oxygen medical necessity.      Caitlin Sales is now in a chronic stable state and continues to require supplemental oxygen. Patient has continued oxygen desaturation due to restrictive lung ds, acute chf.    Alternative treatment(s) tried or considered and deemed clinically infective for treatment of restrictive lung ds, acute diastolic chf include nebulizers, steroids and diuresis.  If portability is ordered, is the patient mobile within the home? yes    **Patients who qualify for home O2 coverage under the CMS guidelines require ABG tests or O2 sat readings obtained closest to, but no earlier than 2 days prior to the discharge, as evidence of the need for home oxygen therapy. Testing must be performed while patient is in the chronic stable state. See notes for O2 sats.**

## 2021-04-16 NOTE — PLAN OF CARE
[facility] DISCHARGE NOTE    Patient discharged to transitional care unit at 12:18 PM via wheel chair. Accompanied by significant other and staff. Discharge instructions reviewed with caregiver, opportunity offered to ask questions. Prescriptions sent to patients preferred pharmacy. All belongings sent with patient.    Isidoro Mahajan RN

## 2021-04-16 NOTE — PLAN OF CARE
"Patient alert to self, pleasant and cooperative with staff. Periods of confusion throughout night asking if, \"that doctor needs help finding his way out.\" Patient reoriented and easily redirected. Patient rouses to touch during night. Patient is an extensive assist of two using walker and gait belt. External catheter in place throughout night.   "

## 2021-04-16 NOTE — PLAN OF CARE
Physical Therapy Discharge Summary    Reason for therapy discharge:    Discharged to transitional care facility.    Progress towards therapy goal(s). See goals on Care Plan in [facility] electronic health record for goal details.  Goals partially met.  Barriers to achieving goals:   discharge from facility.    Therapy recommendation(s):    Continued therapy is recommended.  Rationale/Recommendations:  TCU to progress patient's strength and mobility to baseline function.

## 2021-04-16 NOTE — PROGRESS NOTES
Care Management Discharge Note    Discharge Date: 04/14/21     Discharge Disposition: Transitional Care; pt is accepted at [facility] (Main: [phone] Admissions: [phone] Fax: [phone])    Discharge Services:  Oxygen    Discharge DME: Walker    Discharge Transportation:  agency    Private pay costs discussed: transportation costs    PAS Confirmation Code:  [id number]  Patient/family educated on Medicare website which has current facility and service quality ratings: yes    Education Provided on the Discharge Plan:   Yes  Persons Notified of Discharge Plans: pt and daughterGale  Patient/Family in Agreement with the Plan: yes    Handoff Referral Completed: Yes    Additional Information:  Per IDT rounds, pt is medically stable for discharge today.  Pt has been accepted for TCU cares at [facility] (Main: [phone] Admissions: [phone] Fax: [phone]).    Pt to transport via [facility] wheelchair with O2 at 12:45 PM.      EN Purcell

## 2021-04-16 NOTE — PLAN OF CARE
Occupational Therapy Discharge Summary    Reason for therapy discharge:    Discharged to transitional care facility.    Progress towards therapy goal(s). See goals on Care Plan in [facility] electronic health record for goal details.  Goals partially met.  Barriers to achieving goals:   discharge from facility.    Therapy recommendation(s):    Continued therapy is recommended.  Rationale/Recommendations:  TCU to increase independence with ADLs and functional mobility.

## 2021-04-16 NOTE — PROGRESS NOTES
[facility] Hospitalist Service      Subjective:  No complaint  Concerned bc she hasn't seen her family  I told her that I have been in contact with Chante    Review of Systems:  CONSTITUTIONAL: NEGATIVE for fever, chills, change in weight  ENT/MOUTH: NEGATIVE for ear, mouth and throat problems  RESP: NEGATIVE for significant cough or SOB  CV: NEGATIVE for chest pain, palpitations or peripheral edema    Physical Exam:  Vitals Were Reviewed    Patient Vitals for the past 16 hrs:   BP Temp Temp src Pulse Resp SpO2   04/16/21 0723 (!) 154/59 97.8  F (36.6  C) Oral 54 18 93 %   04/15/21 2227 (!) 176/71 98.3  F (36.8  C) Oral 58 18 94 %   04/15/21 1929 (!) 169/64 98.2  F (36.8  C) Oral 65 18 94 %         Intake/Output Summary (Last 24 hours) at 4/16/2021 1052  Last data filed at 4/15/2021 1406  Gross per 24 hour   Intake --   Output 950 ml   Net -950 ml       GENERAL APPEARANCE: a bit anxious  EYES: conjunctiva clear, eyes grossly normal  RESP: clear, some what decreased  CV: regular rate and rhythm, normal S1 S2, no S3 or S4 and no murmur, click or rub   ABDOMEN: soft, nontender, no HSM or masses and bowel sounds normal  MS: no clubbing, cyanosis; no edema  SKIN: clear without significant rashes or lesions    Lab:  Recent Labs   Lab Test 04/16/21  0442 04/15/21  0547    145*   POTASSIUM 4.3 4.3   CHLORIDE 100 102   CO2 42* 43*   ANIONGAP <1* <1*   GLC 93 111*   BUN 40* 45*   CR 1.28* 1.41*   FROY 8.0* 8.0*     CBC RESULTS:   Recent Labs   Lab Test 04/16/21  0442 04/15/21  0547   WBC 8.8 10.3   RBC 3.32* 3.22*   HGB 9.6* 9.2*   HCT 33.8* 32.9*    219       Results for orders placed or performed during the hospital encounter of 04/10/21 (from the past 24 hour(s))   CBC with platelets   Result Value Ref Range    WBC 8.8 4.0 - 11.0 10e9/L    RBC Count 3.32 (L) 3.8 - 5.2 10e12/L    Hemoglobin 9.6 (L) 11.7 - 15.7 g/dL    Hematocrit 33.8 (L) 35.0 - 47.0 %     (H) 78 - 100 fl    MCH 28.9 26.5 - 33.0 pg     MCHC 28.4 (L) 31.5 - 36.5 g/dL    RDW 15.4 (H) 10.0 - 15.0 %    Platelet Count 215 150 - 450 10e9/L   Blood gas venous   Result Value Ref Range    Ph Venous 7.38 7.32 - 7.43 pH    PCO2 Venous 81 (HH) 40 - 50 mm Hg    PO2 Venous 53 (H) 25 - 47 mm Hg    Bicarbonate Venous 48 (HH) 21 - 28 mmol/L    Base Excess Venous 19.9 mmol/L    FIO2 28%    Basic metabolic panel   Result Value Ref Range    Sodium 142 133 - 144 mmol/L    Potassium 4.3 3.4 - 5.3 mmol/L    Chloride 100 94 - 109 mmol/L    Carbon Dioxide 42 (H) 20 - 32 mmol/L    Anion Gap <1 (L) 3 - 14 mmol/L    Glucose 93 70 - 99 mg/dL    Urea Nitrogen 40 (H) 7 - 30 mg/dL    Creatinine 1.28 (H) 0.52 - 1.04 mg/dL    GFR Estimate 38 (L) >60 mL/min/[1.73_m2]    GFR Estimate If Black 44 (L) >60 mL/min/[1.73_m2]    Calcium 8.0 (L) 8.5 - 10.1 mg/dL       Assessment and Plan:    Caitlin Sales is a 85 year old female who presents on 4/10/2021 generalized weakness, tremor and LORI.     Acute kidney injury (H)  CKD (chronic kidney disease) stage 2, GFR 60-89 ml/min  Creatinine on admission 1.81.  Baseline 0.98.  Could be related to recent medication change. Losartan was increased by her Cardiologist on 04/02/2021.  Received normal saline bolus of 500 ml in the ED  Lasix and losartan initially held.     Creat now 1.0--1.15--1.27--1.41-1.28   Diuresed   I/o - 4580  Weight is down-- 222 lb     Acute on chronic hypoxic and hypercarbic resp failure  History of restrictive lung ds  Acute diastolic and right heart failure  Pulmonary htn  Patient with history of chronic respiratory failure with hypercapnia and is on 2 L oxygen at home.  PFT 03/08/2021: moderate-severe restriction - moderate reduction in DLCO.  She has been on amiodarone therapy for wide-complex tachycardia since 04/22/2020, which can cause pulmonary fibrosis.  She has a history of elevated hemidiaphragm, heart failure and morbid obesity which could be contributing as well.  - Patient was noted by he nurse after  admission to have episodes of occasional hypoxia and pulse ox was not reliable for oxygen saturation measurement.  VBG was obtained and showed PCO2 venous at 99 repeat 96, pH venous 7.25, bicarb 43 which is consistent with partially compensated respiratory acidosis, likely due to chronic CO2 retention.   - Patient was started on BiPAP and was transferred  to the ICU  - Patient not cooperating with leaving Bipap on, placed in restraints  - Sedation with lorazepam and haloperidol due to agitation  - CXR to evaluate for pneumonia or pulmonary edema, demonstrates elevated R hemidiaphragm and R basilar atelectasis.  Small bilateral pleural effusions without pneumothorax.  - Solumedrol 60 mg IV daily started 4/11  - Duonebs q 4 hours     4/12/21 echo-  Left ventricular systolic function is normal.  The visual ejection fraction is estimated at 55-60%.  There is moderate (2+) tricuspid regurgitation.  Right ventricular systolic pressure is elevated, consistent with moderate  pulmonary hypertension.  There is mild (1+) mitral regurgitation.  Compared to 5/20, degree of TR is worse. RVSP is unchanged. The study was  technically difficult.     On 2 l nc  Ph 7.38/81  Pt has improved with iv diuresis  Unsure if component of restrictive lung ds.-on empiric prednisone for that-stopping upon dc  Previously on lasix 20 every other day and 40 every other day  On  Lasix 20 twice daily (was on 20 every other day and 40 every other day)  Creat down to 1.28  Will discharge on previous lasix dose.        Essential hypertension  Losartan initially held due to carlos a  Restarted losartan 04/13/21      Coronary artery disease involving native coronary artery of native heart without angina pectoris  Heart failure with preserved ejection fraction, NYHA class II (H)  Stress test done on 04/20/2020 showed severe reversible perfusion defect of the inferolateral wall and distal septal distribution.  No discrete areas of ischemia.  She is currently  asymptomatic.  On pta Lipitor     Generalized muscle weakness  Exam showed normal strength in the upper extremities.  She has truncal and lower extremity weakness.  She has a history of chronic back pain with radiculopathy.  She had lumbar spine fusion surgery and right total hip replacement surgery.  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  IVC filter was placed permanently.  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     Esophageal reflux  - Resume home omeprazole      Late onset Alzheimer's disease without behavioral disturbance (H)  Acute metabolic encephalopathy  Stable.  Patient is currently staying at an assisted living facility.  Normal mentation on admission, A&O x 3.  - Continue home Remeron     Pt more confused since ICU admit.      Iron deficiency anemia  Hemoglobin admission 10.  No overt bleeding.  Discharge hgb 9.6      Hypothyroidism  Chronic stable problem.  Resume home levothyroxine 75 MCG.  - Check TSH in AM      Hyperlipidemia  History of TIA (transient ischemic attack) and stroke  - Resume home Lipitor 40 mg daily   - Resume home Xarelto     Covid-19 negative  This patient was evaluated during a global COVID-19 pandemic, which necessitated consideration that the patient might be at risk for infection with the SARS-CoV-2 virus that causes COVID-19  - Covid PCR is negative     Diet: Regular Diet Adult    DVT Prophylaxis: patient on Xarelto   Lindsey Catheter:  present  Code Status:   Full code  Lines: IV line      Disposition:   Pt presented with weakness and tremor and was found to have LORI. Then developed increased hypoxia and hypercarbia in hospital after having IV fluids and lasix held.     Now appears to  have chf exacerbation and that has improved . Also some element of interstitial lung disease based on pulmonary function tests.  Patient is on amiodarone.  Will need outpatient follow-up of this.  She chronically uses oxygen two. She has some stable chronic hypercarbia now.     To tcu later.  Home lasix dosing.     Previous cards note-  1. WCT - Thought to be aberrant SVT by cardiologist in [place] and tx with metoprolol. ER visit 4/2020 unresponsive to adenosine and required DCCV and transferred to [facility]. Placed on Amio load at [facility] 4/2020  2. Resolved CM - EF 40% at [facility] 4/2020 but normalized 5/2020 60-65%  3. Chronic Respiratory Failure/hypercarbia  - Admitted to [facility] 5/2020 with O2 sats 80%. Known elevated hemidiaphragm  4. H/o DVT - Following back surgery 2013; IVC filter in place. DVT again noted 4/2020. Remains on chronic AC with Xarelto  5. 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 Murillo had a virtual visit with her 6/2020 to review her hospitalization at [facility] 5/2020 for respiratory failure.  There was concern that amiodarone (started 1 m prior) could have contributed, but when Dr. Murillo spoke with her 6/2020, she was doing well in [facility] with O2 sats 95%. EKG showed NSR with narrower QRS than LBBB. Continued BB and amiodarone rec'd with 3 m follow-up. Unfortunately, she's now just back for follow-up 11 m later.     Family contact for all medical & discharge information is Sonia Reardon, [phone].  [place] lives in [place] in [place], had lived in [facility] prior.  Had previously been on a lot of narcs.  After getting off meds -cognition improved  Typically very weak-hard to walk from br to chair.  She was just put on oxygen at [place]

## 2021-04-16 NOTE — PLAN OF CARE
"Purewick in place. Brief on. Patient is heavy assist of two with gait belt and walker. Up in chair for supper. Disoriented to place (\"[place]\"), time (\"the 25th\") and situation (\"the problem I've been having\") but easily redirectable. Bed alarm on for safety. Uses call light.   "

## 2021-04-16 NOTE — PROVIDER NOTIFICATION
DATE:  4/16/2021   TIME OF RECEIPT FROM LAB:  0500  LAB TEST:  PCO2 81 and BICARB of 48.  LAB VALUE:  See above  RESULTS GIVEN TO TELE MD Sanchez  TIME LAB VALUE REPORTED TO PROVIDER:   8586

## 2021-04-18 PROBLEM — Z79.891 LONG TERM (CURRENT) USE OF OPIATE ANALGESIC: Status: ACTIVE | Noted: 2019-09-30

## 2021-04-18 PROBLEM — Z98.1 S/P LUMBAR SPINAL FUSION: Status: ACTIVE | Noted: 2017-03-15

## 2021-04-18 PROBLEM — G30.1 LATE ONSET ALZHEIMER'S DISEASE WITHOUT BEHAVIORAL DISTURBANCE (H): Status: ACTIVE | Noted: 2020-02-20

## 2021-04-18 PROBLEM — F02.80 LATE ONSET ALZHEIMER'S DISEASE WITHOUT BEHAVIORAL DISTURBANCE (H): Status: ACTIVE | Noted: 2020-02-20

## 2021-04-18 PROBLEM — I47.10 SUPRAVENTRICULAR TACHYCARDIA (H): Status: ACTIVE | Noted: 2019-01-25

## 2021-04-18 NOTE — PROGRESS NOTES
[facility] GERIATRIC SERVICES  PRIMARY CARE PROVIDER AND CLINIC: Abbie Zacarias DO, [address] [facility] [place] [address]; Phone: [phone]; Pager: [phone]; Fax: [phone]  Chief Complaint   Patient presents with     Hospital F/U     [facility] 4/10/2021 - 4/16/2021      [facility] Record Number: [id number]  Place of Service where encounter took place: [facility] (TCU) [[id number]]    Caitlin Sales is a 85 year old (1935), admitted to the above facility from  [facility]. Hospital stay 4/10/2021 through 4/16/2021.  HPI:    Brief Summary of Hospital Course: Caitlin Sales has a past medical history of wide complex tachycardia, hyperlipidemia, CAD, diastolic CHF, hypertension, chronic respiratory failure, history of DVT on chronic anticoagulation, history of TIA, R vertebral artery stenosis, neuropathy, GIB, anemia,  obesity, osteoarthritis, chronic back pain, GERD, dementia.  S/he was admitted to the hospital with weakness, tremor from her assisted living facility and found to have acute kidney injury with creat to 1.81 and her diuretics and losartan were held.  She was given IV fluids and developed worsening respiratory failure needing BiPAP. Echo was done 4/12/21 showing EF of 55-60%, tricuspid regurg, mitral regurg, and elevated R ventricular systolic pressure consistent with pulmonary hypertension.  S/he underwent diuresis, creat improved to 1.28 at time of discharge. The hospital stay was long and complex, she has very poor functional baseline status and uses wheelchair for much of her mobility.    Follow up needed:  -Cardiology - review ongoing use of amiodarone due to findings with PFTs.  This is scheduled for 5/18/21 with Dr. Walters  -Pulmonology - planned for 5/12/21 and 5/26/21 with Dr. Adkins    Today: Caitlin reports that she has chronic all-over pain from arthritis but it is no worse than usual.  She reports no new dyspnea, she thinks  she uses chronic oxygen at home, and usually moves around with wheelchair as she is unable to walk very far.  She is not sure why she is in TCU, recalls little of her hospital stay.  She reports no other concerns.     CODE STATUS/ADVANCE DIRECTIVES DISCUSSION: Full Code  CPR/Full code   ALLERGIES: Aspirin, Ace inhibitors, Citalopram, Furosemide, Lisinopril-hydrochlorothiazide, Penicillins, Torsemide, and Tramadol  PAST MEDICAL HISTORY:  has a past medical history of Basal cell carcinoma of face (7/19/2017), History of DVT of lower extremity (09/25/2013), Neuropathy (9/25/2013), TIA (transient ischemic attack) (12/26/2019), and Wide-complex tachycardia (H) (04/28/2020).  PAST SURGICAL HISTORY:   has a past surgical history that includes appendectomy; Cholecystectomy (1967); salpingo oophorectomy,r/l/destiny (Left, 1967); Endometrial sampling (biopsy) (2001); LUMBAR SPINE FUSN,POST INTRBDY (2010); vena cava filter (2010); and REVISE TOTAL HIP REPLACEMENT (Right, 2013).  FAMILY HISTORY: family history includes Abdominal Aortic Aneurysm in her father; Breast Cancer in her daughter; Cancer in her mother; Hypertension in her father; Prostate Cancer in her father.  SOCIAL HISTORY:   reports that she has never smoked. She has never used smokeless tobacco. She reports previous alcohol use. She reports that she does not use drugs.  Patient's living condition: lives in an assisted living facility    Post Discharge Medication Reconciliation Status: discharge medications reconciled and changed, per note/orders  acetaminophen (TYLENOL) 500 MG tablet, Take 2 tablets (1,000 mg) by mouth 3 times daily (Patient taking differently: Take 1,000 mg by mouth 3 times daily )  amiodarone (PACERONE) 200 MG tablet, Take 1 tablet (200 mg) by mouth daily  atorvastatin (LIPITOR) 40 MG tablet, Take 1 tablet (40 mg) by mouth daily  DESITIN 40 % paste, Apply 1 Application topically as needed  folic acid (FOLVITE) 1 MG tablet, Take 1 mg by mouth daily  At 3 pm  furosemide (LASIX) 20 MG tablet, Take 1 tab (20 mg) every other day, alternating with 2 tabs (40 mg) every other day  gabapentin (NEURONTIN) 600 MG tablet, Take 1 tablet (600 mg) by mouth 2 times daily  hydrocortisone (CORTAID) 1 % external cream, Apply 1 applicator topically 2 times daily as needed for itching   levothyroxine (SYNTHROID/LEVOTHROID) 75 MCG tablet, Take 75 mcg by mouth daily   loperamide (IMODIUM) 2 MG capsule, Take 2 mg by mouth 4 times daily as needed for diarrhea  losartan (COZAAR) 50 MG tablet, Take 1 tablet (50 mg) by mouth daily Hold for SBP <110 mmHg  melatonin 3 MG tablet, 3 mg by Oral or NG Tube route At Bedtime  mirtazapine (REMERON) 7.5 MG tablet, Take 1 tablet (7.5 mg) by mouth At Bedtime  nystatin (MYCOSTATIN) 080987 UNIT/GM external cream, Apply topically 2 times daily  omeprazole (PRILOSEC) 40 MG DR capsule, Take 1 capsule (40 mg) by mouth daily  order for DME, Equipment being ordered: walker with 4 wheels  order for DME, O2 2 LPM continuos (Patient taking differently: O2 2 LPM continuous)  order for DME, Equipment being ordered: knee high compression stockings 20-30 mm compression  oxyCODONE-acetaminophen (PERCOCET) 5-325 MG tablet, Take 1 tablet by mouth daily as needed for pain  polyethylene glycol (MIRALAX) 17 g packet, Take 1 packet by mouth daily as needed   rivaroxaban ANTICOAGULANT (XARELTO ANTICOAGULANT) 20 MG TABS tablet, Take 1 tablet (20 mg) by mouth daily (with dinner)  simethicone (MYLICON) 125 MG chewable tablet, Take 125 mg by mouth 2 times daily as needed   triamcinolone (KENALOG) 0.1 % external cream, Apply 1 applicator topically 2 times daily as needed for irritation To hand rash  vitamin B-12 (CYANOCOBALAMIN) 1000 MCG tablet, Take 1,000 mcg by mouth daily    No current facility-administered medications on file prior to visit.      ROS:  10 point ROS of systems including Constitutional, Eyes, Respiratory, Cardiovascular, Gastroenterology, Genitourinary,  "Integumentary, Musculoskeletal, Psychiatric were all negative except for pertinent positives noted in my HPI.    Vitals:  /53   Pulse 64   Temp 98.1  F (36.7  C)   Resp 18   Ht 1.651 m (5' 5\")   Wt 120.2 kg (265 lb 1.6 oz)   SpO2 96%   BMI 44.11 kg/m    Exam:  GENERAL APPEARANCE:  Alert, in no acute distress   HEAD:  Normal, normocephalic, atraumatic  EYE EXAM: normal external eye, conjunctiva, lids, JADYN  NECK EXAM: supple, no JVD  CHEST/RESP:  respiratory effort normal, lung sounds CTA but diminished , no respiratory distress  CV:  Rate reg, rhythm reg, no murmur, no peripheral edema  M/S:   extremities normal, gait abnormal-unable to ambulate, weak, normal muscle tone, and range of motion baseline -decreased R shoulder  SKIN EXAM: flaking peeling skin of bilateral LE, cool to touch, no edema  NEUROLOGIC EXAM: Cranial nerves 2-12 are normal tested and grossly at patient's baseline.  No tremor, gross motor movement at baseline.   PSYCH:  Alert and oriented to self and surroundings, affect pleasant     Lab/Diagnostic data:  Recent labs in [employer] reviewed by me today    Assessment/Plan:  Chronic respiratory failure with hypercapnia (H)  Dependence on supplemental oxygen  Chronic history of oxygen use at home, chronic respiratory failure thought due in part to elevated hemidiaphragm, likely with obesity and CHF as contributing factors.  Required BiPAP in the hospital   -continue oxygen and current orders    Heart failure with preserved ejection fraction, NYHA class II (H)  Coronary artery disease involving native coronary artery of native heart without angina pectoris  On amiodarone therapy  Wide-complex tachycardia (H)  Patient with significant history of heart disease, tachycardia and followed by Cardiology. Her diuretics were resumed during hospital stay, with plans for follow up labs.  Labs today reassuring.   -monitor labs 4/26/21     Acute kidney injury (H)  Stage 3a chronic kidney disease  Admitted " "to the hospital with acute kidney injury, creat to 1.81.  down to 1.28 at hospital discharge, today was stable at 1.23. Baseline creat 0.8-1.0 over last 1 year.    -Avoid nephrotoxic medications  -Renal dosing of medications  -monitor kidney function 4/26/21      Lumbosacral radiculopathy at L5  S/P lumbar spinal fusion  Osteoarthritis of multiple joints, unspecified osteoarthritis type  Chronic right shoulder pain  Long term (current) use of opiate analgesic  Patient with longstanding history of pain due to OA, low back pain, R shoulder pain, and was previously using narcotic pain medications.  Per review of records, she has been successfully weaned off-now requiring only prn and is currently prescribed on lymphedema 1 tab per day prn for pain.  She is also on extra strength tylenol 1000 tid, gabapentin 600 bid (max dose due to other medications).  She reports significant R shoulder pain today  -start voltaren gel for R shoulder pain  -start CA/D supplement for improved bone health-monitor for increased constipation     Morbid obesity (H)  Generalized muscle weakness  Noted Body mass index is 44.11 kg/m ., which is above normal range.  BMI>30 consistent with obesity, BMI>35 with complicating disease process OR BMI>40 is consistent with morbid obesity.  This is clinically significant due to increased nursing cares, use of resources, and potential for complications.     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 Has \"permanent\" IVC filter implanted, has been there since at least 2010.   -therapeutic interchange at [facility] from xarelto to apixaban due to insurance considerations  -monitor for new symptoms     History of upper gastrointestinal bleeding  Other iron deficiency anemia  Patient with history of GIB, anemia.  Baseline hemoglobin appears to be about " "10-11, stable during hospital stay and 10.1 today.  She is on omeprazole 40 mg daily, of note also on anticoagulation so at high risk of complications.   -recheck hemoglobin next week    -monitor stools    Cognitive impairment   Patient with progressive noted cognitive impairment over last months to year.  Has been seen by Neuropsych for cognitive testing, and then will follow up with Neurology to determine next steps.  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 Also noted appearance of mild depression at that time, though she did not endorse feelings of depression.  Referral to Neurology pending due to history of \"jaw jumping\" for years, now with significant tremors of upper and lower extremities which interfere with mobility-worsening over last 6 months.   -recently started on melatonin for sleep, started on remeron 7.5 mg on 4/8/21 (hospitalized 4/10)    Orders:    Start Ca/D 600/400 BID po for hypocalcemia    voltaren gel 1% 2 gm TID top to R shoulder for pain    Labs on 4/26/21 to include BMP, hemoglobin     Total time spent with patient visit at the skilled nursing facility was 38 min including patient visit, review of past records and phone call to patient contact. Greater than 50% of total time spent with counseling and coordinating care due to multiple unstable medical conditions, discussion with daughter re goals of care.     Electronically signed by:  WALT Rich CNP           "

## 2021-04-18 NOTE — DISCHARGE SUMMARY
Admit Date:     04/10/2021   Discharge Date:     04/16/2021      HISTORY OF PRESENT ILLNESS:  Caitlin Sales is an 85-year-old female with chronic respiratory failure, chronic use of 2 liters of oxygen at home, Alzheimer's dementia, coronary artery disease, hypertension, hyperlipidemia, hypothyroidism, previous DVT and chronic anticoagulation.      She presented with complaints of tremor and weakness.      Upon admission, she was found to have acute kidney injury with creatinine of 1.81.  Her diuretics were held.  Losartan was held.  She was given IV fluids.  The cause of the acute kidney injury was unclear.      The patient went on to have a complex and prolonged hospitalization.  She developed worsening hypoxic and hypercarbic respiratory failure.  She ultimately needed BiPAP and was transferred to the ICU.  An echo done on 04/12/2021 showed an EF of 55-60% with moderate tricuspid regurgitation and elevated right ventricular systolic pressure, consistent with pulmonary hypertension.  She had mild mitral regurgitation.      It was thought that she had an exacerbation of diastolic congestive heart failure and right heart failure related to IV fluids that she had received in the holding of her diuretics.  Ultimately, she was diuresed, and she clinically improved.  She was ultimately discharged on her previous Lasix dose, which is Lasix 20 every other day and 40 every other day.      The patient's creatinine did improve, and her creatinine was 1.28 at the time of discharge.  Her weight was 222.      It was also noted that the patient was on amiodarone.  A recent pulmonary function test had shown moderate-to-severe restriction and moderate reduction in DLCO.  Patient was on amiodarone because of wide-complex tachycardia.  She is advised to see Cardiology for followup to review the situation with the pulmonary function tests and her amiodarone.  Her respiratory issues during this hospitalization seemed to be due to  congestive heart failure.      The patient had been living at assisted living in [place].  Prior to that, she lived in [place], [place].  Her daughter told me that she previously had been on a lot of narcotics.  After getting off these meds, her cognition improved.  There had been thoughts that she had dementia, but there was an improvement as she got off narcotics.  She has a very poor baseline functional status.  She is typically very weak and found it very hard to walk just from the bathroom to a chair.  She also has been on chronic oxygen at the home.      The patient was ultimately found suitable to go to a TCU.      ASSESSMENT:   1.  Acute kidney injury superimposed on chronic kidney disease -- exact cause unknown.   2.  Acute on chronic hypoxic and hypercarbic respiratory failure due to acute diastolic and right heart failure that occurred after hydration and diuretic cessation.   3.  Pulmonary hypertension.   4.  Abnormal pulmonary function test with moderate-to-severe restriction in a patient on amiodarone -- Cardiology followup recommended.   5.  Hypertension.   6.  Coronary artery disease.   7.  Generalized muscle weakness.   8.  History of DVT with IVC filter in place, on permanent anticoagulation.   9.  History of wide complex tachycardia.   10.  Gastroesophageal reflux disease (GERD).   11.  Late onset Alzheimer's disease with some acute metabolic encephalopathy during this hospitalization.   12.  Iron deficiency anemia -- discharge hemoglobin 9.6.   13.  Hypothyroidism, on levothyroxine.   14.  Hyperlipidemia, on Lipitor.   15.  COVID-19 negative PCR.      PLAN:  She is going to assisted living.  She needs Cardiology followup due to the pulmonary function tests and the amiodarone use.  She is ultimately discharged on the same Lasix dosing that she had been on prior to admission.     Discharge Medication List as of 4/16/2021 11:56 AM      CONTINUE these medications which have CHANGED    Details    furosemide (LASIX) 20 MG tablet Take 1 tab (20 mg) every other day, alternating with 2 tabs (40 mg) every other day, Disp-360 tablet, R-11, E-PrescribeGive 20 mg dose 4/17/21      oxyCODONE-acetaminophen (PERCOCET) 5-325 MG tablet Take 1 tablet by mouth daily as needed for pain, Disp-10 tablet, R-0, E-Prescribe         CONTINUE these medications which have NOT CHANGED    Details   acetaminophen (TYLENOL) 500 MG tablet Take 2 tablets (1,000 mg) by mouth 3 times daily, Transitional      amiodarone (PACERONE) 200 MG tablet Take 1 tablet (200 mg) by mouth daily, Disp-90 tablet, R-3, E-Prescribe      atorvastatin (LIPITOR) 40 MG tablet Take 1 tablet (40 mg) by mouth daily, Disp-30 tablet, R-11, E-PrescribeThis prescription was filled on 2/15/2021. Any refills authorized will be placed on file.      folic acid (FOLVITE) 1 MG tablet Take 1 mg by mouth daily At 3 pm, Historical      gabapentin (NEURONTIN) 600 MG tablet Take 1 tablet (600 mg) by mouth 2 times daily, Disp-180 tablet, R-3, E-Prescribe      levothyroxine (SYNTHROID/LEVOTHROID) 75 MCG tablet Take 75 mcg by mouth daily , Historical      losartan (COZAAR) 50 MG tablet Take 1 tablet (50 mg) by mouth daily Hold for SBP <110 mmHg, Disp-90 tablet, R-3, E-PrescribeNote dose increase      melatonin 3 MG tablet 3 mg by Oral or NG Tube route At Bedtime, Historical      mirtazapine (REMERON) 7.5 MG tablet Take 1 tablet (7.5 mg) by mouth At Bedtime, Disp-90 tablet, R-3, E-Prescribe      nystatin (MYCOSTATIN) 992847 UNIT/GM external cream Apply topically 2 times dailyDisp-30 g, [id number]      omeprazole (PRILOSEC) 40 MG DR capsule Take 1 capsule (40 mg) by mouth daily, Disp-30 capsule, R-0, E-Prescribe      rivaroxaban ANTICOAGULANT (XARELTO ANTICOAGULANT) 20 MG TABS tablet Take 1 tablet (20 mg) by mouth daily (with dinner), Disp-90 tablet, R-3, E-Prescribe      vitamin B-12 (CYANOCOBALAMIN) 1000 MCG tablet Take 1,000 mcg by mouth daily, Historical      DESITIN 40 %  paste Apply 1 Application topically as needed, TAO, Historical      hydrocortisone (CORTAID) 1 % external cream Apply 1 applicator topically 2 times daily as needed for itching Historical      loperamide (IMODIUM) 2 MG capsule Take 2 mg by mouth 4 times daily as needed for diarrhea, Historical      !! order for DME Equipment being ordered: walker with 4 wheelsDisp-1 Device, R-0, Local Print      !! order for DME O2 2 LPM continuosDisp-1 Device, R-0, Local Print      !! order for DME Equipment being ordered: knee high compression stockings 20-30 mm compressionDisp-1 Units, R-1, Local Print      polyethylene glycol (MIRALAX) 17 g packet Take 1 packet by mouth daily as needed , Historical      simethicone (MYLICON) 125 MG chewable tablet Take 125 mg by mouth 2 times daily as needed , Historical      triamcinolone (KENALOG) 0.1 % external cream Apply 1 applicator topically 2 times daily as needed for irritation To hand rashHistorical       !! - Potential duplicate medications found. Please discuss with provider.        Unresulted Labs Ordered in the Past 30 Days of this Admission     No orders found from 3/11/2021 to 2021.              TOTAL TIME SPENT:  Greater than 30 minutes spent on this.         VALERIO BENITO MD             D: 2021   T: 2021   MT: ALENA      Name:     LUIS ALBERTO RICHARD   MRN:      [id number]        Account:        [id number]   :      1935           Admit Date:     04/10/2021                                  Discharge Date: 2021      Document: [id number]       cc: Abbie Zacarias DO

## 2021-04-19 PROBLEM — Z79.01 CURRENT USE OF LONG TERM ANTICOAGULATION: Status: ACTIVE | Noted: 2021-01-01

## 2021-04-19 PROBLEM — I82.432 ACUTE DEEP VEIN THROMBOSIS (DVT) OF POPLITEAL VEIN OF LEFT LOWER EXTREMITY (H): Status: ACTIVE | Noted: 2021-01-01

## 2021-04-19 PROBLEM — I82.432 ACUTE DEEP VEIN THROMBOSIS (DVT) OF POPLITEAL VEIN OF LEFT LOWER EXTREMITY (H): Status: RESOLVED | Noted: 2021-01-01 | Resolved: 2021-01-01

## 2021-04-19 NOTE — PROGRESS NOTES
Clinic Care Coordination Contact  Care Coordination Transition Communication    Referral Source: IP Handoff    Clinical Data: Patient was hospitalized at [facility]  from 4/10/21 to 4/16/21 with diagnosis of  heart failure.     Transition to Facility:              [facility]              Contact name and phone number/fax: Isabel is  at [phone]    Plan: RN/SW Care Coordinator will await notification from facility staff informing RN/SW Care Coordinator of patient's discharge plans/needs. RN/SW Care Coordinator will review chart and outreach to facility staff every 4 weeks and as needed.     Hope Adams RN, [facility] RN Coordinator  [facility]   4/19/2021    9:05 AM  [phone]

## 2021-04-19 NOTE — LETTER
4/19/2021        RE: Caitlin Sales  [facility]  [address]  [address]  [place] [address]        [facility]  PRIMARY CARE PROVIDER AND CLINIC: Abbie Zacarias DO, [address] / [place] [address]; Phone: [phone]; Pager: [phone]; Fax: [phone]  Chief Complaint   Patient presents with     Hospital F/U     [facility] 4/10/2021 - 4/16/2021      [facility] Medical Record Number: [id number]  Place of Service where encounter took place: [facility] (TCU) [[id number]]    Caitlin Saels is a 85 year old (1935), admitted to the above facility from  [facility]. Hospital stay 4/10/2021 through 4/16/2021.  HPI:    Brief Summary of Hospital Course: Caitlin Sales has a past medical history of wide complex tachycardia, hyperlipidemia, CAD, diastolic CHF, hypertension, chronic respiratory failure, history of DVT on chronic anticoagulation, history of TIA, R vertebral artery stenosis, neuropathy, GIB, anemia,  obesity, osteoarthritis, chronic back pain, GERD, dementia.  S/he was admitted to the hospital with weakness, tremor from her assisted living facility and found to have acute kidney injury with creat to 1.81 and her diuretics and losartan were held.  She was given IV fluids and developed worsening respiratory failure needing BiPAP. Echo was done 4/12/21 showing EF of 55-60%, tricuspid regurg, mitral regurg, and elevated R ventricular systolic pressure consistent with pulmonary hypertension.  S/he underwent diuresis, creat improved to 1.28 at time of discharge. The hospital stay was long and complex, she has very poor functional baseline status and uses wheelchair for much of her mobility.    Follow up needed:  -Cardiology - review ongoing use of amiodarone due to findings with PFTs.  This is scheduled for 5/18/21 with Dr. Walters  -Pulmonology - planned for 5/12/21 and 5/26/21 with Dr. Adkins    Today:  Caitlin reports that she has chronic all-over pain from arthritis but it is no worse than usual.  She reports no new dyspnea, she thinks she uses chronic oxygen at home, and usually moves around with wheelchair as she is unable to walk very far.  She is not sure why she is in TCU, recalls little of her hospital stay.  She reports no other concerns.     CODE STATUS/ADVANCE DIRECTIVES DISCUSSION: Full Code  CPR/Full code   ALLERGIES: Aspirin, Ace inhibitors, Citalopram, Furosemide, Lisinopril-hydrochlorothiazide, Penicillins, Torsemide, and Tramadol  PAST MEDICAL HISTORY:  has a past medical history of Basal cell carcinoma of face (7/19/2017), History of DVT of lower extremity (09/25/2013), Neuropathy (9/25/2013), TIA (transient ischemic attack) (12/26/2019), and Wide-complex tachycardia (H) (04/28/2020).  PAST SURGICAL HISTORY:   has a past surgical history that includes appendectomy; Cholecystectomy (1967); salpingo oophorectomy,r/l/destiny (Left, 1967); Endometrial sampling (biopsy) (2001); LUMBAR SPINE FUSN,POST INTRBDY (2010); vena cava filter (2010); and REVISE TOTAL HIP REPLACEMENT (Right, 2013).  FAMILY HISTORY: family history includes Abdominal Aortic Aneurysm in her father; Breast Cancer in her daughter; Cancer in her mother; Hypertension in her father; Prostate Cancer in her father.  SOCIAL HISTORY:   reports that she has never smoked. She has never used smokeless tobacco. She reports previous alcohol use. She reports that she does not use drugs.  Patient's living condition: lives in an assisted living facility    Post Discharge Medication Reconciliation Status: discharge medications reconciled and changed, per note/orders  acetaminophen (TYLENOL) 500 MG tablet, Take 2 tablets (1,000 mg) by mouth 3 times daily (Patient taking differently: Take 1,000 mg by mouth 3 times daily )  amiodarone (PACERONE) 200 MG tablet, Take 1 tablet (200 mg) by mouth daily  atorvastatin (LIPITOR) 40 MG tablet, Take 1 tablet (40 mg)  by mouth daily  DESITIN 40 % paste, Apply 1 Application topically as needed  folic acid (FOLVITE) 1 MG tablet, Take 1 mg by mouth daily At 3 pm  furosemide (LASIX) 20 MG tablet, Take 1 tab (20 mg) every other day, alternating with 2 tabs (40 mg) every other day  gabapentin (NEURONTIN) 600 MG tablet, Take 1 tablet (600 mg) by mouth 2 times daily  hydrocortisone (CORTAID) 1 % external cream, Apply 1 applicator topically 2 times daily as needed for itching   levothyroxine (SYNTHROID/LEVOTHROID) 75 MCG tablet, Take 75 mcg by mouth daily   loperamide (IMODIUM) 2 MG capsule, Take 2 mg by mouth 4 times daily as needed for diarrhea  losartan (COZAAR) 50 MG tablet, Take 1 tablet (50 mg) by mouth daily Hold for SBP <110 mmHg  melatonin 3 MG tablet, 3 mg by Oral or NG Tube route At Bedtime  mirtazapine (REMERON) 7.5 MG tablet, Take 1 tablet (7.5 mg) by mouth At Bedtime  nystatin (MYCOSTATIN) 719082 UNIT/GM external cream, Apply topically 2 times daily  omeprazole (PRILOSEC) 40 MG DR capsule, Take 1 capsule (40 mg) by mouth daily  order for DME, Equipment being ordered: walker with 4 wheels  order for DME, O2 2 LPM continuos (Patient taking differently: O2 2 LPM continuous)  order for DME, Equipment being ordered: knee high compression stockings 20-30 mm compression  oxyCODONE-acetaminophen (PERCOCET) 5-325 MG tablet, Take 1 tablet by mouth daily as needed for pain  polyethylene glycol (MIRALAX) 17 g packet, Take 1 packet by mouth daily as needed   rivaroxaban ANTICOAGULANT (XARELTO ANTICOAGULANT) 20 MG TABS tablet, Take 1 tablet (20 mg) by mouth daily (with dinner)  simethicone (MYLICON) 125 MG chewable tablet, Take 125 mg by mouth 2 times daily as needed   triamcinolone (KENALOG) 0.1 % external cream, Apply 1 applicator topically 2 times daily as needed for irritation To hand rash  vitamin B-12 (CYANOCOBALAMIN) 1000 MCG tablet, Take 1,000 mcg by mouth daily    No current facility-administered medications on file prior to  "visit.      ROS:  10 point ROS of systems including Constitutional, Eyes, Respiratory, Cardiovascular, Gastroenterology, Genitourinary, Integumentary, Musculoskeletal, Psychiatric were all negative except for pertinent positives noted in my HPI.    Vitals:  /53   Pulse 64   Temp 98.1  F (36.7  C)   Resp 18   Ht 1.651 m (5' 5\")   Wt 120.2 kg (265 lb 1.6 oz)   SpO2 96%   BMI 44.11 kg/m    Exam:  GENERAL APPEARANCE:  Alert, in no acute distress   HEAD:  Normal, normocephalic, atraumatic  EYE EXAM: normal external eye, conjunctiva, lids, JADYN  NECK EXAM: supple, no JVD  CHEST/RESP:  respiratory effort normal, lung sounds CTA but diminished , no respiratory distress  CV:  Rate reg, rhythm reg, no murmur, no peripheral edema  M/S:   extremities normal, gait abnormal-unable to ambulate, weak, normal muscle tone, and range of motion baseline -decreased R shoulder  SKIN EXAM: flaking peeling skin of bilateral LE, cool to touch, no edema  NEUROLOGIC EXAM: Cranial nerves 2-12 are normal tested and grossly at patient's baseline.  No tremor, gross motor movement at baseline.   PSYCH:  Alert and oriented to self and surroundings, affect pleasant     Lab/Diagnostic data:  Recent labs in [facility] reviewed by me today    Assessment/Plan:  Chronic respiratory failure with hypercapnia (H)  Dependence on supplemental oxygen  Chronic history of oxygen use at home, chronic respiratory failure thought due in part to elevated hemidiaphragm, likely with obesity and CHF as contributing factors.  Required BiPAP in the hospital   -continue oxygen and current orders    Heart failure with preserved ejection fraction, NYHA class II (H)  Coronary artery disease involving native coronary artery of native heart without angina pectoris  On amiodarone therapy  Wide-complex tachycardia (H)  Patient with significant history of heart disease, tachycardia and followed by Cardiology. Her diuretics were resumed during hospital stay, with plans for " "follow up labs.  Labs today reassuring.   -monitor labs 4/26/21     Acute kidney injury (H)  Stage 3a chronic kidney disease  Admitted to the hospital with acute kidney injury, creat to 1.81.  down to 1.28 at hospital discharge, today was stable at 1.23. Baseline creat 0.8-1.0 over last 1 year.    -Avoid nephrotoxic medications  -Renal dosing of medications  -monitor kidney function 4/26/21      Lumbosacral radiculopathy at L5  S/P lumbar spinal fusion  Osteoarthritis of multiple joints, unspecified osteoarthritis type  Chronic right shoulder pain  Long term (current) use of opiate analgesic  Patient with longstanding history of pain due to OA, low back pain, R shoulder pain, and was previously using narcotic pain medications.  Per review of records, she has been successfully weaned off-now requiring only prn and is currently prescribed on lymphedema 1 tab per day prn for pain.  She is also on extra strength tylenol 1000 tid, gabapentin 600 bid (max dose due to other medications).  She reports significant R shoulder pain today  -start voltaren gel for R shoulder pain  -start CA/D supplement for improved bone health-monitor for increased constipation     Morbid obesity (H)  Generalized muscle weakness  Noted Body mass index is 44.11 kg/m ., which is above normal range.  BMI>30 consistent with obesity, BMI>35 with complicating disease process OR BMI>40 is consistent with morbid obesity.  This is clinically significant due to increased nursing cares, use of resources, and potential for complications.     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 Has \"permanent\" IVC filter implanted, has been there since at least 2010.   -therapeutic interchange at Replaced by [facility] from xarelto to apixaban due to insurance considerations  -monitor for new symptoms     History of upper " "gastrointestinal bleeding  Other iron deficiency anemia  Patient with history of GIB, anemia.  Baseline hemoglobin appears to be about 10-11, stable during hospital stay and 10.1 today.  She is on omeprazole 40 mg daily, of note also on anticoagulation so at high risk of complications.   -recheck hemoglobin next week    -monitor stools    Cognitive impairment   Patient with progressive noted cognitive impairment over last months to year.  Has been seen by Neuropsych for cognitive testing, and then will follow up with Neurology to determine next steps.  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 Also noted appearance of mild depression at that time, though she did not endorse feelings of depression.  Referral to Neurology pending due to history of \"jaw jumping\" for years, now with significant tremors of upper and lower extremities which interfere with mobility-worsening over last 6 months.   -recently started on melatonin for sleep, started on remeron 7.5 mg on 4/8/21 (hospitalized 4/10)    Orders:    Start Ca/D 600/400 BID po for hypocalcemia    voltaren gel 1% 2 gm TID top to R shoulder for pain    Labs on 4/26/21 to include BMP, hemoglobin     Total time spent with patient visit at the skilled nursing facility was 38 min including patient visit, review of past records and phone call to patient contact. Greater than 50% of total time spent with counseling and coordinating care due to multiple unstable medical conditions, discussion with daughter re goals of care.     Electronically signed by:  WALT Rich CNP                 Sincerely,        WALT Rich CNP    "

## 2021-04-20 PROBLEM — I50.9 DECOMPENSATED HEART FAILURE (H): Status: ACTIVE | Noted: 2021-01-01

## 2021-04-20 NOTE — ED PROVIDER NOTES
History     Chief Complaint   Patient presents with     Bradycardia     Altered Mental Status     HPI  Caitlin Sales is a 85 year old female, past medical history is significant for long-term anticoagulation, generalized weakness, stage III renal disease, morbid obesity, CHF, chronic respiratory failure with hypercapnia, IVC filter, ASCVD history of wide-complex tachycardia, GI bleed, Alzheimer's disease, TIA, VT, lumbosacral radiculopathy at L5, hypothyroidism, hyperlipidemia, history of recurrent DVT, OA, diverticulosis, GERD, osteoporosis, hypertension, vitreous degeneration, presents to the emergency department as a full code by EMS with concerns of respiratory failure and bradycardia.  History is obtained from EMS as the patient is unresponsive.  The patient is reportedly full code.  She presents with EMS with external pacing on 70 bpm with apparently good capture, poor perfusion however, unresponsive, bag-valve-mask ventilation.  EMS reports that the nursing home noted increased difficulty breathing and low heart rate in the 40s or 50s last night, this morning this progressed heart rate in the 30s and increased difficulties breathing.  EMS placed external pacing with good capture although the patient remained unresponsive, breathing was assisted bag-valve-mask, IV access was difficult and a left tibial IO was placed.  Minimal fluid infused thus far.    Allergies:  Allergies   Allergen Reactions     Aspirin GI Disturbance     Bleeding     Ace Inhibitors Cough     Citalopram Other (See Comments)     Tremors       Furosemide Rash     Lisinopril-Hydrochlorothiazide Cough     Penicillins Unknown     Reaction occurred as a child - unsure      Torsemide Rash     Tramadol Other (See Comments)     Lethargy       Problem List:    Patient Active Problem List    Diagnosis Date Noted     Decompensated heart failure (H) 04/20/2021     Priority: Medium     Current use of long term anticoagulation 04/19/2021      Priority: Medium     Generalized muscle weakness 04/10/2021     Priority: Medium     Acute kidney injury (H) 04/10/2021     Priority: Medium     Labile blood pressure 04/10/2021     Priority: Medium     Chronic kidney disease, stage 3 04/08/2021     Priority: Medium     Morbid obesity (H) 02/16/2021     Priority: Medium     Heart failure with preserved ejection fraction, NYHA class II (H) 06/08/2020     Priority: Medium     Elevated hemidiaphragm 05/22/2020     Priority: Medium     Markedly elevated right hemidiaphragm, 1st noted 12/25/2019       Chronic respiratory failure with hypercapnia (H) 05/21/2020     Priority: Medium     Hypoxemia 05/20/2020     Priority: Medium     Presence of IVC filter 05/01/2020     Priority: Medium     PLACED 2013  Discussed considering removal in 6-12 months, post-COVID.       Coronary artery disease involving native coronary artery of native heart without angina pectoris 05/01/2020     Priority: Medium     Presumed CAD.  Stress test 4/2020 Perfusion defect in the inferolateral wall and septal area seen on stress test.  Patient reports no history of acute MI.       Stenosis of right vertebral artery 05/01/2020     Priority: Medium     Noted on CTA for TIA 4/2020.  Started statin       History of discitis 05/01/2020     Priority: Medium     She , had a spinal fusion done by Dr. Zapata in [place] about 2010 that went very poorly. She ended up having diskitis, infection and needed roughly 12 weeks of IV antibiotics. She had three subsequent surgeries to try to clean the issues up and, unfortunately, had significant scar tissue in her lumbar spine and chronic pain due to this       History of upper gastrointestinal bleeding 05/01/2020     Priority: Medium     On aspirin       Wide-complex tachycardia (H) 04/28/2020     Priority: Medium     [facility] presented 4/2020 with palpitations associated with shortness of breath. Heart rate was noted to be in 165 beats  per minute. She was given adenosine 6 mg followed by 12 mg. Subsequently she was given 150 joules shock. Subsequently patient was given 200 joules of shock. Patient was also given 150 mg bolus of amiodarone followed by drip. She converted to sinus rhythm after amiodarone bolus. Transferred to [facility]. EP was consulted and they felt it was unlikely to be VT         Late onset Alzheimer's disease without behavioral disturbance (H) 02/20/2020     Priority: Medium     Worsening as of 2/2020    Formatting of this note might be different from the original.  Worsening as of 2/2020       History of TIA (transient ischemic attack) and stroke 12/26/2019     Priority: Medium     History of TIAs- scant details available  During hospitalization 4/202:  Stroke code called on 4/23. Head CT negative. Not candidate for tpa or embolectomy. Concern for embolic effect with possible a fib vs flutter and non AC cardioversion at OSH. Bubble study negative. Likely had TIA.  Neurology was involved and she was started on xarelto as above         Long term (current) use of opiate analgesic 09/30/2019     Priority: Medium     Pain Diagnosis back pain with right leg rad pain    Should be seen in the clinic every twelve weeks. Currently on a taper: no.    Plan for flares of chronic pain clinic visit    ED and UC: Opioid medication will not be given in the ER or Urgent Care unless there is a medical emergency unrelated to chronic pain.  Limit to 3 day supply    Refills: Refills will only be given at a scheduled visit.    Formatting of this note might be different from the original.  Pain Diagnosis back pain with right leg rad pain    Should be seen in the clinic every twelve weeks. Currently on a taper: no.    Plan for flares of chronic pain clinic visit    ED and UC: Opioid medication will not be given in the ER or Urgent Care unless there is a medical emergency unrelated to chronic pain.  Limit to 3 day supply    Refills: Refills will only be  "given at a scheduled visit.       Supraventricular tachycardia (H) 01/25/2019     Priority: Medium     HOLTER suggested long runs Vtach.  Cardiac EP [facility] dul feels this is SVT with aberancy, beta blocker dose increased.  Stress testing 4/2019 \"most likely\" normal, EF 80%    Formatting of this note might be different from the original.  HOLTER suggested long runs Vtach.  Cardiac EP eval [facility] dul feels this is SVT with aberancy, beta blocker dose increased.  Stress testing 4/2019 \"most likely\" normal, EF 80%       Lumbosacral radiculopathy at L5 03/15/2017     Priority: Medium     S/P lumbar spinal fusion 03/15/2017     Priority: Medium     L3-S1    Formatting of this note might be different from the original.  L3-S1       Hypothyroidism 02/15/2016     Priority: Medium     CKD (chronic kidney disease) stage 2, GFR 60-89 ml/min 12/18/2015     Priority: Medium     Other iron deficiency anemia 12/18/2015     Priority: Medium     400 venofer 12/2019 (noted to be b12, folate AND b12 def at same time)    Formatting of this note might be different from the original.  400 venofer 12/2019 (noted to be b12, folate AND b12 def at same time)       Hyperlipidemia 12/18/2015     Priority: Medium     Back pain with right-sided radiculopathy 11/20/2015     Priority: Medium     INJECTION Dr Huddleston 3/15/17 modestly helpful.  Fusion [place] 6/2010-- multiple complications, profound scar tissue entrapping right sided nerves-- chronic pain    Formatting of this note might be different from the original.  INJECTION Dr Huddleston 3/15/17 modestly helpful.  Fusion [place] 6/2010-- multiple complications, profound scar tissue entrapping right sided nerves-- chronic pain       Shoulder pain 02/16/2015     Priority: Medium     Severe OA per xr 2015.  Not surg candidate.  Left shoulder inj 2/16/15.  Right shoulder inj 2/16/15, 9/6/2016, 12/7/18, 1/3/2020    Formatting of this note might be different from the original.  Severe OA per " xr 2015.  Not surg candidate.  Left shoulder inj 2/16/15.  Right shoulder inj 2/16/15, 9/6/2016, 12/7/18, 1/3/2020       History of DVT (deep vein thrombosis) 09/25/2013     Priority: Medium     Initial DVT following back surgery in 2013, IVC filter in place permanently.   Acute left lower extremity DVT 4/2020o and now on indefinite anticoagulation with rivaroxaban. ultrasound 4/2020 - Occlusive left mid to distal femoral vein involving the popliteal vein and nonocclusive clot in the left common femoral and proximal femoral  Needs anticoagulation indefinintely.     Formatting of this note is different from the original.  2/13/2014  History of DVT. No evidence of recurrence at this time. Encouraged physical activity. I am hesitant to change meds given the possibility of hematoma in the quadriceps muscle at this point in time.     Overview Addendum 6/3/2013  3:59 PM by Sukhdeep Hdz MD   STopped coumadin 2012.  Left leg post back surgery [place] late June 2010--with permanent filter, long term AC, sig lymphedema.  Continue coumadin until edema in left leg resolved.  US 8/2011--clot resolve, +vessel dz persists.  This is first true DVT       Neuropathy 09/25/2013     Priority: Medium     S/P hip replacement 09/25/2013     Priority: Medium     Osteoarthrosis, hip 05/02/2012     Priority: Medium     Right hip. Significant OA changes on xrays.  Failed conservative rx, 9/24/13 with Right CIHLO/misterling    Formatting of this note might be different from the original.  Right hip. Significant OA changes on xrays.  Failed conservative rx, 9/24/13 with Right CHILO/misterling       Personal history of colonic polyps 02/06/2006     Priority: Medium     8/05=1 tubulovillous polyp, recheck 9/07=1polyp    Formatting of this note might be different from the original.  8/05=1 tubulovillous polyp, recheck 9/07=1polyp=benign/5 yr fu  IMO Update 10/11       Diverticulosis of colon 08/05/2005     Priority: Medium     Per  colonoscopy    Formatting of this note might be different from the original.  Per colonoscopy  IMO Update 10/11       Esophageal reflux 08/05/2004     Priority: Medium     Osteoporosis 08/05/2004     Priority: Medium     Dexa 10/06 normal, recheck 2 yrs.   Dexa 2/08 good. 1/2011 mild worsening with frax score 10%major/0.8%other. REPEAT 2012^^^^^^^^^^^^^^^^^^^^^    Formatting of this note might be different from the original.   Dexa 10/06 normal, recheck 2 yrs.   Dexa 2/08 good. 1/2011 mild worsening with frax score 10%major/0.8%other. REPEAT 2012^^^^^^^^^^^^^^^^^^^^^       Primary osteoarthritis involving multiple joints 08/05/2004     Priority: Medium     Complex/multiple back surgeries [place] summer 2010--ultimately with fusion.  Right knee-hinkle, rooster comb injections-x5 fall 2008.  Right shoulder cortisone 1/08, 10/08, 1/3/20.  Left hip injection 5/2011. Right hip injected [facility] 1/20/11, 3/15/11, also 9/2011 by [facility] pain clinic (all trochanter bursa), -1/18/12 (r hip again).  Left knee 6/25/14, 9/17/14, 4/24/15.    Formatting of this note might be different from the original.  Complex/multiple back surgeries [place] summer 2010--ultimately with fusion.  Right knee-hinkle, rooster comb injections-x5 fall 2008.  Right shoulder cortisone 1/08, 10/08, 1/3/20.  Left hip injection 5/2011. Right hip injected [facility] 1/20/11, 3/15/11, also 9/2011 by [facility] pain clinic (all trochanter bursa), -1/18/12 (r hip again).  Left knee 6/25/14, 9/17/14, 4/24/15.       Essential hypertension 08/05/2004     Priority: Medium     Echo 02/2016-- EF 55-60%, valves ok, right heart ok    Formatting of this note might be different from the original.  Echo 02/2016-- EF 55-60%, valves ok, right heart ok       Vitreous degeneration 03/14/2002     Priority: Medium     HX of bilateral vitreous detachment    Formatting of this note might be different from the original.  HX of bilateral vitreous detachment       Routine  general medical examination at a health care facility 06/15/2001     Priority: Medium     Formatting of this note might be different from the original.  Pap/mammo via [facility]  IMO Update 10/11       Class 1 obesity due to excess calories with serious comorbidity in adult 05/21/2020     Priority: Low        Past Medical History:    Past Medical History:   Diagnosis Date     Basal cell carcinoma of face 7/19/2017     History of DVT of lower extremity 09/25/2013     Neuropathy 9/25/2013     TIA (transient ischemic attack) 12/26/2019     Wide-complex tachycardia (H) 04/28/2020       Past Surgical History:    Past Surgical History:   Procedure Laterality Date     APPENDECTOMY      teenager     AS LUMBAR SPINE FUSN,POST INTRBDY  2010     AS REVISE TOTAL HIP REPLACEMENT Right 2013     CHOLECYSTECTOMY  1967     ENDOMETRIAL SAMPLING (BIOPSY)  2001     SALPINGO OOPHORECTOMY,R/L/DI Left 1967     VENA CAVA FILTER  2010    Permanent        Family History:    Family History   Problem Relation Age of Onset     Cancer Mother         multiple myeloma     Abdominal Aortic Aneurysm Father      Hypertension Father      Prostate Cancer Father      Breast Cancer Daughter        Social History:  Marital Status:   [5]  Social History     Tobacco Use     Smoking status: Never Smoker     Smokeless tobacco: Never Used   Substance Use Topics     Alcohol use: Not Currently     Frequency: Never     Drug use: Never        Medications:    No current outpatient medications on file.        Review of Systems   Unable to perform ROS: Patient unresponsive       Physical Exam   BP: 108/89  Pulse: 61  Resp: 27  SpO2: (!) 84 %      Physical Exam  Vitals signs and nursing note reviewed.   Constitutional:       Appearance: She is obese. She is ill-appearing.      Comments: Unresponsive, pupils are midsized and fixed.  Bag-valve-mask ventilation is in place adequate chest rise.  Good seal   HENT:      Head: Normocephalic and atraumatic.       Mouth/Throat:      Mouth: Mucous membranes are moist.      Pharynx: Oropharynx is clear.   Eyes:      Extraocular Movements: Extraocular movements intact.      Pupils: Pupils are equal, round, and reactive to light.   Neck:      Musculoskeletal: Neck supple.   Cardiovascular:      Rate and Rhythm: Normal rate and regular rhythm.      Comments: Auscultated at 70 bpm regular.  TCP in place.  Bedside ultrasound shows weak squeeze at paced 70 bpm  Pulmonary:      Comments: No spontaneous respiratory effort.  Equal breath sounds bilaterally with bag-valve-mask ventilation  Abdominal:      Comments: Hugely obese abdomen, absent bowel sounds.  Nondistended.   Skin:     Capillary Refill: Capillary refill takes more than 3 seconds.      Coloration: Skin is cyanotic and pale.      Comments: Mottled skin peripherally, cool, poorly perfused.  There is a left tibial IO that is functioning appropriately.   Neurological:      Mental Status: She is unresponsive.         ED Course        Procedures               EKG Interpretation:      Interpreted by Kishan Santos MD  Time reviewed: 6:17 % paced 70 bpm        Critical Care time:  was 193 minutes for this patient excluding procedures.               Results for orders placed or performed during the hospital encounter of 04/20/21 (from the past 24 hour(s))   Troponin I   Result Value Ref Range    Troponin I ES 0.030 0.000 - 0.045 ug/L   CBC with platelets differential   Result Value Ref Range    WBC 8.7 4.0 - 11.0 10e9/L    RBC Count 3.09 (L) 3.8 - 5.2 10e12/L    Hemoglobin 8.9 (L) 11.7 - 15.7 g/dL    Hematocrit 33.0 (L) 35.0 - 47.0 %     (H) 78 - 100 fl    MCH 28.8 26.5 - 33.0 pg    MCHC 27.0 (L) 31.5 - 36.5 g/dL    RDW 15.1 (H) 10.0 - 15.0 %    Platelet Count 223 150 - 450 10e9/L    Diff Method Automated Method     % Neutrophils 65.3 %    % Lymphocytes 27.5 %    % Monocytes 5.3 %    % Eosinophils 0.8 %    % Basophils 0.2 %    % Immature Granulocytes 0.9 %     Nucleated RBCs 0 0 /100    Absolute Neutrophil 5.7 1.6 - 8.3 10e9/L    Absolute Lymphocytes 2.4 0.8 - 5.3 10e9/L    Absolute Monocytes 0.5 0.0 - 1.3 10e9/L    Absolute Eosinophils 0.1 0.0 - 0.7 10e9/L    Absolute Basophils 0.0 0.0 - 0.2 10e9/L    Abs Immature Granulocytes 0.1 0 - 0.4 10e9/L    Absolute Nucleated RBC 0.0    Comprehensive metabolic panel   Result Value Ref Range    Sodium 143 133 - 144 mmol/L    Potassium 3.8 3.4 - 5.3 mmol/L    Chloride 106 94 - 109 mmol/L    Carbon Dioxide 38 (H) 20 - 32 mmol/L    Anion Gap <1 (L) 3 - 14 mmol/L    Glucose 183 (H) 70 - 99 mg/dL    Urea Nitrogen 39 (H) 7 - 30 mg/dL    Creatinine 1.14 (H) 0.52 - 1.04 mg/dL    GFR Estimate 44 (L) >60 mL/min/[1.73_m2]    GFR Estimate If Black 51 (L) >60 mL/min/[1.73_m2]    Calcium 7.1 (L) 8.5 - 10.1 mg/dL    Bilirubin Total 0.2 0.2 - 1.3 mg/dL    Albumin 2.5 (L) 3.4 - 5.0 g/dL    Protein Total 5.3 (L) 6.8 - 8.8 g/dL    Alkaline Phosphatase 74 40 - 150 U/L    ALT 37 0 - 50 U/L    AST 34 0 - 45 U/L   Acetaminophen level   Result Value Ref Range    Acetaminophen Level 8 mg/L   Salicylate level   Result Value Ref Range    Salicylate Level <2 mg/dL   Lactic acid whole blood   Result Value Ref Range    Lactic Acid 1.1 0.7 - 2.0 mmol/L   Blood gas venous   Result Value Ref Range    Ph Venous 7.19 (LL) 7.32 - 7.43 pH    PCO2 Venous 103 (HH) 40 - 50 mm Hg    PO2 Venous 21 (L) 25 - 47 mm Hg    Bicarbonate Venous 39 (H) 21 - 28 mmol/L    Base Excess Venous 9.3 mmol/L    FIO2 95%    UA reflex to Microscopic   Result Value Ref Range    Color Urine Yellow     Appearance Urine Clear     Glucose Urine Negative NEG^Negative mg/dL    Bilirubin Urine Negative NEG^Negative    Ketones Urine Negative NEG^Negative mg/dL    Specific Gravity Urine 1.017 1.003 - 1.035    Blood Urine Negative NEG^Negative    pH Urine 5.0 5.0 - 7.0 pH    Protein Albumin Urine Negative NEG^Negative mg/dL    Urobilinogen mg/dL 0.0 0.0 - 2.0 mg/dL    Nitrite Urine Positive (A)  "NEG^Negative    Leukocyte Esterase Urine Negative NEG^Negative    Source Catheterized Urine     RBC Urine 1 0 - 2 /HPF    WBC Urine 4 0 - 5 /HPF    Bacteria Urine Few (A) NEG^Negative /HPF    Squamous Epithelial /HPF Urine 1 0 - 1 /HPF    Mucous Urine Present (A) NEG^Negative /LPF   XR Chest Port 1 View    Narrative    CHEST PORTABLE ONE VIEW April 20, 2021 6:56 AM     HISTORY: Post intubation.    COMPARISON: Chest x-ray on 4/12/2021.      Impression    IMPRESSION: Single portable AP view of the chest was obtained.  Endotracheal tube tip projects over lower thoracic trachea  approximately 2 cm from the kolby. Right IJ Port-A-Cath distal tip  projects over mid SVC. Stable mild enlargement of the cardiac  silhouette. Mild bibasilar pulmonary opacities, likely atelectasis. No  significant pleural effusion or pneumothorax.    COLIN JUSTICE MD   Asymptomatic SARS-CoV-2 COVID-19 Virus (Coronavirus) by PCR    Specimen: Nasopharyngeal   Result Value Ref Range    SARS-CoV-2 Virus Specimen Source Nasopharyngeal     SARS-CoV-2 PCR Result NEGATIVE     SARS-CoV-2 PCR Comment (Note)    Blood gas venous   Result Value Ref Range    Ph Venous 7.20 (L) 7.32 - 7.43 pH    PCO2 Venous 96 (HH) 40 - 50 mm Hg    PO2 Venous 35 25 - 47 mm Hg    Bicarbonate Venous 37 (H) 21 - 28 mmol/L    Base Excess Venous 7.1 mmol/L    FIO2 95%    [facility] Procedure Note          Intubation:     Date/Time: 7:46 AM  Performed by: Ritchie June MD    The procedure was performed in an emergent situation.  Risks and benefits: risks, benefits and alternatives were discussed.  Patient identity confirmed: verbally with patient and arm band  Time out: Immediately prior to procedure a \"time out\" was called to verify the correct patient, procedure, equipment, support staff and site/side marked as required.    Indication: Acute respiratory failure.    Intubation method: Glidescope  Patient status: Unconscious  Preoxygenation: Mask  Pretreatment " medications: None  Sedatives: Midazolam (Versed)  Paralytic: none  Laryngoscope size: Mac 4  Tube size: 8.0 cuffed with cuff inflated after placement  Number of attempts: 1  Cricoid pressure: yes  Cords visualized: yes  Post-procedure assessment: Breath sounds equal bilaterally with chest rise and absent over the epigastrium, Chest x-ray interpreted by me demonstrating endotracheal tube in appropriate position and CO2 detector.  ETT to teeth: 24 cm  Tube secured with: ETT hayward    Patient tolerated the procedure well with no immediate complications.  Complications:  None    After the patient had been intubated and x-ray obtained the tip of the endotracheal tube was visualized just at the kolby and therefore it was withdrawn to 22 cm at the teeth by RT at my direction.    Procedure request by: Danielle   Indication: No peripheral access available after multiple attempts by nursing staff.  Functional left IO.  Hypotension and need for pressors.  Consent:   Ultrasound guided Right Internal Jugular Approach   Patient placed in trendelenburg position.   [place] precautions including: gown, mask, hair cap, gloves used   Prior to placing sterile drape ultrasound used to confirm location and identification by compressing right IJ.Carotid artery identified as well.   Skin prepped with chlorhexidine.   1 % lidocaine infiltrated SQ for local anesthetic. - 3 -5 cc with 27 gauge needle.   Sterile drapes applied over the patient .   Sterile condom/ drape placed over the US transducer.   Central line kit opened and all necessary items accounted for.   Triple line was flushed with sterile saline.   Under ultrasound guidance the right IJ was entered with introducer needle. Blood flash and draw back noted.   Syringe removed and Selinger wire passed through needle. No resistance encountered. Monitor used to look for ectopy.   Needle removed and skin incision with scalpel blade made -2mm- at point of intersection of skin and wire.    Dilator passed over the Selinger wire and introduced into vessel.   Dilator removed and central line placed over the guide wire. Depth of insertion determined by pre-procedure measurement.   Guide wire was held at its end to maintain stable position and then removed.   Central line re- flushed and capped.   Sutured in place with 3-0 silk suture.   Pressure applied until no site bleeding noted.   Chest Xray portable view obtained: position confirmed - and repositioned if needed.   No identified pneumothorax noted.   Drapes and sharps disposed of in proper fashion.   Nurse assigned to re-flush lines and attach to appropriate cardiac monitoring.   Patient tolerated procedure well.      Medications   midazolam (VERSED) injection 2 mg (2 mg Intravenous Given 4/20/21 0718)   midazolam (VERSED) injection 2 mg (2 mg Intravenous Given 4/20/21 0800)   fentaNYL (PF) (SUBLIMAZE) injection 50 mcg (50 mcg Intravenous Given 4/20/21 0805)   fentaNYL (PF) (SUBLIMAZE) injection 50 mcg (50 mcg Intravenous Given 4/20/21 0842)     There is tight bed availability at the time of this patient's presentation.  I do not have an acceptable bed at my facility here.  The [facility] was full and unable to accept the patient.  [facility] intensivist Rio Garcia MD felt that they could likely accept the patient at [facility] to the ICU provided the cardiology was available for probable implantation of pacemaker.  I spoke with Dr. Mcrae for cardiology who confirmed that they would be able to do this procedure and they therefore with bed control agreed to take the patient as a direct admission to the ICU intubated, on pressors, and paced.      Assessments & Plan (with Medical Decision Making)   85-year-old female has medical history reviewed as above who presents by EMS with concerns of bradycardia, respiratory failure, loss of consciousness as described in the HPI and documented with respect to multiple abnormalities on exam.   Patient does not know EMS give history consistent with possible infection, I suspect that this may be a primary cardiac event of some type with the patient having severe symptomatic bradycardia and loss of consciousness and respiratory failure.  Patient was resuscitated consistent with her wishes, intubated, central line placement for pressor support with TCP having good capture and evidence on ultrasound of consistent cardiac squeeze, I could not get very good images given the patient's morbid obesity.  During her time in the emergency department chest x-ray was obtained which confirmed basement of the endotracheal tube just proximal to the kolby and it was retracted 2 cm to 22 cm at the teeth.  Central line appears to be in the SVC and is acceptable position for infusion of pressors and other required medications at this time.  During the time the emergency department the patient's blood pressure improved with a combination of fluids which were stopped at 2 L total volume given her history for congestive heart failure, and pressors.  She ultimately was waking up and required sedation given the intubation and ventilation as well as pacing.  Sedation and pain medication both given given her situation with the transcutaneous pacing and the attendant discomfort that this would imply.  We ultimately were able to secure a bed for the patient at [facility] with etiology being able to place a permanent pacer if necessary, direct admission to the ICU there.        I have reviewed the nursing notes.    I have reviewed the findings, diagnosis, plan and need for follow up with the patient.       Discharge Medication List as of 4/20/2021  9:15 AM          Final diagnoses:   Bradycardia   Acute respiratory failure, unspecified whether with hypoxia or hypercapnia (H)   Hypotension, unspecified hypotension type       4/20/2021   [facility] EMERGENCY DEPT     Kishan Santos MD  04/20/21  [id number]

## 2021-04-20 NOTE — ED TRIAGE NOTES
Pt arrives via EMS from [facility]. Reportedly pt had episodes last night starting at 2200 of bradycardia and hypoxia. TCU staff tried correcting with O2 via NC. When they check on her this AM around 0500 she had a HR 30s-40s with O2 sat's 70%. EMS applied O2 @15L non-re breather to bring her up to the mid 90s and externally paced her to a rate of 70. Tibial IO placed in LLE. 1 mg versed, 100 mg fentanyl and 4 of zofran given prior to arrival. EMS reported that the pt was alert and responsive in the garage when they arrived. Pt was dusky and unresponsive by the time she reached room 1.

## 2021-04-20 NOTE — PROGRESS NOTES
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  She was intubated in ED, CVC placed and pressors started but still required external pacing to maintain hemodynamics. Patient accepted at [facility] for admission, conferred with Cardiology plan for urgent transvenous pacing after evaluation on arrival.       Rio Garcia MD

## 2021-04-20 NOTE — H&P
ICU Attending Note    85 F has a past medical history of wide complex tachycardia, hyperlipidemia, CAD, diastolic CHF, hypertension, chronic respiratory failure, history of DVT on chronic anticoagulation, history of TIA, R vertebral artery stenosis, neuropathy, GIB, anemia,  obesity, osteoarthritis, chronic back pain, GERD, dementia recent hospitalization 4/10-4/16 presented back from  from nursing home with decrease LOC and hypoxemia ultimately requiring external pacing en route to ED.  She was intubated in ED, CVC placed and pressors started but still required external pacing to maintain hemodynamics.     On arrival to [facility] - pt tremulous on arrival, cool extremities.  Per EMS - difficult to sedate prior to transfer and during intubation, described sats 40-70% on RA prior to ED (unknown length of time with hypoxemia). Synchronous with vent paced at 70 externally, vasopressors running but labile. Pink frothy secretions from ETT. COVID neg from outside ED       IMPRESSION  1. Cardiogenic shock - bradycardia - ? Met abolic derangement vs med induced vs progressive disease  2. Acute hypoxemic respiratory failure suspect pulm edema  3.  ?LORI  4. Symptomatic bradycardia   5. HEfPEF , decompensated  6. CAD  7. Hx of wide complex tachycardia on amdiodaron  8. Metabolic vs hypoxic Encephalopathy  9. Acute pain - 2/2 externally pacing  10 . Morbid obesity, complicating cares  11. Hx of dvt with IVC filter and long term AC    PLAN  - stat admit labs  - CXR  - fentanyl gtt for pain  - versed infusion for sedation  - stat cards consult for eval for pacer placement/evaluation  - continue levophed goal MAP >65  - serial troponin  - pending procedure hold AC      45 min CCT excluding procedures.       Addendum  Case discussed with cardiology at bedside - ECHO RV overload, frothy sputum  Clinical picture c/w decompensated heart failure. In discussion with daughter at bedside - pt on O2 but notes frequently comes off and suspected  fell off with resultant hypoxemia. Suspect this as precipitant particularly as now rate improved with improved oxygenation and external pacing off.  Will start diuresis (lasix 60) and follow. Hold amiodarone at present given bradycardia. Check TSH     Daughter updated at bedside.     Additional 40 min at bedside       Rio Garcia MD

## 2021-04-20 NOTE — PHARMACY-ADMISSION MEDICATION HISTORY
Pharmacy Medication History  Admission medication history interview status for the 4/20/2021  admission is complete. See EPIC admission navigator for prior to admission medications     Location of Interview: Outside patient room but on unit  Medication history sources: [facility] ([place]    Significant changes made to the medication list:  Added diclofenac    In the past week, patient estimated taking medication this percent of the time: greater than 90%    Additional medication history information:   none    Medication reconciliation completed by provider prior to medication history? No    Time spent in this activity: 15 minutes    Prior to Admission medications    Medication Sig Last Dose Taking? Auth Provider   acetaminophen (TYLENOL) 500 MG tablet Take 2 tablets (1,000 mg) by mouth 3 times daily 4/19/2021 at pm Yes Abbie Zacarias DO   amiodarone (PACERONE) 200 MG tablet Take 1 tablet (200 mg) by mouth daily 4/19/2021 at am Yes Maris Elizabeth PA-C   apixaban ANTICOAGULANT (ELIQUIS) 5 MG tablet Take 5 mg by mouth 2 times daily 4/19/2021 at pm Yes Reported, Patient   atorvastatin (LIPITOR) 40 MG tablet Take 1 tablet (40 mg) by mouth daily 4/19/2021 at am Yes Abbie Zacarias DO   DESITIN 40 % paste Apply 1 Application topically as needed prn Yes Reported, Patient   folic acid (FOLVITE) 1 MG tablet Take 1 mg by mouth daily At 3 pm 4/19/2021 at 1500 Yes Abbie Zacarias DO   furosemide (LASIX) 20 MG tablet Take 1 tab (20 mg) every other day, alternating with 2 tabs (40 mg) every other day 4/19/2021 at am 20mg Yes Carmelo Medina MD   gabapentin (NEURONTIN) 600 MG tablet Take 1 tablet (600 mg) by mouth 2 times daily 4/19/2021 at pm Yes Abbie Zacarias DO   hydrocortisone (CORTAID) 1 % external cream Apply 1 applicator topically 2 times daily as needed for itching  prn Yes Reported, Patient   levothyroxine (SYNTHROID/LEVOTHROID) 75 MCG tablet Take 75 mcg by mouth daily   4/19/2021 at am Yes Abbie Zacarias DO   loperamide (IMODIUM) 2 MG capsule Take 2 mg by mouth 4 times daily as needed for diarrhea prn Yes Reported, Patient   losartan (COZAAR) 50 MG tablet Take 1 tablet (50 mg) by mouth daily Hold for SBP <110 mmHg 4/19/2021 at Unknown time Yes Maris Elizabeth PA-C   melatonin 3 MG tablet Take 3 mg by mouth At Bedtime 4/19/2021 at hs Yes Reported, Patient   mirtazapine (REMERON) 7.5 MG tablet Take 1 tablet (7.5 mg) by mouth At Bedtime 4/19/2021 at hs Yes Abbie Zacarias DO   nystatin (MYCOSTATIN) 536846 UNIT/GM external cream Apply topically 2 times daily 4/19/2021 at pm Yes Abbie Zacarias DO   omeprazole (PRILOSEC) 20 MG DR capsule Take 40 mg by mouth daily 4/19/2021 at am Yes Unknown, Entered By History   oxyCODONE-acetaminophen (PERCOCET) 5-325 MG tablet Take 1 tablet by mouth daily as needed for pain prn Yes Kishan Hatfield MD   polyethylene glycol (MIRALAX) 17 g packet Take 1 packet by mouth daily as needed  prn Yes Reported, Patient   simethicone (MYLICON) 125 MG chewable tablet Take 125 mg by mouth 2 times daily as needed  prn Yes Reported, Patient   triamcinolone (KENALOG) 0.1 % external cream Apply 1 applicator topically 2 times daily as needed for irritation To hand rash prn Yes Reported, Patient   vitamin B-12 (CYANOCOBALAMIN) 1000 MCG tablet Take 1,000 mcg by mouth daily 4/19/2021 at am Yes Reported, Patient   diclofenac (VOLTAREN) 1 % topical gel Apply topically 3 times daily Apply to right shoulder NEW  Unknown, Entered By History   order for DME Equipment being ordered: walker with 4 wheels   Treasure Waite APRN CNP   order for DME O2 2 LPM continuos  Patient taking differently: O2 2 LPM continuous   Lawrence Iyer MD   order for DME Equipment being ordered: knee high compression stockings 20-30 mm compression   Abbie Zacarias DO       The information provided in this note is only as accurate as the sources available at the  time of update(s)

## 2021-04-20 NOTE — ED NOTES
Pt daughter Jade updated via phone as she was contacted from the TCU. States to call with updates and she will relay to other family members. Phone number [phone].

## 2021-04-20 NOTE — ED NOTES
DATE:  4/20/2021   TIME OF RECEIPT FROM LAB:  1938  LAB TEST:  Ph and PCO2  LAB VALUE:  7.19 and 103  RESULTS GIVEN WITH READ-BACK TO (PROVIDER):  Dr Santos  TIME LAB VALUE REPORTED TO PROVIDER:   7:43 AM

## 2021-04-20 NOTE — ED NOTES
6:03 AM 1 mg Epi  6:08 AM 1 mg Epi   6:17 AM 2 mg Versed   6:19 AM 0.5 mg Atropine   6:20 AM Pt intubated with an 8 fr tube 24 cm at the lip. Vent 95% O2  6:27 AM 20 mg Propofol   6:28  mg Fentanyl   6:30 AM Lindesy placed. Urine returned. Specimen sent.   6:46 AM Versed 2 mg   6:50 AM Central line placed   6:51 AM Norepi gtt 0.05   6:59 AM Norepi gtt 0.08  7:08 AM Norepi gtt 0.10  7:12 AM Norepi gtt 0.12  7:25 AM OG 43 @the lip  7:28 AM Norepi gtt 0.10  7:31 AM Propofol 5 mg/kg

## 2021-04-20 NOTE — ED NOTES
Called EMS dispatch for Code 3 ETA.  Fransisco at [facility] states that [facility] is on the way from [place].

## 2021-04-21 NOTE — PROGRESS NOTES
[facility]    Cardiology Progress Note    Date of Service (when I saw the patient): 04/21/2021            Assessment and Plan:     ASSESSMENT:  1.  CHFpEF:  With exacerbation, evidence for volume overload on exam.  Echocardiogram with elevated pulmonary pressures and RV volume overload.    Dry weight appears to be at least 213 pounds or less   2.  .  Acute on chronic hypoxic/hypercapnic respiratory failure:  Uses 2L of O2 at baseline.  Reportedly frequently has nasal cannula not in place.   Acute worsening of hypoxia in the setting of volume overload  3.   SSS/Bradycardia:  Resolved with correction of acidosis and hypoxia, recurred overnight with pacer back up rate set to 30, now back in sinus rhythm  4.  Hx of wide complex tachycardia:  Felt to be SVT with aberrancy.    Has been maintained on amiodarone.       RECOMMENDATIONS:  1.  Continue IV diuresis; titrate lasix as needed to goal 1-2L net negative daily.   2.  Hold amiodarone given bradycardia.  Keep external pacer pads in place with back up rate of 30 bpm per EP.   No acute indication for temporary or permanent pacemaker.  May ultimately benefit from pacemaker implantation for SSS per EP, but certainly not primary issue now.    3.  Cardiology will continue to follow to assist with diuresis.     Bee Bah MD [facility]    I personally examined and evaluated the patient today.  Caitlin Sales was in (or remains in) critical condition today due to bradycardia and volume overload.  The key decisions made today include EP consult and diuresis.   spent a total of 30 minutes providing critical care services at the bedside, evaluating the patient, directing care and reviewing laboratory values and radiologic reports.  Bee Bah MD         Interval History:     Required several hours of pacing overnight       Medications:       chlorhexidine  15 mL Mouth/Throat Q12H     furosemide  40 mg Intravenous Q12H      insulin aspart  1-6 Units Subcutaneous Q4H     levothyroxine  75 mcg Oral or Feeding Tube QAM AC     pantoprazole (PROTONIX) IV  40 mg Intravenous Q24H            Physical Exam:   Blood pressure 101/46, pulse 51, temperature 97.9  F (36.6  C), resp. rate 22, weight 104.1 kg (229 lb 8 oz), SpO2 97 %, not currently breastfeeding.  Vitals:    21 0445   Weight: 104.1 kg (229 lb 8 oz)       Intake/Output Summary (Last 24 hours) at 2021 1338  Last data filed at 2021 0600  Gross per 24 hour   Intake 393.48 ml   Output 4050 ml   Net -3656.52 ml           Vital Sign Ranges  Temperature Temp  Av.6  F (37.6  C)  Min: 97.7  F (36.5  C)  Max: 102.7  F (39.3  C)   Blood pressure Systolic (24hrs), Av , Min:77 , Max:196        Diastolic (24hrs), Av, Min:25, Max:172      Pulse Pulse  Av.7  Min: 44  Max: 71   Respirations Resp  Av.4  Min: 22  Max: 123   Pulse oximetry SpO2  Av.5 %  Min: 91 %  Max: 100 %         EXAM:    Constitutional:    Intubated and sedated    Skin:    normal    Head:    Normocephalic, atramatic    Eyes:    pupils equal, round and reactive to light, extra ocular muscles intact, sclera clear, conjunctiva normal    Neck:    JVP    Lungs:    CTAB RRR no m/r/g   Cardiovascular:    S1, S2 no JVD    Abdomen:    normal bowel sounds    Extremities and Back:    no cyanosis or clubbing and No edema    Neurological:    No gross or focal neurologic abnormalities               Data:     Recent Labs   Lab Test 21  0500 21  1355 20  1705 20  1705 20  0000 20   WBC 13.2* 21.1*   < > 7.7   < >  --    HGB 8.9* 9.9*   < > 9.8*   < >  --    MCV 97 101*   < > 104*   < >  --     263   < > 206   < >  --    INR  --   --   --  1.37*  --  2.0*    < > = values in this interval not displayed.      Recent Labs   Lab Test 21  0500 21  2100    143   POTASSIUM 3.6 3.6   CHLORIDE 105 104   BUN 30 34*   CR 1.10* 1.14*     Recent Labs   Lab  04/21/21  0500 04/20/21  2100 04/20/21  1030 04/20/21  0640   GLC 89 84 103* 183*     Recent Labs   Lab Test 04/21/21  0500 04/20/21  1030   ALT 34 46   AST 39 40     Troponin I ES   Date Value Ref Range Status   04/20/2021 0.074 (H) 0.000 - 0.045 ug/L Final     Comment:     The 99th percentile for upper reference range is 0.045 ug/L.  Troponin values   in the range of 0.045 - 0.120 ug/L may be associated with risks of adverse   clinical events.     04/20/2021 0.030 0.000 - 0.045 ug/L Final     Comment:     The 99th percentile for upper reference range is 0.045 ug/L.  Troponin values   in the range of 0.045 - 0.120 ug/L may be associated with risks of adverse   clinical events.     04/10/2021 <0.015 0.000 - 0.045 ug/L Final     Comment:     The 99th percentile for upper reference range is 0.045 ug/L.  Troponin values   in the range of 0.045 - 0.120 ug/L may be associated with risks of adverse   clinical events.     05/20/2020 <0.015 0.000 - 0.045 ug/L Final     Comment:     The 99th percentile for upper reference range is 0.045 ug/L.  Troponin values   in the range of 0.045 - 0.120 ug/L may be associated with risks of adverse   clinical events.           Recent Labs   Lab Test 04/20/21 2100 04/11/21  0416   MAG 1.9 2.2       No results found for: CHOL  No results found for: HDL  No results found for: LDL  No results found for: TRIG  No results found for: CHOLHDLRATIO       TSH   Date Value Ref Range Status   04/20/2021 3.01 0.40 - 4.00 mU/L Final           Bee Bah MD, [facility]  Cardiology

## 2021-04-21 NOTE — CONSULTS
CLINICAL NUTRITION SERVICES  -  ASSESSMENT NOTE      Recommendations Ordered by Registered Dietitian (RD):   Initiate TwoCal HN at 10 ml/hr providing 480 kcals , 20 gm protein, 53 gm CHO, 1 gm fiber and 168 ml free water.  FWF: 30 ml q 4 hours  Certavite Daily    Future goal when appropriate to advance:   TwoCal HN @ 30 mL/hr (720 mL/day) to provide 1440 kcals (15 kcal/kg/day), 60 g PRO, 504 mL H2O, 158 g CHO and 4 g Fiber daily.  Prosource 1 packet TID - 120 kcals, 33 g Pro  Total Provisions: 1560 kcals (16 kcal/kg) and 93 gm protein (1.6 g/kg)   Malnutrition:   % Weight Loss:  None noted  % Intake:  Decreased intake does not meet criteria for malnutrition - NPO x 2 days  Subcutaneous Fat Loss: Deferred, patient busy with other provider   Muscle Loss: Deferred, patient busy with other provider   Fluid Retention:  None noted    Malnutrition Diagnosis: Unable to assess at this time without NFPE      REASON FOR ASSESSMENT  Caitlin Sales is a 85 year old female seen by Registered Dietitian for Provider Order - Registered Dietitian to Assess and Order TF per Medical Nutrition Therapy Protocol    NUTRITION HISTORY  - Information obtained from chart, pt intubated  Pt from assisted living facility, recent decline in mobility due to oxygen needs    PMH: wide complex tachycardia, hyperlipidemia, CAD, diastolic CHF, hypertension, chronic respiratory failure, history of DVT on chronic anticoagulation, history of TIA, R vertebral artery stenosis, neuropathy, GIB, anemia,  obesity, osteoarthritis, chronic back pain, GERD, dementia recent hospitalization 4/10-4/16     CURRENT NUTRITION ORDERS  Diet Order:     NPO x2 days    Current Intake/Tolerance:  4/20:  Intubated   402/4275 - diuresing   4/21: AXR=Orogastric tube passes into the stomach    NUTRITION FOCUSED PHYSICAL ASSESSMENT FOR DIAGNOSING MALNUTRITION)  No  Deferred, patient busy with other provider             Observed:    N/A    Obtained from  Chart/Interdisciplinary Team:  N/A    ANTHROPOMETRICS  Height: 165.1 cm (5'5)  Weight: 229 lbs 7.98 oz - 104.1 kg  Dry Weight per MD: 213# 96.8 kg  Body mass index is 38.19 kg/m .  Weight Status:  Obesity Grade II BMI 35-39.9  IBW: 56.8 kg  % IBW: 183%  Weight History: wt status fluctuations noted due to CHF exacerbation   Wt Readings from Last 10 Encounters:   04/21/21 104.1 kg (229 lb 8 oz)   04/19/21 120.2 kg (265 lb 1.6 oz)   04/19/21 120.2 kg (265 lb 1.6 oz)   04/13/21 100.7 kg (222 lb 0.1 oz)   02/16/21 101.2 kg (223 lb)   01/18/21 96.6 kg (213 lb)   10/01/20 95.5 kg (210 lb 8 oz)   06/04/20 90.3 kg (199 lb)   06/02/20 89.8 kg (198 lb)   05/29/20 90.7 kg (200 lb)     LABS  Labs reviewed    MEDICATIONS  Medications reviewed  Lasix, Novolog MSSi   Epi, versed    ASSESSED NUTRITION NEEDS PER APPROVED PRACTICE GUIDELINES:  Dosing Weight: 96.8 kg for energy, 56.8 kg IBW for protein  Estimated Energy Needs: 6768-6775 kcals (14-17 Kcal/Kg)  Justification: obese and vented  Estimated Protein Needs:  grams protein (1.5-2 g pro/Kg)  Justification: obesity guidelines   Estimated Fluid Needs: 1 ml/kcal    MALNUTRITION:  % Weight Loss:  None noted  % Intake:  Decreased intake does not meet criteria for malnutrition - NPO x 2 days  Subcutaneous Fat Loss: Deferred, patient busy with other provider   Muscle Loss: Deferred, patient busy with other provider   Fluid Retention:  None noted    Malnutrition Diagnosis: Unable to assess at this time without NFPE    NUTRITION DIAGNOSIS:  Inadequate protein-energy intake related to intubation and sedation as evidenced by patient NPO x 2 days, receiving 0 % of protein and energy needs.    NUTRITION INTERVENTIONS  Recommendations / Nutrition Prescription  Initiate TwoCal HN at 10 ml/hr providing 480 kcals , 20 gm protein, 53 gm CHO, 1 gm fiber and 168 ml free water.  FWF: 30 ml q 4 hours  Certavite Daily    Future goal when appropriate to advance:   TwoCal HN @ 30 mL/hr (720  mL/day) to provide 1440 kcals (15 kcal/kg/day), 60 g PRO, 504 mL H2O, 158 g CHO and 4 g Fiber daily.  Prosource 1 packet TID- 120 kcals, 33 g Pro  Total Provisions: 1560 kcals (16 kcal/kg) and 93 gm protein (1.6 g/kg)    Implementation  Nutrition education: Not appropriate at this time due to patient condition  EN Composition - TF orders as above.   Collaboration and Referral of Nutrition care    Nutrition Goals  TF to advance to goal within 36-48 hours.    MONITORING AND EVALUATION:  Progress towards goals will be monitored and evaluated per protocol and Practice Guidelines    Shoshana Ortiz   Dietetic Intern

## 2021-04-21 NOTE — PLAN OF CARE
Neuro: Sedated on fentanyl and versed gtts.  Increased due to tremors, vent asynchrony, ETT tolerance.  Does not follow commands.    CV:  Arrived via EMS externally paced.  On discussion with cardiology, backup pacer rate decreased to 50 and intrinsic sinus rhythm rate of 55-60 on monitor.  Pacer on backup rate 50.  Heparin gtt started.  Temp increasing 102, pan-cultured.    Resp:  FiO2 80%P10.  Lungs coarse crackles.  Pink/brown ETT secretions.    GI/:  Lasix given with >1400cc out.  OG advanced to 55cm, LIS.  No BM.    Skin:  Pale, dry.  Pulses improved throughout day.   Bruising on left buttocks=From fall into chair per daughter.  Protective mepilex on coccyx.    Right internal jugular in place, reinforced dressing.  IO in place.     Daughter Gale at bedside throughout day and updated regularly by myself and MD's.

## 2021-04-21 NOTE — PLAN OF CARE
Pt withdraws from pain, PERRL, tremors after continuous stimulation. Pts rate was 55-60s, back up pacer rate 50. Pts HR dropped below 50 at 0200 and paced rest of shift. Tolerating vent settings, minimal secretions. OG low-int suction. Scheduled lasix given with 1.4L out. Family updated at bedside and over the phone. Continue to monitor.

## 2021-04-21 NOTE — PROGRESS NOTES
[facility] ICU RESPIRATORY NOTE        Date of Admission: 4/20/2021    Date of Intubation (most recent): 4/20/21    Reason for Mechanical Ventilation: AW protection    Number of Days on Mechanical Ventilation: 2    Met Criteria for Spontaneous Breathing Trial: No    Reason for No Spontaneous Breathing Trial: per MD    Significant Events Today: none    ABG Results:   Recent Labs   Lab 04/21/21  1203 04/21/21  0845 04/20/21  1319 04/20/21  1030 04/20/21  0740   PH  --  7.54* 7.46*  --   --    PCO2  --  39 51*  --   --    PO2  --  103 53*  --   --    HCO3  --  33* 37*  --   --    O2PER 60  --  80 VENOUS 95%       Current Vent Settings: Ventilation Mode: CMV/AC  (Continuous Mandatory Ventilation/ Assist Control)  FiO2 (%): 60 %  Rate Set (breaths/minute): 18 breaths/min  Tidal Volume Set (mL): 450 mL  PEEP (cm H2O): 10 cmH2O  Oxygen Concentration (%): 60 %  Resp: 22          Plan: continue full vent support.    Eunice Barone, RT

## 2021-04-21 NOTE — PROVIDER NOTIFICATION
Cardiology paged due to 2.5 hrs of pacing and labile BP. No change in plan of care. Continue to monitor.

## 2021-04-21 NOTE — CONSULTS
CARDIOLOGY CONSULT    REASON FOR CONSULT:  Acute hypoxic respiratory failure, bradycardia    PRIMARY CARE PHYSICIAN:  Abbie Zacarias    HISTORY OF PRESENT ILLNESS:  Ms. 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  She  Had recently been hospitalized for altered mental status and weakness.  She was initially given fluids during that hospitalization and then diuresed.  Her discharge weight was 222 pounds.  In January she was noted to be 213 pounds.  Per report, she was noted to be bradycardic at the TCU overnight.  Her O2 was increased.  She has difficulty keeping the O2 on and frequently comes off.  In the morning she was noted to be unresponsive with HR's in the 30-40's and was hypoxic.  She was externally paced by EMS.  She required bag-valve-mask ventilation and was ultimately intubated.  She was started on low dose levophed.  She was noted to be acidotic with CO2 of 96 on venous blood gas.  This improved rapidly following intubation.    Upon arrival to the ED her back up rate was decreased to 50 bpm and she had a normal sinus rhythm in the 50-60 range.  She has not required the back up pacer.  Echocardiogram demonstrated normal LV function.  RV was very dilated with significantly elevated estimated RVSP.  CXR with evidence for pulmonary congestion and pink frothy sputum was noted coming from her ETT.      PAST MEDICAL HISTORY:  1.  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  Hx of TIA  3.  Cognitive dysfunction  4.  CKD stage III  5.  Hx of TIA    MEDICATIONS:  Current Facility-Administered Medications   Medication     acetaminophen (TYLENOL) tablet 650 mg    Or     acetaminophen (TYLENOL) Suppository 650 mg     chlorhexidine (PERIDEX) 0.12 % solution 15 mL      glucose gel 15-30 g    Or     dextrose 50 % injection 25-50 mL    Or     glucagon injection 1 mg     fentaNYL (PF) (SUBLIMAZE) injection  mcg     fentaNYL (SUBLIMAZE) infusion     furosemide (LASIX) injection 40 mg     heparin infusion 25,000 units in 0.45% NaCl 250 mL ANTICOAGULANT     insulin aspart (NovoLOG) injection (RAPID ACTING)     [START ON 4/21/2021] levothyroxine (SYNTHROID/LEVOTHROID) tablet 75 mcg     midazolam (VERSED) drip - ADULT 100 mg/100 mL in NS (pre-mix)     naloxone (NARCAN) injection 0.2 mg    Or     naloxone (NARCAN) injection 0.4 mg    Or     naloxone (NARCAN) injection 0.2 mg    Or     naloxone (NARCAN) injection 0.4 mg     norepinephrine (LEVOPHED) 16 mg in  mL infusion CENTRAL LINE     ondansetron (ZOFRAN-ODT) ODT tab 4 mg    Or     ondansetron (ZOFRAN) injection 4 mg     senna-docusate (SENOKOT-S/PERICOLACE) 8.6-50 MG per tablet 1 tablet    Or     senna-docusate (SENOKOT-S/PERICOLACE) 8.6-50 MG per tablet 2 tablet       ALLERGIES:  Allergies   Allergen Reactions     Aspirin GI Disturbance     Bleeding     Ace Inhibitors Cough     Citalopram Other (See Comments)     Tremors       Furosemide Rash     Lisinopril-Hydrochlorothiazide Cough     Penicillins Unknown     Reaction occurred as a child - unsure      Torsemide Rash     Tramadol Other (See Comments)     Lethargy       SOCIAL HISTORY:  I have reviewed this patient's social history and updated it with pertinent information if needed. Caitlin Demario Henrry  reports that she has never smoked. She has never used smokeless tobacco. She reports previous alcohol use. She reports that she does not use drugs.    FAMILY HISTORY:  I have reviewed this patient's family history and updated it with pertinent information if needed.   Family History   Problem Relation Age of Onset     Cancer Mother         multiple myeloma     Abdominal Aortic Aneurysm Father      Hypertension Father      Prostate Cancer Father      Breast Cancer Daughter         REVIEW OF SYSTEMS:  Unable to obtain as intubated and sedated    PHYSICAL EXAM:  Temp: 100.4  F (38  C) Temp src: Esophageal BP: (!) 104/39 Pulse: 59   Resp: 22 SpO2: 99 % O2 Device: Mechanical Ventilator    Vital Signs with Ranges  Temp:  [99.8  F (37.7  C)-102.7  F (39.3  C)] 100.4  F (38  C)  Pulse:  [44-71] 59  Resp:  [] 22  BP: ()/() 104/39  FiO2 (%):  [80 %-100 %] 80 %  SpO2:  [43 %-100 %] 99 %  0 lbs 0 oz    Constitutional: intubated and sedated  Eyes: PERRL, sclera nonicteric  ENT: trachea midline  Respiratory: coarse breath sounds bilaterally  Cardiovascular: RRR no m/r/g, JVD difficult to assess  GI: nondistended, nontender, bowel sounds present  Lymph/Hematologic: no lymphadenopathy  Skin: dry, no rash  Musculoskeletal: good muscle tone, no edema bilaterally  Neurologic: no focal deficits  Neuropsychiatric: appropriate affact    DATA:  Echocardiogram:  1. Left ventricular systolic function is normal. The visual ejection fraction  is estimated at 60-65%.  2. No regional wall motion abnormalities noted.  3. The right ventricle is severely dilated. Mildly decreased right ventricular  systolic function.  4. There is severe biatrial dilatation.  5. There is moderate (2+) tricuspid regurgitation.  6. Severe pulmonary hypertension; the right ventricular systolic pressure is  approximated at 55mmHg plus the right atrial pressure.  7. In comparison with the prior study dated 4/12/21, the RV now significantly  dilated and estimated RVSP is now severe.    EKG: dated 4/20/21 reviewed personally.  Normal sinus rhythm with anterior T wave inversions, low voltage    ASSESSMENT:  1.  CHFpEF:  With exacerbation, evidence for volume overload on exam.  Echocardiogram with elevated pulmonary pressures and RV volume overload.    Dry weight appears to be at least 213 pounds or less   2.  .  Acute on chronic hypoxic/hypercapnic respiratory failure:  Uses 2L of O2 at baseline.  Reportedly frequently has  nasal cannula not in place.   Acute worsening of hypoxia in the setting of volume overload  3.   Bradycardia:  Resolved with correction of acidosis and hypoxia  4.  Hx of wide complex tachycardia:  Felt to be SVT with aberrancy.    Has been maintained on amiodarone.      RECOMMENDATIONS:  1. IV diuresis; titrate lasix as needed to goal 1-2L net negative daily.   2.  Hold amiodarone given bradycardia.  Keep external pacer pads in place with back up rate of 50 bpm.  No acute indication for temporary or permanent pacemaker.  3.  Cardiology will continue to follow.      Bee Bah MD  Cardiology - [facility]  April 20, 2021    I personally examined and evaluated the patient today.  Caitlin Sales was in (or remains in) critical condition today due to bradycardia, hypotension.  I personally managed and the key decisions made today include pacemaker settings, echocardiogram and diuresis.    I spent a total of 45  minutes providing critical care services at the bedside, evaluating the patient, directing care and reviewing laboratory values and radiologic reports.    Bee Bah MD

## 2021-04-21 NOTE — PROGRESS NOTES
[facility] ICU RESPIRATORY NOTE        Date of Admission: 4/20/2021    Date of Intubation (most recent): 4/20/2021    Reason for Mechanical Ventilation: AW protection    Number of Days on Mechanical Ventilation: 1    Met Criteria for Spontaneous Breathing Trial: No    Reason for No Spontaneous Breathing Trial: Per MD    Significant Events Today: Sputum culture.  ABG Results:   Recent Labs   Lab 04/20/21  1319 04/20/21  1030 04/20/21  0740 04/20/21  0640   PH 7.46*  --   --   --    PCO2 51*  --   --   --    PO2 53*  --   --   --    HCO3 37*  --   --   --    O2PER 80 VENOUS 95% 95%       Current Vent Settings: Ventilation Mode: CMV/AC  (Continuous Mandatory Ventilation/ Assist Control)  FiO2 (%): 80 %  Rate Set (breaths/minute): 22 breaths/min  Tidal Volume Set (mL): 450 mL  PEEP (cm H2O): 5 cmH2O  Oxygen Concentration (%): 80 %  Resp: 22        Patel Bailey, RT

## 2021-04-21 NOTE — PROGRESS NOTES
"SPIRITUAL HEALTH SERVICES  SPIRITUAL ASSESSMENT Progress Note  [facility] ICU     REFERRAL SOURCE: Family request    I shared a reflective visit with patient's daughter, Gale at bedside today which integrated elements of illness and personal narratives.    -Gale was grateful her mom has been able to calm, sharing she seemed distressed earlier in the day. She reflected on how hard it is to see her in the ICU, intubated.    -Gale shares they have strong family support for her mom. She shares her father  a few years ago and the 6 siblings are all close and share in caring for Amanda. Gale described her mother's recent increase in \"scary ambulance rides\" and hospitalizations and shares the weight this has had for her mom. She shares she has discussed her mom's wishes with her, which has focused around patient sharing her desire for more time to be with her grandchildren and great grandchildren. She shares though if more time would mean more hospitalizations and increased medical interventions, in facilities with visitor restrictions, she doesn't feel this would be her mother's desire.    -Gale shares their family's Confucianism janelle is deeply important to them and to her mother. She asked for prayer at bedside, which I provided as another provider was offering cares.    -Gale shared many stories of her mother, describing her as unconditionally loving and that \"everyone called her mom.\" She shared about the things that have brought meaning and christiano into her mother's life and how well loved she is.    I spent time offering emotional and spiritual support through reflective listening that affirmed emotions, experience and meaning.  I affirmed Gale and their family's care for their mother.       PLAN: Gale welcomes ongoing visits, will continue to follow for support.     Yue Worrell  Associate    Phone: [phone]  Pager: [phone]    "

## 2021-04-21 NOTE — CONSULTS
CARDIAC ELECTROPHYSIOLOGY CONSULTATION  April 21, 2021    REQUESTING PROVIDER:  Dr. ALANNA Bah    REASON FOR CONSULTATION:  Bradycardia      HISTORY OF PRESENT ILLNESS:    Thank you for asking the EP service to evaluate Ms. Sales.  She is an 85-year-old woman with history of chronic lung disease, on oxygen therapy, CKD stage III, DVT with prior IVC filter and on chronic anticoagulation, dementia.      She is well-known to the EP team and previously received care by my colleagues Piotr Murillo MD and PADMINI Scott.  She has history of wide-complex tachycardia, felt to be aberrant SVT by her cardiologist in [place] and initially treated with metoprolol.  Was placed on amiodarone after ER visit in April 2020 after she presented with sustained tachycardia requiring cardioversion ([facility]).  She also has history of transient cardiomyopathy, EF 40% 1 year ago but normalized to 60-65% by May 2020.    More recently admitted with acute respiratory failure requiring BiPAP therapy.  Felt to be related to diastolic heart failure aggravating chronic lung disease.  Was readmitted on 4/20/2021 with acute respiratory failure, likely d/t CHF.  She was initially bradycardic and hypoxic.  She was intubated and is currently in the ICU.    Heart rate improved post intubation but around 2 AM today she again became significantly bradycardic requiring transcutaneous pacing.  I have been told that her heart rate was below 50 bpm and she required pacing for 2 to 3 hours.  It is not documented what her escape rhythm was during this time (if pacing had not been performed at 50 ppm).  She had runs of wide QRS tachycardia that appear to be supraventricular with BBB.    The patient is currently intubated and sedated.  History is obtained from the chart.  I did speak to her daughter who was in the ICU room.  She told me that her mom has declined recently and has been idle and unable to move much at her assisted living place because of  "her dependence on oxygen and perhaps some mobility issues.      DIAGNOSTIC STUDIES:  Labs: Sodium 143, potassium 4.3, creatinine 1.1, TSH 3.0  12-lead ECG: Sinus bradycardia 53, prolonged QT with bifid T waves or prominent U wave best seen in the precordial leads.  Echocardiogram: LV EF 60-65%, severe RV dilatation, mildly decreased RV function, severe atrial dilatation, severe pulmonary hypertension.      IMPRESSION:  1. Moderate to severe sinus bradycardia.  Her initial bradycardia was likely related to hypoxemia/metabolic disturbances.  Bradycardia this a.m. occurred while the patient was intubated, so hypoxemia was not a factor.  Her HR currently is not \"appropriately fast\" to reflect her current medical condition.  It appears the patient has sick sinus syndrome.  Heart rate is currently in the 50s.  She is hemodynamically stable.  2. Runs of WCT.  Appears to be non-sustained SVT with BBB.      RECOMMENDATIONS:    1. Reasonable to hold amiodarone.  2. Reasonable to have transcutaneous pacing at \"standby\" at 30 ppm.  3. No immediate need for temporary transvenous pacemaker implantation.    4. A permanent pacemaker may be considered before discharge if she both recovers from this acute respiratory failure and PM implantation is something she and her family strongly desire.  Unfortunately, this patient has been very frail lately.  A permanent pacemaker is not expected to improve her lung disease or HFpEF.  Goals of care need be discussed before pursuing another intervention in this patient.     I appreciate the opportunity to be part of this patient's care.  Please feel free to call me with any questions (pager #[phone]).      Johann Mcdaniel MD, [facility]        PHYSICAL EXAM:  Vitals: /46   Pulse 51   Temp 97.9  F (36.6  C)   Resp 22   Wt 104.1 kg (229 lb 8 oz)   SpO2 97%   BMI 38.19 kg/m      Intake/Output Summary (Last 24 hours) at 4/21/2021 1028  Last data filed at 4/21/2021 0600  Gross per 24 " hour   Intake 402.78 ml   Output 4275 ml   Net -3872.22 ml     Vitals:    04/21/21 0445   Weight: 104.1 kg (229 lb 8 oz)     Constitutional:  Intubated.  Head: Normocephalic and atraumatic.   Skin:  Normal color and texture.  No rashes, lesions or eruptions.  Eyes:  no jaundice, EOMI.  ENT:  Supple, normal JVP.  No lymphadenopathy or apparent thyroid enlargement.  Chest/Lungs:  Bilateral breath sounds.  Cardiac:  Regular rhythm in 50s, normal S1 and S2.  No murmur, rub or gallop.  Abdomen:  Normal bowel sounds. Abdomen is soft.    Extremities:  Radial pulses 1+ bilaterally.  No lower extremity edema is present.   Neurological:  Sedated.  Back:  No CVA tenderness.     REVIEW OF SYSTEMS:  Review of system completed and negative with the exception of what was described in the HPI section.     CURRENT MEDICATIONS:    chlorhexidine  15 mL Mouth/Throat Q12H     furosemide  40 mg Intravenous Q12H     insulin aspart  1-6 Units Subcutaneous Q4H     levothyroxine  75 mcg Oral or Feeding Tube QAM AC       ALLERGIES     Allergies   Allergen Reactions     Aspirin GI Disturbance     Bleeding     Ace Inhibitors Cough     Citalopram Other (See Comments)     Tremors       Furosemide Rash     Lisinopril-Hydrochlorothiazide Cough     Penicillins Unknown     Reaction occurred as a child - unsure      Torsemide Rash     Tramadol Other (See Comments)     Lethargy       PAST MEDICAL HISTORY:  Past Medical History:   Diagnosis Date     Basal cell carcinoma of face 7/19/2017     History of DVT of lower extremity 09/25/2013     Neuropathy 9/25/2013     TIA (transient ischemic attack) 12/26/2019     Wide-complex tachycardia (H) 04/28/2020       PAST SURGICAL HISTORY:  Past Surgical History:   Procedure Laterality Date     APPENDECTOMY      teenager     AS LUMBAR SPINE FUSN,POST INTRBDY  2010     AS REVISE TOTAL HIP REPLACEMENT Right 2013     CHOLECYSTECTOMY  1967     ENDOMETRIAL SAMPLING (BIOPSY)  2001     SALPINGO OOPHORECTOMY,R/L/DI Left  1967     VENA CAVA FILTER  2010    Permanent        FAMILY HISTORY:  Family History   Problem Relation Age of Onset     Cancer Mother         multiple myeloma     Abdominal Aortic Aneurysm Father      Hypertension Father      Prostate Cancer Father      Breast Cancer Daughter        SOCIAL HISTORY:  Social History     Socioeconomic History     Marital status:      Spouse name: Not on file     Number of children: Not on file     Years of education: Not on file     Highest education level: Not on file   Occupational History     Not on file   Social Needs     Financial resource strain: Not on file     Food insecurity     Worry: Not on file     Inability: Not on file     Transportation needs     Medical: Not on file     Non-medical: Not on file   Tobacco Use     Smoking status: Never Smoker     Smokeless tobacco: Never Used   Substance and Sexual Activity     Alcohol use: Not Currently     Frequency: Never     Drug use: Never     Sexual activity: Not Currently     Partners: Male   Lifestyle     Physical activity     Days per week: Not on file     Minutes per session: Not on file     Stress: Not on file   Relationships     Social connections     Talks on phone: Not on file     Gets together: Not on file     Attends Voodoo service: Not on file     Active member of club or organization: Not on file     Attends meetings of clubs or organizations: Not on file     Relationship status: Not on file     Intimate partner violence     Fear of current or ex partner: Not on file     Emotionally abused: Not on file     Physically abused: Not on file     Forced sexual activity: Not on file   Other Topics Concern     Not on file   Social History Narrative     Not on file         Recent Lab Results:  Recent Labs   Lab 04/21/21  0500 04/20/21  2100 04/20/21  1355 04/20/21  1030 04/20/21  0640   WBC 13.2*  --  21.1* 18.8* 8.7   HGB 8.9*  --  9.9* 10.0* 8.9*   MCV 97  --  101* 103* 107*     --  263 250 223    143  --   143 143   POTASSIUM 3.6 3.6  --  4.3 3.8   CHLORIDE 105 104  --  106 106   CO2 35* 35*  --  39* 38*   BUN 30 34*  --  37* 39*   CR 1.10* 1.14*  --  1.10* 1.14*   ANIONGAP 2* 4  --  <1* <1*   FROY 7.8* 7.9*  --  7.6* 7.1*   GLC 89 84  --  103* 183*   ALBUMIN 2.5*  --   --  2.8* 2.5*   PROTTOTAL 5.3*  --   --  5.9* 5.3*   BILITOTAL 0.9  --   --  0.5 0.2   ALKPHOS 65  --   --  82 74   ALT 34  --   --  46 37   AST 39  --   --  40 34   TROPI  --   --   --  0.074* 0.030

## 2021-04-21 NOTE — PROGRESS NOTES
Critical Care  Note      04/21/2021    Name: Caitlin Sales MRN#: [id number]   Age: 85 year old YOB: 1935     [facility]  [facility] #    MV DAY #             Problem List:   Active Problems:    Decompensated heart failure (H)           Summary/Hospital Course:     85 F has a past medical history of wide complex tachycardia, hyperlipidemia, CAD, diastolic CHF, hypertension, chronic respiratory failure, history of DVT on chronic anticoagulation, history of TIA, R vertebral artery stenosis, neuropathy, GIB, anemia,  obesity, osteoarthritis, chronic back pain, GERD, dementia recent hospitalization 4/10-4/16 presented back from  from nursing home with decrease LOC and hypoxemia ultimately requiring external pacing en route to ED.  She was intubated in ED, CVC placed and pressors started but still required external pacing to maintain hemodynamics.      On arrival to [facility] - pt tremulous on arrival, cool extremities.  Per EMS - difficult to sedate prior to transfer and during intubation, described sats 40-70% on RA prior to ED (unknown length of time with hypoxemia). Synchronous with vent paced at 70 externally, vasopressors running but labile. Pink frothy secretions from ETT. COVID neg from outside ED     Overnight Events:   Patient bradycardic again overnight for approximatley 3 hours once again requiring pacing.    Levophed changed to Epinephrine.  Fentanyl increased for sedation with pacing.      Assessment and plan :     Caitlin Sales IS a 85 year old female admitted on 4/20/2021 for hypoxia, hypotension, bradycardia requiring pacing, & acute hypoxic respiratory failure.   I have personally reviewed the daily labs, imaging studies, cultures and discussed the case with referring physician and consulting physicians.     My assessment and plan by system for this patient is as follows:    Neurology/Psychiatry:   1. Pain/analgesia  2. Metabolic vs Toxic Encephelopathy  3. Hx of TIA  Plan  1.  Fentanyl Gtt with IVP prn pain  2. Versed Gtt.  3. APAP 650 Q 4 prn    Cardiovascular:   1. Symptomatic Bradycardia  2. Cardiogenic Shock  3. HFpEF  4. Severe Pulmonary HTN.  5. CAD.  6. Hx of Wide Complex Tachycardia    Echo 4/20 Interpretation Summary     1. Left ventricular systolic function is normal. The visual ejection fraction  is estimated at 60-65%.  2. No regional wall motion abnormalities noted.  3. The right ventricle is severely dilated. Mildly decreased right ventricular  systolic function.  4. There is severe biatrial dilatation.  5. There is moderate (2+) tricuspid regurgitation.  6. Severe pulmonary hypertension; the right ventricular systolic pressure is  approximated at 55mmHg plus the right atrial pressure.  7. In comparison with the prior study dated 4/12/21, the RV now significantly  dilated and estimated RVSP is now severe.    Plan  - Patient Paced   - Cards to place EP consult today.  - Hold Amiodarone.  - Continue IV Lasix with goal of - 1 to -2 L daily    Pulmonary/Ventilator Management:   1. Acute Hypoxic Respiratory Failure  2. Oxygenation/ventilation/mechanics   - Current Vent Settings: AC, Tv 450, f: 22, Peep: 5 FiO2 70%  3. ? COPD?    - Daughter states her mother was on home O2 in past    - Does not remember rate.  Plan  - Await EP consult.  - Wean Sedation   - PST's  - Wean FiO2 as torey.  - decrease f: to 18  - VBG @ 1115  - follow O2 sat's    GI and Nutrition :   1. No Issues.  2. Protonix 40 IVP Q day  3. Enteral feedings  Plan  - Nutrition consult today.  - Plan for TF's via OG tube  - Senna PO Q 12    Renal/Fluids/Electrolytes:   1. CKD  2. Primary Respiratory Alkalosis, Chronic, with secondary Met. Alkalosis  3. - 3.8 L/24 hrs, - 3.8 L/admit, Urine: 4.1L/24 hrs, 104.1kg  Plan  - Follow I's & O's    Infectious Disease:   1. Afebrile.  2. Leukocytosis - 13.2  3. Monitor for fevers; pan Cx for temps > 101.5    Endocrine:   1. Hypothyroidism.  2. Stress induced  hyperglycemia    Plan  - ICU insulin protocol, goal sugar <180  - Medium ISS    Hematology/Oncology:   1. Hx of DVT's  2. S/P IVC Filter.  3. Heparin Gtt  Plan  - Monitor Hb.     IV/Access:   1. Venous access - R IJ  2. Arterial access - No A line    Plan  - central access required and necessary      ICU Prophylaxis:   1. DVT: No heparin Gtt, mechanical  2. VAP: HOB 30 degrees, chlorhexidine rinse  3. Stress Ulcer: PPI  4. Restraints: Nonviolent soft two point restraints required and necessary for patient safety and continued cares and good effect as patient continues to pull at necessary lines, tubes despite education and distraction. Will readdress daily.   5. Wound care  - Not Indicated  6. Feeding - nutrition consult placed today.  7. Family Update:At bedside.  8. Disposition - ICU.        Key goals for next 24 hours:   1. Await EP consult   2. Continue Diuresis for right heart failure.  3. Wean Vent as torey.               Medical History:     Past Medical History:   Diagnosis Date     Basal cell carcinoma of face 7/19/2017     History of DVT of lower extremity 09/25/2013     Neuropathy 9/25/2013     TIA (transient ischemic attack) 12/26/2019     Wide-complex tachycardia (H) 04/28/2020     Past Surgical History:   Procedure Laterality Date     APPENDECTOMY      teenager     AS LUMBAR SPINE FUSN,POST INTRBDY  2010     AS REVISE TOTAL HIP REPLACEMENT Right 2013     CHOLECYSTECTOMY  1967     ENDOMETRIAL SAMPLING (BIOPSY)  2001     SALPINGO OOPHORECTOMY,R/L/DI Left 1967     VENA CAVA FILTER  2010    Permanent      Social History     Socioeconomic History     Marital status:      Spouse name: Not on file     Number of children: Not on file     Years of education: Not on file     Highest education level: Not on file   Occupational History     Not on file   Social Needs     Financial resource strain: Not on file     Food insecurity     Worry: Not on file     Inability: Not on file     Transportation needs      Medical: Not on file     Non-medical: Not on file   Tobacco Use     Smoking status: Never Smoker     Smokeless tobacco: Never Used   Substance and Sexual Activity     Alcohol use: Not Currently     Frequency: Never     Drug use: Never     Sexual activity: Not Currently     Partners: Male   Lifestyle     Physical activity     Days per week: Not on file     Minutes per session: Not on file     Stress: Not on file   Relationships     Social connections     Talks on phone: Not on file     Gets together: Not on file     Attends Islam service: Not on file     Active member of club or organization: Not on file     Attends meetings of clubs or organizations: Not on file     Relationship status: Not on file     Intimate partner violence     Fear of current or ex partner: Not on file     Emotionally abused: Not on file     Physically abused: Not on file     Forced sexual activity: Not on file   Other Topics Concern     Not on file   Social History Narrative     Not on file        Allergies   Allergen Reactions     Aspirin GI Disturbance     Bleeding     Ace Inhibitors Cough     Citalopram Other (See Comments)     Tremors       Furosemide Rash     Lisinopril-Hydrochlorothiazide Cough     Penicillins Unknown     Reaction occurred as a child - unsure      Torsemide Rash     Tramadol Other (See Comments)     Lethargy              Escobar Medications:       chlorhexidine  15 mL Mouth/Throat Q12H     furosemide  40 mg Intravenous Q12H     insulin aspart  1-6 Units Subcutaneous Q4H     levothyroxine  75 mcg Oral or Feeding Tube QAM AC       EPINEPHrine 0.03 mcg/kg/min (04/21/21 0809)     fentaNYL 100 mcg/hr (04/21/21 0809)     heparin Stopped (04/21/21 0751)     midazolam 6 mg/hr (04/21/21 0810)     norepinephrine Stopped (04/21/21 0437)        Home Meds  [COMPLETED] fentaNYL (PF) (SUBLIMAZE) injection 50 mcg    acetaminophen (TYLENOL) 500 MG tablet, Take 2 tablets (1,000 mg) by mouth 3 times daily  amiodarone (PACERONE) 200 MG  tablet, Take 1 tablet (200 mg) by mouth daily  apixaban ANTICOAGULANT (ELIQUIS) 5 MG tablet, Take 5 mg by mouth 2 times daily  atorvastatin (LIPITOR) 40 MG tablet, Take 1 tablet (40 mg) by mouth daily  DESITIN 40 % paste, Apply 1 Application topically as needed  folic acid (FOLVITE) 1 MG tablet, Take 1 mg by mouth daily At 3 pm  furosemide (LASIX) 20 MG tablet, Take 1 tab (20 mg) every other day, alternating with 2 tabs (40 mg) every other day  gabapentin (NEURONTIN) 600 MG tablet, Take 1 tablet (600 mg) by mouth 2 times daily  hydrocortisone (CORTAID) 1 % external cream, Apply 1 applicator topically 2 times daily as needed for itching   levothyroxine (SYNTHROID/LEVOTHROID) 75 MCG tablet, Take 75 mcg by mouth daily   loperamide (IMODIUM) 2 MG capsule, Take 2 mg by mouth 4 times daily as needed for diarrhea  losartan (COZAAR) 50 MG tablet, Take 1 tablet (50 mg) by mouth daily Hold for SBP <110 mmHg  melatonin 3 MG tablet, Take 3 mg by mouth At Bedtime  mirtazapine (REMERON) 7.5 MG tablet, Take 1 tablet (7.5 mg) by mouth At Bedtime  nystatin (MYCOSTATIN) 443825 UNIT/GM external cream, Apply topically 2 times daily  omeprazole (PRILOSEC) 20 MG DR capsule, Take 40 mg by mouth daily  oxyCODONE-acetaminophen (PERCOCET) 5-325 MG tablet, Take 1 tablet by mouth daily as needed for pain  polyethylene glycol (MIRALAX) 17 g packet, Take 1 packet by mouth daily as needed   simethicone (MYLICON) 125 MG chewable tablet, Take 125 mg by mouth 2 times daily as needed   triamcinolone (KENALOG) 0.1 % external cream, Apply 1 applicator topically 2 times daily as needed for irritation To hand rash  vitamin B-12 (CYANOCOBALAMIN) 1000 MCG tablet, Take 1,000 mcg by mouth daily  diclofenac (VOLTAREN) 1 % topical gel, Apply topically 3 times daily Apply to right shoulder  order for DME, Equipment being ordered: walker with 4 wheels  order for DME, O2 2 LPM continuos (Patient taking differently: O2 2 LPM continuous)  order for DME, Equipment  being ordered: knee high compression stockings 20-30 mm compression               Physical Examination:   Temp:  [97.7  F (36.5  C)-102.7  F (39.3  C)] 97.9  F (36.6  C)  Pulse:  [44-71] 51  Resp:  [] 22  BP: ()/() 101/46  FiO2 (%):  [70 %-100 %] 70 %  SpO2:  [76 %-100 %] 97 %    Intake/Output Summary (Last 24 hours) at 4/21/2021 0826  Last data filed at 4/21/2021 0600  Gross per 24 hour   Intake 402.78 ml   Output 4275 ml   Net -3872.22 ml     Wt Readings from Last 4 Encounters:   04/21/21 104.1 kg (229 lb 8 oz)   04/19/21 120.2 kg (265 lb 1.6 oz)   04/19/21 120.2 kg (265 lb 1.6 oz)   04/13/21 100.7 kg (222 lb 0.1 oz)     BP - Mean:  [] 70  Ventilation Mode: (S) CMV/AC  (Continuous Mandatory Ventilation/ Assist Control)  FiO2 (%): 70 %  Rate Set (breaths/minute): (S) 22 breaths/min  Tidal Volume Set (mL): (S) 500 mL  PEEP (cm H2O): (S) 10 cmH2O  Oxygen Concentration (%): 70 %  Resp: 22    Recent Labs   Lab 04/20/21  1319 04/20/21  1030 04/20/21  0740 04/20/21  0640   PH 7.46*  --   --   --    PCO2 51*  --   --   --    PO2 53*  --   --   --    HCO3 37*  --   --   --    O2PER 80 VENOUS 95% 95%       GEN: no acute distress   HEENT: head ncat, sclera anicteric, OP patent, trachea midline   PULM: unlabored synchronous with vent, clear anteriorly    CV/COR: RRR S1S2 no gallop,  No rub, no murmur  ABD: soft nontender, hypoactive bowel sounds, no mass  EXT:  Edema   warm  NEURO: grossly intact  SKIN: no obvious rash  LINES: clean, dry intact         Data:   All data and imaging reviewed     ROUTINE ICU LABS (Last four results)  CMP  Recent Labs   Lab 04/21/21  0500 04/20/21  2100 04/20/21  1030 04/20/21  0640    143 143 143   POTASSIUM 3.6 3.6 4.3 3.8   CHLORIDE 105 104 106 106   CO2 35* 35* 39* 38*   ANIONGAP 2* 4 <1* <1*   GLC 89 84 103* 183*   BUN 30 34* 37* 39*   CR 1.10* 1.14* 1.10* 1.14*   GFRESTIMATED 46* 44* 46* 44*   GFRESTBLACK 53* 51* 53* 51*   FROY 7.8* 7.9* 7.6* 7.1*   MAG  --  1.9   --   --    PROTTOTAL 5.3*  --  5.9* 5.3*   ALBUMIN 2.5*  --  2.8* 2.5*   BILITOTAL 0.9  --  0.5 0.2   ALKPHOS 65  --  82 74   AST 39  --  40 34   ALT 34  --  46 37     CBC  Recent Labs   Lab 21  0500 21  1355 21  1030 21  0640   WBC 13.2* 21.1* 18.8* 8.7   RBC 3.22* 3.43* 3.54* 3.09*   HGB 8.9* 9.9* 10.0* 8.9*   HCT 31.2* 34.7* 36.4 33.0*   MCV 97 101* 103* 107*   MCH 27.6 28.9 28.2 28.8   MCHC 28.5* 28.5* 27.5* 27.0*   RDW 15.5* 15.4* 15.2* 15.1*    263 250 223     INRNo lab results found in last 7 days.  Arterial Blood Gas  Recent Labs   Lab 21  1319 21  1030 21  0740 21  0640   PH 7.46*  --   --   --    PCO2 51*  --   --   --    PO2 53*  --   --   --    HCO3 37*  --   --   --    O2PER 80 VENOUS 95% 95%       All cultures:  Recent Labs   Lab 21  0050 21  2355 21  1800 21  1700   CULT No growth after 2 hours No growth after 2 hours PENDING PENDING     Recent Results (from the past 24 hour(s))   XR Chest Port 1 View    Narrative    CHEST ONE VIEW  2021 11:17 AM     HISTORY: Intubated.    COMPARISON: 2021      Impression    IMPRESSION: Endotracheal tube tip 2.5 cm in the kolby, slightly  higher in position than previous. Right IJ line stable. Increased  right base infiltrate since the comparison study. Similar left base  infiltrate.   Echocardiogram Limited    Narrative    [id number]  [id number]  [id number]  [id number]^MIKE^BENITEZ^MORGAN     [facility]  Echocardiography Laboratory  [address] [place] [id number]     Name: LUIS ALBERTO RICHARD  MRN: [id number]  : 1935  Study Date: 2021 11:11 AM  Age: 85 yrs  Gender: Female  Patient [facility]: [facility]  Reason For Study: Shock  Ordering Physician: BENITEZ MCKEON  Referring Physician: SAMANTHA CARABALLO  Performed By: Ritchie Rizo RDCS     BSA: 2.2 m2  Height: 65 in  Weight: 256 lb  HR: 57  BP: 105/63  mmHg  ______________________________________________________________________________  Procedure  Limited Portable Echo Adult. Optison (NDC #3427-5613) given intravenously.  ______________________________________________________________________________  Interpretation Summary     1. Left ventricular systolic function is normal. The visual ejection fraction  is estimated at 60-65%.  2. No regional wall motion abnormalities noted.  3. The right ventricle is severely dilated. Mildly decreased right ventricular  systolic function.  4. There is severe biatrial dilatation.  5. There is moderate (2+) tricuspid regurgitation.  6. Severe pulmonary hypertension; the right ventricular systolic pressure is  approximated at 55mmHg plus the right atrial pressure.  7. In comparison with the prior study dated 4/12/21, the RV now significantly  dilated and estimated RVSP is now severe.  ______________________________________________________________________________  Left Ventricle  Left ventricular systolic function is normal. The visual ejection fraction is  estimated at 60-65%. No regional wall motion abnormalities noted.     Right Ventricle  The right ventricle is severely dilated. Mildly decreased right ventricular  systolic function.     Atria  The left atrium is severely dilated. The right atrium is severely dilated.     Mitral Valve  There is mild mitral annular calcification. There is mild (1+) mitral  regurgitation.     Tricuspid Valve  The tricuspid valve is normal in structure and function. There is moderate  (2+) tricuspid regurgitation. The right ventricular systolic pressure is  approximated at 55mmHg plus the right atrial pressure. Right ventricular  systolic pressure is elevated, consistent with severe pulmonary hypertension.     Aortic Valve  The aortic valve is trileaflet with aortic valve sclerosis.     Pericardium  There is no pericardial effusion.     Rhythm  Sinus rhythm was noted.      ______________________________________________________________________________  MMode/2D Measurements & Calculations  IVSd: 1.2 cm  LVIDd: 4.4 cm  LVIDs: 2.7 cm  LVPWd: 1.2 cm  FS: 37.5 %  LV mass(C)d: 184.9 grams  LV mass(C)dI: 84.1 grams/m2  asc Aorta Diam: 3.4 cm  RWT: 0.53     Doppler Measurements & Calculations  TV V2 max: 371.0 cm/sec  TV max P.1 mmHg     ______________________________________________________________________________  Report approved by: Irma Pickering 2021 12:07 PM         XR Abdomen Port 1 View    Narrative    EXAM: XR ABDOMEN PORTABLE 1 VIEWS  LOCATION: [facility]  DATE/TIME: 2021, 5:54 AM    INDICATION: OG tube placement.  COMPARISON: None.      Impression    IMPRESSION: Orogastric tube passes into the stomach. The tip is not seen on this film. ET tube approximately 4 cm above the kolby. Bibasilar pulmonary infiltrates.            Billing: This patient is critically ill: Yes. Total critical care time today 45 min.

## 2021-04-21 NOTE — PROCEDURES
[facility]    Arterial line placement    Date/Time: 4/21/2021 5:57 PM  Performed by: Maryse Pineda MD  Authorized by: Maryse Pineda MD     UNIVERSAL PROTOCOL   Site Marked: NA  Prior Images Obtained and Reviewed:  NA  Required items: Required blood products, implants, devices and special equipment available    Patient identity confirmed:  Verbally with patient and provided demographic data  Patient was reevaluated immediately before administering moderate or deep sedation or anesthesia  Confirmation Checklist:  Patient's identity using two indicators, procedure was appropriate and matched the consent or emergent situation and correct equipment/implants were available  Time out: Immediately prior to the procedure a time out was called    Universal Protocol: the Joint Commission Universal Protocol was followed    Preparation: Patient was prepped and draped in usual sterile fashion    ESBL (mL):  2      Indication: multiple ABGs respiratory failure hemodynamic monitoring  Location:  Left femoral      SEDATION    Patient Sedated: Yes    Sedation:  See MAR for details  Vital signs: Vital signs monitored during sedation      PROCEDURE DETAILS    Needle Gauge:  20  Seldinger technique: Seldinger technique used    Number of Attempts:  1  Post-procedure:  Line sutured and dressing applied    PROCEDURE   Patient Tolerance:  Patient tolerated the procedure well with no immediate complications     2 Attempts made at Left Radial A line unsuccessfully.  Left Femoral A line placed.  Length of time physician/provider present for 1:1 monitoring during sedation: 60

## 2021-04-21 NOTE — PLAN OF CARE
OT/PT: Per discussion with RN and chart review, pt not appropriate for therapy this date d/t vent/sed. Will check back tomorrow.

## 2021-04-21 NOTE — PROGRESS NOTES
[facility] ICU RESPIRATORY NOTE           Date of Admission: 4/20/2021     Date of Intubation (most recent): 4/20/2021     Reason for Mechanical Ventilation: AW protection     Number of Days on Mechanical Ventilation: 2     Met Criteria for Spontaneous Breathing Trial: No     Reason for No Spontaneous Breathing Trial: Per MD    BP (!) 142/56   Pulse 50   Temp 97.9  F (36.6  C)   Resp 22   Wt 104.1 kg (229 lb 8 oz)   SpO2 97%   BMI 38.19 kg/m      Recent Labs   Lab 04/20/21  1319 04/20/21  1030 04/20/21  0740 04/20/21  0640   PH 7.46*  --   --   --    PCO2 51*  --   --   --    PO2 53*  --   --   --    HCO3 37*  --   --   --    O2PER 80 VENOUS 95% 95%     Ventilation Mode: CMV/AC  (Continuous Mandatory Ventilation/ Assist Control)  FiO2 (%): 70 %  Rate Set (breaths/minute): 26 breaths/min  Tidal Volume Set (mL): 500 mL  PEEP (cm H2O): 10 cmH2O  Oxygen Concentration (%): 70 %  Resp: 22      NORA DIAZ, RT

## 2021-04-22 NOTE — PROGRESS NOTES
Critical Care  Note      04/22/2021    Name: Caitlin Sales MRN#: [id number]   Age: 85 year old YOB: 1935     Hsptl Day# 2  ICU DAY #    MV DAY #             Problem List:   Active Problems:    Decompensated heart failure (H)           Summary/Hospital Course:     85 F has a past medical history of wide complex tachycardia, hyperlipidemia, CAD, diastolic CHF, hypertension, chronic respiratory failure, history of DVT on chronic anticoagulation, history of TIA, R vertebral artery stenosis, neuropathy, GIB, anemia,  obesity, osteoarthritis, chronic back pain, GERD, dementia recent hospitalization 4/10-4/16 presented back from  from nursing home with decrease LOC and hypoxemia ultimately requiring external pacing en route to ED.  She was intubated in ED, CVC placed and pressors started but still required external pacing to maintain hemodynamics.      On arrival to [facility] - pt tremulous on arrival, cool extremities.  Per EMS - difficult to sedate prior to transfer and during intubation, described sats 40-70% on RA prior to ED (unknown length of time with hypoxemia). Synchronous with vent paced at 70 externally, vasopressors running but labile. Pink frothy secretions from ETT. COVID neg from outside ED     Overnight Events:   No major events overnight.  Propofol off .  Fentanyl increased for sedation.      Assessment and plan :     Caitlin Sales IS a 85 year old female admitted on 4/20/2021 for hypoxia, hypotension, bradycardia requiring pacing, & acute hypoxic respiratory failure.   I have personally reviewed the daily labs, imaging studies, cultures and discussed the case with referring physician and consulting physicians.     My assessment and plan by system for this patient is as follows:    Neurology/Psychiatry:   1. Pain/analgesia  2. Metabolic vs Toxic Encephelopathy  3. Hx of TIA  Plan  1. Fentanyl Gtt; will try bolus'ing   - if adequate sedation will chnge to prn fentanyl's  2. APAP 650 Q  4 prn    Cardiovascular:   1. Symptomatic Bradycardia  2. Cardiogenic Shock  3. HFpEF  4. Severe Pulmonary HTN.  5. CAD.  6. Hx of Wide Complex Tachycardia    Echo 4/20 Interpretation Summary     1. Left ventricular systolic function is normal. The visual ejection fraction  is estimated at 60-65%.  2. No regional wall motion abnormalities noted.  3. The right ventricle is severely dilated. Mildly decreased right ventricular  systolic function.  4. There is severe biatrial dilatation.  5. There is moderate (2+) tricuspid regurgitation.  6. Severe pulmonary hypertension; the right ventricular systolic pressure is  approximated at 55mmHg plus the right atrial pressure.  7. In comparison with the prior study dated 4/12/21, the RV now significantly  dilated and estimated RVSP is now severe.    Plan  - Cardiology recommending continued diuresis with goal of - 1 to - 2 L net per day.  - Cards does not believe symptomatic bradycardia is the main issue.  - Patient has right heart failure with sever pulmonary htn.    - Will continue with aggressive diuresis for now to optimize right heart function.   - Goal to optimize to point where we can extubate patient   - Wean Epi as torey.  - maintain Maps > 65      Pulmonary/Ventilator Management:   1. Acute Hypoxic Respiratory Failure  2. Oxygenation/ventilation/mechanics   - Current Vent Settings: AC, Tv 450, f: 18, Peep: 5 FiO2 30%  3. ? COPD?    - Daughter states her mother was on home O2 in past    - Does not remember rate.  Plan  - PST once appropriate.    GI and Nutrition :   1. No Issues.  2. Protonix 40 IVP Q day  3. Enteral feedings  Plan  - Advance TF's to goal  - Senna PO Q 12    Renal/Fluids/Electrolytes:   1. CKD  2. Primary Respiratory Alkalosis, Chronic, with secondary Met. Alkalosis  3. - 1.3 L/24 hrs, - -5.4 L/admit, Urine: 1.9 L/24 hrs, - 3.3 kg/admit  Plan  - Net negative goal of 1 - 2 L/day  - Continue diuresis  - monitor BUN/Cr ratio    Infectious Disease:   1.   UTI   - Enterococcus Faecalis   - Klebsiellae pneumoniae.   - Zosyn 7 day regimen.   - Remove Lindsey catheter.   - Prewick   2 Afebrile.  3. Leukocytosis - 14.7  4. Monitor for fevers; pan Cx for temps > 101.5    Endocrine:   1. Hypothyroidism.  2. Stress induced hyperglycemia    Plan  - ICU insulin protocol, goal sugar <180  - Medium ISS    Hematology/Oncology:   1. Hx of DVT's  2. S/P IVC Filter.  3. Heparin Gtt  Plan  - Monitor Hb.     IV/Access:   1. Venous access - R IJ  2. Arterial access - Left Femoral A line.    Plan  - central access required and necessary      ICU Prophylaxis:   1. DVT: heparin Gtt, mechanical  2. VAP: HOB 30 degrees, chlorhexidine rinse  3. Stress Ulcer: PPI  4. Restraints: Nonviolent soft two point restraints required and necessary for patient safety and continued cares and good effect as patient continues to pull at necessary lines, tubes despite education and distraction. Will readdress daily.   5. Wound care  - Not Indicated  6. Feeding - nutrition consult placed today.  7. Family Update:At bedside.  8. Disposition - ICU.        Key goals for next 24 hours:   1. Continue diuresis  2. IV Zosyn      Medical History:     Past Medical History:   Diagnosis Date     Basal cell carcinoma of face 7/19/2017     History of DVT of lower extremity 09/25/2013     Neuropathy 9/25/2013     TIA (transient ischemic attack) 12/26/2019     Wide-complex tachycardia (H) 04/28/2020     Past Surgical History:   Procedure Laterality Date     APPENDECTOMY      teenager     AS LUMBAR SPINE FUSN,POST INTRBDY  2010     AS REVISE TOTAL HIP REPLACEMENT Right 2013     CHOLECYSTECTOMY  1967     ENDOMETRIAL SAMPLING (BIOPSY)  2001     SALPINGO OOPHORECTOMY,R/L/DI Left 1967     VENA CAVA FILTER  2010    Permanent      Social History     Socioeconomic History     Marital status:      Spouse name: Not on file     Number of children: Not on file     Years of education: Not on file     Highest education level: Not  on file   Occupational History     Not on file   Social Needs     Financial resource strain: Not on file     Food insecurity     Worry: Not on file     Inability: Not on file     Transportation needs     Medical: Not on file     Non-medical: Not on file   Tobacco Use     Smoking status: Never Smoker     Smokeless tobacco: Never Used   Substance and Sexual Activity     Alcohol use: Not Currently     Frequency: Never     Drug use: Never     Sexual activity: Not Currently     Partners: Male   Lifestyle     Physical activity     Days per week: Not on file     Minutes per session: Not on file     Stress: Not on file   Relationships     Social connections     Talks on phone: Not on file     Gets together: Not on file     Attends Jewish service: Not on file     Active member of club or organization: Not on file     Attends meetings of clubs or organizations: Not on file     Relationship status: Not on file     Intimate partner violence     Fear of current or ex partner: Not on file     Emotionally abused: Not on file     Physically abused: Not on file     Forced sexual activity: Not on file   Other Topics Concern     Not on file   Social History Narrative     Not on file        Allergies   Allergen Reactions     Aspirin GI Disturbance     Bleeding     Ace Inhibitors Cough     Citalopram Other (See Comments)     Tremors       Furosemide Rash     Lisinopril-Hydrochlorothiazide Cough     Penicillins Unknown     Reaction occurred as a child - unsure      Torsemide Rash     Tramadol Other (See Comments)     Lethargy              Escobar Medications:       chlorhexidine  15 mL Mouth/Throat Q12H     furosemide  40 mg Intravenous Q12H     insulin aspart  1-6 Units Subcutaneous Q4H     levothyroxine  75 mcg Oral or Feeding Tube QAM AC     multivitamins w/minerals  15 mL Per Feeding Tube Daily     [START ON 4/23/2021] pantoprazole  40 mg Per Feeding Tube QAM AC       dextrose       EPINEPHrine 0.03 mcg/kg/min (04/22/21 7869)      fentaNYL 100 mcg/hr (04/22/21 0756)     heparin 900 Units/hr (04/22/21 0930)     midazolam Stopped (04/22/21 0600)     norepinephrine Stopped (04/21/21 0437)        Home Meds  No current facility-administered medications on file prior to encounter.   acetaminophen (TYLENOL) 500 MG tablet, Take 2 tablets (1,000 mg) by mouth 3 times daily  amiodarone (PACERONE) 200 MG tablet, Take 1 tablet (200 mg) by mouth daily  apixaban ANTICOAGULANT (ELIQUIS) 5 MG tablet, Take 5 mg by mouth 2 times daily  atorvastatin (LIPITOR) 40 MG tablet, Take 1 tablet (40 mg) by mouth daily  DESITIN 40 % paste, Apply 1 Application topically as needed  folic acid (FOLVITE) 1 MG tablet, Take 1 mg by mouth daily At 3 pm  furosemide (LASIX) 20 MG tablet, Take 1 tab (20 mg) every other day, alternating with 2 tabs (40 mg) every other day  gabapentin (NEURONTIN) 600 MG tablet, Take 1 tablet (600 mg) by mouth 2 times daily  hydrocortisone (CORTAID) 1 % external cream, Apply 1 applicator topically 2 times daily as needed for itching   levothyroxine (SYNTHROID/LEVOTHROID) 75 MCG tablet, Take 75 mcg by mouth daily   loperamide (IMODIUM) 2 MG capsule, Take 2 mg by mouth 4 times daily as needed for diarrhea  losartan (COZAAR) 50 MG tablet, Take 1 tablet (50 mg) by mouth daily Hold for SBP <110 mmHg  melatonin 3 MG tablet, Take 3 mg by mouth At Bedtime  mirtazapine (REMERON) 7.5 MG tablet, Take 1 tablet (7.5 mg) by mouth At Bedtime  nystatin (MYCOSTATIN) 905508 UNIT/GM external cream, Apply topically 2 times daily  omeprazole (PRILOSEC) 20 MG DR capsule, Take 40 mg by mouth daily  oxyCODONE-acetaminophen (PERCOCET) 5-325 MG tablet, Take 1 tablet by mouth daily as needed for pain  polyethylene glycol (MIRALAX) 17 g packet, Take 1 packet by mouth daily as needed   simethicone (MYLICON) 125 MG chewable tablet, Take 125 mg by mouth 2 times daily as needed   triamcinolone (KENALOG) 0.1 % external cream, Apply 1 applicator topically 2 times daily as needed for  irritation To hand rash  vitamin B-12 (CYANOCOBALAMIN) 1000 MCG tablet, Take 1,000 mcg by mouth daily  diclofenac (VOLTAREN) 1 % topical gel, Apply topically 3 times daily Apply to right shoulder  order for DME, Equipment being ordered: walker with 4 wheels  order for DME, O2 2 LPM continuos (Patient taking differently: O2 2 LPM continuous)  order for DME, Equipment being ordered: knee high compression stockings 20-30 mm compression               Physical Examination:   Temp:  [97.7  F (36.5  C)-100  F (37.8  C)] 98.8  F (37.1  C)  Pulse:  [46-59] 49  Resp:  [18] 18  BP: ()/(43-69) 119/57  MAP:  [2 mmHg-94 mmHg] 73 mmHg  Arterial Line BP: (100-170)/(35-56) 146/45  FiO2 (%):  [50 %] 50 %  SpO2:  [89 %-100 %] 96 %    Intake/Output Summary (Last 24 hours) at 4/21/2021 0826  Last data filed at 4/21/2021 0600  Gross per 24 hour   Intake 402.78 ml   Output 4275 ml   Net -3872.22 ml     Wt Readings from Last 4 Encounters:   04/22/21 100.8 kg (222 lb 3.6 oz)   04/19/21 120.2 kg (265 lb 1.6 oz)   04/19/21 120.2 kg (265 lb 1.6 oz)   04/13/21 100.7 kg (222 lb 0.1 oz)     Arterial Line BP: (100-170)/(35-56) 146/45  MAP:  [2 mmHg-94 mmHg] 73 mmHg  BP - Mean:  [63-83] 81  Ventilation Mode: CMV/AC  (Continuous Mandatory Ventilation/ Assist Control)  FiO2 (%): 50 %  Rate Set (breaths/minute): 18 breaths/min  Tidal Volume Set (mL): 450 mL  PEEP (cm H2O): 10 cmH2O  Oxygen Concentration (%): 30 %  Resp: 18    Recent Labs   Lab 04/22/21  0425 04/21/21  1203 04/21/21  0845 04/20/21  1319 04/20/21  1030   PH 7.48*  --  7.54* 7.46*  --    PCO2 42  --  39 51*  --    PO2 115*  --  103 53*  --    HCO3 31*  --  33* 37*  --    O2PER 50% 60  --  80 VENOUS       GEN: no acute distress   HEENT: head ncat, sclera anicteric, OP patent, trachea midline   PULM: unlabored synchronous with vent, clear anteriorly    CV/COR: RRR S1S2 no gallop,  No rub, no murmur  ABD: soft nontender, hypoactive bowel sounds, no mass  EXT:  Edema   warm  NEURO:  grossly intact  SKIN: no obvious rash  LINES: clean, dry intact         Data:   All data and imaging reviewed     ROUTINE ICU LABS (Last four results)  CMP  Recent Labs   Lab 04/22/21  1124 04/21/21  0500 04/20/21  2100 04/20/21  1030 04/20/21  0640    142 143 143 143   POTASSIUM 3.1* 3.6 3.6 4.3 3.8   CHLORIDE 106 105 104 106 106   CO2 33* 35* 35* 39* 38*   ANIONGAP 4 2* 4 <1* <1*   * 89 84 103* 183*   BUN 31* 30 34* 37* 39*   CR 1.21* 1.10* 1.14* 1.10* 1.14*   GFRESTIMATED 41* 46* 44* 46* 44*   GFRESTBLACK 47* 53* 51* 53* 51*   FROY 8.2* 7.8* 7.9* 7.6* 7.1*   MAG  --   --  1.9  --   --    PROTTOTAL  --  5.3*  --  5.9* 5.3*   ALBUMIN  --  2.5*  --  2.8* 2.5*   BILITOTAL  --  0.9  --  0.5 0.2   ALKPHOS  --  65  --  82 74   AST  --  39  --  40 34   ALT  --  34  --  46 37     CBC  Recent Labs   Lab 04/22/21  1124 04/21/21  1635 04/21/21  0500 04/20/21  1355   WBC 14.7* 14.1* 13.2* 21.1*   RBC 2.89* 2.82* 3.22* 3.43*   HGB 8.1* 8.1* 8.9* 9.9*   HCT 27.4* 27.3* 31.2* 34.7*   MCV 95 97 97 101*   MCH 28.0 28.7 27.6 28.9   MCHC 29.6* 29.7* 28.5* 28.5*   RDW 16.4* 15.8* 15.5* 15.4*    208 204 263     INRNo lab results found in last 7 days.  Arterial Blood Gas  Recent Labs   Lab 04/22/21  0425 04/21/21  1203 04/21/21  0845 04/20/21  1319 04/20/21  1030   PH 7.48*  --  7.54* 7.46*  --    PCO2 42  --  39 51*  --    PO2 115*  --  103 53*  --    HCO3 31*  --  33* 37*  --    O2PER 50% 60  --  80 VENOUS       All cultures:  Recent Labs   Lab 04/21/21  0050 04/20/21  2355 04/20/21  1800 04/20/21  1700   CULT No growth after 1 day No growth after 1 day 10,000 to 50,000 colonies/mL  Klebsiella pneumoniae  Susceptibility testing in progress  *  50,000 to 100,000 colonies/mL  Enterococcus faecalis  Susceptibility testing in progress  *  <1000 colonies/mL  urogenital saji  Susceptibility testing not routinely done   Light growth  Normal saji       Recent Results (from the past 24 hour(s))   XR Chest Port 1 View     Narrative    CHEST ONE VIEW  2021 11:17 AM     HISTORY: Intubated.    COMPARISON: 2021      Impression    IMPRESSION: Endotracheal tube tip 2.5 cm in the kolby, slightly  higher in position than previous. Right IJ line stable. Increased  right base infiltrate since the comparison study. Similar left base  infiltrate.   Echocardiogram Limited    Narrative    [id number]  [id number]  [id number]  [id number]^MIKE^BENITEZ^MORAGN     [facility]  Echocardiography Laboratory  [address] [place] [id number]     Name: LUIS ALBERTO RICHARD  MRN: [id number]  : 1935  Study Date: 2021 11:11 AM  Age: 85 yrs  Gender: Female  Patient Location: [facility]  Reason For Study: Shock  Ordering Physician: BENITEZ MCKEON  Referring Physician: SAMANTHA CARABALLO  Performed By: Ritchie Rizo RDCS     BSA: 2.2 m2  Height: 65 in  Weight: 256 lb  HR: 57  BP: 105/63 mmHg  ______________________________________________________________________________  Procedure  Limited Portable Echo Adult. Optison (NDC #[id number]) given intravenously.  ______________________________________________________________________________  Interpretation Summary     1. Left ventricular systolic function is normal. The visual ejection fraction  is estimated at 60-65%.  2. No regional wall motion abnormalities noted.  3. The right ventricle is severely dilated. Mildly decreased right ventricular  systolic function.  4. There is severe biatrial dilatation.  5. There is moderate (2+) tricuspid regurgitation.  6. Severe pulmonary hypertension; the right ventricular systolic pressure is  approximated at 55mmHg plus the right atrial pressure.  7. In comparison with the prior study dated 21, the RV now significantly  dilated and estimated RVSP is now severe.  ______________________________________________________________________________  Left Ventricle  Left ventricular systolic function is normal. The visual ejection fraction  is  estimated at 60-65%. No regional wall motion abnormalities noted.     Right Ventricle  The right ventricle is severely dilated. Mildly decreased right ventricular  systolic function.     Atria  The left atrium is severely dilated. The right atrium is severely dilated.     Mitral Valve  There is mild mitral annular calcification. There is mild (1+) mitral  regurgitation.     Tricuspid Valve  The tricuspid valve is normal in structure and function. There is moderate  (2+) tricuspid regurgitation. The right ventricular systolic pressure is  approximated at 55mmHg plus the right atrial pressure. Right ventricular  systolic pressure is elevated, consistent with severe pulmonary hypertension.     Aortic Valve  The aortic valve is trileaflet with aortic valve sclerosis.     Pericardium  There is no pericardial effusion.     Rhythm  Sinus rhythm was noted.     ______________________________________________________________________________  MMode/2D Measurements & Calculations  IVSd: 1.2 cm  LVIDd: 4.4 cm  LVIDs: 2.7 cm  LVPWd: 1.2 cm  FS: 37.5 %  LV mass(C)d: 184.9 grams  LV mass(C)dI: 84.1 grams/m2  asc Aorta Diam: 3.4 cm  RWT: 0.53     Doppler Measurements & Calculations  TV V2 max: 371.0 cm/sec  TV max P.1 mmHg     ______________________________________________________________________________  Report approved by: Irma Pickering 2021 12:07 PM         XR Abdomen Port 1 View    Narrative    EXAM: XR ABDOMEN PORTABLE 1 VIEWS  LOCATION: [facility]  DATE/TIME: 2021, 5:54 AM    INDICATION: OG tube placement.  COMPARISON: None.      Impression    IMPRESSION: Orogastric tube passes into the stomach. The tip is not seen on this film. ET tube approximately 4 cm above the kolby. Bibasilar pulmonary infiltrates.            Billing: This patient is critically ill: Yes. Total critical care time today 45 min.

## 2021-04-22 NOTE — PROGRESS NOTES
[facility] ICU RESPIRATORY NOTE        Date of Admission: 4/20/2021    Date of Intubation (most recent): 4/20/21    Reason for Mechanical Ventilation: AW protection    Number of Days on Mechanical Ventilation: 3    Met Criteria for Spontaneous Breathing Trial: No    Significant Events Today: None overnight    ABG Results:   Recent Labs   Lab 04/21/21  1203 04/21/21  0845 04/20/21  1319 04/20/21  1030 04/20/21  0740   PH  --  7.54* 7.46*  --   --    PCO2  --  39 51*  --   --    PO2  --  103 53*  --   --    HCO3  --  33* 37*  --   --    O2PER 60  --  80 VENOUS 95%       Current Vent Settings: Ventilation Mode: CMV/AC  (Continuous Mandatory Ventilation/ Assist Control)  FiO2 (%): 50 %  Rate Set (breaths/minute): 18 breaths/min  Tidal Volume Set (mL): 450 mL  PEEP (cm H2O): 10 cmH2O  Oxygen Concentration (%): 50 %  Resp: 18      Skin Assessment: No Skin issues    Plan: Continue vent support and daily wean assessments    Nuvia Fowler, RT

## 2021-04-22 NOTE — PLAN OF CARE
[facility] Intensive Care Unit   Nursing Note                                                       Neuro:  PERRL. Withdraws x 4 to pain,  Does not follow commands.  Cardiovascular:  SB 40s-50s, no pacing required during the night.  Keeping MAP > 65 with epinephrine gtt titrated accordingly, currently at 0.05.  Pulmonary:  Tolerating ventilator fentanyl, versed titrated off.  Oxygen saturation > 92 on 50% FiO2.  GI/:  Adequate UOP, navas in place.   Endocrine: Glucose well controlled.  Skin:  Intact.  Protective mepilex to sacrum.  Restraints:  Not in use or necessary.    Family updated  Continue to monitor closely.    Recent Labs   Lab 04/22/21  0425 04/21/21  1203 04/21/21  0845 04/20/21  1319 04/20/21  1030   PH 7.48*  --  7.54* 7.46*  --    PCO2 42  --  39 51*  --    PO2 115*  --  103 53*  --    HCO3 31*  --  33* 37*  --    O2PER 50% 60  --  80 VENOUS       Lab Results   Component Value Date    TROPI 0.074 (H) 04/20/2021    TROPI 0.030 04/20/2021    TROPI <0.015 04/10/2021    TROPI <0.015 05/20/2020       ROUTINE IP LABS (Last four results)  BMP  Recent Labs   Lab 04/21/21  0500 04/20/21  2100 04/20/21  1030 04/20/21  0640    143 143 143   POTASSIUM 3.6 3.6 4.3 3.8   CHLORIDE 105 104 106 106   FROY 7.8* 7.9* 7.6* 7.1*   CO2 35* 35* 39* 38*   BUN 30 34* 37* 39*   CR 1.10* 1.14* 1.10* 1.14*   GLC 89 84 103* 183*     CBC  Recent Labs   Lab 04/21/21  1635 04/21/21  0500 04/20/21  1355 04/20/21  1030   WBC 14.1* 13.2* 21.1* 18.8*   RBC 2.82* 3.22* 3.43* 3.54*   HGB 8.1* 8.9* 9.9* 10.0*   HCT 27.3* 31.2* 34.7* 36.4   MCV 97 97 101* 103*   MCH 28.7 27.6 28.9 28.2   MCHC 29.7* 28.5* 28.5* 27.5*   RDW 15.8* 15.5* 15.4* 15.2*    204 263 250     INRNo lab results found in last 7 days.  PTT  Recent Labs   Lab 04/22/21  0055 04/21/21  1635 04/21/21  0500 04/20/21  1955   PTT 47* 98* 100* 74*     LACTIC ACID  Lactic Acid (mmol/L)   Date Value   04/20/2021 1.1   05/20/2020 0.7     Blood Glucose  Glucose  (mg/dL)   Date Value   04/22/2021 99   04/22/2021 77   04/21/2021 110 (H)   04/21/2021 97   04/21/2021 111 (H)   04/21/2021 117 (H)       Intake/Output Summary (Last 24 hours) at 4/22/2021 0507  Last data filed at 4/22/2021 0400  Gross per 24 hour   Intake 584.95 ml   Output 2325 ml   Net -1740.05 ml       Eda Nava BSN RN   [facility]  Intensive Care Unit

## 2021-04-22 NOTE — PROGRESS NOTES
[facility]    Cardiology Progress Note    Date of Service (when I saw the patient): 04/22/2021            Assessment and Plan:   ASSESSMENT:  1.  CHFpEF:  With exacerbation, evidence for volume overload on exam.  Echocardiogram with elevated pulmonary pressures and RV volume overload.    Dry weight appears to be at least 213 pounds or less   2.  .  Acute on chronic hypoxic/hypercapnic respiratory failure:  Uses 2L of O2 at baseline.  Reportedly frequently has nasal cannula not in place.   Acute worsening of hypoxia in the setting of volume overload  3.   SSS/Bradycardia:  Resolved with correction of acidosis and hypoxia, recurred overnight with pacer back up rate set to 30, now back in sinus rhythm  4.  Hx of wide complex tachycardia:  Felt to be SVT with aberrancy.    Has been maintained on amiodarone.       RECOMMENDATIONS:  1.  Continue IV diuresis; titrate lasix as needed to goal 1-2L net negative daily.   2.  Hold amiodarone given bradycardia.  Keep external pacer pads in place with back up rate of 30 bpm per EP.   No acute indication for temporary or permanent pacemaker.  May ultimately benefit from pacemaker implantation for SSS per EP, but certainly not primary issue now.    3.  Wean epinephrine gtt as able.    4.  Cardiology will continue to follow to assist with diuresis.          Bee Bah MD [facility]    I personally examined and evaluated the patient today.  Caitlin Sales was in (or remains in) critical condition today due to decompensated heart failure/volume overload.    I personally managed IV diuresis.    I spent a total of 30 minutes providing critical care services at the bedside, evaluating the patient, directing care and reviewing laboratory values and radiologic reports,    Bee Bah MD            Interval History:     No new issues overnight.         Medications:       chlorhexidine  15 mL Mouth/Throat Q12H     furosemide  40 mg  Intravenous Q12H     insulin aspart  1-6 Units Subcutaneous Q4H     levothyroxine  75 mcg Oral or Feeding Tube QAM AC     multivitamins w/minerals  15 mL Per Feeding Tube Daily     [START ON 2021] pantoprazole  40 mg Per Feeding Tube QAM AC     piperacillin-tazobactam  3.375 g Intravenous Q6H     protein modular  1 packet Per Feeding Tube Q8H            Physical Exam:   Blood pressure 119/57, pulse 53, temperature 99.7  F (37.6  C), resp. rate 18, weight 100.8 kg (222 lb 3.6 oz), SpO2 98 %, not currently breastfeeding.  Vitals:    21 0445 21 0000   Weight: 104.1 kg (229 lb 8 oz) 100.8 kg (222 lb 3.6 oz)       Intake/Output Summary (Last 24 hours) at 2021 1621  Last data filed at 2021 1400  Gross per 24 hour   Intake 634.2 ml   Output 1900 ml   Net -1265.8 ml           Vital Sign Ranges  Temperature Temp  Av  F (37.2  C)  Min: 97.7  F (36.5  C)  Max: 100  F (37.8  C)   Blood pressure Systolic (24hrs), Av , Min:117 , Max:120        Diastolic (24hrs), Av, Min:48, Max:57      Pulse Pulse  Av.5  Min: 47  Max: 62   Respirations Resp  Av.9  Min: 9  Max: 18   Pulse oximetry SpO2  Av.4 %  Min: 89 %  Max: 100 %         EXAM:    Constitutional:    intubated   Skin:    normal    Head:    Normocephalic, atramatic    Eyes:    pupils equal, round and reactive to light, extra ocular muscles intact, sclera clear, conjunctiva normal    Neck:    JVP    Lungs:    CTAB   Cardiovascular:    S1, S2 RRR no m/r/g   Abdomen:    normal bowel sounds    Extremities and Back:    no cyanosis or clubbing and No edema bilaterally   Neurological:    No gross or focal neurologic abnormalities               Data:     Recent Labs   Lab Test 21  1124 21  1635 20  1705 20  1705 20  0000 20   WBC 14.7* 14.1*   < > 7.7   < >  --    HGB 8.1* 8.1*   < > 9.8*   < >  --    MCV 95 97   < > 104*   < >  --     208   < > 206   < >  --    INR  --   --   --  1.37*  --   2.0*    < > = values in this interval not displayed.      Recent Labs   Lab Test 04/22/21  1124 04/21/21  0500    142   POTASSIUM 3.1* 3.6   CHLORIDE 106 105   BUN 31* 30   CR 1.21* 1.10*     Recent Labs   Lab 04/22/21  1124 04/21/21  0500 04/20/21  2100 04/20/21  1030   * 89 84 103*     Recent Labs   Lab Test 04/21/21  0500 04/20/21  1030   ALT 34 46   AST 39 40     Troponin I ES   Date Value Ref Range Status   04/20/2021 0.074 (H) 0.000 - 0.045 ug/L Final     Comment:     The 99th percentile for upper reference range is 0.045 ug/L.  Troponin values   in the range of 0.045 - 0.120 ug/L may be associated with risks of adverse   clinical events.     04/20/2021 0.030 0.000 - 0.045 ug/L Final     Comment:     The 99th percentile for upper reference range is 0.045 ug/L.  Troponin values   in the range of 0.045 - 0.120 ug/L may be associated with risks of adverse   clinical events.     04/10/2021 <0.015 0.000 - 0.045 ug/L Final     Comment:     The 99th percentile for upper reference range is 0.045 ug/L.  Troponin values   in the range of 0.045 - 0.120 ug/L may be associated with risks of adverse   clinical events.     05/20/2020 <0.015 0.000 - 0.045 ug/L Final     Comment:     The 99th percentile for upper reference range is 0.045 ug/L.  Troponin values   in the range of 0.045 - 0.120 ug/L may be associated with risks of adverse   clinical events.           Recent Labs   Lab Test 04/22/21  1124 04/20/21 2100 04/11/21  0416   MAG 2.3 1.9 2.2       No results found for: CHOL  No results found for: HDL  No results found for: LDL  No results found for: TRIG  No results found for: CHOLHDLRATIO       TSH   Date Value Ref Range Status   04/20/2021 3.01 0.40 - 4.00 mU/L Final       Bee Bah MD, FACC  Cardiology

## 2021-04-22 NOTE — PROGRESS NOTES
CLINICAL NUTRITION SERVICES - BRIEF NOTE    Current Nutrition Support    New Findings   -TF was started at [facility], 10 ml/hr  -Wt: 100.8 kg, I/O: 620/1950  -Continues on lasix, epi  -versed and norepi stopped  -2 small BMs  -Per MD, advance to goal TF  -K:3.1(L), Mag/Phos - wnl    Assessed Needs  Dosing Weight: 104 kg, actual body weight for energy, 56.8 kg for protein  Energy: 0124-8392 kcals (14-17 kcals/kg)   Protein:  grams (1.5-2 g/kg)     Interventions  Increase TF rate by 10 ml q 12 hours to goal:  TwoCal HN @ 30 mL/hr (720 mL/day) to provide 1440 kcals (15 kcal/kg/day), 60 g PRO, 504 mL H2O, 158 g CHO and 4 g Fiber daily.   Add Prosource 1 packet TID - 120 kcals, 33 g Pro   Total Provisions: 1560 kcals (16 kcal/kg) and 93 gm protein (1.6 g/kg)     Shoshana Ortiz   Dietetic Intern

## 2021-04-22 NOTE — PLAN OF CARE
OT/PT: Per discussion with RN, pt is not appropriate for therapy today and advised to sign-off. Please reorder therapy when pt is appropriate.

## 2021-04-22 NOTE — PLAN OF CARE
Neuro: Sedated on fentanyl and versed gtts.  Versed decreased in afternoon.  Followed a simple command earlier, difficult to replicate.    CV:  Sinus sylvia 50-60 range.  On discussion with cardiology/EP, backup pacer rate decreased to 30.  Switched to epi gtt overnight from levophed.  Unable to wean off epi gtt.  Heparin gtt continues.  Arterial line placed.    Resp:  Decreased FiO2 from 70% to 50%, P10.  Lungs coarse but sound improved vs yesterday.    GI/:  Diuresis continues.  TF to start via OG.  No BM.    Skin:  Warm, pale, dry.    Bruising on left buttocks and some excoriated areas in abdominal folds.  Protective mepilex on coccyx.  Turned Q2 with continuous rotation on bed.    Daughter Gale at bedside throughout day and updated regularly by myself.

## 2021-04-22 NOTE — PROGRESS NOTES
formerly [facility] ICU RESPIRATORY NOTE        Date of Admission: 4/20/2021    Date of Intubation (most recent): 4/20/12    Reason for Mechanical Ventilation: AW protection    Number of Days on Mechanical Ventilation:  3    Met Criteria for Spontaneous Breathing Trial: No    Reason for No Spontaneous Breathing Trial: Per MD    Significant Events Today: None    ABG Results:   Recent Labs   Lab 04/22/21  0425 04/21/21  1203 04/21/21  0845 04/20/21  1319 04/20/21  1030   PH 7.48*  --  7.54* 7.46*  --    PCO2 42  --  39 51*  --    PO2 115*  --  103 53*  --    HCO3 31*  --  33* 37*  --    O2PER 50% 60  --  80 VENOUS       Current Vent Settings: Ventilation Mode: CMV/AC  (Continuous Mandatory Ventilation/ Assist Control)  FiO2 (%): 30 %  Rate Set (breaths/minute): 18 breaths/min  Tidal Volume Set (mL): 450 mL  PEEP (cm H2O): 10 cmH2O  Oxygen Concentration (%): 30 %  Resp: 16      Plan:  Pt to remain on full vent support overnight    Doug Lorenzo, RT

## 2021-04-23 NOTE — PROGRESS NOTES
[facility]    Cardiology Progress Note    Date of Service (when I saw the patient): 04/23/2021            Assessment and Plan:   ASSESSMENT:  1.  CHFpEF:  With exacerbation, evidence for volume overload on exam.  Echocardiogram with elevated pulmonary pressures and RV volume overload.    Dry weight appears to be at least 213 pounds or less   2.  Acute on chronic hypoxic/hypercapnic respiratory failure:  Uses 2L of O2 at baseline.  Reportedly frequently has nasal cannula not in place.   Acute worsening of hypoxia in the setting of volume overload  3.   SSS/Bradycardia:  Resolved with correction of acidosis and hypoxia, recurred overnight with pacer back up rate set to 30, now back in sinus rhythm.  Inappropriate HR response in the setting of significant acute illness  4.  Hx of wide complex tachycardia:  Felt to be SVT with aberrancy.    Has been maintained on amiodarone.       RECOMMENDATIONS:  1.  Continue IV diuresis; titrate lasix as needed to goal 1-2L net negative daily.   2.  Hold amiodarone given bradycardia.   No acute indication for temporary or permanent pacemaker.  May ultimately benefit from pacemaker implantation for SSS per EP, but certainly not primary issue now.    3. Will continue IV diuresis over the weekend.  Diuresing well, renal function remains stable and no longer requiring pressors.  However, she remains sedated with ongoing encephalopathy and has not tolerating breathing trials.  Family hopeful she will ultimately be successfully extubated and eventually able to discharge.  However, more broadly, we reviewed her recent medical course, hospitalizations and overall medical picture which is one of steady decline.   Palliative care consult is certainly reasonable as planned on Monday.        Bee Bah MD [facility]            Interval History:     Diuresing.  Weight down 8 pounds, net 8.5L out       Medications:       chlorhexidine  15 mL  Mouth/Throat Q12H     furosemide  40 mg Intravenous Q12H     insulin aspart  1-6 Units Subcutaneous Q4H     levothyroxine  75 mcg Oral or Feeding Tube QAM AC     multivitamins w/minerals  15 mL Per Feeding Tube Daily     pantoprazole  40 mg Per Feeding Tube QAM AC     piperacillin-tazobactam  3.375 g Intravenous Q6H     protein modular  1 packet Per Feeding Tube Q8H            Physical Exam:   Blood pressure 110/51, pulse 64, temperature 100.8  F (38.2  C), temperature source Esophageal, resp. rate 12, weight 100.2 kg (220 lb 14.4 oz), SpO2 (!) 84 %, not currently breastfeeding.  Vitals:    21 0445 21 0000 21 0500   Weight: 104.1 kg (229 lb 8 oz) 100.8 kg (222 lb 3.6 oz) 100.2 kg (220 lb 14.4 oz)       Intake/Output Summary (Last 24 hours) at 2021 1651  Last data filed at 2021 1600  Gross per 24 hour   Intake 1352.65 ml   Output 3575 ml   Net -2222.35 ml           Vital Sign Ranges  Temperature Temp  Av.4  F (38  C)  Min: 99.1  F (37.3  C)  Max: 101.7  F (38.7  C)   Blood pressure Systolic (24hrs), Av , Min:92 , Max:188        Diastolic (24hrs), Av, Min:41, Max:144      Pulse Pulse  Av.3  Min: 48  Max: 75   Respirations Resp  Av.9  Min: 9  Max: 29   Pulse oximetry SpO2  Av.8 %  Min: 84 %  Max: 100 %         EXAM:    Constitutional:    Intubated and sedated    Skin:    normal    Head:    Normocephalic, atramatic    Eyes:    pupils equal, round and reactive to light, extra ocular muscles intact, sclera clear, conjunctiva normal    Neck:    JVP    Lungs:    Coarse breath sounds bilaterally   Cardiovascular:    S1, S2    Abdomen:    normal bowel sounds    Extremities and Back:    no cyanosis or clubbing and No edema    Neurological:    No gross or focal neurologic abnormalities               Data:     Recent Labs   Lab Test 21  0500 21  1124 20  1705 20  1705 20  0000 20   WBC 15.5* 14.7*   < > 7.7   < >  --    HGB 8.0* 8.1*    < > 9.8*   < >  --    MCV 96 95   < > 104*   < >  --     196   < > 206   < >  --    INR  --   --   --  1.37*  --  2.0*    < > = values in this interval not displayed.      Recent Labs   Lab Test 04/23/21  1245 04/23/21  0500 04/22/21  1124 04/22/21  1124   NA  --  143  --  143   POTASSIUM 3.1* 3.0*   < > 3.1*   CHLORIDE  --  108  --  106   BUN  --  29  --  31*   CR  --  1.21*  --  1.21*    < > = values in this interval not displayed.     Recent Labs   Lab 04/23/21  0500 04/22/21  1124 04/21/21  0500 04/20/21  2100   * 126* 89 84     Recent Labs   Lab Test 04/23/21  0500 04/21/21  0500   ALT 56* 34   AST 47* 39     Troponin I ES   Date Value Ref Range Status   04/20/2021 0.074 (H) 0.000 - 0.045 ug/L Final     Comment:     The 99th percentile for upper reference range is 0.045 ug/L.  Troponin values   in the range of 0.045 - 0.120 ug/L may be associated with risks of adverse   clinical events.     04/20/2021 0.030 0.000 - 0.045 ug/L Final     Comment:     The 99th percentile for upper reference range is 0.045 ug/L.  Troponin values   in the range of 0.045 - 0.120 ug/L may be associated with risks of adverse   clinical events.     04/10/2021 <0.015 0.000 - 0.045 ug/L Final     Comment:     The 99th percentile for upper reference range is 0.045 ug/L.  Troponin values   in the range of 0.045 - 0.120 ug/L may be associated with risks of adverse   clinical events.     05/20/2020 <0.015 0.000 - 0.045 ug/L Final     Comment:     The 99th percentile for upper reference range is 0.045 ug/L.  Troponin values   in the range of 0.045 - 0.120 ug/L may be associated with risks of adverse   clinical events.           Recent Labs   Lab Test 04/22/21  1124 04/20/21  2100 04/11/21  0416   MAG 2.3 1.9 2.2            TSH   Date Value Ref Range Status   04/20/2021 3.01 0.40 - 4.00 mU/L Final         Bee Bah MD, FACC  Cardiology

## 2021-04-23 NOTE — PROGRESS NOTES
[facility] ICU RESPIRATORY NOTE        Date of Admission: 4/20/2021    Date of Intubation (most recent):  4/20/21    Reason for Mechanical Ventilation: AW protection    Number of Days on Mechanical Ventilation: 4    Met Criteria for Spontaneous Breathing Trial: Yes but pt failed within minutes    Significant Events Today: None    ABG Results:   Recent Labs   Lab 04/22/21  0425 04/21/21  1203 04/21/21  0845 04/20/21  1319 04/20/21  1030   PH 7.48*  --  7.54* 7.46*  --    PCO2 42  --  39 51*  --    PO2 115*  --  103 53*  --    HCO3 31*  --  33* 37*  --    O2PER 50% 60  --  80 VENOUS       Current Vent Settings: Ventilation Mode: CMV/AC  (Continuous Mandatory Ventilation/ Assist Control)  FiO2 (%): 30 %  Rate Set (breaths/minute): 14 breaths/min  Tidal Volume Set (mL): 450 mL  PEEP (cm H2O): 5 cmH2O  Oxygen Concentration (%): 30 %  Resp: 18    Plan:  Pt to remain on full vent support overnight    Doug Lorenzo, RT

## 2021-04-23 NOTE — PROGRESS NOTES
Critical Care  Note      04/23/2021    Name: Caitlin Sales MRN#: [id number]   Age: 85 year old YOB: 1935     Hsptl Day# 3  ICU DAY #    MV DAY #             Problem List:   Active Problems:    Decompensated heart failure (H)           Summary/Hospital Course:     85 F has a past medical history of wide complex tachycardia, hyperlipidemia, CAD, diastolic CHF, hypertension, chronic respiratory failure, history of DVT on chronic anticoagulation, history of TIA, R vertebral artery stenosis, neuropathy, GIB, anemia,  obesity, osteoarthritis, chronic back pain, GERD, dementia recent hospitalization 4/10-4/16 presented back from  from nursing home with decrease LOC and hypoxemia ultimately requiring external pacing en route to ED.  She was intubated in ED, CVC placed and pressors started but still required external pacing to maintain hemodynamics.      On arrival to [facility] - pt tremulous on arrival, cool extremities.  Per EMS - difficult to sedate prior to transfer and during intubation, described sats 40-70% on RA prior to ED (unknown length of time with hypoxemia). Synchronous with vent paced at 70 externally, vasopressors running but labile. Pink frothy secretions from ETT. COVID neg from outside ED     Overnight Events:     Patient febrile overnight with Tmax 101.5  Patient remains bradycardic.  Epinephrine weaned off.  Bowel Movement this am.      Assessment and plan :     Caitlin Sales IS a 85 year old female admitted on 4/20/2021 for hypoxia, hypotension, bradycardia requiring pacing, & acute hypoxic respiratory failure.   I have personally reviewed the daily labs, imaging studies, cultures and discussed the case with referring physician and consulting physicians.     My assessment and plan by system for this patient is as follows:    Neurology/Psychiatry:   1. Pain/analgesia  2. Metabolic vs Toxic Encephelopathy  3. Hx of TIA  Plan  1. Stop Fentanyl Gtt; switch to prn pushes of fentanyl  2.  Stop Versed Gtt  3. APAP 650 Q 4 prn    Cardiovascular:   1. Symptomatic Bradycardia  2. Cardiogenic Shock  3. HFpEF  4. Severe Pulmonary HTN.  5. CAD.  6. Hx of Wide Complex Tachycardia    Echo 4/20 Interpretation Summary     1. Left ventricular systolic function is normal. The visual ejection fraction  is estimated at 60-65%.  2. No regional wall motion abnormalities noted.  3. The right ventricle is severely dilated. Mildly decreased right ventricular  systolic function.  4. There is severe biatrial dilatation.  5. There is moderate (2+) tricuspid regurgitation.  6. Severe pulmonary hypertension; the right ventricular systolic pressure is  approximated at 55mmHg plus the right atrial pressure.  7. In comparison with the prior study dated 4/12/21, the RV now significantly  dilated and estimated RVSP is now severe.    Plan  - Pacer currently off as rate is set @ 60.  - Per Cards patient may need permanent pacer for SSS, but not at this time.  - Hold Amiodarone.  - Continue IV Lasix with goal of - 1 to -2 L daily    Pulmonary/Ventilator Management:   1. Acute Hypoxic Respiratory Failure  2. Oxygenation/ventilation/mechanics   - Current Vent Settings: AC, Tv 450, f: 18, Peep: 5 FiO2 Wean as torey.  3. ? Hx of COPD?    - Daughter states her mother was on home O2 in past    - Does not remember O2  rate.  Plan  - Daily SBT trials.   - failed today   - Possible extubation tomorrow.  GI and Nutrition :   1. No Issues.  2. Protonix 40 IVP Q day  3. Enteral feedings  Plan  - Nutrition consult today.  - Plan for TF's via OG tube  - Senna PO Q 12    Renal/Fluids/Electrolytes:   1. CKD  2. Primary Respiratory Alkalosis, Chronic, with secondary Met. Alkalosis  3. - 2.0 L/24 hrs, - -7.4 L/admit, Urine: 2.9 L/24 hrs, -3.9 kg  Plan  - Follow I's & O's    Infectious Disease:   1. UTI.  2. Pyrexia Tmax of   3. Leukocytosis - 15.5  4. Monitor for fevers; pan Cx for temps > 101.5    Endocrine:   1. Hypothyroidism.  2. Stress induced  hyperglycemia    Plan  - ICU insulin protocol, goal sugar <180  - Medium ISS    Hematology/Oncology:   1. Hx of DVT's  2. S/P IVC Filter.  3. Heparin Gtt  Plan  - Monitor Hb.     IV/Access:   1. Venous access - R IJ  2. Arterial access - No A line    Plan  - central access required and necessary      ICU Prophylaxis:   1. DVT: heparin Gtt, mechanical  2. VAP: HOB 30 degrees, chlorhexidine rinse  3. Stress Ulcer: PPI  4. Restraints: Nonviolent soft two point restraints required and necessary for patient safety and continued cares and good effect as patient continues to pull at necessary lines, tubes despite education and distraction. Will readdress daily.   5. Wound care  - consulted for pacer burn  6. Feeding - Continue.  7. Family Update:At bedside.  8. Disposition - ICU.        Key goals for next 24 hours:   1. SBT.  2. Possible extubation tomorrow.  3. Continue diuresis.             Medical History:     Past Medical History:   Diagnosis Date     Basal cell carcinoma of face 7/19/2017     History of DVT of lower extremity 09/25/2013     Neuropathy 9/25/2013     TIA (transient ischemic attack) 12/26/2019     Wide-complex tachycardia (H) 04/28/2020     Past Surgical History:   Procedure Laterality Date     APPENDECTOMY      teenager     AS LUMBAR SPINE FUSN,POST INTRBDY  2010     AS REVISE TOTAL HIP REPLACEMENT Right 2013     CHOLECYSTECTOMY  1967     ENDOMETRIAL SAMPLING (BIOPSY)  2001     SALPINGO OOPHORECTOMY,R/L/DI Left 1967     VENA CAVA FILTER  2010    Permanent      Social History     Socioeconomic History     Marital status:      Spouse name: Not on file     Number of children: Not on file     Years of education: Not on file     Highest education level: Not on file   Occupational History     Not on file   Social Needs     Financial resource strain: Not on file     Food insecurity     Worry: Not on file     Inability: Not on file     Transportation needs     Medical: Not on file     Non-medical: Not  on file   Tobacco Use     Smoking status: Never Smoker     Smokeless tobacco: Never Used   Substance and Sexual Activity     Alcohol use: Not Currently     Frequency: Never     Drug use: Never     Sexual activity: Not Currently     Partners: Male   Lifestyle     Physical activity     Days per week: Not on file     Minutes per session: Not on file     Stress: Not on file   Relationships     Social connections     Talks on phone: Not on file     Gets together: Not on file     Attends Jewish service: Not on file     Active member of club or organization: Not on file     Attends meetings of clubs or organizations: Not on file     Relationship status: Not on file     Intimate partner violence     Fear of current or ex partner: Not on file     Emotionally abused: Not on file     Physically abused: Not on file     Forced sexual activity: Not on file   Other Topics Concern     Not on file   Social History Narrative     Not on file        Allergies   Allergen Reactions     Aspirin GI Disturbance     Bleeding     Ace Inhibitors Cough     Citalopram Other (See Comments)     Tremors       Furosemide Rash     Lisinopril-Hydrochlorothiazide Cough     Penicillins Unknown     Reaction occurred as a child - unsure      Torsemide Rash     Tramadol Other (See Comments)     Lethargy              Escobar Medications:       chlorhexidine  15 mL Mouth/Throat Q12H     furosemide  40 mg Intravenous Q12H     insulin aspart  1-6 Units Subcutaneous Q4H     levothyroxine  75 mcg Oral or Feeding Tube QAM AC     multivitamins w/minerals  15 mL Per Feeding Tube Daily     pantoprazole  40 mg Per Feeding Tube QAM AC     piperacillin-tazobactam  3.375 g Intravenous Q6H     protein modular  1 packet Per Feeding Tube Q8H       dextrose       EPINEPHrine 0.03 mcg/kg/min (04/23/21 0258)     heparin 1,050 Units/hr (04/23/21 0707)     norepinephrine Stopped (04/21/21 0437)     sodium chloride 10 mL/hr at 04/23/21 0709        Home Meds  No current  facility-administered medications on file prior to encounter.   acetaminophen (TYLENOL) 500 MG tablet, Take 2 tablets (1,000 mg) by mouth 3 times daily  amiodarone (PACERONE) 200 MG tablet, Take 1 tablet (200 mg) by mouth daily  apixaban ANTICOAGULANT (ELIQUIS) 5 MG tablet, Take 5 mg by mouth 2 times daily  atorvastatin (LIPITOR) 40 MG tablet, Take 1 tablet (40 mg) by mouth daily  DESITIN 40 % paste, Apply 1 Application topically as needed  folic acid (FOLVITE) 1 MG tablet, Take 1 mg by mouth daily At 3 pm  furosemide (LASIX) 20 MG tablet, Take 1 tab (20 mg) every other day, alternating with 2 tabs (40 mg) every other day  gabapentin (NEURONTIN) 600 MG tablet, Take 1 tablet (600 mg) by mouth 2 times daily  hydrocortisone (CORTAID) 1 % external cream, Apply 1 applicator topically 2 times daily as needed for itching   levothyroxine (SYNTHROID/LEVOTHROID) 75 MCG tablet, Take 75 mcg by mouth daily   loperamide (IMODIUM) 2 MG capsule, Take 2 mg by mouth 4 times daily as needed for diarrhea  losartan (COZAAR) 50 MG tablet, Take 1 tablet (50 mg) by mouth daily Hold for SBP <110 mmHg  melatonin 3 MG tablet, Take 3 mg by mouth At Bedtime  mirtazapine (REMERON) 7.5 MG tablet, Take 1 tablet (7.5 mg) by mouth At Bedtime  nystatin (MYCOSTATIN) 400308 UNIT/GM external cream, Apply topically 2 times daily  omeprazole (PRILOSEC) 20 MG DR capsule, Take 40 mg by mouth daily  oxyCODONE-acetaminophen (PERCOCET) 5-325 MG tablet, Take 1 tablet by mouth daily as needed for pain  polyethylene glycol (MIRALAX) 17 g packet, Take 1 packet by mouth daily as needed   simethicone (MYLICON) 125 MG chewable tablet, Take 125 mg by mouth 2 times daily as needed   triamcinolone (KENALOG) 0.1 % external cream, Apply 1 applicator topically 2 times daily as needed for irritation To hand rash  vitamin B-12 (CYANOCOBALAMIN) 1000 MCG tablet, Take 1,000 mcg by mouth daily  diclofenac (VOLTAREN) 1 % topical gel, Apply topically 3 times daily Apply to right  shoulder  order for DME, Equipment being ordered: walker with 4 wheels  order for DME, O2 2 LPM continuos (Patient taking differently: O2 2 LPM continuous)  order for DME, Equipment being ordered: knee high compression stockings 20-30 mm compression               Physical Examination:   Temp:  [99.1  F (37.3  C)-101.7  F (38.7  C)] 99.9  F (37.7  C)  Pulse:  [48-75] 56  Resp:  [9-29] 18  BP: ()/() 119/50  MAP:  [45 mmHg-217 mmHg] 79 mmHg  Arterial Line BP: ()/() 129/54  FiO2 (%):  [30 %] 30 %  SpO2:  [90 %-100 %] 95 %    Intake/Output Summary (Last 24 hours) at 4/21/2021 0826  Last data filed at 4/21/2021 0600  Gross per 24 hour   Intake 402.78 ml   Output 4275 ml   Net -3872.22 ml     Wt Readings from Last 4 Encounters:   04/23/21 100.2 kg (220 lb 14.4 oz)   04/19/21 120.2 kg (265 lb 1.6 oz)   04/19/21 120.2 kg (265 lb 1.6 oz)   04/13/21 100.7 kg (222 lb 0.1 oz)     Arterial Line BP: ()/() 129/54  MAP:  [45 mmHg-217 mmHg] 79 mmHg  BP - Mean:  [] 80  Ventilation Mode: CMV/AC  (Continuous Mandatory Ventilation/ Assist Control)  FiO2 (%): 30 %  Rate Set (breaths/minute): 14 breaths/min  Tidal Volume Set (mL): 450 mL  PEEP (cm H2O): 5 cmH2O  Oxygen Concentration (%): 30 %  Resp: 18    Recent Labs   Lab 04/22/21  0425 04/21/21  1203 04/21/21  0845 04/20/21  1319 04/20/21  1030   PH 7.48*  --  7.54* 7.46*  --    PCO2 42  --  39 51*  --    PO2 115*  --  103 53*  --    HCO3 31*  --  33* 37*  --    O2PER 50% 60  --  80 VENOUS       GEN: no acute distress   HEENT: head ncat, sclera anicteric, OP patent, trachea midline   PULM: unlabored synchronous with vent, clear anteriorly    CV/COR: RRR S1S2 no gallop,  No rub, no murmur  ABD: soft nontender, hypoactive bowel sounds, no mass  EXT:  Edema   warm  NEURO: grossly intact  SKIN: no obvious rash  LINES: clean, dry intact         Data:   All data and imaging reviewed     ROUTINE ICU LABS (Last four results)  CMP  Recent Labs   Lab  04/23/21  1245 04/23/21  0500 04/22/21  1700 04/22/21  1124 04/21/21  0500 04/20/21  2100 04/20/21  1030 04/20/21  0640   NA  --  143  --  143 142 143 143 143   POTASSIUM 3.1* 3.0* 3.5 3.1* 3.6 3.6 4.3 3.8   CHLORIDE  --  108  --  106 105 104 106 106   CO2  --  32  --  33* 35* 35* 39* 38*   ANIONGAP  --  3  --  4 2* 4 <1* <1*   GLC  --  128*  --  126* 89 84 103* 183*   BUN  --  29  --  31* 30 34* 37* 39*   CR  --  1.21*  --  1.21* 1.10* 1.14* 1.10* 1.14*   GFRESTIMATED  --  41*  --  41* 46* 44* 46* 44*   GFRESTBLACK  --  47*  --  47* 53* 51* 53* 51*   FROY  --  8.1*  --  8.2* 7.8* 7.9* 7.6* 7.1*   MAG  --   --   --  2.3  --  1.9  --   --    PHOS  --   --   --  2.5  --   --   --   --    PROTTOTAL  --  6.0*  --   --  5.3*  --  5.9* 5.3*   ALBUMIN  --  2.4*  --   --  2.5*  --  2.8* 2.5*   BILITOTAL  --  0.8  --   --  0.9  --  0.5 0.2   ALKPHOS  --  112  --   --  65  --  82 74   AST  --  47*  --   --  39  --  40 34   ALT  --  56*  --   --  34  --  46 37     CBC  Recent Labs   Lab 04/23/21  0500 04/22/21  1124 04/21/21  1635 04/21/21  0500   WBC 15.5* 14.7* 14.1* 13.2*   RBC 2.84* 2.89* 2.82* 3.22*   HGB 8.0* 8.1* 8.1* 8.9*   HCT 27.3* 27.4* 27.3* 31.2*   MCV 96 95 97 97   MCH 28.2 28.0 28.7 27.6   MCHC 29.3* 29.6* 29.7* 28.5*   RDW 16.5* 16.4* 15.8* 15.5*    196 208 204     INRNo lab results found in last 7 days.  Arterial Blood Gas  Recent Labs   Lab 04/22/21  0425 04/21/21  1203 04/21/21  0845 04/20/21  1319 04/20/21  1030   PH 7.48*  --  7.54* 7.46*  --    PCO2 42  --  39 51*  --    PO2 115*  --  103 53*  --    HCO3 31*  --  33* 37*  --    O2PER 50% 60  --  80 VENOUS       All cultures:  Recent Labs   Lab 04/21/21  0050 04/20/21  2355 04/20/21  1800 04/20/21  1700   CULT No growth after 2 days No growth after 2 days 10,000 to 50,000 colonies/mL  Klebsiella pneumoniae  *  50,000 to 100,000 colonies/mL  Enterococcus faecalis  *  <1000 colonies/mL  urogenital saji  Susceptibility testing not routinely done    Light growth  Normal saji       Recent Results (from the past 24 hour(s))   XR Chest Port 1 View    Narrative    CHEST ONE VIEW  2021 11:17 AM     HISTORY: Intubated.    COMPARISON: 2021      Impression    IMPRESSION: Endotracheal tube tip 2.5 cm in the kolby, slightly  higher in position than previous. Right IJ line stable. Increased  right base infiltrate since the comparison study. Similar left base  infiltrate.   Echocardiogram Limited    Narrative    [id number]  [id number]  [id number]  [id number]^MIKE^BENITEZ^MORGAN     [facility]  Echocardiography Laboratory  [address] [place] [id number]     Name: LUIS ALBERTO RICHARD  MRN: [id number]  : 1935  Study Date: 2021 11:11 AM  Age: 85 yrs  Gender: Female  Patient Location[facility]  Reason For Study: Shock  Ordering Physician: BENITEZ MCKEON  Referring Physician: SAMANTHA CARABALLO  Performed By: Ritchie Rizo RDCS     BSA: 2.2 m2  Height: 65 in  Weight: 256 lb  HR: 57  BP: 105/63 mmHg  ______________________________________________________________________________  Procedure  Limited Portable Echo Adult. Optison (NDC #[id number]) given intravenously.  ______________________________________________________________________________  Interpretation Summary     1. Left ventricular systolic function is normal. The visual ejection fraction  is estimated at 60-65%.  2. No regional wall motion abnormalities noted.  3. The right ventricle is severely dilated. Mildly decreased right ventricular  systolic function.  4. There is severe biatrial dilatation.  5. There is moderate (2+) tricuspid regurgitation.  6. Severe pulmonary hypertension; the right ventricular systolic pressure is  approximated at 55mmHg plus the right atrial pressure.  7. In comparison with the prior study dated 21, the RV now significantly  dilated and estimated RVSP is now  severe.  ______________________________________________________________________________  Left Ventricle  Left ventricular systolic function is normal. The visual ejection fraction is  estimated at 60-65%. No regional wall motion abnormalities noted.     Right Ventricle  The right ventricle is severely dilated. Mildly decreased right ventricular  systolic function.     Atria  The left atrium is severely dilated. The right atrium is severely dilated.     Mitral Valve  There is mild mitral annular calcification. There is mild (1+) mitral  regurgitation.     Tricuspid Valve  The tricuspid valve is normal in structure and function. There is moderate  (2+) tricuspid regurgitation. The right ventricular systolic pressure is  approximated at 55mmHg plus the right atrial pressure. Right ventricular  systolic pressure is elevated, consistent with severe pulmonary hypertension.     Aortic Valve  The aortic valve is trileaflet with aortic valve sclerosis.     Pericardium  There is no pericardial effusion.     Rhythm  Sinus rhythm was noted.     ______________________________________________________________________________  MMode/2D Measurements & Calculations  IVSd: 1.2 cm  LVIDd: 4.4 cm  LVIDs: 2.7 cm  LVPWd: 1.2 cm  FS: 37.5 %  LV mass(C)d: 184.9 grams  LV mass(C)dI: 84.1 grams/m2  asc Aorta Diam: 3.4 cm  RWT: 0.53     Doppler Measurements & Calculations  TV V2 max: 371.0 cm/sec  TV max P.1 mmHg     ______________________________________________________________________________  Report approved by: Irma Pickering 2021 12:07 PM         XR Abdomen Port 1 View    Narrative    EXAM: XR ABDOMEN PORTABLE 1 VIEWS  LOCATION: [facility]  DATE/TIME: 2021, 5:54 AM    INDICATION: OG tube placement.  COMPARISON: None.      Impression    IMPRESSION: Orogastric tube passes into the stomach. The tip is not seen on this film. ET tube approximately 4 cm above the kolby. Bibasilar pulmonary infiltrates.             Billing: This patient is critically ill: Yes. Total critical care time today 45 min.

## 2021-04-23 NOTE — PLAN OF CARE
Continues on full vent support, off all sedation at 1100.  Failed PS trial with minimal Tv.  Diuresing well, starting to arouse.  Daughter at bedside most of day, fully aware of plan of care

## 2021-04-23 NOTE — PROVIDER NOTIFICATION
Intensivist notified about no urine output in external catheter. Bladder scan >400 x2. Straight cath done x1. Pt on scheduled lasix. Okay to put new navas in per MD.

## 2021-04-23 NOTE — PROGRESS NOTES
Critical Care Attending Note    The patient was seen and examined by me with and independent of .   The case was discussed at length. Pertinent vitals, lab results and imaging were reviewed by me. Agree with the fellow's note from today as reflects our joint assessment and plan.     86 y/o F PMH significant for DVT s/p IVC filter on xarelto, CKD stage III, chronic respiratory failure on home O2, wide complex tachycardia on amiodarone transferred from OSH after being found unresponsive at TCU requiring external pacing, intubation and pressors.  Overall picture c/w decompensated heart failure not optimally treated. Clinically improved with diuresis, off pressors, off pacing.  Encephalopathy remains however sedation infusions just turned off.  Failed SBT and several barriers remain to extubation.  Discussed with daughter at bedside along with Cards - continue to work to liberate from vent but recent trajectory of repeat admissions raises concern for long term prognosis.  Discussed DNR and daughter and family in agreement.  Will continue to move to diurese and support but remains critically ill.     Rio Garcia MD  35 min CCT, excluding procedures

## 2021-04-23 NOTE — PROGRESS NOTES
[facility] ICU RESPIRATORY NOTE    Date of Admission: 4/20/2021    Date of Intubation (most recent):  4/20/12    Reason for Mechanical Ventilation: AW protection    Number of Days on Mechanical Ventilation: 4    Met Criteria for Pressure Support Trial: No    Reason for No Pressure Support Trial: Per MD    Significant Events Today: None    ABG Results: Results for LUIS ALBERTO RICHARD (MRN [id number]) as of 4/23/2021 05:10   Ref. Range 4/22/2021 04:25   pH Arterial Latest Ref Range: 7.35 - 7.45 pH 7.48 (H)   pCO2 Arterial Latest Ref Range: 35 - 45 mm Hg 42   PO2 Arterial Latest Ref Range: 80 - 105 mm Hg 115 (H)   Bicarbonate Arterial Latest Ref Range: 21 - 28 mmol/L 31 (H)   Base Excess Art Latest Units: mmol/L 6.5   FIO2 Unknown 50%   Oxyhemoglobin Arterial Latest Ref Range: 92 - 100 % 98     Current Vent Settings: Ventilation Mode: CMV/AC  (Continuous Mandatory Ventilation/ Assist Control)  FiO2 (%): 30 %  Rate Set (breaths/minute): 18 breaths/min  Tidal Volume Set (mL): 450 mL  PEEP (cm H2O): 10 cmH2O  Oxygen Concentration (%): 30 %    Plan:  Continue on full vent support

## 2021-04-23 NOTE — PLAN OF CARE
Pt tolerating vent settings, VSS. Not following commands, PERRL, withdraws to pain. Sinus sylvia. Epi titrated to keep MAP >65. Tolerating tube feed. Lindsey placed for urine retention. No new skin changes. Versed remains off. Continue to monitor.

## 2021-04-24 NOTE — PROGRESS NOTES
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       Events:  Bradycardia improved after discontinuation of amiodarone.  Patient remains intubated.  -2 L / 24 hours    Previous studies:  -Echo: EF of 60-65%.  Moderate TR and severe pulmonary hypertension.    Plan:    Events in last 24 hours: Remains intubated, WBC is elevated and he is spiking fever.  BP has been labile.  He is off nicardipine and having moments of Levo requirement.  Currently he is off Levo.    Plan:    1. Cardiovascular.   -SSS/Bradycardia: Bradycardia improved.  Patient may need PPM implantation prior to discharge to facilitate therapy.  -WCT/SVT of aberrancy.  Amiodarone was discontinued.  No recurrence of arrhythmia so far.     2. Respiratory. REsp failure/COPD/CHF exacerbation. Intubated.  Saturating well on MV ( Fio2 of 30%; PEEP of 5).  She failed CPAP trial. Agree with diuresis.     3. Neuro. Sedated and intubated but alert  4. ID. Asp PNA.  She is febrile.  Continue Zosyn    5. Renal.  ARF. Renal function is improving with diuresis.   6. GI. PPI for prophylaxis.  7. Hematology. Hx of DVT.  Xarelto was held.  Continue heparin drip.      Bowen Maxwell MD    Physical Exam:  Vitals: /59   Pulse 53   Temp 100  F (37.8  C) (Esophageal)   Resp 14   Wt 97.7 kg (215 lb 6.2 oz)   SpO2 95%   BMI 35.84 kg/m        Intake/Output Summary (Last 24 hours) at 4/24/2021 1141  Last data filed at 4/24/2021 0600  Gross per 24 hour   Intake 1558.5 ml   Output 2090 ml   Net -531.5 ml     Vitals:    04/21/21 0445 04/22/21 0000 04/23/21 0500 04/24/21 0130   Weight: 104.1 kg (229 lb 8  oz) 100.8 kg (222 lb 3.6 oz) 100.2 kg (220 lb 14.4 oz) 97.7 kg (215 lb 6.2 oz)       Constitutional:    Pt is in NAD.  HEAD: Normocephalic.  SKIN: Skin normal color, texture and turgor with no lesions or eruptions.  Eyes: PERRL, EOMI.  ENT:  Supple, normal JVP. No lymphadenopathy or thyroid enlargement.  Chest:  Decrease breath sound in base B/L. Rales,  Wheezing.  Cardiac:  RRR, normal  S1 and S2.  No murmurs rubs or gallop.    Abdomen:  Normal BS.  Soft, non-tender and non-distended.  No rebound or guarding.    Extremities:  Pedious pulses palpable B/L.  No LE edema noticed.   Neurological: Strength and sensation grossly symmetric and intact throughout.                            Review of Systems:   As per subjective, otherwise 5 systems reviewed and negative.         Medications:          chlorhexidine  15 mL Mouth/Throat Q12H     furosemide  40 mg Intravenous Q12H     insulin aspart  1-6 Units Subcutaneous Q4H     levothyroxine  75 mcg Oral or Feeding Tube QAM AC     multivitamins w/minerals  15 mL Per Feeding Tube Daily     pantoprazole  40 mg Per Feeding Tube QAM AC     piperacillin-tazobactam  3.375 g Intravenous Q6H     protein modular  1 packet Per Feeding Tube Q8H     PRN Meds: acetaminophen **OR** acetaminophen, dextrose, glucose **OR** dextrose **OR** glucagon, fentaNYL, fentaNYL, naloxone **OR** naloxone **OR** naloxone **OR** naloxone, ondansetron **OR** ondansetron, senna-docusate **OR** senna-docusate             Data:     Recent Labs   Lab 04/24/21  0839 04/24/21  0500 04/23/21  1750 04/23/21  1549 04/23/21  0500 04/23/21  0500 04/22/21  1124 04/22/21  1124 04/20/21  1030 04/20/21  1030 04/20/21  0640   WBC  --  16.6*  --   --   --  15.5*  --  14.7*   < > 18.8* 8.7   HGB  --  7.8*  --   --   --  8.0*  --  8.1*   < > 10.0* 8.9*   MCV  --  98  --   --   --  96  --  95   < > 103* 107*   PLT  --  214  --   --   --  196  --  196   < > 250 223   *  --   --   --   --  143  --  143   < > 143 143    POTASSIUM 3.4 3.1* 3.5  --    < > 3.0*   < > 3.1*   < > 4.3 3.8   CHLORIDE 113*  --   --   --   --  108  --  106   < > 106 106   CO2 32  --   --   --   --  32  --  33*   < > 39* 38*   BUN 35*  --   --   --   --  29  --  31*   < > 37* 39*   CR 1.44*  --   --   --   --  1.21*  --  1.21*   < > 1.10* 1.14*   ANIONGAP 2*  --   --   --   --  3  --  4   < > <1* <1*   FROY 7.9*  --   --   --   --  8.1*  --  8.2*   < > 7.6* 7.1*   *  --   --  131*  --  128*  --  126*   < > 103* 183*   ALBUMIN 2.2*  --   --   --   --  2.4*  --   --    < > 2.8* 2.5*   PROTTOTAL 6.0*  --   --   --   --  6.0*  --   --    < > 5.9* 5.3*   BILITOTAL 0.5  --   --   --   --  0.8  --   --    < > 0.5 0.2   ALKPHOS 112  --   --   --   --  112  --   --    < > 82 74   ALT 95*  --   --   --   --  56*  --   --    < > 46 37   *  --   --   --   --  47*  --   --    < > 40 34   TROPI  --   --   --   --   --   --   --   --   --  0.074* 0.030    < > = values in this interval not displayed.

## 2021-04-24 NOTE — PLAN OF CARE
Neuro:  Patient more alert, follows commands fairly consistantly this morning and overall strength has improved.  No narcotics given by writer.  APAP given x 1 for temp >102.   Cultures pending.     Pulm:  LS CTA but thick pink/purulent secretions noted.  Remains on vent.  Plan for weaning trail again today.      Cardiac:  Vasopressors off >24hrs.   Maintaining maps.  Remains on heparin GTT.  PTT within range, recheck in AM.      GI:  Several large/moderate BM's during shift.       :  evelina Lindsey.  K+ replaced this am.  Time draw for 1000 this am.      Skin:  Multiple issues noted and documented in epic.        Plan:  Wean from vent

## 2021-04-24 NOTE — PROGRESS NOTES
[facility] ICU RESPIRATORY NOTE    Date of Admission: 4/20/2021    Date of Intubation (most recent):  4/20/21    Reason for Mechanical Ventilation: AW protection    Number of Days on Mechanical Ventilation: 5    Met Criteria for Pressure Support Trial: yes    Length of Pressure Support Trial: few minutes       Significant Events Today: none    ABG Results: Results for LUIS ALBERTO RICHARD (MRN [id number]) as of 4/24/2021 05:31   Ref. Range 4/22/2021 04:25   pH Arterial Latest Ref Range: 7.35 - 7.45 pH 7.48 (H)   pCO2 Arterial Latest Ref Range: 35 - 45 mm Hg 42   PO2 Arterial Latest Ref Range: 80 - 105 mm Hg 115 (H)   Bicarbonate Arterial Latest Ref Range: 21 - 28 mmol/L 31 (H)   Base Excess Art Latest Units: mmol/L 6.5   FIO2 Unknown 50%   Oxyhemoglobin Arterial Latest Ref Range: 92 - 100 % 98     Current Vent Settings: Ventilation Mode: CMV/AC  (Continuous Mandatory Ventilation/ Assist Control)  FiO2 (%): 30 %  Rate Set (breaths/minute): 14 breaths/min  Tidal Volume Set (mL): 450 mL  PEEP (cm H2O): 5 cmH2O  Oxygen Concentration (%): 30 %      Plan:  Pt remains on full vent settings.

## 2021-04-24 NOTE — PLAN OF CARE
MICHELLE orientation. Can inconsistently follow simple commands with cueing. Agitated and restless when stimulated. Neuro's charted. Sinus Yusef Dysrhythmia. BP unremarkable. Afebrile. LS diminished but clear, minimal sputum via inline suction. Failed PS trial today. Seroquel started for agitation. Fentanyl given for pain. Lindsey patent. Multiple watery loose BMs today. TF at goal via OG. Restraints for safety. Rt internal jugular patent. Heparin gtts unchanged. Family at bedside and updated throughout day. Nursing to follow closely

## 2021-04-24 NOTE — PROGRESS NOTES
Critical Care  Note      04/24/2021    Name: Caitlin Sales MRN./#: [id number]   Age: 85 year old YOB: 1935     Hsptl Day# 4  ICU DAY #4    MV DAY #4             Problem List:   Active Problems:    Decompensated heart failure (H)           Summary/Hospital Course:     85 F has a past medical history of wide complex tachycardia, hyperlipidemia, CAD, diastolic CHF, hypertension, chronic respiratory failure, history of DVT on chronic anticoagulation, history of TIA, R vertebral artery stenosis, neuropathy, GIB, anemia,  obesity, osteoarthritis, chronic back pain, GERD, dementia recent hospitalization 4/10-4/16 presented back from  from nursing home with decrease LOC and hypoxemia ultimately requiring external pacing en route to ED.  She was intubated in ED, CVC placed and pressors started but still required external pacing to maintain hemodynamics.      On arrival to [facility] - pt tremulous on arrival, cool extremities.  Per EMS - difficult to sedate prior to transfer and during intubation, described sats 40-70% on RA prior to ED (unknown length of time with hypoxemia). Synchronous with vent paced at 70 externally, vasopressors running but labile. Pink frothy secretions from ETT. COVID neg from outside ED             Interm History:     - SBT yesterday did not tolerate  - sedative infusion held with improving mental status   -  bowel movement this AM          Assessment and plan :     Caitlin Sales IS a 85 year old female admitted on 4/20/2021 for hypoxia, hypotension, bradycardia requiring pacing, & acute hypoxic respiratory failure.   I have personally reviewed the daily labs, imaging studies, cultures and discussed the case with referring physician and consulting physicians.     My assessment and plan by system for this patient is as follows:    Neurology/Psychiatry:   1. Pain/analgesia  2. Metabolic vs Toxic Encephelopathy  3. Hx of TIA  Plan  - APAP 650  - PRN fentanyl but avoid  oversedation  -     Cardiovascular:   1. Symptomatic Bradycardia- resolved  2. Cardiogenic Shock, improved  3. HFpEF  4. Severe Pulmonary HTN.  5. CAD.  6. Hx of Wide Complex Tachycardia    Echo 4/20 Interpretation Summary     1. Left ventricular systolic function is normal. The visual ejection fraction  is estimated at 60-65%.  2. No regional wall motion abnormalities noted.  3. The right ventricle is severely dilated. Mildly decreased right ventricular  systolic function.  4. There is severe biatrial dilatation.  5. There is moderate (2+) tricuspid regurgitation.  6. Severe pulmonary hypertension; the right ventricular systolic pressure is  approximated at 55mmHg plus the right atrial pressure.  7. In comparison with the prior study dated 4/12/21, the RV now significantly  dilated and estimated RVSP is now severe.    Plan  - follow hemodynamics, goal MAP >65  - continue diuresis; lasix goal -1 to 2 liters negative   - Per Cards patient may need permanent pacer for SSS, but not at this time.  - Hold Amiodarone.      Pulmonary/Ventilator Management:   1. Acute Hypoxic Respiratory Failure  2.  ? Hx of COPD? ; increase secretions today   - Daughter states her mother was on home O2 in past    - Does not remember O2  rate.  Plan  - continue vent support  - Daily SBT trials.  - fup secretion/sputum     GI and Nutrition :   1. Mild lft elevation - ? meds exam benign  2. Protonix 40 IVP Q day  3. Enteral feedings  Plan  - Nutrition consulted, continue tube feedings   - Senna PO Q 12    Renal/Fluids/Electrolytes:   1. CKD  2. Primary Respiratory Alkalosis, Chronic, with secondary Met. Alkalosis  3. - 2.0 L/24 hrs, - -7.4 L/admit, Urine: 2.9 L/24 hrs, -3.9 kg  Plan  - Follow I's & O's    Infectious Disease:   1. UTI. Polymicrobial   2. Increased secretions -   Plan  - FUP cultures  - continue zosyn 3/7     Endocrine:   1. Hypothyroidism.  2. Stress induced hyperglycemia  Plan  - ICU insulin protocol, goal sugar <180  -  Medium ISS  - continue levothyroxine    Hematology/Oncology:   1. Hx of DVT's S/P IVC Filter.  2. Leukocytosis - persistent,   Plan  - serial CBC, coag   - continue heparin gtt      IV/Access:   1. Venous access - R IJ  2. Arterial access - No A line  Plan  - central access required and necessary      ICU Prophylaxis:   1. DVT: heparin Gtt, mechanical  2. VAP: HOB 30 degrees, chlorhexidine rinse  3. Stress Ulcer: PPI  4. Restraints: Nonviolent soft two point restraints required and necessary for patient safety and continued cares and good effect as patient continues to pull at necessary lines, tubes despite education and distraction. Will readdress daily.   5. Wound care  - consulted for pacer burn  6. Feeding - Continue.  7. Family Update:At bedside.  8. Disposition - ICU.        Key goals for next 24 hours:   1. Continue diuresis  2. SBT as  Tolerated 5/5   3. FUP cultures              Key Medications:       chlorhexidine  15 mL Mouth/Throat Q12H     furosemide  40 mg Intravenous Q12H     insulin aspart  1-6 Units Subcutaneous Q4H     levothyroxine  75 mcg Oral or Feeding Tube QAM AC     multivitamins w/minerals  15 mL Per Feeding Tube Daily     pantoprazole  40 mg Per Feeding Tube QAM AC     piperacillin-tazobactam  3.375 g Intravenous Q6H     protein modular  1 packet Per Feeding Tube Q8H       dextrose       EPINEPHrine 0.03 mcg/kg/min (04/23/21 0258)     heparin 1,050 Units/hr (04/23/21 2027)     norepinephrine Stopped (04/21/21 0437)     sodium chloride 10 mL/hr at 04/23/21 2030                 Physical Examination:   Temp:  [100  F (37.8  C)-102.7  F (39.3  C)] 100  F (37.8  C)  Pulse:  [53-73] 59  Resp:  [9-29] 25  BP: ()/(45-83) 132/60  MAP:  [66 mmHg-98 mmHg] 83 mmHg  Arterial Line BP: ()/(41-90) 137/57  FiO2 (%):  [30 %] 30 %  SpO2:  [84 %-98 %] 92 %      Intake/Output Summary (Last 24 hours) at 4/24/2021 0813  Last data filed at 4/24/2021 0600  Gross per 24 hour   Intake 1648.5 ml   Output  2440 ml   Net -791.5 ml         Wt Readings from Last 4 Encounters:   04/24/21 97.7 kg (215 lb 6.2 oz)   04/19/21 120.2 kg (265 lb 1.6 oz)   04/19/21 120.2 kg (265 lb 1.6 oz)   04/13/21 100.7 kg (222 lb 0.1 oz)     Arterial Line BP: ()/(41-90) 137/57  MAP:  [66 mmHg-98 mmHg] 83 mmHg  BP - Mean:  [] 102  Ventilation Mode: CMV/AC  (Continuous Mandatory Ventilation/ Assist Control)  FiO2 (%): 30 %  Rate Set (breaths/minute): 14 breaths/min  Tidal Volume Set (mL): 450 mL  PEEP (cm H2O): 5 cmH2O  Oxygen Concentration (%): 30 %  Resp: 25    Recent Labs   Lab 04/22/21  0425 04/21/21  1203 04/21/21  0845 04/20/21  1319 04/20/21  1030   PH 7.48*  --  7.54* 7.46*  --    PCO2 42  --  39 51*  --    PO2 115*  --  103 53*  --    HCO3 31*  --  33* 37*  --    O2PER 50% 60  --  80 VENOUS       GEN: no acute distress . intubated  HEENT: head ncat, sclera anicteric, OP patent, trachea midline   PULM: unlabored synchronous with vent, coarse anteriorly    CV/COR: RRR S1S2 no gallop,  No rub, no murmur  ABD: soft nontender, hypoactive bowel sounds, no mass  EXT:  1+ Edema   warm  NEURO: moving 4 extremities spontaneously , increased attention  SKIN: no obvious rash  LINES: clean, dry intact         Data:   All data and imaging reviewed     ROUTINE ICU LABS (Last four results)  CMP  Recent Labs   Lab 04/24/21  0500 04/23/21  1750 04/23/21  1549 04/23/21  1245 04/23/21  0500 04/22/21  1124 04/22/21  1124 04/21/21  0500 04/20/21  2100 04/20/21  1030 04/20/21  0640   NA  --   --   --   --  143  --  143 142 143 143 143   POTASSIUM 3.1* 3.5  --  3.1* 3.0*   < > 3.1* 3.6 3.6 4.3 3.8   CHLORIDE  --   --   --   --  108  --  106 105 104 106 106   CO2  --   --   --   --  32  --  33* 35* 35* 39* 38*   ANIONGAP  --   --   --   --  3  --  4 2* 4 <1* <1*   GLC  --   --  131*  --  128*  --  126* 89 84 103* 183*   BUN  --   --   --   --  29  --  31* 30 34* 37* 39*   CR  --   --   --   --  1.21*  --  1.21* 1.10* 1.14* 1.10* 1.14*    GFRESTIMATED  --   --   --   --  41*  --  41* 46* 44* 46* 44*   GFRESTBLACK  --   --   --   --  47*  --  47* 53* 51* 53* 51*   FROY  --   --   --   --  8.1*  --  8.2* 7.8* 7.9* 7.6* 7.1*   MAG 2.3 2.4*  --   --   --   --  2.3  --  1.9  --   --    PHOS  --   --   --   --   --   --  2.5  --   --   --   --    PROTTOTAL  --   --   --   --  6.0*  --   --  5.3*  --  5.9* 5.3*   ALBUMIN  --   --   --   --  2.4*  --   --  2.5*  --  2.8* 2.5*   BILITOTAL  --   --   --   --  0.8  --   --  0.9  --  0.5 0.2   ALKPHOS  --   --   --   --  112  --   --  65  --  82 74   AST  --   --   --   --  47*  --   --  39  --  40 34   ALT  --   --   --   --  56*  --   --  34  --  46 37    < > = values in this interval not displayed.     CBC  Recent Labs   Lab 04/24/21  0500 04/23/21  0500 04/22/21  1124 04/21/21  1635   WBC 16.6* 15.5* 14.7* 14.1*   RBC 2.68* 2.84* 2.89* 2.82*   HGB 7.8* 8.0* 8.1* 8.1*   HCT 26.2* 27.3* 27.4* 27.3*   MCV 98 96 95 97   MCH 29.1 28.2 28.0 28.7   MCHC 29.8* 29.3* 29.6* 29.7*   RDW 16.9* 16.5* 16.4* 15.8*    196 196 208     INRNo lab results found in last 7 days.  Arterial Blood Gas  Recent Labs   Lab 04/22/21  0425 04/21/21  1203 04/21/21  0845 04/20/21  1319 04/20/21  1030   PH 7.48*  --  7.54* 7.46*  --    PCO2 42  --  39 51*  --    PO2 115*  --  103 53*  --    HCO3 31*  --  33* 37*  --    O2PER 50% 60  --  80 VENOUS       All cultures:  Recent Labs   Lab 04/23/21  1550 04/23/21  1530 04/23/21  1517 04/21/21  0050 04/20/21  2355 04/20/21  1800 04/20/21  1700   CULT PENDING No growth after 12 hours No growth after 12 hours No growth after 3 days No growth after 3 days 10,000 to 50,000 colonies/mL  Klebsiella pneumoniae  *  50,000 to 100,000 colonies/mL  Enterococcus faecalis  *  <1000 colonies/mL  urogenital saji  Susceptibility testing not routinely done   Light growth  Normal asji       No results found for this or any previous visit (from the past 24 hour(s)).      Billing: This patient is  critically ill: Yes. Total critical care time today 30 min.

## 2021-04-25 NOTE — PROGRESS NOTES
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        Previous studies:  -Echo: EF of 60-65%.  Moderate TR and severe pulmonary hypertension.     Events in last 24 hours: Remains intubated and continues to spike fevers.  She is a bit new LBBB the pattern EKG today.  However, she has history of intermittent BBB pattern in the past.     Plan:     1. Cardiovascular.   -SSS/Bradycardia: Bradycardia improved.  Patient may need PPM implantation prior to discharge to facilitate therapy.  -WCT/SVT of aberrancy.  Amiodarone was discontinued.  No recurrence of arrhythmia.  -New LBBB pattern EKG.  Patient is known to have aberrancy in the past and this is probably a normal component.  She seems to be asymptomatic.  I recommend checking troponin x2     2. Respiratory. REsp failure/COPD/CHF exacerbation. Intubated.  Saturating well on MV ( Fio2 of 30%; PEEP of 5).  She failed CPAP trial. Agree with diuresis.      3. Neuro. Sedated and intubated but alert  4. ID. Asp PNA.  She is febrile.  Continue Zosyn    5. Renal.  ARF. Renal function is improving with diuresis.   6. GI. PPI for prophylaxis.  7. Hematology. Hx of DVT.  Xarelto was held.  Continue heparin drip.    We will sign off.  Please contact us when patient is ready to be discharge or if troponin is elevated.  We will reevaluate need for pacemaker to facilitate therapy once patient is ready for discharge.    Physical Exam:  Vitals: BP (!) 170/82   Pulse 62   Temp 99.1  F (37.3  C)   Resp 19   Wt 96.3 kg (212 lb 4.9 oz)   SpO2 100%   BMI 35.33 kg/m        Intake/Output Summary (Last 24 hours) at  4/25/2021 0928  Last data filed at 4/25/2021 0600  Gross per 24 hour   Intake 1722 ml   Output 2545 ml   Net -823 ml     Vitals:    04/21/21 0445 04/22/21 0000 04/23/21 0500 04/24/21 0130   Weight: 104.1 kg (229 lb 8 oz) 100.8 kg (222 lb 3.6 oz) 100.2 kg (220 lb 14.4 oz) 97.7 kg (215 lb 6.2 oz)    04/25/21 0400   Weight: 96.3 kg (212 lb 4.9 oz)       Constitutional:   Pt is in NAD.  HEAD: Normocephalic.  SKIN: Skin normal color, texture and turgor with no lesions or eruptions.  Eyes: PERRL, EOMI.  ENT:  Supple, normal JVP. No lymphadenopathy or thyroid enlargement.  Chest:   Decrease breath sound in base B/L.  Cardiac: RRR, normal  S1 and S2.  No murmurs rubs or gallop.   Abdomen:  Normal BS.  Soft, non-tender and non-distended.  No rebound or guarding.    Extremities:  Pedious pulses palpable B/L.  No LE edema noticed.   Neurological: Strength and sensation grossly symmetric and intact throughout.                            Review of Systems:   As per subjective, otherwise 5 systems reviewed and negative.         Medications:          chlorhexidine  15 mL Mouth/Throat Q12H     furosemide  40 mg Intravenous Q12H     insulin aspart  1-6 Units Subcutaneous Q4H     levothyroxine  75 mcg Oral or Feeding Tube QAM AC     multivitamins w/minerals  15 mL Per Feeding Tube Daily     pantoprazole  40 mg Per Feeding Tube QAM AC     piperacillin-tazobactam  3.375 g Intravenous Q6H     protein modular  1 packet Per Feeding Tube Q8H     PRN Meds: acetaminophen **OR** acetaminophen, dextrose, glucose **OR** dextrose **OR** glucagon, fentaNYL, fentaNYL, naloxone **OR** naloxone **OR** naloxone **OR** naloxone, ondansetron **OR** ondansetron, QUEtiapine, senna-docusate **OR** senna-docusate             Data:     Recent Labs   Lab 04/25/21  0500 04/24/21  0839 04/24/21  0500 04/23/21  1549 04/23/21  1549 04/23/21  0500 04/23/21  0500 04/20/21  1030 04/20/21  1030 04/20/21  0640   WBC 13.3*  --  16.6*  --   --   --  15.5*   < > 18.8*  8.7   HGB 7.9*  --  7.8*  --   --   --  8.0*   < > 10.0* 8.9*   MCV 99  --  98  --   --   --  96   < > 103* 107*     --  214  --   --   --  196   < > 250 223   * 147*  --   --   --   --  143   < > 143 143   POTASSIUM 3.1* 3.4 3.1*   < >  --    < > 3.0*   < > 4.3 3.8   CHLORIDE 113* 113*  --   --   --   --  108   < > 106 106   CO2 36* 32  --   --   --   --  32   < > 39* 38*   BUN 33* 35*  --   --   --   --  29   < > 37* 39*   CR 1.36* 1.44*  --   --   --   --  1.21*   < > 1.10* 1.14*   ANIONGAP <1* 2*  --   --   --   --  3   < > <1* <1*   FROY 8.2* 7.9*  --   --   --   --  8.1*   < > 7.6* 7.1*   * 141*  --   --  131*  --  128*   < > 103* 183*   ALBUMIN 2.2* 2.2*  --   --   --   --  2.4*   < > 2.8* 2.5*   PROTTOTAL 6.2* 6.0*  --   --   --   --  6.0*   < > 5.9* 5.3*   BILITOTAL 0.5 0.5  --   --   --   --  0.8   < > 0.5 0.2   ALKPHOS 110 112  --   --   --   --  112   < > 82 74   * 95*  --   --   --   --  56*   < > 46 37   AST 88* 102*  --   --   --   --  47*   < > 40 34   TROPI  --   --   --   --   --   --   --   --  0.074* 0.030    < > = values in this interval not displayed.

## 2021-04-25 NOTE — PLAN OF CARE
Alert, can follow simple commands, Answer yes/no. Very restless and agitated when stimulated and with cares. Tele changes this AM when PS, See EKG and Dr. Garcia notified. BP unremarkable. Low grade Temp. More secretions from oral and inline suction today, Still vented. TF at goal via OG. Lindsey patent. Frequent loose watery stools, rectal tube placed. Seroquel and Depakote utilized for agitation. Restraints for safety. Family at bedside. Nursing to follow closely.       Update 1941.. No acute changes. Still restless when stimulated. PS x1 and tolerated moderately for 20 minutes, 2nd PS trial did not tolerate. Troponin's negative. Heparin unchanged. Nursing to follow closely.

## 2021-04-25 NOTE — PROGRESS NOTES
[facility] ICU RESPIRATORY NOTE     Date of Admission: 4/20/2021     Date of Intubation (most recent):  4/20/21     Reason for Mechanical Ventilation: AW protection     Number of Days on Mechanical Ventilation: 6     Met Criteria for Pressure Support Trial: yes         Significant Events Today: none     ABG Results: Results for LUIS ALBERTO RICHARD (MRN [id number]) as of 4/24/2021 05:31    Ref. Range 4/22/2021 04:25   pH Arterial Latest Ref Range: 7.35 - 7.45 pH 7.48 (H)   pCO2 Arterial Latest Ref Range: 35 - 45 mm Hg 42   PO2 Arterial Latest Ref Range: 80 - 105 mm Hg 115 (H)   Bicarbonate Arterial Latest Ref Range: 21 - 28 mmol/L 31 (H)   Base Excess Art Latest Units: mmol/L 6.5   FIO2 Unknown 50%   Oxyhemoglobin Arterial Latest Ref Range: 92 - 100 % 98      Current Vent Settings: Ventilation Mode: CMV/AC  (Continuous Mandatory Ventilation/ Assist Control)  FiO2 (%): 30 %  Rate Set (breaths/minute): 14 breaths/min  Tidal Volume Set (mL): 450 mL  PEEP (cm H2O): 5 cmH2O  Oxygen Concentration (%): 30 %        Plan:  Pt remains on full vent settings.      Patel Bailey, RT

## 2021-04-25 NOTE — PROGRESS NOTES
Neuro:  Patient more alert/agitated this shift, follows commands inconsistently this morning.  Overall strength continues to improve.  Fentanyl and Seroquel given for pain and agitation.  APAP given.  Cultures pending.      Pulm:  LS Coarse diminished.  Less secretions.       Cardiac:  Vasopressors off >24hrs.   Maintaining maps.  Remains on heparin GTT.  PTT within range, recheck in AM.       GI:  Several large/moderate BM's during shift.        :  evelina Lindsey.  K+ replaced this am.  Time draw for 1000 this am.       Skin:  Multiple issues noted and documented in epic.          Plan:  Wean from vent as able.  Palliative conference tentatively for Monday

## 2021-04-25 NOTE — PROGRESS NOTES
Critical Care  Note      04/25/2021    Name: Caitlin Sales MRN./#: [id number]   Age: 85 year old YOB: 1935     Hsptl Day# 5  ICU DAY #5    MV DAY #5             Problem List:   Active Problems:    Decompensated heart failure (H)           Summary/Hospital Course:     85 F has a past medical history of wide complex tachycardia, hyperlipidemia, CAD, diastolic CHF, hypertension, chronic respiratory failure, history of DVT on chronic anticoagulation, history of TIA, R vertebral artery stenosis, neuropathy, GIB, anemia,  obesity, osteoarthritis, chronic back pain, GERD, dementia recent hospitalization 4/10-4/16 presented back from  from nursing home with decrease LOC and hypoxemia ultimately requiring external pacing en route to ED.  She was intubated in ED, CVC placed and pressors started but still required external pacing to maintain hemodynamics.      On arrival to [facility] - pt tremulous on arrival, cool extremities.  Per EMS - difficult to sedate prior to transfer and during intubation, described sats 40-70% on RA prior to ED (unknown length of time with hypoxemia). Synchronous with vent paced at 70 externally, vasopressors running but labile. Pink frothy secretions from ETT. COVID neg from outside ED.    Pressor and pacing requirements resolved with correction of hypoxemia and acidosis. Urine cultures positive(<50K CFU) thus treated empirically. Slow to clear encephalopathy.     4/24 - failed SBT, waking up more but confused,         Interm History:     - hypernatremic this AM in setting of diuresis  - EP by -? PPM timing   - wbc is down , tmax 100,   - intermittent agitation but brighter this AM, redirectable with family at bedside  - up in chair - tolerated 20 min PS trial however flipping in an out of wide complex rhythm , QTC ok 500 on EKG              Assessment and plan :     Caitlin Sales IS a 85 year old female admitted on 4/20/2021 for hypoxia, hypotension, bradycardia requiring  pacing, & acute hypoxic respiratory failure.   I have personally reviewed the daily labs, imaging studies, cultures and discussed the case with referring physician and consulting physicians.     My assessment and plan by system for this patient is as follows:    Neurology/Psychiatry:   1. Metabolic vs Toxic Encephalopathy, stable, new delirium  3. Hx of TIA  Plan  - APAP 650  - PRN fentanyl but avoid oversedation  - consider change to depakote for delirium from seroquel BID, if qtc changes     Cardiovascular:   1. Symptomatic Bradycardia- resolved  2. Cardiogenic Shock, improved  3. HFpEF  4. Severe Pulmonary HTN.  5. CAD.  6. Hx of Wide Complex Tachycardia    Echo 4/20 Interpretation Summary     1. Left ventricular systolic function is normal. The visual ejection fraction  is estimated at 60-65%.  2. No regional wall motion abnormalities noted.  3. The right ventricle is severely dilated. Mildly decreased right ventricular  systolic function.  4. There is severe biatrial dilatation.  5. There is moderate (2+) tricuspid regurgitation.  6. Severe pulmonary hypertension; the right ventricular systolic pressure is  approximated at 55mmHg plus the right atrial pressure.  7. In comparison with the prior study dated 4/12/21, the RV now significantly  dilated and estimated RVSP is now severe.  [id number]  - follow hemodynamics, goal MAP >65  - continue diuresis; lasix goal -1 to 2 liters negative   - Per Cards/EP patient may need permanent pacer for SSS, but unclear of timing, certainly need to clear any infection prior  - Hold Amiodarone.  - follow qtc with seroquel, will trial 1 x dose depakene      Pulmonary/Ventilator Management:   1. Acute Hypoxic Respiratory Failure  2.  ? Hx of COPD? ; increase secretions today, GPC on smear - Daughter states her mother was on home O2 in past    - Does not remember O2  rate.  Plan  - continue vent support  - Daily SBT trials.  - fup secretion/sputum     GI and Nutrition :   1. Mild lft  elevation - ? meds exam benign, stable  2. Protonix 40 IVP Q day  3. Enteral feedings  Plan  - Nutrition consulted, continue tube feedings   - Senna PO Q 12    Renal/Fluids/Electrolytes:   1. CKD  2. Primary Respiratory Alkalosis, Chronic, with secondary Met. Alkalosis  3. Hypernatremia in setting of diuresis  4, Volume status; down ~ 10L since admission  Plan  - Follow I's & O's  - serial Na  - increase free water to 100 q4    Infectious Disease:   1. UTI. Polymicrobial , wbc downtrending  2. Increased secretions -GPC On smear   Plan  - FUP cultures  - continue zosyn 4/7     Endocrine:   1. Hypothyroidism.  2. Stress induced hyperglycemia  Plan  - ICU insulin protocol, goal sugar <180  - Medium ISS  - continue levothyroxine    Hematology/Oncology:   1. Hx of DVT's S/P IVC Filter. On AC  2. Leukocytosis - persistent,   Plan  - serial CBC, coag   - continue heparin gtt, consider transition to lovenox to decrease volume     IV/Access:   1. Venous access - R IJ  2. Arterial access - No A line  Plan  - central access required and necessary      ICU Prophylaxis:   1. DVT: heparin Gtt, mechanical  2. VAP: HOB 30 degrees, chlorhexidine rinse  3. Stress Ulcer: PPI  4. Restraints: Nonviolent soft two point restraints required and necessary for patient safety and continued cares and good effect as patient continues to pull at necessary lines, tubes despite education and distraction. Will readdress daily.   5. Wound care  - consulted for pacer burn  6. Feeding - Continue.  7. Family Update:At bedside.  8. Disposition - ICU.        Key goals for next 24 hours:   1. Continue diuresis  2. SBT as  Tolerated 5/5   3. Increase free water           Key Medications:       chlorhexidine  15 mL Mouth/Throat Q12H     furosemide  40 mg Intravenous Q12H     insulin aspart  1-6 Units Subcutaneous Q4H     levothyroxine  75 mcg Oral or Feeding Tube QAM AC     multivitamins w/minerals  15 mL Per Feeding Tube Daily     pantoprazole  40 mg Per  Feeding Tube QAM AC     piperacillin-tazobactam  3.375 g Intravenous Q6H     protein modular  1 packet Per Feeding Tube Q8H       dextrose       heparin 1,050 Units/hr (04/25/21 0548)     sodium chloride 10 mL/hr at 04/23/21 2030                 Physical Examination:   Temp:  [98.6  F (37  C)-100.4  F (38  C)] 99.1  F (37.3  C)  Pulse:  [50-63] 62  Resp:  [13-34] 19  BP: ()/(42-83) 170/82  FiO2 (%):  [30 %] 30 %  SpO2:  [95 %-100 %] 100 %      Intake/Output Summary (Last 24 hours) at 4/25/2021 0809  Last data filed at 4/25/2021 0600  Gross per 24 hour   Intake 1722 ml   Output 2545 ml   Net -823 ml             Wt Readings from Last 4 Encounters:   04/25/21 96.3 kg (212 lb 4.9 oz)   04/19/21 120.2 kg (265 lb 1.6 oz)   04/19/21 120.2 kg (265 lb 1.6 oz)   04/13/21 100.7 kg (222 lb 0.1 oz)     BP - Mean:  [] 115  Ventilation Mode: CMV/AC  (Continuous Mandatory Ventilation/ Assist Control)  FiO2 (%): 30 %  Rate Set (breaths/minute): 14 breaths/min  Tidal Volume Set (mL): 450 mL  PEEP (cm H2O): 5 cmH2O  Oxygen Concentration (%): 30 %  Resp: 19    Recent Labs   Lab 04/22/21  0425 04/21/21  1203 04/21/21  0845 04/20/21  1319 04/20/21  1030   PH 7.48*  --  7.54* 7.46*  --    PCO2 42  --  39 51*  --    PO2 115*  --  103 53*  --    HCO3 31*  --  33* 37*  --    O2PER 50% 60  --  80 VENOUS       GEN: no acute distress . intubated  HEENT: head ncat, sclera anicteric, OP patent, trachea midline   PULM: unlabored synchronous with vent, coarse anteriorly    CV/COR: RRR S1S2 no gallop,  No rub, no murmur  ABD: soft nontender, hypoactive bowel sounds, no mass  EXT:  1+ Edema   warm  NEURO: moving 4 extremities spontaneously , increased attention more interactive   SKIN: no obvious rash  LINES: clean, dry intact         Data:   All data and imaging reviewed     ROUTINE ICU LABS (Last four results)  CMP  Recent Labs   Lab 04/25/21  0500 04/24/21  0839 04/24/21  0500 04/23/21  1750 04/23/21  1549 04/23/21  0500 04/23/21  0500  04/22/21  1124 04/22/21  1124 04/21/21  0500 04/20/21  2100   * 147*  --   --   --   --  143  --  143 142 143   POTASSIUM 3.1* 3.4 3.1* 3.5  --    < > 3.0*   < > 3.1* 3.6 3.6   CHLORIDE 113* 113*  --   --   --   --  108  --  106 105 104   CO2 36* 32  --   --   --   --  32  --  33* 35* 35*   ANIONGAP <1* 2*  --   --   --   --  3  --  4 2* 4   * 141*  --   --  131*  --  128*  --  126* 89 84   BUN 33* 35*  --   --   --   --  29  --  31* 30 34*   CR 1.36* 1.44*  --   --   --   --  1.21*  --  1.21* 1.10* 1.14*   GFRESTIMATED 35* 33*  --   --   --   --  41*  --  41* 46* 44*   GFRESTBLACK 41* 38*  --   --   --   --  47*  --  47* 53* 51*   FROY 8.2* 7.9*  --   --   --   --  8.1*  --  8.2* 7.8* 7.9*   MAG  --   --  2.3 2.4*  --   --   --   --  2.3  --  1.9   PHOS  --   --   --   --   --   --   --   --  2.5  --   --    PROTTOTAL 6.2* 6.0*  --   --   --   --  6.0*  --   --  5.3*  --    ALBUMIN 2.2* 2.2*  --   --   --   --  2.4*  --   --  2.5*  --    BILITOTAL 0.5 0.5  --   --   --   --  0.8  --   --  0.9  --    ALKPHOS 110 112  --   --   --   --  112  --   --  65  --    AST 88* 102*  --   --   --   --  47*  --   --  39  --    * 95*  --   --   --   --  56*  --   --  34  --     < > = values in this interval not displayed.     CBC  Recent Labs   Lab 04/25/21  0500 04/24/21  0500 04/23/21  0500 04/22/21  1124   WBC 13.3* 16.6* 15.5* 14.7*   RBC 2.74* 2.68* 2.84* 2.89*   HGB 7.9* 7.8* 8.0* 8.1*   HCT 27.0* 26.2* 27.3* 27.4*   MCV 99 98 96 95   MCH 28.8 29.1 28.2 28.0   MCHC 29.3* 29.8* 29.3* 29.6*   RDW 17.2* 16.9* 16.5* 16.4*    214 196 196     INRNo lab results found in last 7 days.  Arterial Blood Gas  Recent Labs   Lab 04/22/21  0425 04/21/21  1203 04/21/21  0845 04/20/21  1319 04/20/21  1030   PH 7.48*  --  7.54* 7.46*  --    PCO2 42  --  39 51*  --    PO2 115*  --  103 53*  --    HCO3 31*  --  33* 37*  --    O2PER 50% 60  --  80 VENOUS       All cultures:  Recent Labs   Lab 04/23/21  1550  04/23/21  1530 04/23/21  1517 04/21/21  0050 04/20/21  2355 04/20/21  1800 04/20/21  1700   CULT Culture negative monitoring continues No growth after 2 days No growth after 2 days No growth after 4 days No growth after 4 days 10,000 to 50,000 colonies/mL  Klebsiella pneumoniae  *  50,000 to 100,000 colonies/mL  Enterococcus faecalis  *  <1000 colonies/mL  urogenital saji  Susceptibility testing not routinely done   Light growth  Normal saji       No results found for this or any previous visit (from the past 24 hour(s)).      Billing: This patient is critically ill: Yes. Total critical care time today 30 min.

## 2021-04-26 NOTE — PROGRESS NOTES
[facility] ICU RESPIRATORY NOTE     Date of Admission: 4/20/2021     Date of Intubation (most recent):  4/20/21     Reason for Mechanical Ventilation: AW protection     Number of Days on Mechanical Ventilation: 7     Met Criteria for Pressure Support Trial: yes         Significant Events Today: none overnight.     ABG Results: Results for LUIS ALBERTO RICHARD (MRN [id number]) as of 4/24/2021 05:31    Ref. Range 4/22/2021 04:25   pH Arterial Latest Ref Range: 7.35 - 7.45 pH 7.48 (H)   pCO2 Arterial Latest Ref Range: 35 - 45 mm Hg 42   PO2 Arterial Latest Ref Range: 80 - 105 mm Hg 115 (H)   Bicarbonate Arterial Latest Ref Range: 21 - 28 mmol/L 31 (H)   Base Excess Art Latest Units: mmol/L 6.5   FIO2 Unknown 50%   Oxyhemoglobin Arterial Latest Ref Range: 92 - 100 % 98      Current Vent Settings: Ventilation Mode: CMV/AC  (Continuous Mandatory Ventilation/ Assist Control)  FiO2 (%): 30 %  Rate Set (breaths/minute): 14 breaths/min  Tidal Volume Set (mL): 450 mL  PEEP (cm H2O): 5 cmH2O  Oxygen Concentration (%): 30 %        Plan:  Pt remains on full vent settings.        Patel Bailey, RT

## 2021-04-26 NOTE — PROGRESS NOTES
Palliative consult received, case reviewed with Dr. Pineda. Pt will be seen by our team tomorrow. Thanks.    WALT Mcdonnell [facility]  Contact information available via [facility] Paging/Directory

## 2021-04-26 NOTE — PROGRESS NOTES
CLINICAL NUTRITION SERVICES - REASSESSMENT NOTE      Malnutrition: (4/26)  % Weight Loss:  None noted  % Intake:  Decreased intake does not meet criteria for malnutrition (NPO x2 days p/t EN)   Subcutaneous Fat Loss:  Orbital region mild-moderate depletion, likely baseline (not using towards malnutrition dx at this time)   Muscle Loss:  Temporal region mild depletion, likely baseline (not using towards malnutrition dx at this time)   Fluid Retention:  None noted    Malnutrition Diagnosis: Patient does not meet two of the above criteria necessary for diagnosing malnutrition at this time        EVALUATION OF PROGRESS TOWARD GOALS   Diet:  NPO     Nutrition Support:  TF started at midnight 4/22 and goal rate achieved 4/23 as outlined below ~    Nutrition Support Enteral:  Type of Feeding Tube: OGT   Enteral Frequency:  Continuous  Enteral Regimen: TwoCal HN @ 30 mL/hr (720 mL/day)  Enteral Provisions: 1440 kcals (15 kcal/kg/day), 60 g PRO, 504 mL H2O, 158 g CHO and 4 g Fiber daily  - Add Prosource 1 packet TID - 120 kcals, 33 g Pro   Total Provisions: 1560 kcals (17 kcal/kg) and 93 gm protein (1.6 g/kg)   Free Water Flush: 100 mL q 4 hrs (per MD on 4/25)     Intake/Tolerance:    Labs reviewed: Na 150 (H), K 3.2 (L), Mg 2.5 (H), Phos 2 (L)  Recent Labs   Lab 04/26/21  1117 04/26/21  0741 04/26/21  0434 04/26/21  0428 04/25/21  2336 04/25/21  1951 04/25/21  1543 04/25/21  0500 04/25/21  0500 04/24/21  0839 04/24/21  0839 04/23/21  1549 04/23/21  1549 04/23/21  0500 04/23/21  0500 04/22/21  1124 04/22/21  1124   GLC  --   --   --  123*  --   --   --   --  118*  --  141*  --  131*  --  128*  --  126*   * 124* 88  --  131* 122* 133*   < >  --    < >  --    < >  --    < >  --    < >  --     < > = values in this interval not displayed.     Medications reviewed: Lasix, Certavite, Neutra-Phos, KLOR-CON, pressors off 4/23, no sedation   Stooling:   - 4/23 BM x3  - 4/24 BM x6  - 4/25 BM x6   - 4/26 RT placed, 400 mL stool  so far   Wt: 92.4 kg - wts trending down w/ diuretics   Vitals:    04/22/21 0000 04/23/21 0500 04/24/21 0130 04/25/21 0400   Weight: 100.8 kg (222 lb 3.6 oz) 100.2 kg (220 lb 14.4 oz) 97.7 kg (215 lb 6.2 oz) 96.3 kg (212 lb 4.9 oz)    04/26/21 0600   Weight: 92.4 kg (203 lb 11.3 oz)       Obtained diet history from daughter at bedside~  Pt eats well at home and enjoys many sweet treats such as ice cream   Her children have noticed pt gaining weight over the last few years  Daughter states that pt has poor muscle mass at baseline but she hasn't visually seen depletion in notable areas of the body acutely   We discussed high protein supplements available when pt is able to eat s/p extubation and daughter suggests pt would like Ensure shakes with ice cream     Partial Nutrition Focused Physical Exam completed       ASSESSED NUTRITION NEEDS: (updated)   Dosing Weight: 92.4 kg for energy, 56.8 kg IBW for protein  Estimated Energy Needs: 2440-3052 kcals (14-17 Kcal/Kg)  Justification: obese and vented  Estimated Protein Needs:  grams protein (1.5-2 g pro/Kg)  Justification: obesity guidelines   Estimated Fluid Needs: 1 ml/kcal      NEW FINDINGS:   Chart reviewed   PST again today, potential extubation discussed   Palliative consulted for GOC    Previous Goals:   TF to advance to goal within 36-48 hours.  Evaluation: Met    Previous Nutrition Diagnosis:   Inadequate protein-energy intake related to intubation and sedation as evidenced by patient NPO x 2 days, receiving 0 % of protein and energy needs.  Evaluation: Completed      MALNUTRITION (updated)  % Weight Loss:  None noted  % Intake:  Decreased intake does not meet criteria for malnutrition (NPO x2 days p/t EN)   Subcutaneous Fat Loss:  Orbital region mild-moderate depletion, likely baseline (not using towards malnutrition dx at this time)   Muscle Loss:  Temporal region mild depletion, likely baseline (not using towards malnutrition dx at this time)   Fluid  Retention:  None noted    Malnutrition Diagnosis: Patient does not meet two of the above criteria necessary for diagnosing malnutrition at this time       CURRENT NUTRITION DIAGNOSIS  No nutrition diagnosis identified at this time     INTERVENTIONS  Recommendations / Nutrition Prescription  Continue TF as ordered     Implementation  Collaboration and Referral of Nutrition care: patient was dicussed during ICU rounds     Goals  EN + Prosource to meet % estimated needs       MONITORING AND EVALUATION:  Progress towards goals will be monitored and evaluated per protocol and Practice Guidelines      Tania Zaragoza RD, LD  Clinical Dietitian

## 2021-04-26 NOTE — PLAN OF CARE
Pt extubated to Bipap at 1430. At 1515 nursing was at bedside and pt had sudden drop in O2 sats to 70%.  Bipap Fio2 increased to 100 % and staff assist called. Pt's sats improved on bipap. HR wide complex rhythm ?AIVR. Hypertensive. 2.5 metoprolol given. Daughter at bedside.  MD informed and at bedside. Cont to closely monitor.

## 2021-04-26 NOTE — PROGRESS NOTES
Critical Care  Note      04/26/2021    Name: Caitlin Sales MRN./#: [id number]   Age: 85 year old YOB: 1935     Hsptl Day# 6  ICU DAY #6    MV DAY #5             Problem List:   Active Problems:    Decompensated heart failure (H)           Summary/Hospital Course:     85 F has a past medical history of wide complex tachycardia, hyperlipidemia, CAD, diastolic CHF, hypertension, chronic respiratory failure, history of DVT on chronic anticoagulation, history of TIA, R vertebral artery stenosis, neuropathy, GIB, anemia,  obesity, osteoarthritis, chronic back pain, GERD, dementia recent hospitalization 4/10-4/16 presented back from  from nursing home with decrease LOC and hypoxemia ultimately requiring external pacing en route to ED.  She was intubated in ED, CVC placed and pressors started but still required external pacing to maintain hemodynamics.      On arrival to [facility] - pt tremulous on arrival, cool extremities.  Per EMS - difficult to sedate prior to transfer and during intubation, described sats 40-70% on RA prior to ED (unknown length of time with hypoxemia). Synchronous with vent paced at 70 externally, vasopressors running but labile. Pink frothy secretions from ETT. COVID neg from outside ED.    Pressor and pacing requirements resolved with correction of hypoxemia and acidosis. Urine cultures positive(<50K CFU) thus treated empirically. Slow to clear encephalopathy.     4/24 - failed SBT, waking up more but confused,         Interm History:     Patient pressure supporting this am; Tv 250 to 300  RSBI's 75 - 110  Patient off sedation and awake this am.  Patient remains hypernatremic  Tmax 100.4      Assessment and plan :     Caitlin Sales IS a 85 year old female admitted on 4/20/2021 for hypoxia, hypotension, bradycardia requiring pacing, & acute hypoxic respiratory failure.   I have personally reviewed the daily labs, imaging studies, cultures and discussed the case with referring  physician and consulting physicians.     My assessment and plan by system for this patient is as follows:    Neurology/Psychiatry:   1. Metabolic vs Toxic Encephalopathy, stable, new delirium  3. Hx of TIA  Plan  - APAP 650  - PRN fentanyl but avoid oversedation  - Seroquel 25 mg PO Q 4 hours prn agitation.  - consider change to depakote for delirium from seroquel BID, if qtc changes     Cardiovascular:   1. Symptomatic Bradycardia- resolved  2. Cardiogenic Shock, improved  3. HFpEF  4. Severe Pulmonary HTN.  5. CAD.  6. Hx of Wide Complex Tachycardia    Echo 4/20 Interpretation Summary     1. Left ventricular systolic function is normal. The visual ejection fraction  is estimated at 60-65%.  2. No regional wall motion abnormalities noted.  3. The right ventricle is severely dilated. Mildly decreased right ventricular  systolic function.  4. There is severe biatrial dilatation.  5. There is moderate (2+) tricuspid regurgitation.  6. Severe pulmonary hypertension; the right ventricular systolic pressure is  approximated at 55mmHg plus the right atrial pressure.  7. In comparison with the prior study dated 4/12/21, the RV now significantly  dilated and estimated RVSP is now severe.  Plan:  - follow hemodynamics, goal MAP >65  - continue diuresis; lasix goal -1 to 2 liters negative  (40 mg IVP Q 12 hours)  - Per Cards/EP patient may need permanent pacer for SSS, but unclear of timing, certainly need to clear any infection prior  - Hold Amiodarone.    Pulmonary/Ventilator Management:   1. Acute Hypoxic Respiratory Failure  2.  ? Hx of COPD?    - GPC on smear (4/20)    - Daughter states her mother was on home O2 in past    - Does not remember O2  rate.  Plan  - Daily SBT trials; possible extubation today  - fup secretion/sputum (light growth/normal saji 4/20)(rare Gm + cocci on Gm stain sputum 4/20)     GI and Nutrition :   1. Mild transaminitis - will continue to trend   2. Protonix 40 IVP Q day  3. Enteral  feedings  Plan  - Nutrition consulted, continue tube feedings   - Senna PO Q 12    Renal/Fluids/Electrolytes:   1. LORI on chronic kidney disease.  2. Primary Respiratory Alkalosis, Chronic, with secondary Met. Alkalosis  3. Hypernatremia in setting of diuresis  4, Volume status; down ~ 10L & 11.7kg since admission  Plan  - Follow I's & O's  - serial Na  - increase free water to 140 q4    Infectious Disease:   1. UTI. Polymicrobial , wbc downtrending (11.9)  2. Leukocytosis.  3. Low grade fever Tmax 100.4  Plan  - Sputum cultures normal sjai 4/20  - continue zosyn 5/7     Endocrine:   1. Hypothyroidism.  2. Stress induced hyperglycemia  Plan  - ICU insulin protocol, goal sugar <180  - Medium ISS  - continue levothyroxine    Hematology/Oncology:   1. Hx of DVT's S/P IVC Filter. On AC  2. Leukocytosis - persistent - trending down.   Plan  - serial CBC, coag   - continue heparin gtt, consider transition to lovenox to decrease volume     IV/Access:   1. Venous access - R IJ  2. Arterial access - No A line  Plan  - central access required and necessary      ICU Prophylaxis:   1. DVT: heparin Gtt, mechanical  2. VAP: HOB 30 degrees, chlorhexidine rinse  3. Stress Ulcer: PPI  4. Restraints: Nonviolent soft two point restraints required and necessary for patient safety and continued cares and good effect as patient continues to pull at necessary lines, tubes despite education and distraction. Will readdress daily.   5. Wound care  - consulted for pacer burn  6. Feeding - Continue.  7. Family Update:At bedside.  8. Disposition - ICU.        Key goals for next 24 hours:   1. Continue diuresis  2. SBT as  Tolerated 5/5   3. Increase free water           Key Medications:       chlorhexidine  15 mL Mouth/Throat Q12H     furosemide  40 mg Intravenous Q12H     insulin aspart  1-6 Units Subcutaneous Q4H     levothyroxine  75 mcg Oral or Feeding Tube QAM AC     multivitamins w/minerals  15 mL Per Feeding Tube Daily     pantoprazole   40 mg Per Feeding Tube QAM AC     piperacillin-tazobactam  3.375 g Intravenous Q6H     potassium & sodium phosphates  1 packet Oral or Feeding Tube BID     protein modular  1 packet Per Feeding Tube Q8H       dextrose       heparin 1,050 Units/hr (04/25/21 2108)     sodium chloride 10 mL/hr at 04/25/21 2108                 Physical Examination:   Temp:  [99  F (37.2  C)-100.4  F (38  C)] 99  F (37.2  C)  Pulse:  [53-73] 58  Resp:  [11-33] 16  BP: (119-177)/() 163/85  FiO2 (%):  [30 %] 30 %  SpO2:  [92 %-100 %] 97 %      Intake/Output Summary (Last 24 hours) at 4/25/2021 0809  Last data filed at 4/25/2021 0600  Gross per 24 hour   Intake 1722 ml   Output 2545 ml   Net -823 ml             Wt Readings from Last 4 Encounters:   04/26/21 92.4 kg (203 lb 11.3 oz)   04/19/21 120.2 kg (265 lb 1.6 oz)   04/19/21 120.2 kg (265 lb 1.6 oz)   04/13/21 100.7 kg (222 lb 0.1 oz)     BP - Mean:  [] 108  Ventilation Mode: CMV/AC  (Continuous Mandatory Ventilation/ Assist Control)  FiO2 (%): 30 %  Rate Set (breaths/minute): 14 breaths/min  Tidal Volume Set (mL): 450 mL  PEEP (cm H2O): 5 cmH2O  Pressure Support (cm H2O): 5 cmH2O  Oxygen Concentration (%): 30 %  Resp: 16    Recent Labs   Lab 04/22/21  0425 04/21/21  1203 04/21/21  0845 04/20/21  1319 04/20/21  1030   PH 7.48*  --  7.54* 7.46*  --    PCO2 42  --  39 51*  --    PO2 115*  --  103 53*  --    HCO3 31*  --  33* 37*  --    O2PER 50% 60  --  80 VENOUS       GEN: no acute distress . intubated  HEENT: head ncat, sclera anicteric, OP patent, trachea midline   PULM: unlabored synchronous with vent, coarse anteriorly    CV/COR: RRR S1S2 no gallop,  No rub, no murmur  ABD: soft nontender, hypoactive bowel sounds, no mass  EXT:  1+ Edema   warm  NEURO: moving 4 extremities spontaneously , increased attention more interactive   SKIN: no obvious rash  LINES: clean, dry intact         Data:   All data and imaging reviewed     ROUTINE ICU LABS (Last four results)  CMP  Recent  Labs   Lab 04/26/21  0428 04/25/21  2336 04/25/21  1901 04/25/21  1329 04/25/21  0500 04/25/21  0500 04/24/21  0839 04/24/21  0500 04/23/21  1750 04/23/21  1549 04/23/21  0500 04/23/21  0500 04/22/21  1124 04/22/21  1124   *  --   --   --   --  149* 147*  --   --   --   --  143  --  143   POTASSIUM 3.2* 3.2* 3.1* 3.0*   < > 3.1* 3.4 3.1* 3.5  --    < > 3.0*   < > 3.1*   CHLORIDE 114*  --   --   --   --  113* 113*  --   --   --   --  108  --  106   CO2 27  --   --   --   --  36* 32  --   --   --   --  32  --  33*   ANIONGAP 9  --   --   --   --  <1* 2*  --   --   --   --  3  --  4   *  --   --   --   --  118* 141*  --   --  131*  --  128*  --  126*   BUN 38*  --   --   --   --  33* 35*  --   --   --   --  29  --  31*   CR 1.61*  --   --   --   --  1.36* 1.44*  --   --   --   --  1.21*  --  1.21*   GFRESTIMATED 29*  --   --   --   --  35* 33*  --   --   --   --  41*  --  41*   GFRESTBLACK 34*  --   --   --   --  41* 38*  --   --   --   --  47*  --  47*   FROY 8.4*  --   --   --   --  8.2* 7.9*  --   --   --   --  8.1*  --  8.2*   MAG 2.5*  --   --   --   --   --   --  2.3 2.4*  --   --   --   --  2.3   PHOS 2.0*  --   --   --   --   --   --   --   --   --   --   --   --  2.5   PROTTOTAL 6.5*  --   --   --   --  6.2* 6.0*  --   --   --   --  6.0*  --   --    ALBUMIN 2.3*  --   --   --   --  2.2* 2.2*  --   --   --   --  2.4*  --   --    BILITOTAL 0.7  --   --   --   --  0.5 0.5  --   --   --   --  0.8  --   --    ALKPHOS 106  --   --   --   --  110 112  --   --   --   --  112  --   --    *  --   --   --   --  88* 102*  --   --   --   --  47*  --   --    *  --   --   --   --  101* 95*  --   --   --   --  56*  --   --     < > = values in this interval not displayed.     CBC  Recent Labs   Lab 04/26/21  0428 04/25/21  0500 04/24/21  0500 04/23/21  0500   WBC 11.9* 13.3* 16.6* 15.5*   RBC 2.90* 2.74* 2.68* 2.84*   HGB 8.2* 7.9* 7.8* 8.0*   HCT 27.9* 27.0* 26.2* 27.3*   MCV 96 99 98 96   MCH 28.3  28.8 29.1 28.2   MCHC 29.4* 29.3* 29.8* 29.3*   RDW 17.2* 17.2* 16.9* 16.5*    223 214 196     INRNo lab results found in last 7 days.  Arterial Blood Gas  Recent Labs   Lab 04/22/21  0425 04/21/21  1203 04/21/21  0845 04/20/21  1319 04/20/21  1030   PH 7.48*  --  7.54* 7.46*  --    PCO2 42  --  39 51*  --    PO2 115*  --  103 53*  --    HCO3 31*  --  33* 37*  --    O2PER 50% 60  --  80 VENOUS       All cultures:  Recent Labs   Lab 04/23/21  1550 04/23/21  1530 04/23/21  1517 04/21/21  0050 04/20/21  2355 04/20/21  1800 04/20/21  1700   CULT Light growth  Normal saji   No growth after 3 days No growth after 3 days No growth after 5 days No growth after 5 days 10,000 to 50,000 colonies/mL  Klebsiella pneumoniae  *  50,000 to 100,000 colonies/mL  Enterococcus faecalis  *  <1000 colonies/mL  urogenital saji  Susceptibility testing not routinely done   Light growth  Normal saji       No results found for this or any previous visit (from the past 24 hour(s)).      Billing: This patient is critically ill: Yes. Total critical care time today 30 min.

## 2021-04-26 NOTE — PROGRESS NOTES
Pt extubated to Bipap 14/6 50% per MD order, prior to extubation, pt suctioned. Breath sound is course, no stridor heard, No complication

## 2021-04-26 NOTE — PLAN OF CARE
Assumed care 3-7 pm    Episode of hypoxia, sats in 60s, BiPAP increased to 100%. Several PVC's noted. BP > 200. Given metoprolol. Less responsive now. JADYN. Not following commands. Daughter expresses plans for going comfort care. VSS now. Remains on BIPAP @ 100%. Will continue to monitor.

## 2021-04-26 NOTE — PLAN OF CARE
Pt alert and intermittently follows commands. Restless at times and becomes anxious/agitated with cares. Improves with calm, step by step approach. Continues on vent support. Lungs coarse/crackles with moderate oral secretions. Plan to pressure support again today. HR sylvia. Low dose PRN Metoprolol ordered and given x1 for SBP >160. Continues Heparin gtt. Adequate UOP with Lasix.

## 2021-04-27 LAB
BACTERIA SPEC CULT: NO GROWTH
BACTERIA SPEC CULT: NO GROWTH
INTERPRETATION ECG - MUSE: NORMAL
SPECIMEN SOURCE: NORMAL
SPECIMEN SOURCE: NORMAL

## 2021-04-27 NOTE — PROGRESS NOTES
Called to pronounce death.  No cardiac activity on monitor.  No heart sounds on 60 seconds auscultation.  No pupillary reponse.  No response to painful stimulus.  Time of death 21:36.  Family at bedside.

## 2021-04-27 NOTE — PROVIDER NOTIFICATION
0: Family at bedside with patient. Pt resting on 100% BiPAP transitioning to comfort care. Waiting for more family to come see patient before removing BiPAP. Dr. Pineda also came to bedside to visit with family.    : [employer] called. Referral # [id number]. Pt does not meet donor criteria d/t dementia diagnosis per Kelley from [employer].    : BiPAP removed to RA. Family at bedside, declined  support at this time.     : Pt  with family at bedside. No BP, HR, RR, or response to stimuli. MD and [employer] updated.     : Kelley from [employer] called and stated pt could be candidate for research for eyes and training for tissue. Lavantece to call family.     0: Pt transferred to [facility] with security. 2 gold band rings sent with pt in labeled personal belonging bag.

## 2021-04-27 NOTE — DISCHARGE SUMMARY
Physician Discharge Summary     Patient ID:  Caitlin Sales  [id number]  85 year old  1935    Admit date: 2021    Discharge date and time: 21    Admitting Physician: Rio Garcia MD     Discharge Physician: New Mccann MD    Admission Diagnoses: Decompensated heart failure (H) [I50.9]    Discharge Diagnoses: Decompensated heart failure,   symptomatic bradycardia,   sick sinus syndrome,   acute hypoxemic respiratory failure requiring mechanical ventilation,  acute metabolic encephalopathy/delirium,   cardiogenic shock,   h/o severe pulmonary hypertension,   h/o coronary artery disease,   acute on chronic kidney injury,   hypernatremia     Admission Condition: critical    Discharged Condition:     Indication for Admission: see admission/discharge diagnoses    Hospital Course: Ms. Sales is an 84 yo F with complex medical history who presented from her nursing facility on  with complaints of encephalopathy and hypoxemia.  She was found to be bradycardic requiring initiation of external pacing, and in respiratory failure requiring intubation.  She had a six day hospital course during which time she struggled to recover from her respiratory failure, and she had wavering hemodynamics due to cardiogenic shock.  Given her advanced age, underlying medical problems, and severity of present illness, it was felt that she would have a long and difficult recovery from this illness, without good odds that she would again reach her baseline quality of life.  Her family felt that continued aggressive cares would not be in line with her wishes and she was transitioned to comfort measures only.  She  a short time later.    Consults: cardiology, electrophysiology    Significant Diagnostic Studies: see above    Treatments: see above    Disposition:     Signed:  New Mccann MD  2021  9:43 PM

## 2021-04-28 LAB — INTERPRETATION ECG - MUSE: NORMAL

## 2021-04-29 LAB
BACTERIA SPEC CULT: NO GROWTH
BACTERIA SPEC CULT: NO GROWTH
Lab: NORMAL
SPECIMEN SOURCE: NORMAL
SPECIMEN SOURCE: NORMAL

## 2023-02-07 NOTE — PROGRESS NOTES
Alert and oriented, forgetful.   Nasal cannula at 1 % with saturation 92-93%. Did not attempt to wean overnight.  Pure wick in place. Urine output slowing down.   Slept comfortably between cares.      81-year-old female with history of hypertension, hypothyroidism, hyperlipidemia, diabetes, recent hospitalizations for acute respiratory failure secondary to RSV pneumonia in December 2022 requiring chronic O2 (3 to 4 L via nasal cannula) and in January 2023 for entero/rhinovirus here with 2 days of cough and fatigue.  Patient noted to have cough productive of clear mucus for the past 2 days associated with body aches and generalized fatigue.  Patient also reporting right-sided chest pain when she coughs today.  No associated fever, chills, back pain, abdominal pain, vomiting, diarrhea, leg pain or swelling.    Chronically ill appearing, lying in stretcher, awake and alert, nontoxic.  AF/VSS.  No hypoxia (on homne O2 4L).  Lungs cta bl, trace coarse bs.  Cards nl S1/S2, RRR, no MRG.  Abd soft ntnd.  No pedal edema or calf tenderness.  No unilateral calf swelling.    Symptoms are concerning for underlying infection–viral versus bacterial respiratory infection.  Also consider underlying chronic pulmonary disease given multiple recent respiratory admissions with nodules and upper lobe opacities on recent CT (patient had declined invasive evaluations at the time).  Also consider cardiac etiology, no signs and symptoms of DVT or shortness of breath to suggest PE.  Plan for EKG, labs, RVP, chest x-ray, reassess.

## 2024-09-06 NOTE — PROGRESS NOTES
SPIRITUAL HEALTH SERVICES Progress Note  [facility] ICU    On-call  received request from ICU staff that family requests visit as pt is moving to comfort care. I responded as pt is on palliative care list for consultation.    I introduced myself to pt's dtr Gale and offered reflective listening and affirmation of feelings, experience and meaning as she told me about her mom's health journey, she and her siblings' discernment of going to comfort care for pt, and her close relationship with her mom. Gale asked that I pray with pt and I offered her prayer and words of our care and support for her and her family. Pt is not able to respond. I shared time with Gale after prayer and also offered her our care and support and that SH remains available to them.         Chanelle Beck  Chaplain Resident     Detail Level: Detailed Depth Of Biopsy: dermis Was A Bandage Applied: Yes Size Of Lesion In Cm: 0 Biopsy Type: H and E Biopsy Method: Dermablade Anesthesia Type: 1% lidocaine with epinephrine Anesthesia Volume In Cc: 0.5 Hemostasis: Drysol Wound Care: Petrolatum Dressing: bandage Destruction After The Procedure: No Type Of Destruction Used: Curettage Curettage Text: The wound bed was treated with curettage after the biopsy was performed. Cryotherapy Text: The wound bed was treated with cryotherapy after the biopsy was performed. Electrodesiccation Text: The wound bed was treated with electrodesiccation after the biopsy was performed. Electrodesiccation And Curettage Text: The wound bed was treated with electrodesiccation and curettage after the biopsy was performed. Silver Nitrate Text: The wound bed was treated with silver nitrate after the biopsy was performed. Lab: [id number] Lab Facility: [id number] Consent: Written consent was obtained and risks were reviewed including but not limited to scarring, infection, bleeding, scabbing, incomplete removal, nerve damage and allergy to anesthesia. Post-Care Instructions: I reviewed with the patient in detail post-care instructions. Patient is to keep the biopsy site dry overnight, and then apply bacitracin twice daily until healed. Patient may apply hydrogen peroxide soaks to remove any crusting. Notification Instructions: Patient will be notified of biopsy results. However, patient instructed to call the office if not contacted within 2 weeks. Billing Type: Third-Party Bill Information: Selecting Yes will display possible errors in your note based on the variables you have selected. This validation is only offered as a suggestion for you. PLEASE NOTE THAT THE VALIDATION TEXT WILL BE REMOVED WHEN YOU FINALIZE YOUR NOTE. IF YOU WANT TO FAX A PRELIMINARY NOTE YOU WILL NEED TO TOGGLE THIS TO 'NO' IF YOU DO NOT WANT IT IN YOUR FAXED NOTE.

## 2024-10-04 NOTE — PATIENT INSTRUCTIONS
1. Urgent referral to Cardiology (1-2 weeks)  2. Urgent referral for breathing tests (2 weeks hopefully) and Sleep clinic (1-2 months)  3. See Dr. Zacarias next Thursday 6/4, labs 1 hour prior to visit.  4. Labs and xray today  
Oriented - self; Oriented - place; Oriented - time